# Patient Record
Sex: MALE | Race: WHITE | NOT HISPANIC OR LATINO | Employment: OTHER | URBAN - METROPOLITAN AREA
[De-identification: names, ages, dates, MRNs, and addresses within clinical notes are randomized per-mention and may not be internally consistent; named-entity substitution may affect disease eponyms.]

---

## 2017-05-13 RX ORDER — SIMVASTATIN 80 MG
TABLET ORAL
Qty: 90 TABLET | Refills: 4 | Status: CANCELLED | OUTPATIENT
Start: 2017-05-13

## 2017-05-20 RX ORDER — SIMVASTATIN 80 MG
TABLET ORAL
Qty: 90 TABLET | Refills: 4 | Status: CANCELLED | OUTPATIENT
Start: 2017-05-13

## 2017-05-23 ENCOUNTER — OFFICE VISIT (OUTPATIENT)
Dept: CARDIOLOGY | Facility: CLINIC | Age: 72
End: 2017-05-23

## 2017-05-23 VITALS
SYSTOLIC BLOOD PRESSURE: 117 MMHG | HEART RATE: 55 BPM | BODY MASS INDEX: 28.85 KG/M2 | DIASTOLIC BLOOD PRESSURE: 67 MMHG | WEIGHT: 232 LBS | HEIGHT: 75 IN

## 2017-05-23 DIAGNOSIS — I25.10 CORONARY ARTERY DISEASE INVOLVING NATIVE CORONARY ARTERY OF NATIVE HEART WITHOUT ANGINA PECTORIS: Primary | ICD-10-CM

## 2017-05-23 DIAGNOSIS — I10 ESSENTIAL HYPERTENSION: ICD-10-CM

## 2017-05-23 DIAGNOSIS — I63.9 CEREBROVASCULAR ACCIDENT (CVA), UNSPECIFIED MECHANISM (HCC): ICD-10-CM

## 2017-05-23 DIAGNOSIS — E78.00 HYPERCHOLESTEROLEMIA: ICD-10-CM

## 2017-05-23 PROCEDURE — 99212 OFFICE O/P EST SF 10 MIN: CPT | Performed by: INTERNAL MEDICINE

## 2017-05-23 RX ORDER — OXYBUTYNIN CHLORIDE 5 MG/1
TABLET ORAL
Refills: 0 | COMMUNITY
Start: 2017-05-10 | End: 2020-06-11

## 2017-05-23 RX ORDER — LEVOTHYROXINE SODIUM 0.03 MG/1
25 TABLET ORAL DAILY
Refills: 0 | COMMUNITY
Start: 2017-03-02

## 2017-05-23 RX ORDER — AMLODIPINE BESYLATE 5 MG/1
5 TABLET ORAL DAILY
Refills: 0 | COMMUNITY
Start: 2017-04-04 | End: 2019-06-04 | Stop reason: SDUPTHER

## 2017-08-30 ENCOUNTER — TRANSCRIBE ORDERS (OUTPATIENT)
Dept: ADMINISTRATIVE | Facility: HOSPITAL | Age: 72
End: 2017-08-30

## 2017-08-30 DIAGNOSIS — I10 ESSENTIAL HYPERTENSION, MALIGNANT: Primary | ICD-10-CM

## 2017-08-30 DIAGNOSIS — E03.9 UNSPECIFIED HYPOTHYROIDISM: ICD-10-CM

## 2017-11-09 ENCOUNTER — TRANSCRIBE ORDERS (OUTPATIENT)
Dept: ADMINISTRATIVE | Facility: HOSPITAL | Age: 72
End: 2017-11-09

## 2017-11-09 ENCOUNTER — HOSPITAL ENCOUNTER (OUTPATIENT)
Dept: GENERAL RADIOLOGY | Facility: HOSPITAL | Age: 72
Discharge: HOME | End: 2017-11-09
Attending: INTERNAL MEDICINE | Admitting: INTERNAL MEDICINE

## 2017-11-09 DIAGNOSIS — M25.552 ISCHIAL PAIN, LEFT: Primary | ICD-10-CM

## 2017-11-09 PROCEDURE — 72170 X-RAY EXAM OF PELVIS: CPT

## 2018-05-29 ENCOUNTER — OFFICE VISIT (OUTPATIENT)
Dept: CARDIOLOGY | Facility: CLINIC | Age: 73
End: 2018-05-29

## 2018-05-29 VITALS
HEART RATE: 55 BPM | HEIGHT: 75 IN | SYSTOLIC BLOOD PRESSURE: 128 MMHG | BODY MASS INDEX: 28.97 KG/M2 | DIASTOLIC BLOOD PRESSURE: 72 MMHG | WEIGHT: 233 LBS

## 2018-05-29 DIAGNOSIS — I10 ESSENTIAL HYPERTENSION: ICD-10-CM

## 2018-05-29 DIAGNOSIS — I63.9 CEREBROVASCULAR ACCIDENT (CVA), UNSPECIFIED MECHANISM (HCC): ICD-10-CM

## 2018-05-29 DIAGNOSIS — I25.10 CORONARY ARTERY DISEASE INVOLVING NATIVE CORONARY ARTERY OF NATIVE HEART WITHOUT ANGINA PECTORIS: Primary | ICD-10-CM

## 2018-05-29 DIAGNOSIS — E78.00 HYPERCHOLESTEROLEMIA: ICD-10-CM

## 2018-05-29 PROCEDURE — 99214 OFFICE O/P EST MOD 30 MIN: CPT | Performed by: INTERNAL MEDICINE

## 2018-05-29 NOTE — PROGRESS NOTES
"  OFFICE FOLLOW UP     Date of Encounter:2018     Name: Travon Plummer  : 1945  Address: [address] [place], [place] [address]  Phone: [phone]    PCP: Chilango Erickson MD  [address] [place], [place] [id number]    Travon Plummer is a 73 y.o. male.      Chief Complaint: Follow up of CAD    Problem List:   1. Coronary artery disease.  a. Dyspnea/abnormal MPS, 2008:  Inferior wall ischemia, EF 70%:  1. Left heart catheterization by Dr. Gonzalez, 12/15/2008:  Normal LV.  2. Subtotal occlusion of well collateralized right JEREMIAS.  3. A 3.5 x 13 mm. Cypher ESTIVEN to RCA expanded with 4 mm balloon.   2. Cerebrovascular accident with expressive aphasia, 10/31/2008:  a. PTA/left carotid artery stent, 10/31/2008.   b. No obstructive disease by duplex, 2010.  c. Carotid duplex, 11/10/2014, Petros Crsos MD, revealed patent left carotid stent and no evidence of significant stenosis in the right ICA.  3. History of hypertension; hypotensive at the time of office visit on 11/10/2015.  4. Hypercholesterolemia.   5. History of paroxysmal atrial fibrillation, data deficit.  6. Tobacco/chewing abuse.   7. Gout.  8. Osteoarthritis.  9. Gastroesophageal reflux disease.  10. Obesity.  11. Acute Pancreatitis, 2016.  12. Prostate cancer, diagnosed 2015, status post radiation therapy x39 treatments, 2015 at [facility].  13. Renal cell carcinoma, diagnosed 2015, status post left nephrectomy.  Elevated creatinine of 2.1 on labs performed 2015. Creatinine 1.3 2015. \"Spots on lung\" and chest treated with radiation, 2016.  14. Surgical history:  a. Inguinal hernia.   b. Colon polypectomy    Allergies   Allergen Reactions   • Lipitor [Atorvastatin]        Current Medications:  •  allopurinol 300 mg by mouth 2 (Two) Times a Day.  •  amLODIPine (NORVASC) 5 MG by mouth daily  •  aspirin 81 MG EC by mouth Daily  •  carvedilol 3.125 MG by mouth 2 " "(Two) Times a Day With Meals  •  levothyroxine 25 MCG by mouth once daily  •  oxybutynin 5 MG by mouth twice a day  •  pantoprazole 40 MG EC BY MOUTH ONCE DAILY  •  simvastatin 80 MG by mouth once every day  •  tamsulosin 0.4 MG by mouth Daily.    History of Present Illness:  The patient returns for followup today.  Over the last year he has apparently been treated for some \"metastatic\" renal cell cancer lesions that include a pulmonary lesion and a possible skin lesion on his chest.  There is as well now, per his wife's history, a possible bone lesion.  He has not had angina.  He has not had symptoms of TIA or CVA.  Home blood pressures have been \"normal\".           The following portions of the patient's history were reviewed and updated as appropriate: allergies, current medications and problem list.    ROS: Pertinent positives as listed in the HPI.  All other systems reviewed and negative.    Objective:    Vitals:    05/29/18 1203 05/29/18 1207   BP: 140/82 128/72   BP Location: Right arm Right arm   Patient Position: Sitting Standing   Pulse: (!) 49 55   Weight: 106 kg (233 lb)    Height: 190.5 cm (75\")        Physical Exam:  GENERAL: Alert, cooperative, in no acute distress.   HEENT: Fundoscopic deferred, otherwise unremarkable.  NECK: No Jugular venous distention, adenopathy, or thyromegaly noted.   HEART: Regular rhythm, normal rate, and no murmurs, gallops, or rubs.   LUNGS: Clear to auscultation bilaterally. No wheezing, rales or ronchi.  ABDOMEN: Flat without evidence of organomegaly, masses, or tenderness.  NEUROLOGIC: No focal abnormalities involving strength or sensation are noted.   EXTREMITIES: No clubbing, cyanosis, or edema noted.     Diagnostic Data:  No new labs available to review.    Procedures      Assessment and Plan:   1.  CAD: Asymptomatic.  Best medical treatment will be continued.  2.  HTN: Historically, BPs are normal. Will \"watch\" only for now.  3.  HLD:  No changes at this time; " recheck cholesterol per primary care. Target LDL is <70.    I, Raul Gonzalez MD, [facility], [facility], personally performed the services described in this documentation as scribed by the above named individual in my presence, and it is both accurate and complete. At 1:58 PM on 05/29/2018    I will see Travon Plummer back in one year or sooner on an as needed basis.        Scribed for Raul Gonzalez MD by Sandra Flores RN. 05/29/2018 12:14 PM.        EMR Dragon/Transcription Disclaimer:  Much of this encounter note is an electronic transcription/translation of spoken language to printed text.  The electronic translation of spoken language may permit erroneous, or at times, nonsensical words or phrases to be inadvertently transcribed.  Although I have reviewed the note for such errors, some may still exist.

## 2018-12-26 ENCOUNTER — TRANSCRIBE ORDERS (OUTPATIENT)
Dept: ADMINISTRATIVE | Facility: HOSPITAL | Age: 73
End: 2018-12-26

## 2018-12-26 ENCOUNTER — HOSPITAL ENCOUNTER (OUTPATIENT)
Dept: GENERAL RADIOLOGY | Facility: HOSPITAL | Age: 73
Discharge: HOME | End: 2018-12-26
Attending: INTERNAL MEDICINE | Admitting: INTERNAL MEDICINE

## 2018-12-26 DIAGNOSIS — J18.9 PNEUMONIA OF LOWER LOBE DUE TO INFECTIOUS ORGANISM, UNSPECIFIED LATERALITY: Primary | ICD-10-CM

## 2018-12-26 DIAGNOSIS — J18.9 PNEUMONIA OF LOWER LOBE DUE TO INFECTIOUS ORGANISM, UNSPECIFIED LATERALITY: ICD-10-CM

## 2018-12-26 PROCEDURE — 71046 X-RAY EXAM CHEST 2 VIEWS: CPT

## 2019-04-16 ENCOUNTER — HOSPITAL ENCOUNTER (OUTPATIENT)
Dept: GENERAL RADIOLOGY | Facility: HOSPITAL | Age: 74
Discharge: HOME | End: 2019-04-16

## 2019-04-16 ENCOUNTER — HOSPITAL ENCOUNTER (OUTPATIENT)
Dept: GENERAL RADIOLOGY | Facility: HOSPITAL | Age: 74
Discharge: HOME | End: 2019-04-16
Admitting: INTERNAL MEDICINE

## 2019-04-16 ENCOUNTER — TRANSCRIBE ORDERS (OUTPATIENT)
Dept: GENERAL RADIOLOGY | Facility: HOSPITAL | Age: 74
End: 2019-04-16

## 2019-04-16 DIAGNOSIS — M79.631 PAIN OF RIGHT FOREARM: ICD-10-CM

## 2019-04-16 DIAGNOSIS — M25.531 RIGHT WRIST PAIN: ICD-10-CM

## 2019-04-16 DIAGNOSIS — M25.531 RIGHT WRIST PAIN: Primary | ICD-10-CM

## 2019-04-16 PROCEDURE — 73090 X-RAY EXAM OF FOREARM: CPT

## 2019-04-16 PROCEDURE — 73110 X-RAY EXAM OF WRIST: CPT

## 2019-06-04 ENCOUNTER — OFFICE VISIT (OUTPATIENT)
Dept: CARDIOLOGY | Facility: CLINIC | Age: 74
End: 2019-06-04

## 2019-06-04 VITALS
HEIGHT: 74 IN | HEART RATE: 64 BPM | WEIGHT: 231 LBS | SYSTOLIC BLOOD PRESSURE: 143 MMHG | BODY MASS INDEX: 29.65 KG/M2 | DIASTOLIC BLOOD PRESSURE: 105 MMHG

## 2019-06-04 DIAGNOSIS — I25.10 CORONARY ARTERY DISEASE INVOLVING NATIVE CORONARY ARTERY OF NATIVE HEART WITHOUT ANGINA PECTORIS: ICD-10-CM

## 2019-06-04 DIAGNOSIS — E78.00 HYPERCHOLESTEROLEMIA: ICD-10-CM

## 2019-06-04 DIAGNOSIS — I63.9 CEREBROVASCULAR ACCIDENT (CVA), UNSPECIFIED MECHANISM (HCC): ICD-10-CM

## 2019-06-04 DIAGNOSIS — I10 ESSENTIAL HYPERTENSION: Primary | ICD-10-CM

## 2019-06-04 PROCEDURE — 99214 OFFICE O/P EST MOD 30 MIN: CPT | Performed by: INTERNAL MEDICINE

## 2019-06-04 RX ORDER — AMLODIPINE BESYLATE 10 MG/1
10 TABLET ORAL DAILY
Qty: 90 TABLET | Refills: 3 | Status: SHIPPED | OUTPATIENT
Start: 2019-06-04 | End: 2019-12-26

## 2019-06-04 NOTE — PROGRESS NOTES
"  OFFICE FOLLOW UP     Date of Encounter:2019     Name: Travon Plummer  : 1945  Address: [address] [place], [place] [address]    PCP: Chilango Erickson MD  [address] [id number]    Travon Plummer is a 74 y.o. male.      Chief Complaint: Follow up of CAD, CVA, HTN, HLD    Problem List:   1. Coronary artery disease.  a. Dyspnea/abnormal MPS, 2008:  Inferior wall ischemia, EF 70%:  1. Left heart catheterization by Dr. Gonzalez, 12/15/2008:  Normal LV.  2. Subtotal occlusion of well collateralized right JEREMIAS.  3. A 3.5 x 13 mm. Cypher ESTIVEN to RCA expanded with 4 mm balloon.   2. Cerebrovascular accident with expressive aphasia, 10/31/2008:  a. PTA/left carotid artery stent, 10/31/2008.   b. No obstructive disease by duplex, 2010.  c. Carotid duplex, 11/10/2014, Petros Cross MD, revealed patent left carotid stent and no evidence of significant stenosis in the right ICA.  3. History of hypertension; hypotensive at the time of office visit on 11/10/2015.  4. Hypercholesterolemia.   5. History of paroxysmal atrial fibrillation, data deficit.  6. Tobacco/chewing abuse.   7. Gout.  8. Osteoarthritis.  9. Gastroesophageal reflux disease.  10. Obesity.  11. Acute Pancreatitis, 2016.  12. Prostate cancer, diagnosed 2015, status post radiation therapy x39 treatments, 2015 at [facility].  13. Renal cell carcinoma, diagnosed 2015, status post left nephrectomy.  Elevated creatinine of 2.1 on labs performed 2015. Creatinine 1.3 2015. \"Spots on lung\" and chest treated with radiation, 2016.  14. Surgical history:  a. Inguinal hernia.   b. Colon polypectomy  Allergies:  Allergies   Allergen Reactions   • Lipitor [Atorvastatin]        Current Medications:  •  allopurinol (ZYLOPRIM) 300 MG tablet, Take 300 mg by mouth 2 (Two) Times a Day  •  amLODIPine (NORVASC) 5 MG tablet, Take 5 mg by mouth Daily.  •  aspirin 81 MG EC " "tablet, Take 81 mg by mouth Daily.   •  carvedilol (COREG) 3.125 MG tablet, Take 3.125 mg by mouth 2 (Two) Times a Day With Meals.  •  levothyroxine (SYNTHROID, LEVOTHROID) 25 MCG tablet, take 1 tablet by mouth once daily  •  oxybutynin (DITROPAN) 5 MG tablet, take 1 tablet by mouth daily  •  pantoprazole (PROTONIX) 40 MG EC tablet, TAKE 1 TABLET BY MOUTH ONCE DAILY  •  simvastatin (ZOCOR) 80 MG tablet, Take 1 tablet by mouth once every day    History of Present Illness: Mr. Plummer returns for follow-up today.  He has had no angina or symptoms of congestive heart failure.  He has had no symptoms or findings suggesting TIA  or stroke. He is fairly sedentary but remains asymptomatic.         The following portions of the patient's history were reviewed and updated as appropriate: allergies, current medications and problem list.    ROS: Pertinent positives as listed in the HPI.  All other systems reviewed and negative.    Objective:    Vitals:    06/04/19 1305 06/04/19 1307   BP: 147/85 (!) 143/105   BP Location: Left arm Left arm   Patient Position: Sitting Standing   Pulse: 61 64   Weight: 105 kg (231 lb)    Height: 188 cm (74\")        Physical Exam:  GENERAL: Alert, cooperative, in no acute distress.   HEENT: Normocephalic, no adenopathy, no jugular venous distention  HEART: No discrete PMI is noted. Regular rhythm, normal rate, and no murmurs, gallops, or rubs.   LUNGS: Clear to auscultation bilaterally. No wheezing, rales or ronchi.  ABDOMEN: Soft, bowel sounds present, non-tender   NEUROLOGIC: No focal abnormalities involving strength or sensation are noted.   EXTREMITIES: No clubbing, cyanosis, or edema noted.     Diagnostic Data:  No new labs available to review.      Procedures      Assessment and Plan:   1.  CAD: NYHA I on BMT.  2.  HTN: Not at goal at home or in office. I'll increase amlodipine from 5 to 10 mg p.o. daily  3.  HLD:  The target LDL is <70.    I, Raul [facility] MD, [facility], " personally performed the services described in this documentation as scribed by the above named individual in my presence, and it is both accurate and complete. At 6:15 PM on 06/04/2019    I will see Travon Plummer back in one year or sooner on an as needed basis.        Scribed for Raul Gonzalez MD by Sandra Flores RN. 06/04/2019 12:16 PM.        EMR Dragon/Transcription Disclaimer:  Much of this encounter note is an electronic transcription/translation of spoken language to printed text.  The electronic translation of spoken language may permit erroneous, or at times, nonsensical words or phrases to be inadvertently transcribed.  Although I have reviewed the note for such errors, some may still exist.

## 2019-06-17 ENCOUNTER — TRANSCRIBE ORDERS (OUTPATIENT)
Dept: ADMINISTRATIVE | Facility: HOSPITAL | Age: 74
End: 2019-06-17

## 2019-06-17 ENCOUNTER — HOSPITAL ENCOUNTER (OUTPATIENT)
Dept: GENERAL RADIOLOGY | Facility: HOSPITAL | Age: 74
Discharge: HOME | End: 2019-06-17
Admitting: INTERNAL MEDICINE

## 2019-06-17 DIAGNOSIS — M25.552 LEFT HIP PAIN: Primary | ICD-10-CM

## 2019-06-17 PROCEDURE — 73502 X-RAY EXAM HIP UNI 2-3 VIEWS: CPT

## 2019-06-25 ENCOUNTER — TRANSCRIBE ORDERS (OUTPATIENT)
Dept: ADMINISTRATIVE | Facility: HOSPITAL | Age: 74
End: 2019-06-25

## 2019-06-25 ENCOUNTER — HOSPITAL ENCOUNTER (OUTPATIENT)
Dept: CT IMAGING | Facility: HOSPITAL | Age: 74
Discharge: HOME | End: 2019-06-25
Admitting: ORTHOPAEDIC SURGERY

## 2019-06-25 DIAGNOSIS — M25.552 LEFT HIP PAIN: Primary | ICD-10-CM

## 2019-06-25 DIAGNOSIS — M25.552 LEFT HIP PAIN: ICD-10-CM

## 2019-06-25 PROCEDURE — 73700 CT LOWER EXTREMITY W/O DYE: CPT

## 2019-12-26 RX ORDER — AMLODIPINE BESYLATE 10 MG/1
TABLET ORAL
Qty: 30 TABLET | Refills: 6 | Status: SHIPPED | OUTPATIENT
Start: 2019-12-26 | End: 2020-01-06

## 2020-01-06 RX ORDER — AMLODIPINE BESYLATE 10 MG/1
TABLET ORAL
Qty: 30 TABLET | Refills: 6 | Status: SHIPPED | OUTPATIENT
Start: 2020-01-06 | End: 2020-09-08

## 2020-04-23 ENCOUNTER — HOSPITAL ENCOUNTER (EMERGENCY)
Facility: HOSPITAL | Age: 75
Discharge: HOME | End: 2020-04-23
Attending: EMERGENCY MEDICINE | Admitting: EMERGENCY MEDICINE

## 2020-04-23 VITALS
WEIGHT: 238.2 LBS | HEART RATE: 74 BPM | SYSTOLIC BLOOD PRESSURE: 150 MMHG | HEIGHT: 74 IN | DIASTOLIC BLOOD PRESSURE: 83 MMHG | BODY MASS INDEX: 30.57 KG/M2 | RESPIRATION RATE: 19 BRPM | TEMPERATURE: 97.6 F | OXYGEN SATURATION: 97 %

## 2020-04-23 DIAGNOSIS — S05.02XA CORNEAL ABRASION, LEFT, INITIAL ENCOUNTER: Primary | ICD-10-CM

## 2020-04-23 PROCEDURE — 99284 EMERGENCY DEPT VISIT MOD MDM: CPT

## 2020-04-23 RX ORDER — ERYTHROMYCIN 5 MG/G
OINTMENT OPHTHALMIC EVERY 6 HOURS
Qty: 3.5 G | Refills: 0 | Status: SHIPPED | OUTPATIENT
Start: 2020-04-23 | End: 2020-04-28

## 2020-04-23 RX ORDER — TETRACAINE HYDROCHLORIDE 5 MG/ML
2 SOLUTION OPHTHALMIC ONCE
Status: COMPLETED | OUTPATIENT
Start: 2020-04-23 | End: 2020-04-23

## 2020-04-23 RX ORDER — ERYTHROMYCIN 5 MG/G
1 OINTMENT OPHTHALMIC ONCE
Status: COMPLETED | OUTPATIENT
Start: 2020-04-23 | End: 2020-04-23

## 2020-04-23 RX ADMIN — ERYTHROMYCIN 1 APPLICATION: 5 OINTMENT OPHTHALMIC at 22:09

## 2020-04-23 RX ADMIN — TETRACAINE HYDROCHLORIDE 2 DROP: 5 SOLUTION OPHTHALMIC at 22:10

## 2020-04-23 RX ADMIN — FLUORESCEIN SODIUM 1 STRIP: 1 STRIP OPHTHALMIC at 22:10

## 2020-04-24 NOTE — ED PROVIDER NOTES
"Subjective   Patient presents with complaint of some left eye discomfort seen at an urgent treatment center yesterday given antibiotic eyedrops states still having some discomfort in the left eye apparently was mowing 3 days ago and felt like he \"got something in the left eye.  No loss of vision describes some kind of blurred vision at times he does have some history of myopia no chest pain shortness of air no fever chills no systemic complaints      History provided by:  Patient      Review of Systems   Constitutional: Negative.    HENT: Negative.    Eyes: Positive for photophobia and pain.   Respiratory: Negative.    Cardiovascular: Negative.    Gastrointestinal: Negative.    Musculoskeletal: Positive for arthralgias.   Skin: Negative.    Neurological: Negative.    Psychiatric/Behavioral: Negative.    All other systems reviewed and are negative.      Past Medical History:   Diagnosis Date   • Broken arm    • Cerebrovascular accident (CMS/HCC) 10/31/2008   • Coronary artery disease    • Dyspnea    • GERD (gastroesophageal reflux disease)    • Gout    • Hypercholesterolemia    • Hypertension 11/10/2015    History of hypertension; hypotensive at the time of office visit on 11/10/2015.   • Obesity    • Osteoarthritis    • Paroxysmal atrial fibrillation (CMS/HCC)     History of paroxysmal atrial fibrillation, data deficit.   • Prostate cancer (CMS/HCC)     Prostate cancer, diagnosed March of 2015, status post radiation therapy x39 treatments, 07/01/2015 at [facility].   • Renal cell carcinoma (CMS/HCC)     Renal cell carcinoma, diagnosed March of 2015, status post left nephrectomy.  Elevated creatinine of 2.1 on labs performed 11/04/2015.       Allergies   Allergen Reactions   • Lipitor [Atorvastatin]        Past Surgical History:   Procedure Laterality Date   • CAROTID STENT Left 10/31/2008    PTA/left carotid artery stent, 10/31/2008.    • COLONOSCOPY W/ POLYPECTOMY     • CORONARY ANGIOPLASTY WITH STENT " PLACEMENT      A 3.5 x 13 mm. Cypher ESTIVEN to RCA expanded with 4 mm balloon.    • INGUINAL HERNIA REPAIR     • NEPHRECTOMY Left     Renal cell carcinoma, diagnosed 2015, status post left nephrectomy.  Elevated creatinine of 2.1 on labs performed 2015.   • PROSTATE SURGERY         Family History   Problem Relation Age of Onset   • No Known Problems Mother    • No Known Problems Father        Social History     Socioeconomic History   • Marital status:      Spouse name: Not on file   • Number of children: Not on file   • Years of education: Not on file   • Highest education level: Not on file   Tobacco Use   • Smoking status: Former Smoker     Types: Cigarettes     Last attempt to quit:      Years since quittin.3   • Smokeless tobacco: Former User     Types: Chew     Quit date: 2017   Substance and Sexual Activity   • Alcohol use: No   • Drug use: No   • Sexual activity: Defer           Objective   Physical Exam   Constitutional: He appears well-developed and well-nourished.   Well-appearing nontoxic acute distress   HENT:   Head: Normocephalic and atraumatic.   Eyes: Pupils are equal, round, and reactive to light. Conjunctivae and EOM are normal. Right eye exhibits no discharge. Left eye exhibits no discharge.   No orbital edema or erythema   Neck: Normal range of motion.   Cardiovascular: Normal rate and regular rhythm.   Pulmonary/Chest: Effort normal.   Musculoskeletal: Normal range of motion.   Neurological: He is alert. No cranial nerve deficit or sensory deficit. He exhibits normal muscle tone. Coordination normal.   Skin: Skin is warm and dry. Capillary refill takes less than 2 seconds.   Psychiatric: He has a normal mood and affect. His behavior is normal. Judgment and thought content normal.   Nursing note and vitals reviewed.      Foreign Body Removal - Ocular  Date/Time: 2020 9:43 PM  Performed by: Escobar Bob PA-C  Authorized by: Cuate Faith DO      Consent:     Consent obtained:  Verbal    Consent given by:  Patient  Pre-procedure details:     OS visual acuity:  20/50    OD visual acuity:  20/70    Correction: uncorrected      Fluorescein exam: yes      Fluorescein uptake: yes      Corneal abrasion description:  Central punctate area    Corneal abrasion location:  Central  Anesthesia (see MAR for exact dosages):     Local anesthetic:  Tetracaine drops  Procedure details:     Localization method:  Eyelid eversion and slit lamp    Removal mechanism:  Irrigation    Foreign bodies recovered:  None  Post-procedure details:     Dressing:  Antibiotic ointment  Comments:      Exam with slit lamp left eye, appears to be a small defect central cornea, seen evaluated by myself and Dr. Faith... Small punctate lesion, also some irritation to the limbus left sclera  Aubrey-Pen use eye pressure7/8/9 left               ED Course  ED Course as of Apr 23 2148   Thu Apr 23, 2020 2142 Pt seen and examined by myself and Dr faith  Plan on discharging patient home with erythromycin ophthalmic ointment recommend follow-up with ophthalmology [facility] here in town tomorrow to return here if there is any problem follow-up and worsen concerns patient is doing great with plan of care    [SC]      ED Course User Index  [SC] Escobar Bob PA-C                                           MDM  Number of Diagnoses or Management Options     Amount and/or Complexity of Data Reviewed  Review and summarize past medical records: yes  Discuss the patient with other providers: yes    Risk of Complications, Morbidity, and/or Mortality  Presenting problems: moderate  Diagnostic procedures: low  Management options: low        Final diagnoses:   Corneal abrasion, left, initial encounter            Escobar Bob PA-C  04/23/20 2148

## 2020-04-24 NOTE — DISCHARGE INSTRUCTIONS
Use eye ointment as directed, follow-up with ophthalmology tomorrow, return to ER for any problem with follow-up, worsening symptoms

## 2020-06-11 ENCOUNTER — OFFICE VISIT (OUTPATIENT)
Dept: CARDIOLOGY | Facility: CLINIC | Age: 75
End: 2020-06-11

## 2020-06-11 VITALS
SYSTOLIC BLOOD PRESSURE: 111 MMHG | DIASTOLIC BLOOD PRESSURE: 67 MMHG | HEIGHT: 75 IN | HEART RATE: 63 BPM | TEMPERATURE: 97 F | WEIGHT: 226.6 LBS | BODY MASS INDEX: 28.17 KG/M2

## 2020-06-11 DIAGNOSIS — I65.22 CAROTID STENOSIS, LEFT: ICD-10-CM

## 2020-06-11 DIAGNOSIS — I63.9 CEREBROVASCULAR ACCIDENT (CVA), UNSPECIFIED MECHANISM (HCC): ICD-10-CM

## 2020-06-11 DIAGNOSIS — I25.10 CORONARY ARTERY DISEASE INVOLVING NATIVE CORONARY ARTERY OF NATIVE HEART WITHOUT ANGINA PECTORIS: ICD-10-CM

## 2020-06-11 DIAGNOSIS — I10 ESSENTIAL HYPERTENSION: Primary | ICD-10-CM

## 2020-06-11 DIAGNOSIS — E78.00 HYPERCHOLESTEROLEMIA: ICD-10-CM

## 2020-06-11 PROCEDURE — 99214 OFFICE O/P EST MOD 30 MIN: CPT | Performed by: INTERNAL MEDICINE

## 2020-06-11 NOTE — PROGRESS NOTES
"  OFFICE FOLLOW UP     Date of Encounter:2020     Name: Travon Plummer  : 1945  Address: [address] [place], [place] [id number]    PCP: Chilango Erickson MD  [address] [id number]    Travon Plummer is a 75 y.o. male.      Chief Complaint: Follow up of CAD, CVD, HTN, HLD    Problem List:   1. Coronary artery disease.  a. Dyspnea/abnormal MPS, 2008:  Inferior wall ischemia, EF 70%:  1. Left heart catheterization by Dr. Gonzalez, 12/15/2008:  Normal LV.  2. Subtotal occlusion of well collateralized right JEREMIAS.  3. A 3.5 x 13 mm. Cypher ESTIVEN to RCA expanded with 4 mm balloon.   2. Cerebrovascular accident with expressive aphasia, 10/31/2008:  a. PTA/left carotid artery stent, 10/31/2008.   b. No obstructive disease by duplex, 2010.  c. Carotid duplex, 11/10/2014, Petros Cross MD, revealed patent left carotid stent and no evidence of significant stenosis in the right ICA.  3. History of hypertension; hypotensive at the time of office visit on 11/10/2015.  4. Hypercholesterolemia.   5. History of paroxysmal atrial fibrillation, data deficit.  6. Tobacco/chewing abuse.   7. Gout.  8. Osteoarthritis.  9. Gastroesophageal reflux disease.  10. Obesity.  11. Acute Pancreatitis, 2016.  12. Prostate cancer, diagnosed 2015, status post radiation therapy x39 treatments, 2015 at [facility].  13. Renal cell carcinoma, diagnosed 2015, status post left nephrectomy.  Elevated creatinine of 2.1 on labs performed 2015. Creatinine 1.3 2015. \"Spots on lung\" and chest treated with radiation, 2016.  14. Surgical history:  a. Inguinal hernia.   b. Colon polypectomy    Allergies:  Allergies   Allergen Reactions   • Lipitor [Atorvastatin] Myalgia       Current Medications:   •  allopurinol (ZYLOPRIM) 300 MG tablet, Take 300 mg by mouth Daily.  •  amLODIPine (NORVASC) 10 MG tablet, TAKE ONE TABLET BY MOUTH EVERY DAY  •  aspirin 81 " "MG EC tablet, Take 81 mg by mouth Daily.  •  carvedilol (COREG) 3.125 MG tablet, Take 3.125 mg by mouth 2 (Two) Times a Day With Meals.  •  levothyroxine (SYNTHROID, LEVOTHROID) 25 MCG tablet, take 1 tablet by mouth once daily  •  Mirabegron ER (Myrbetriq) 50 MG tablet sustained-release 24 hour 24 hr tablet, Take 50 mg by mouth Daily  •  pantoprazole (PROTONIX) 40 MG EC tablet, TAKE 1 TABLET BY MOUTH ONCE DAILY  •  simvastatin (ZOCOR) 80 MG tablet, Take 1 tablet by mouth once every day    History of Present Illness:   Travon Plummer returns for follow up today. He has not had angina, heart failure or recurrent neurovascular symptoms. He follows closely with Dr. Erickson for primary care. 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    The following portions of the patient's history were reviewed and updated as appropriate: allergies, current medications and problem list.    ROS: Pertinent positives as listed in the HPI.  All other systems reviewed and negative.    Objective:    Vitals:    06/11/20 1106 06/11/20 1108   BP: 118/81 111/67   BP Location: Left arm Left arm   Patient Position: Sitting Standing   Pulse: 61 63   Temp: 97 °F (36.1 °C)    Weight: 103 kg (226 lb 9.6 oz)    Height: 190.5 cm (75\")        Physical Exam:  GENERAL: Alert, cooperative, in no acute distress.   HEENT: Normocephalic, no adenopathy, no jugular venous distention  HEART: No discrete PMI is noted. Regular rhythm, normal rate, and no murmurs, gallops, or rubs.   LUNGS: Clear to auscultation bilaterally. No wheezing, rales or ronchi.  ABDOMEN: Soft, bowel sounds present, non-tender   NEUROLOGIC: No focal abnormalities involving strength or sensation are noted.   EXTREMITIES: No clubbing, cyanosis, or edema noted.      Diagnostic Data:  No new labs available to review.     Procedures      Assessment and Plan:   1.  CAD: No angina, continue aspirin and statin.  2.  CVD: Repeat carotid duplex to check stent patency at his " convenience. He has not had recurrent neurovascular symptoms.   3.  HLD:  Followed by Dr. Erickson, continue statin.   4.  HTN: Well controlled, continue current medications.     I will see Travon Plummer back in one year or sooner on an as needed basis.  Scribed for Raul Gonzalez MD by Sandra Flores RN. 06/11/2020 11:53 AM.      EMR Dragon/Transcription Disclaimer:  Much of this encounter note is an electronic transcription/translation of spoken language to printed text.  The electronic translation of spoken language may permit erroneous, or at times, nonsensical words or phrases to be inadvertently transcribed.  Although I have reviewed the note for such errors, some may still exist.

## 2020-06-15 ENCOUNTER — HOSPITAL ENCOUNTER (OUTPATIENT)
Dept: CARDIOLOGY | Facility: HOSPITAL | Age: 75
Discharge: HOME | End: 2020-06-15
Admitting: INTERNAL MEDICINE

## 2020-06-15 VITALS — WEIGHT: 226 LBS | BODY MASS INDEX: 28.1 KG/M2 | HEIGHT: 75 IN

## 2020-06-15 DIAGNOSIS — I65.22 CAROTID STENOSIS, LEFT: ICD-10-CM

## 2020-06-15 PROCEDURE — 93880 EXTRACRANIAL BILAT STUDY: CPT | Performed by: INTERNAL MEDICINE

## 2020-06-15 PROCEDURE — 93880 EXTRACRANIAL BILAT STUDY: CPT

## 2020-06-16 LAB
BH CV ECHO MEAS - BSA(HAYCOCK): 2.3 M^2
BH CV ECHO MEAS - BSA: 2.3 M^2
BH CV ECHO MEAS - BZI_BMI: 28.2 KILOGRAMS/M^2
BH CV ECHO MEAS - BZI_METRIC_HEIGHT: 190.5 CM
BH CV ECHO MEAS - BZI_METRIC_WEIGHT: 102.5 KG
BH CV MID LEFT ICA HIDDEN LRR: 1 CM
BH CV VAS CAROTID LEFT DISTAL STENT EDV: 26.3 CM/S
BH CV VAS CAROTID LEFT DISTAL STENT: 75.5 CM/S
BH CV VAS CAROTID LEFT DISTAL TO STENT EDV: 24.6 CM/S
BH CV VAS CAROTID LEFT DISTAL TO STENT: 82.1 CM/S
BH CV VAS CAROTID LEFT MID STENT EDV: 22.8 CM/S
BH CV VAS CAROTID LEFT MID STENT: 78.1 CM/S
BH CV VAS CAROTID LEFT PROXIMAL STENT EDV: 7.79 CM/S
BH CV VAS CAROTID LEFT PROXIMAL STENT: 36.7 CM/S
BH CV VAS CAROTID LEFT PROXIMAL TO STENT: 50.8 CM/S
BH CV VAS CAROTID LEFT STENT NATIVE VESSEL PROXIMAL ED: 10.9 CM/S
BH CV XLRA MEAS LEFT DIST CCA EDV: 13.6 CM/SEC
BH CV XLRA MEAS LEFT DIST CCA PSV: 62.8 CM/SEC
BH CV XLRA MEAS LEFT DIST ICA EDV: 20.6 CM/SEC
BH CV XLRA MEAS LEFT DIST ICA PSV: 64.4 CM/SEC
BH CV XLRA MEAS LEFT ICA/CCA RATIO: 1.31
BH CV XLRA MEAS LEFT MID CCA EDV: 16.7 CM/SEC
BH CV XLRA MEAS LEFT MID CCA PSV: 62.8 CM/SEC
BH CV XLRA MEAS LEFT MID ICA EDV: 24.6 CM/SEC
BH CV XLRA MEAS LEFT MID ICA PSV: 82.1 CM/SEC
BH CV XLRA MEAS LEFT PROX CCA EDV: 15.5 CM/SEC
BH CV XLRA MEAS LEFT PROX CCA PSV: 68.3 CM/SEC
BH CV XLRA MEAS LEFT PROX ECA EDV: 9.3 CM/SEC
BH CV XLRA MEAS LEFT PROX ECA PSV: 73.2 CM/SEC
BH CV XLRA MEAS LEFT PROX ICA EDV: 12.9 CM/SEC
BH CV XLRA MEAS LEFT PROX ICA PSV: 42.3 CM/SEC
BH CV XLRA MEAS LEFT PROX SCLA EDV: 0 CM/SEC
BH CV XLRA MEAS LEFT PROX SCLA PSV: 181 CM/SEC
BH CV XLRA MEAS LEFT VERTEBRAL A EDV: 13 CM/SEC
BH CV XLRA MEAS LEFT VERTEBRAL A PSV: 40.3 CM/SEC
BH CV XLRA MEAS RIGHT DIST CCA EDV: 9.8 CM/SEC
BH CV XLRA MEAS RIGHT DIST CCA PSV: 36.7 CM/SEC
BH CV XLRA MEAS RIGHT DIST ICA EDV: 21.1 CM/SEC
BH CV XLRA MEAS RIGHT DIST ICA PSV: 57.1 CM/SEC
BH CV XLRA MEAS RIGHT ICA/CCA RATIO: 5.9
BH CV XLRA MEAS RIGHT MID CCA EDV: 10.2 CM/SEC
BH CV XLRA MEAS RIGHT MID CCA PSV: 52.9 CM/SEC
BH CV XLRA MEAS RIGHT MID ICA EDV: 13.2 CM/SEC
BH CV XLRA MEAS RIGHT MID ICA PSV: 64.1 CM/SEC
BH CV XLRA MEAS RIGHT PROX CCA EDV: 12.4 CM/SEC
BH CV XLRA MEAS RIGHT PROX CCA PSV: 72.1 CM/SEC
BH CV XLRA MEAS RIGHT PROX ECA EDV: 16.5 CM/SEC
BH CV XLRA MEAS RIGHT PROX ECA PSV: 118.4 CM/SEC
BH CV XLRA MEAS RIGHT PROX ICA EDV: 92.5 CM/SEC
BH CV XLRA MEAS RIGHT PROX ICA PSV: 312 CM/SEC
BH CV XLRA MEAS RIGHT PROX SCLA EDV: 16.5 CM/SEC
BH CV XLRA MEAS RIGHT PROX SCLA PSV: 159.8 CM/SEC
BH CV XLRA MEAS RIGHT VERTEBRAL A EDV: 15.1 CM/SEC
BH CV XLRA MEAS RIGHT VERTEBRAL A PSV: 53.6 CM/SEC
BH CVPROX LEFT ICA HIDDEN LRR: 1 CM
LEFT ARM BP: NORMAL MMHG
RIGHT ARM BP: NORMAL MMHG

## 2020-06-23 DIAGNOSIS — R73.09 OTHER ABNORMAL GLUCOSE: ICD-10-CM

## 2020-06-23 DIAGNOSIS — E78.00 HYPERCHOLESTEROLEMIA: ICD-10-CM

## 2020-06-23 DIAGNOSIS — I10 ESSENTIAL HYPERTENSION: ICD-10-CM

## 2020-06-23 DIAGNOSIS — I65.21 CAROTID STENOSIS, ASYMPTOMATIC, RIGHT: Primary | ICD-10-CM

## 2020-06-24 ENCOUNTER — LAB (OUTPATIENT)
Dept: LAB | Facility: HOSPITAL | Age: 75
End: 2020-06-24

## 2020-06-24 ENCOUNTER — RESEARCH ENCOUNTER (OUTPATIENT)
Dept: OTHER | Facility: OTHER | Age: 75
End: 2020-06-24

## 2020-06-24 DIAGNOSIS — R73.09 OTHER ABNORMAL GLUCOSE: ICD-10-CM

## 2020-06-24 DIAGNOSIS — I10 ESSENTIAL HYPERTENSION: ICD-10-CM

## 2020-06-24 DIAGNOSIS — E78.00 HYPERCHOLESTEROLEMIA: ICD-10-CM

## 2020-06-24 DIAGNOSIS — I65.21 CAROTID STENOSIS, ASYMPTOMATIC, RIGHT: ICD-10-CM

## 2020-06-24 LAB
ALBUMIN SERPL-MCNC: 4.4 G/DL (ref 3.5–5.2)
ALBUMIN/GLOB SERPL: 1.6 G/DL
ALP SERPL-CCNC: 85 U/L (ref 39–117)
ALT SERPL W P-5'-P-CCNC: 11 U/L (ref 1–41)
ANION GAP SERPL CALCULATED.3IONS-SCNC: 10.5 MMOL/L (ref 5–15)
AST SERPL-CCNC: 15 U/L (ref 1–40)
BILIRUB SERPL-MCNC: 0.3 MG/DL (ref 0.2–1.2)
BUN BLD-MCNC: 31 MG/DL (ref 8–23)
BUN/CREAT SERPL: 11.8 (ref 7–25)
CALCIUM SPEC-SCNC: 9.8 MG/DL (ref 8.6–10.5)
CHLORIDE SERPL-SCNC: 104 MMOL/L (ref 98–107)
CHOLEST SERPL-MCNC: 163 MG/DL (ref 0–200)
CK SERPL-CCNC: 103 U/L (ref 20–200)
CO2 SERPL-SCNC: 22.5 MMOL/L (ref 22–29)
CREAT BLD-MCNC: 2.63 MG/DL (ref 0.76–1.27)
DEPRECATED RDW RBC AUTO: 46.7 FL (ref 37–54)
ERYTHROCYTE [DISTWIDTH] IN BLOOD BY AUTOMATED COUNT: 14.3 % (ref 12.3–15.4)
GFR SERPL CREATININE-BSD FRML MDRD: 24 ML/MIN/1.73
GLOBULIN UR ELPH-MCNC: 2.8 GM/DL
GLUCOSE BLD-MCNC: 103 MG/DL (ref 65–99)
HBA1C MFR BLD: 5.93 % (ref 4.8–5.6)
HCT VFR BLD AUTO: 37.8 % (ref 37.5–51)
HDLC SERPL-MCNC: 38 MG/DL (ref 40–60)
HGB BLD-MCNC: 12.5 G/DL (ref 13–17.7)
LDLC SERPL CALC-MCNC: 83 MG/DL (ref 0–100)
LDLC/HDLC SERPL: 2.19 {RATIO}
MCH RBC QN AUTO: 30.1 PG (ref 26.6–33)
MCHC RBC AUTO-ENTMCNC: 33.1 G/DL (ref 31.5–35.7)
MCV RBC AUTO: 91.1 FL (ref 79–97)
PLATELET # BLD AUTO: 175 10*3/MM3 (ref 140–450)
PMV BLD AUTO: 12 FL (ref 6–12)
POTASSIUM BLD-SCNC: 5.5 MMOL/L (ref 3.5–5.2)
PROT SERPL-MCNC: 7.2 G/DL (ref 6–8.5)
RBC # BLD AUTO: 4.15 10*6/MM3 (ref 4.14–5.8)
SODIUM BLD-SCNC: 137 MMOL/L (ref 136–145)
TRIGL SERPL-MCNC: 209 MG/DL (ref 0–150)
VLDLC SERPL-MCNC: 41.8 MG/DL (ref 5–40)
WBC NRBC COR # BLD: 7.91 10*3/MM3 (ref 3.4–10.8)

## 2020-06-24 PROCEDURE — 36415 COLL VENOUS BLD VENIPUNCTURE: CPT

## 2020-06-24 PROCEDURE — 85027 COMPLETE CBC AUTOMATED: CPT

## 2020-06-24 PROCEDURE — 82550 ASSAY OF CK (CPK): CPT

## 2020-06-24 PROCEDURE — 80061 LIPID PANEL: CPT

## 2020-06-24 PROCEDURE — 80053 COMPREHEN METABOLIC PANEL: CPT

## 2020-06-24 PROCEDURE — 83036 HEMOGLOBIN GLYCOSYLATED A1C: CPT

## 2020-06-24 NOTE — RESEARCH
[facility] Crest 2 Baseline Randomization  Pt agreed and consented to Crest 2 . Randomized to IMM alone.  Pt denies baseline MRA but is agreeable to end of study MRI  Pt and wife informed .  Baseline vital signs obtained  1130 131/72 53  1133 133/69 52  1136 130/70 52  Average 131/70 52  Standing 133/72 59  No B/P  Medication changes at this time will wait till 30 day f/u  MRS 1 (pt had contralateral CVA 2008 when tired speech will slurrat times)  NIHSS 0  MRJ informed . Awaiting lab results . Aspirin changed to 325mg daily per protocol.

## 2020-06-25 ENCOUNTER — DOCUMENTATION (OUTPATIENT)
Dept: CARDIOLOGY | Facility: CLINIC | Age: 75
End: 2020-06-25

## 2020-06-26 RX ORDER — ROSUVASTATIN CALCIUM 40 MG/1
40 TABLET, COATED ORAL NIGHTLY
Qty: 30 TABLET | Refills: 11 | Status: SHIPPED | OUTPATIENT
Start: 2020-06-26 | End: 2021-07-01

## 2020-06-26 NOTE — PROGRESS NOTES
Per MRJ review of labs  · Needs ASAP referral to nephrology   · Change simvastatin to rosuvastatin 40 mg daily at bedtime.

## 2020-07-09 ENCOUNTER — TELEPHONE (OUTPATIENT)
Dept: CARDIOLOGY | Facility: CLINIC | Age: 75
End: 2020-07-09

## 2020-07-09 NOTE — TELEPHONE ENCOUNTER
Confirmed nephrology appt with patient. Pt verbalizes understanding and all questions were answered at this time.

## 2020-07-09 NOTE — TELEPHONE ENCOUNTER
----- Message from Nba Valverde RN sent at 7/9/2020 11:24 AM EDT -----      ----- Message -----  From: Laura Lao RN  Sent: 7/8/2020   2:40 PM EDT  To: Sandra Flores, MIROSLAVA, Marcela Cruz RN    Patient called stating he still hasn't heard anything from the nephrologist about an appt. His wife is asking a call back at [phone] regarding this appt. Making sure they haven't missed anything.

## 2020-08-03 ENCOUNTER — LAB (OUTPATIENT)
Dept: LAB | Facility: HOSPITAL | Age: 75
End: 2020-08-03

## 2020-08-03 ENCOUNTER — RESEARCH ENCOUNTER (OUTPATIENT)
Dept: OTHER | Facility: OTHER | Age: 75
End: 2020-08-03

## 2020-08-03 DIAGNOSIS — E78.00 HYPERCHOLESTEROLEMIA: Primary | ICD-10-CM

## 2020-08-03 DIAGNOSIS — E78.00 HYPERCHOLESTEROLEMIA: ICD-10-CM

## 2020-08-03 LAB
CHOLEST SERPL-MCNC: 133 MG/DL (ref 0–200)
HDLC SERPL-MCNC: 34 MG/DL (ref 40–60)
LDLC SERPL CALC-MCNC: 58 MG/DL (ref 0–100)
LDLC/HDLC SERPL: 1.69 {RATIO}
TRIGL SERPL-MCNC: 207 MG/DL (ref 0–150)
VLDLC SERPL-MCNC: 41.4 MG/DL (ref 5–40)

## 2020-08-03 PROCEDURE — 36415 COLL VENOUS BLD VENIPUNCTURE: CPT

## 2020-08-03 PROCEDURE — 80061 LIPID PANEL: CPT

## 2020-08-03 NOTE — RESEARCH
08/03/2020 Crest 2 44 day f/u  Pt seen in our office. Denies any S&S of CVA/TIA.  1003 132/75 61  1006 134/68 57  1009 134/67 57  Average 133/70 57  Pt last creatinine was 2.6 . Is seeing Nephrologist next Thurasday. ELSA wants to wait to see what nephrology has to say before changing any medication. Awaiting Lipid profile results from today .  NIHSS 0  MRS 1 ( had contralateral CVA prior to enrollment and when gets very tired speech sometimes becomes slurred)  Pt agreeable to b/p recheck 08/31/2020 10 am our office.

## 2020-08-31 ENCOUNTER — RESEARCH ENCOUNTER (OUTPATIENT)
Dept: OTHER | Facility: OTHER | Age: 75
End: 2020-08-31

## 2020-08-31 NOTE — RESEARCH
08/31/2020 [facility] C[facility] 2 B/P recheck  Here for B/P recheck   0955 116/63 HR 57  0958 119/59 HR 56  1001 120/60 HR 56  Average 118/60 HR 56  NIHSS 0 MRS 1   No c/o's . Saw Nephrologist is having ultrasound this Thursday 09/03/2020.  Agrees to 4 month follow up 10/27/2020 @ 10am

## 2020-09-08 RX ORDER — AMLODIPINE BESYLATE 10 MG/1
TABLET ORAL
Qty: 90 TABLET | Refills: 3 | Status: SHIPPED | OUTPATIENT
Start: 2020-09-08 | End: 2021-09-21

## 2020-09-23 ENCOUNTER — TRANSCRIBE ORDERS (OUTPATIENT)
Dept: ADMINISTRATIVE | Facility: HOSPITAL | Age: 75
End: 2020-09-23

## 2020-09-23 DIAGNOSIS — N18.5 CHRONIC KIDNEY DISEASE, STAGE V (HCC): Primary | ICD-10-CM

## 2020-09-29 ENCOUNTER — TRANSCRIBE ORDERS (OUTPATIENT)
Dept: ADMINISTRATIVE | Facility: HOSPITAL | Age: 75
End: 2020-09-29

## 2020-10-01 ENCOUNTER — HOSPITAL ENCOUNTER (OUTPATIENT)
Dept: ULTRASOUND IMAGING | Facility: HOSPITAL | Age: 75
Discharge: HOME | End: 2020-10-01
Admitting: INTERNAL MEDICINE

## 2020-10-01 DIAGNOSIS — N18.5 CHRONIC KIDNEY DISEASE, STAGE V (HCC): ICD-10-CM

## 2020-10-01 PROCEDURE — 76775 US EXAM ABDO BACK WALL LIM: CPT

## 2020-10-27 ENCOUNTER — RESEARCH ENCOUNTER (OUTPATIENT)
Dept: OTHER | Facility: OTHER | Age: 75
End: 2020-10-27

## 2020-10-27 ENCOUNTER — TELEPHONE (OUTPATIENT)
Dept: CARDIOLOGY | Facility: CLINIC | Age: 75
End: 2020-10-27

## 2020-10-27 RX ORDER — FERROUS SULFATE 325(65) MG
325 TABLET ORAL
COMMUNITY

## 2020-10-27 RX ORDER — UBIDECARENONE 75 MG
100 CAPSULE ORAL DAILY
COMMUNITY

## 2020-10-27 NOTE — TELEPHONE ENCOUNTER
----- Message from Marcela Cruz RN sent at 10/27/2020 12:27 PM EDT -----  Regarding: Kavitha meds  He stopped his allupurinol and they added iron tab and vit b12 did not know doses.

## 2020-10-27 NOTE — RESEARCH
10/27/2020 Research 4 mn Crest 2 f/u  wgt 224.6lbs  0954 125/68 66  0957 132/67 51  1000 132/71 51  Average 129/68 51  Nonsmoker   NIHSS 0  MRS1 (same as baseline)Exercising twice a week at gym.   Has been to Dr Lees he stopped his allupurinol  added iron and Vit B 12 . Is also watching his sodium intake.  No S&S of TIA/CVA  No hospitalizations.   MRJ informed of visit. No changes at this time. LDL was at targer at 44 day f/u.   Agrees to 8mn f/u March 1 2021

## 2021-02-25 ENCOUNTER — TELEPHONE (OUTPATIENT)
Dept: ICU | Facility: HOSPITAL | Age: 76
End: 2021-02-25

## 2021-03-01 ENCOUNTER — RESEARCH ENCOUNTER (OUTPATIENT)
Dept: OTHER | Facility: OTHER | Age: 76
End: 2021-03-01

## 2021-03-01 VITALS
SYSTOLIC BLOOD PRESSURE: 120 MMHG | DIASTOLIC BLOOD PRESSURE: 76 MMHG | BODY MASS INDEX: 27.56 KG/M2 | HEART RATE: 53 BPM | WEIGHT: 220.5 LBS

## 2021-03-01 NOTE — RESEARCH
03/01/2021      Travon TRISH Kavitha  1945      CREST-2  8mn FOLLOWUP    R ICA STENOSIS    ARM OF STUDY:  PERICO IMM alone      Current Medications    Current Outpatient Medications:   •  amLODIPine (NORVASC) 10 MG tablet, TAKE ONE TABLET BY MOUTH EVERY DAY, Disp: 90 tablet, Rfl: 3  •  aspirin 81 MG EC tablet, Take 81 mg by mouth Daily., Disp: , Rfl:   •  carvedilol (COREG) 3.125 MG tablet, Take 3.125 mg by mouth 2 (Two) Times a Day With Meals., Disp: , Rfl:   •  ferrous sulfate 325 (65 FE) MG tablet, Take 325 mg by mouth Daily With Breakfast., Disp: , Rfl:   •  levothyroxine (SYNTHROID, LEVOTHROID) 25 MCG tablet, take 1 tablet by mouth once daily, Disp: , Rfl: 0  •  Mirabegron ER (Myrbetriq) 50 MG tablet sustained-release 24 hour 24 hr tablet, Take 50 mg by mouth Daily., Disp: , Rfl:   •  pantoprazole (PROTONIX) 40 MG EC tablet, TAKE 1 TABLET BY MOUTH ONCE DAILY, Disp: 30 tablet, Rfl: 11  •  rosuvastatin (CRESTOR) 40 MG tablet, Take 1 tablet by mouth Every Night., Disp: 30 tablet, Rfl: 11  •  vitamin B-12 (CYANOCOBALAMIN) 100 MCG tablet, Take 100 mcg by mouth Daily., Disp: , Rfl:     Vital signs:  AVERAGE BP(right arm sitting - study device): 124/72  Standing(if indicated):  Heart Rate:52  Weight: 220.5  lbs  Vitals:    03/01/21 1040 03/01/21 1043 03/01/21 1046   BP: 130/76 123/70 120/76   BP Location: Right arm     Patient Position: Sitting     Pulse: 53 52 53   Weight: 100 kg (220 lb 8 oz)         NIHSS/mrS: 0/1    RECENT LABS:  Lab Results   Component Value Date    CHOL 133 08/03/2020    TRIG 207 (H) 08/03/2020    HDL 34 (L) 08/03/2020    LDL 58 08/03/2020     Lab Results   Component Value Date    CREATININE 2.63 (H) 06/24/2020     Lab Results   Component Value Date    K 5.5 (H) 06/24/2020       CAROTID DUPLEX  03/01/2021        ORDERS AND MEDICATION CHANGES:B/P within target MRJ informed of visit. No changes made. Will see Dr Sellers 04/19/21 for his kidneys.   Not exercising as much as usual due to covid  precautions  No tobacco products ETOH monthly or less  NO AE's   No CVA/TIA S&S reported  Agrees to one year f/u to be scheduled in June 2021 with labs and doppler          Primary Care Provider: Chilango Erickson MD  PRIMARY CARDIOLOGIST:      Marcela Cruz RN

## 2021-03-09 DIAGNOSIS — I10 ESSENTIAL HYPERTENSION: Primary | ICD-10-CM

## 2021-03-09 DIAGNOSIS — E78.00 HYPERCHOLESTEROLEMIA: ICD-10-CM

## 2021-03-09 DIAGNOSIS — I65.22 CAROTID STENOSIS, LEFT: ICD-10-CM

## 2021-06-15 ENCOUNTER — HOSPITAL ENCOUNTER (OUTPATIENT)
Dept: CARDIOLOGY | Facility: HOSPITAL | Age: 76
Discharge: HOME | End: 2021-06-15

## 2021-06-15 ENCOUNTER — CLINICAL SUPPORT (OUTPATIENT)
Dept: CARDIOLOGY | Facility: HOSPITAL | Age: 76
End: 2021-06-15

## 2021-06-15 ENCOUNTER — LAB (OUTPATIENT)
Dept: LAB | Facility: HOSPITAL | Age: 76
End: 2021-06-15

## 2021-06-15 ENCOUNTER — OFFICE VISIT (OUTPATIENT)
Dept: CARDIOLOGY | Facility: CLINIC | Age: 76
End: 2021-06-15

## 2021-06-15 VITALS — HEIGHT: 75 IN | WEIGHT: 220 LBS | BODY MASS INDEX: 27.35 KG/M2

## 2021-06-15 VITALS
HEART RATE: 59 BPM | BODY MASS INDEX: 26.61 KG/M2 | SYSTOLIC BLOOD PRESSURE: 133 MMHG | DIASTOLIC BLOOD PRESSURE: 71 MMHG | WEIGHT: 214 LBS | HEIGHT: 75 IN

## 2021-06-15 DIAGNOSIS — I10 ESSENTIAL HYPERTENSION: ICD-10-CM

## 2021-06-15 DIAGNOSIS — E78.00 HYPERCHOLESTEROLEMIA: ICD-10-CM

## 2021-06-15 DIAGNOSIS — R03.0 ELEVATED BLOOD PRESSURE, SITUATIONAL: ICD-10-CM

## 2021-06-15 DIAGNOSIS — N18.9 CHRONIC KIDNEY DISEASE, UNSPECIFIED CKD STAGE: ICD-10-CM

## 2021-06-15 DIAGNOSIS — I65.21 CAROTID STENOSIS, ASYMPTOMATIC, RIGHT: ICD-10-CM

## 2021-06-15 DIAGNOSIS — I65.22 CAROTID STENOSIS, LEFT: ICD-10-CM

## 2021-06-15 DIAGNOSIS — I25.10 CORONARY ARTERY DISEASE INVOLVING NATIVE CORONARY ARTERY OF NATIVE HEART WITHOUT ANGINA PECTORIS: Primary | ICD-10-CM

## 2021-06-15 LAB
BH CV ECHO MEAS - BSA(HAYCOCK): 2.3 M^2
BH CV ECHO MEAS - BSA: 2.3 M^2
BH CV ECHO MEAS - BZI_BMI: 27.5 KILOGRAMS/M^2
BH CV ECHO MEAS - BZI_METRIC_HEIGHT: 190.5 CM
BH CV ECHO MEAS - BZI_METRIC_WEIGHT: 99.8 KG
BH CV LEFT CCA HIDDEN LRR: 1 CM/S
BH CV VAS CAROTID LEFT DISTAL STENT EDV: 22.1 CM/S
BH CV VAS CAROTID LEFT DISTAL STENT: 92.1 CM/S
BH CV VAS CAROTID LEFT DISTAL TO STENT EDV: 29.8 CM/S
BH CV VAS CAROTID LEFT DISTAL TO STENT: 93.7 CM/S
BH CV VAS CAROTID LEFT MID STENT EDV: 20.4 CM/S
BH CV VAS CAROTID LEFT MID STENT: 66.2 CM/S
BH CV VAS CAROTID LEFT PROXIMAL STENT EDV: 14.3 CM/S
BH CV VAS CAROTID LEFT PROXIMAL STENT: 43.6 CM/S
BH CV VAS CAROTID LEFT PROXIMAL TO STENT: 47.4 CM/S
BH CV VAS CAROTID LEFT STENT NATIVE VESSEL PROXIMAL ED: 16.5 CM/S
BH CV XLRA MEAS LEFT BULB EDV: 13.3 CM/SEC
BH CV XLRA MEAS LEFT BULB PSV: 46.8 CM/SEC
BH CV XLRA MEAS LEFT CAROTID BULB EDV: 13.3 CM/SEC
BH CV XLRA MEAS LEFT CAROTID BULB PSV: 46.8 CM/SEC
BH CV XLRA MEAS LEFT CCA RATIO VEL: 69.1 CM/SEC
BH CV XLRA MEAS LEFT DIST CCA EDV: 12.6 CM/SEC
BH CV XLRA MEAS LEFT DIST CCA PSV: 48.2 CM/SEC
BH CV XLRA MEAS LEFT DIST ICA EDV: 36.3 CM/SEC
BH CV XLRA MEAS LEFT DIST ICA PSV: 107 CM/SEC
BH CV XLRA MEAS LEFT ICA RATIO VEL: 93.6 CM/SEC
BH CV XLRA MEAS LEFT ICA/CCA RATIO: 1.4
BH CV XLRA MEAS LEFT MID CCA EDV: 16.8 CM/SEC
BH CV XLRA MEAS LEFT MID CCA PSV: 69.1 CM/SEC
BH CV XLRA MEAS LEFT MID ICA EDV: 35.6 CM/SEC
BH CV XLRA MEAS LEFT MID ICA PSV: 107 CM/SEC
BH CV XLRA MEAS LEFT PROX CCA EDV: 14 CM/SEC
BH CV XLRA MEAS LEFT PROX CCA PSV: 56.6 CM/SEC
BH CV XLRA MEAS LEFT PROX ECA PSV: 99.9 CM/SEC
BH CV XLRA MEAS LEFT PROX ICA EDV: 15.4 CM/SEC
BH CV XLRA MEAS LEFT PROX ICA PSV: 48 CM/SEC
BH CV XLRA MEAS LEFT PROX SCLA PSV: 162 CM/SEC
BH CV XLRA MEAS LEFT VERTEBRAL A PSV: 53.8 CM/SEC
BH CV XLRA MEAS RIGHT CAROTID BULB EDV: 12.3 CM/SEC
BH CV XLRA MEAS RIGHT CAROTID BULB PSV: 37.8 CM/SEC
BH CV XLRA MEAS RIGHT CCA RATIO VEL: 68.5 CM/SEC
BH CV XLRA MEAS RIGHT DIST CCA EDV: 10.8 CM/SEC
BH CV XLRA MEAS RIGHT DIST CCA PSV: 45.2 CM/SEC
BH CV XLRA MEAS RIGHT DIST ICA EDV: 26.5 CM/SEC
BH CV XLRA MEAS RIGHT DIST ICA PSV: 86.4 CM/SEC
BH CV XLRA MEAS RIGHT ICA RATIO VEL: 542 CM/SEC
BH CV XLRA MEAS RIGHT ICA/CCA RATIO: 7.9
BH CV XLRA MEAS RIGHT MID CCA EDV: 17 CM/SEC
BH CV XLRA MEAS RIGHT MID CCA PSV: 68.5 CM/SEC
BH CV XLRA MEAS RIGHT MID ICA EDV: 134 CM/SEC
BH CV XLRA MEAS RIGHT MID ICA PSV: 340 CM/SEC
BH CV XLRA MEAS RIGHT PROX CCA EDV: 13.8 CM/SEC
BH CV XLRA MEAS RIGHT PROX CCA PSV: 68.5 CM/SEC
BH CV XLRA MEAS RIGHT PROX ECA PSV: 83.3 CM/SEC
BH CV XLRA MEAS RIGHT PROX ICA EDV: 135 CM/SEC
BH CV XLRA MEAS RIGHT PROX ICA PSV: 542 CM/SEC
BH CV XLRA MEAS RIGHT PROX SCLA PSV: 143 CM/SEC
BH CV XLRA MEAS RIGHT VERTEBRAL A PSV: 59.4 CM/SEC
BH CVPROX LEFT ICA HIDDEN LRR: 1 CM
CHOLEST SERPL-MCNC: 114 MG/DL (ref 0–200)
CREAT SERPL-MCNC: 3.26 MG/DL (ref 0.76–1.27)
GFR SERPL CREATININE-BSD FRML MDRD: 19 ML/MIN/1.73
HDLC SERPL-MCNC: 34 MG/DL (ref 40–60)
LDLC SERPL CALC-MCNC: 58 MG/DL (ref 0–100)
LDLC/HDLC SERPL: 1.63 {RATIO}
LEFT ARM BP: NORMAL MMHG
MAXIMAL PREDICTED HEART RATE: 144 BPM
POTASSIUM SERPL-SCNC: 5.1 MMOL/L (ref 3.5–5.2)
RIGHT ARM BP: NORMAL MMHG
SODIUM SERPL-SCNC: 141 MMOL/L (ref 136–145)
STRESS TARGET HR: 122 BPM
TRIGL SERPL-MCNC: 123 MG/DL (ref 0–150)
VLDLC SERPL-MCNC: 22 MG/DL (ref 5–40)

## 2021-06-15 PROCEDURE — 82565 ASSAY OF CREATININE: CPT

## 2021-06-15 PROCEDURE — 93880 EXTRACRANIAL BILAT STUDY: CPT

## 2021-06-15 PROCEDURE — 93786 AMBL BP MNTR W/SW REC ONLY: CPT

## 2021-06-15 PROCEDURE — 93880 EXTRACRANIAL BILAT STUDY: CPT | Performed by: INTERNAL MEDICINE

## 2021-06-15 PROCEDURE — 36415 COLL VENOUS BLD VENIPUNCTURE: CPT

## 2021-06-15 PROCEDURE — 80061 LIPID PANEL: CPT

## 2021-06-15 PROCEDURE — 84132 ASSAY OF SERUM POTASSIUM: CPT

## 2021-06-15 PROCEDURE — 84295 ASSAY OF SERUM SODIUM: CPT

## 2021-06-15 RX ORDER — ASPIRIN 325 MG
325 TABLET, DELAYED RELEASE (ENTERIC COATED) ORAL DAILY
COMMUNITY
End: 2021-12-23 | Stop reason: HOSPADM

## 2021-06-15 RX ORDER — MULTIVIT-MIN/IRON/FOLIC ACID/K 18-600-40
2000 CAPSULE ORAL DAILY
COMMUNITY

## 2021-06-15 NOTE — PROGRESS NOTES
Travon Plummer  : 1945  MRN: [id number]  Today's Date: 06/15/21    Bedtime __________    I woke up at _______      [facility]  [address] [place] [address], [address] [address]  [phone]    During the day, the monitor will pump air and the cuff will inflate approximately every twenty minutes.  In the evening, the pump-up interval changes to every thirty minutes.  In the late evening (9:00 p.m.--10:00 p.m.), the cuff will inflate every hour until 8:00 a.m. the following morning when the twenty-minute interval begins again.    When you feel the cuff inflating, stop what you are doing, straighten your arm, and be still.  If while the cuff is inflated, your arm is bent or there is too much movement, the cuff will re-inflate and attempt another reading.  When the cuff deflates, resume your normal activity.    The monitor is self-contained and records and displays all readings.  Every time the cuff deflates, your blood pressure and heart rate will be displayed twice.    If you bathe or change clothing, remove the monitor.  It is difficult to re-wrap or re-apply the cuff; before removing it, make sure someone is available to  help you.  Whether the cuff is on your left or right arm, the gray tube must be directly above the bend of your elbow.    If the cuff inflates when it is not wrapped around your arm, let it deflate and disconnect it from the black tube, push out any remaining air, reconnect the tubing, and wrap it around your arm again.  The monitor will resume readings at the next proper time.    After wearing the cuff for twenty-four hours, turn the monitor off by holding the button for several seconds, or by removing the batteries.            BPMONITORINSTRUCTIONS 8                  Ambulatory Blood Pressure Monitor Patient Agreement    I, Travon Plummer, 1945, [id number] assume full responsibility for the safekeeping and care of the  monitor while it is in my possession.      I have been advised that it is not waterproof and that any water that comes in contact with the monitor may cause damage that could require the unit to be replaced.    Until I return the monitor and its attachments to [facility], I will assume responsibility for any damage to the monitor due to neglect or misuse of same.    I understand that I am responsible for returning the monitor or I will be responsible for all costs for replacing the equipment.  Failure to return the monitor will result in a replacement charge of $1800.00 which will be billed to me five business days following the date of hookup.    Unless instructed otherwise, I will wear the monitor for 24 hours.    I was given the opportunity to ask questions and left the office with the device instructions.    I will return the monitor no later than 06/15/2021 to:    [facility], [address] [place] [address], [address] [address]    Date of Hookup: 06/15/21    Ordered by: Dr. Gonzalez    Hooked up by: Jayne Kirk, [facility] Cuff placed on left arm.    Serial Number: [id number]    Termination Date: 06/15/2021  Time: 1040    Patient or Guardian Signature: ______________________, 06/15/21    Date Monitor Returned: __________________            BPMONITORINSTRUCTIONS 8.12.2019

## 2021-06-15 NOTE — PROGRESS NOTES
"06/15/2021      Travon Escobar Kavitha  1945      CREST-2 12 month FOLLOWUP    Right ICA STENOSIS (previous LICA stent)    ARM OF STUDY:  PERICO arm with IMM alone    Allergies   Allergen Reactions   • Lipitor [Atorvastatin] Myalgia     He has received COVID vaccinations.    Current Medications  Current Outpatient Medications   Medication Instructions   • amLODIPine (NORVASC) 10 MG tablet TAKE ONE TABLET BY MOUTH EVERY DAY   • aspirin  mg, Oral, Daily   • aspirin 81 mg, Oral, Daily   • carvedilol (COREG) 3.125 mg, Oral, 2 Times Daily With Meals   • Cholecalciferol (Vitamin D) 50 MCG (2000 UT) capsule Oral, Daily   • ferrous sulfate 325 mg, Oral, Daily With Breakfast   • levothyroxine (SYNTHROID, LEVOTHROID) 25 MCG tablet take 1 tablet by mouth once daily   • pantoprazole (PROTONIX) 40 MG EC tablet TAKE 1 TABLET BY MOUTH ONCE DAILY   • rosuvastatin (CRESTOR) 40 mg, Oral, Nightly   • vitamin B-12 (CYANOCOBALAMIN) 100 mcg, Oral, Daily     Vital signs:  AVERAGE BP(right arm sitting - study device): 136/79  Standing(if indicated): 133/71  Heart Rate: 51  Weight: 214 lbs    Vitals:    06/15/21 0955 06/15/21 0958 06/15/21 1001 06/15/21 1002   BP: 139/72 132/90 138/77 133/71   BP Location: Right arm   Right arm   Patient Position: Sitting   Standing   Pulse: 51 52 51 59   Weight: 97.1 kg (214 lb)      Height: 190.5 cm (75\")      Body mass index is 26.75 kg/m².     NIHSS/mrS: 0/1    RECENT LABS: pending  Lab Results   Component Value Date    CHOL 114 06/15/2021    TRIG 123 06/15/2021    HDL 34 (L) 06/15/2021    LDL 58 06/15/2021     Lab Results   Component Value Date    CREATININE 3.26 (H) 06/15/2021     Lab Results   Component Value Date    K 5.1 06/15/2021        Na                                                                  141                                   06/15/2021     CAROTID DUPLEX  06/15/2021  Interpretation Summary  · There is >70% right internal carotid artery stenosis.  · Since June 2020VICKI" velocities have increased.  · There is a left internal carotid artery stent. There is no evidence of in-stent restenosis.  · Bilateral vertebral artery flow is antegrade.       ORDERS AND MEDICATION CHANGES:  Repeat duplex in 6 months. Apply 24 hour ambulatory BP cuff today to determine home blood pressure. He declines medication changes today. He is being followed by Dr. Sellers in [place] for his CKD and we will send him a letter regarding CREST2 enrollment/follow up.      *THERE IS NO CHARGE FOR THIS VISIT*    Primary Care Provider: Chilango Erickson MD Kristen S Henderson, RN

## 2021-07-01 RX ORDER — ROSUVASTATIN CALCIUM 40 MG/1
TABLET, COATED ORAL
Qty: 90 TABLET | Refills: 3 | Status: SHIPPED | OUTPATIENT
Start: 2021-07-01 | End: 2022-08-03

## 2021-07-01 NOTE — TELEPHONE ENCOUNTER
Lab Results   Component Value Date    CHOL 114 06/15/2021    TRIG 123 06/15/2021    HDL 34 (L) 06/15/2021    LDL 58 06/15/2021    CREST2 pt

## 2021-07-14 ENCOUNTER — TELEPHONE (OUTPATIENT)
Dept: OTHER | Facility: OTHER | Age: 76
End: 2021-07-14

## 2021-07-14 NOTE — RESEARCH
Attempted to contact Pt and Pt wife to remind them about BP Recheck apt in our office tomorrow at 10a.     Will wait for returned call back.

## 2021-07-15 ENCOUNTER — RESEARCH ENCOUNTER (OUTPATIENT)
Dept: OTHER | Facility: OTHER | Age: 76
End: 2021-07-15

## 2021-07-15 VITALS
BODY MASS INDEX: 26.87 KG/M2 | SYSTOLIC BLOOD PRESSURE: 140 MMHG | WEIGHT: 215 LBS | HEART RATE: 51 BPM | DIASTOLIC BLOOD PRESSURE: 80 MMHG

## 2021-07-15 NOTE — RESEARCH
07/15/2021    Travon Plummer  1945    CREST-2 BP FOLLOWUP    R ICA STENOSIS    ARM OF STUDY:  R PERICO- IMM alone    Problem List:       Allergies   Allergen Reactions   • Lipitor [Atorvastatin] Myalgia       Current Medications  Current Outpatient Medications   Medication Instructions   • amLODIPine (NORVASC) 10 MG tablet TAKE ONE TABLET BY MOUTH EVERY DAY   • aspirin  mg, Oral, Daily   • carvedilol (COREG) 3.125 mg, Oral, 2 Times Daily With Meals   • Cholecalciferol (Vitamin D) 50 MCG (2000 UT) capsule Oral, Daily   • ferrous sulfate 325 mg, Oral, Daily With Breakfast   • levothyroxine (SYNTHROID, LEVOTHROID) 25 MCG tablet take 1 tablet by mouth once daily   • pantoprazole (PROTONIX) 40 MG EC tablet TAKE 1 TABLET BY MOUTH ONCE DAILY   • rosuvastatin (CRESTOR) 40 MG tablet TAKE ONE TABLET BY MOUTH AT BEDTIME   • vitamin B-12 (CYANOCOBALAMIN) 100 mcg, Oral, Daily         Vital signs:  AVERAGE BP(right arm sitting - study device): 139/78  Standing(if indicated): 134/67 HR 58--asymptomatic  Heart Rate: 51  Weight: 215 lbs    Vitals:    07/15/21 1003 07/15/21 1006 07/15/21 1009   BP: 136/79 142/75 140/80   BP Location: Right arm Right arm Right arm   Patient Position: Sitting Sitting Sitting   Pulse: 52 52 51   Weight: 97.5 kg (215 lb)       Body mass index is 26.87 kg/m².    mrS: 1    ORDERS AND MEDICATION CHANGES: Patient was seen today for BP recheck, although it is elevated, we are currently awaiting to hear back from the Crest 2 team on making him RTF due to his past 24 hr BP monitor results and his kidney issues. He does have a follow up nephrology appointment on 7/26/21 with b/w prior which he's having drawn 7/19/21. MRJ aware, no med changes at the time. Him and his spouse have been encouraged to start checking BP regularly at home. He's aware of s/s of stroke to call 911. Patient agreeable to receive phone call on 11/1/21 to schedule his 18mn f/u within window of (12/7/21-2/5/22).        *THERE  IS NO CHARGE FOR THIS VISIT*    Primary Care Provider: Chilango Erickson MD  PRIMARY CARDIOLOGIST:      Laura Lao RN

## 2021-08-17 ENCOUNTER — PREP FOR SURGERY (OUTPATIENT)
Dept: OTHER | Facility: HOSPITAL | Age: 76
End: 2021-08-17

## 2021-08-17 DIAGNOSIS — H25.11 NUCLEAR SCLEROTIC CATARACT OF RIGHT EYE: Primary | ICD-10-CM

## 2021-08-17 RX ORDER — SODIUM CHLORIDE 0.9 % (FLUSH) 0.9 %
10 SYRINGE (ML) INJECTION EVERY 12 HOURS SCHEDULED
Status: CANCELLED | OUTPATIENT
Start: 2021-08-17

## 2021-08-17 RX ORDER — PREDNISOLONE ACETATE 10 MG/ML
1 SUSPENSION/ DROPS OPHTHALMIC SEE ADMIN INSTRUCTIONS
Status: CANCELLED | OUTPATIENT
Start: 2021-08-17

## 2021-08-17 RX ORDER — CYCLOPENT/TROPIC/PHEN/KETR/WAT 1%-1%-2.5%
1 DROPS (EA) OPHTHALMIC (EYE)
Status: CANCELLED | OUTPATIENT
Start: 2021-08-17 | End: 2021-08-17

## 2021-08-17 RX ORDER — TETRACAINE HYDROCHLORIDE 5 MG/ML
1 SOLUTION OPHTHALMIC SEE ADMIN INSTRUCTIONS
Status: CANCELLED | OUTPATIENT
Start: 2021-08-17

## 2021-08-17 RX ORDER — SODIUM CHLORIDE 0.9 % (FLUSH) 0.9 %
1-10 SYRINGE (ML) INJECTION AS NEEDED
Status: CANCELLED | OUTPATIENT
Start: 2021-08-17

## 2021-08-18 PROBLEM — H25.11 NUCLEAR SCLEROTIC CATARACT OF RIGHT EYE: Status: ACTIVE | Noted: 2021-08-18

## 2021-09-08 ENCOUNTER — HOSPITAL ENCOUNTER (OUTPATIENT)
Dept: CT IMAGING | Facility: HOSPITAL | Age: 76
Discharge: HOME | End: 2021-09-08
Admitting: NURSE PRACTITIONER

## 2021-09-08 ENCOUNTER — TRANSCRIBE ORDERS (OUTPATIENT)
Dept: ADMINISTRATIVE | Facility: HOSPITAL | Age: 76
End: 2021-09-08

## 2021-09-08 DIAGNOSIS — R10.32 LLQ PAIN: Primary | ICD-10-CM

## 2021-09-08 DIAGNOSIS — R10.32 LLQ PAIN: ICD-10-CM

## 2021-09-08 PROCEDURE — 74176 CT ABD & PELVIS W/O CONTRAST: CPT

## 2021-09-21 RX ORDER — AMLODIPINE BESYLATE 10 MG/1
TABLET ORAL
Qty: 90 TABLET | Refills: 3 | Status: SHIPPED | OUTPATIENT
Start: 2021-09-21 | End: 2022-10-03

## 2021-10-20 ENCOUNTER — OFFICE VISIT (OUTPATIENT)
Dept: SURGERY | Facility: CLINIC | Age: 76
End: 2021-10-20

## 2021-10-20 VITALS
WEIGHT: 203.8 LBS | HEART RATE: 66 BPM | HEIGHT: 75 IN | DIASTOLIC BLOOD PRESSURE: 62 MMHG | OXYGEN SATURATION: 99 % | SYSTOLIC BLOOD PRESSURE: 110 MMHG | TEMPERATURE: 97.3 F | RESPIRATION RATE: 18 BRPM | BODY MASS INDEX: 25.34 KG/M2

## 2021-10-20 DIAGNOSIS — K80.20 CALCULUS OF GALLBLADDER WITHOUT CHOLECYSTITIS WITHOUT OBSTRUCTION: Primary | ICD-10-CM

## 2021-10-20 PROCEDURE — 99203 OFFICE O/P NEW LOW 30 MIN: CPT | Performed by: SURGERY

## 2021-10-29 ENCOUNTER — TELEPHONE (OUTPATIENT)
Dept: SURGERY | Facility: CLINIC | Age: 76
End: 2021-10-29

## 2021-10-29 NOTE — TELEPHONE ENCOUNTER
Patient called and stated one of his stool tests came back positive for c.diff.  He has been prescribed antibiotics and has already have improvement in symptoms. Patient wants to know if he should still have colonoscopy that is scheduled for 11/08/21. Please advise.

## 2021-11-01 ENCOUNTER — TELEPHONE (OUTPATIENT)
Dept: SURGERY | Facility: CLINIC | Age: 76
End: 2021-11-01

## 2021-11-02 ENCOUNTER — TELEPHONE (OUTPATIENT)
Dept: SURGERY | Facility: CLINIC | Age: 76
End: 2021-11-02

## 2021-12-03 ENCOUNTER — TELEPHONE (OUTPATIENT)
Dept: SURGERY | Facility: CLINIC | Age: 76
End: 2021-12-03

## 2021-12-03 NOTE — TELEPHONE ENCOUNTER
If they would like to hold off on the colonoscopy for now, that is fine.  I suspect that his diarrhea was due to undiagnosed C. Diff colitis, which has now been treated.  If he has recurrent symptoms we can always do the colonoscopy later.

## 2021-12-03 NOTE — TELEPHONE ENCOUNTER
Spouse called about colonoscopy.  Patient is doing 100% better, no diarrhea.  Does he still need to have the colonoscopy?    Thanks,

## 2021-12-03 NOTE — TELEPHONE ENCOUNTER
I spoke to Olya Kavitha and let her know that Dr. Do said it was ok to cancel the colonoscopy for now.  If he has recurrent problems to call us back and the colonoscopy can be rescheduled.      Jayne please cancel the colonoscopy in the surgery book.  I called the OR, but it wasn't on their schedule yet.

## 2021-12-21 ENCOUNTER — APPOINTMENT (OUTPATIENT)
Dept: GENERAL RADIOLOGY | Facility: HOSPITAL | Age: 76
End: 2021-12-21

## 2021-12-21 ENCOUNTER — HOSPITAL ENCOUNTER (INPATIENT)
Facility: HOSPITAL | Age: 76
LOS: 2 days | Discharge: HOME | End: 2021-12-23
Attending: EMERGENCY MEDICINE | Admitting: INTERNAL MEDICINE

## 2021-12-21 DIAGNOSIS — K92.1 MELENA: ICD-10-CM

## 2021-12-21 DIAGNOSIS — K92.2 UPPER GI BLEED: ICD-10-CM

## 2021-12-21 DIAGNOSIS — D64.9 ANEMIA, UNSPECIFIED TYPE: Primary | ICD-10-CM

## 2021-12-21 DIAGNOSIS — D50.0 IRON DEFICIENCY ANEMIA DUE TO CHRONIC BLOOD LOSS: ICD-10-CM

## 2021-12-21 DIAGNOSIS — D64.9 SYMPTOMATIC ANEMIA: ICD-10-CM

## 2021-12-21 PROBLEM — N18.4 CHRONIC KIDNEY DISEASE, STAGE IV (SEVERE): Status: ACTIVE | Noted: 2021-12-21

## 2021-12-21 LAB
ABO GROUP BLD: NORMAL
ABO GROUP BLD: NORMAL
ALBUMIN SERPL-MCNC: 3.6 G/DL (ref 3.5–5.2)
ALBUMIN/GLOB SERPL: 1.4 G/DL
ALP SERPL-CCNC: 83 U/L (ref 39–117)
ALT SERPL W P-5'-P-CCNC: <5 U/L (ref 1–41)
ANION GAP SERPL CALCULATED.3IONS-SCNC: 12.2 MMOL/L (ref 5–15)
ANISOCYTOSIS BLD QL: NORMAL
APTT PPP: 26.6 SECONDS (ref 24.5–37.2)
AST SERPL-CCNC: 8 U/L (ref 1–40)
BASOPHILS # BLD AUTO: 0.08 10*3/MM3 (ref 0–0.2)
BASOPHILS NFR BLD AUTO: 1 % (ref 0–1.5)
BILIRUB SERPL-MCNC: 0.2 MG/DL (ref 0–1.2)
BLD GP AB SCN SERPL QL: NEGATIVE
BUN SERPL-MCNC: 42 MG/DL (ref 8–23)
BUN/CREAT SERPL: 11.6 (ref 7–25)
CALCIUM SPEC-SCNC: 9.2 MG/DL (ref 8.6–10.5)
CHLORIDE SERPL-SCNC: 111 MMOL/L (ref 98–107)
CO2 SERPL-SCNC: 17.8 MMOL/L (ref 22–29)
CREAT SERPL-MCNC: 3.63 MG/DL (ref 0.76–1.27)
D-LACTATE SERPL-SCNC: 1.5 MMOL/L (ref 0.5–2)
DEPRECATED RDW RBC AUTO: 53.9 FL (ref 37–54)
EOSINOPHIL # BLD AUTO: 0.94 10*3/MM3 (ref 0–0.4)
EOSINOPHIL NFR BLD AUTO: 12.1 % (ref 0.3–6.2)
ERYTHROCYTE [DISTWIDTH] IN BLOOD BY AUTOMATED COUNT: 15.8 % (ref 12.3–15.4)
FERRITIN SERPL-MCNC: 16.76 NG/ML (ref 30–400)
FOLATE SERPL-MCNC: 5.63 NG/ML (ref 4.78–24.2)
GFR SERPL CREATININE-BSD FRML MDRD: 16 ML/MIN/1.73
GLOBULIN UR ELPH-MCNC: 2.6 GM/DL
GLUCOSE SERPL-MCNC: 110 MG/DL (ref 65–99)
HCT VFR BLD AUTO: 18 % (ref 37.5–51)
HGB BLD-MCNC: 5 G/DL (ref 13–17.7)
HYPOCHROMIA BLD QL: NORMAL
IMM GRANULOCYTES # BLD AUTO: 0.03 10*3/MM3 (ref 0–0.05)
IMM GRANULOCYTES NFR BLD AUTO: 0.4 % (ref 0–0.5)
INR PPP: 1.04 (ref 0.9–1.1)
IRON 24H UR-MRATE: 23 MCG/DL (ref 59–158)
IRON SATN MFR SERPL: 6 % (ref 20–50)
LYMPHOCYTES # BLD AUTO: 0.54 10*3/MM3 (ref 0.7–3.1)
LYMPHOCYTES NFR BLD AUTO: 7 % (ref 19.6–45.3)
MCH RBC QN AUTO: 26.5 PG (ref 26.6–33)
MCHC RBC AUTO-ENTMCNC: 27.8 G/DL (ref 31.5–35.7)
MCV RBC AUTO: 95.2 FL (ref 79–97)
MICROCYTES BLD QL: NORMAL
MONOCYTES # BLD AUTO: 0.69 10*3/MM3 (ref 0.1–0.9)
MONOCYTES NFR BLD AUTO: 8.9 % (ref 5–12)
NEUTROPHILS NFR BLD AUTO: 5.46 10*3/MM3 (ref 1.7–7)
NEUTROPHILS NFR BLD AUTO: 70.6 % (ref 42.7–76)
NRBC BLD AUTO-RTO: 0 /100 WBC (ref 0–0.2)
NT-PROBNP SERPL-MCNC: 2164 PG/ML (ref 0–1800)
PLATELET # BLD AUTO: 188 10*3/MM3 (ref 140–450)
PMV BLD AUTO: 12.1 FL (ref 6–12)
POTASSIUM SERPL-SCNC: 4.9 MMOL/L (ref 3.5–5.2)
PROT SERPL-MCNC: 6.2 G/DL (ref 6–8.5)
PROTHROMBIN TIME: 14.2 SECONDS (ref 12–15.1)
RBC # BLD AUTO: 1.89 10*6/MM3 (ref 4.14–5.8)
RETICS # AUTO: 0.08 10*6/MM3 (ref 0.02–0.13)
RETICS/RBC NFR AUTO: 4.37 % (ref 0.7–1.9)
RH BLD: POSITIVE
RH BLD: POSITIVE
SMALL PLATELETS BLD QL SMEAR: ADEQUATE
SODIUM SERPL-SCNC: 141 MMOL/L (ref 136–145)
T&S EXPIRATION DATE: NORMAL
TIBC SERPL-MCNC: 411 MCG/DL (ref 298–536)
TRANSFERRIN SERPL-MCNC: 276 MG/DL (ref 200–360)
TROPONIN T SERPL-MCNC: <0.01 NG/ML (ref 0–0.03)
VIT B12 BLD-MCNC: >2000 PG/ML (ref 211–946)
WBC MORPH BLD: NORMAL
WBC NRBC COR # BLD: 7.74 10*3/MM3 (ref 3.4–10.8)

## 2021-12-21 PROCEDURE — 85025 COMPLETE CBC W/AUTO DIFF WBC: CPT | Performed by: EMERGENCY MEDICINE

## 2021-12-21 PROCEDURE — 85730 THROMBOPLASTIN TIME PARTIAL: CPT | Performed by: EMERGENCY MEDICINE

## 2021-12-21 PROCEDURE — 86920 COMPATIBILITY TEST SPIN: CPT

## 2021-12-21 PROCEDURE — 82607 VITAMIN B-12: CPT | Performed by: INTERNAL MEDICINE

## 2021-12-21 PROCEDURE — 82728 ASSAY OF FERRITIN: CPT | Performed by: INTERNAL MEDICINE

## 2021-12-21 PROCEDURE — 86900 BLOOD TYPING SEROLOGIC ABO: CPT

## 2021-12-21 PROCEDURE — 86850 RBC ANTIBODY SCREEN: CPT | Performed by: EMERGENCY MEDICINE

## 2021-12-21 PROCEDURE — 36430 TRANSFUSION BLD/BLD COMPNT: CPT

## 2021-12-21 PROCEDURE — 99223 1ST HOSP IP/OBS HIGH 75: CPT | Performed by: INTERNAL MEDICINE

## 2021-12-21 PROCEDURE — 86901 BLOOD TYPING SEROLOGIC RH(D): CPT

## 2021-12-21 PROCEDURE — 83540 ASSAY OF IRON: CPT | Performed by: INTERNAL MEDICINE

## 2021-12-21 PROCEDURE — 86900 BLOOD TYPING SEROLOGIC ABO: CPT | Performed by: EMERGENCY MEDICINE

## 2021-12-21 PROCEDURE — P9016 RBC LEUKOCYTES REDUCED: HCPCS

## 2021-12-21 PROCEDURE — 82746 ASSAY OF FOLIC ACID SERUM: CPT | Performed by: INTERNAL MEDICINE

## 2021-12-21 PROCEDURE — 99284 EMERGENCY DEPT VISIT MOD MDM: CPT

## 2021-12-21 PROCEDURE — 85045 AUTOMATED RETICULOCYTE COUNT: CPT | Performed by: INTERNAL MEDICINE

## 2021-12-21 PROCEDURE — 84466 ASSAY OF TRANSFERRIN: CPT | Performed by: INTERNAL MEDICINE

## 2021-12-21 PROCEDURE — 83605 ASSAY OF LACTIC ACID: CPT | Performed by: EMERGENCY MEDICINE

## 2021-12-21 PROCEDURE — 71045 X-RAY EXAM CHEST 1 VIEW: CPT

## 2021-12-21 PROCEDURE — 83880 ASSAY OF NATRIURETIC PEPTIDE: CPT | Performed by: EMERGENCY MEDICINE

## 2021-12-21 PROCEDURE — 84484 ASSAY OF TROPONIN QUANT: CPT | Performed by: EMERGENCY MEDICINE

## 2021-12-21 PROCEDURE — 86901 BLOOD TYPING SEROLOGIC RH(D): CPT | Performed by: EMERGENCY MEDICINE

## 2021-12-21 PROCEDURE — 80053 COMPREHEN METABOLIC PANEL: CPT | Performed by: EMERGENCY MEDICINE

## 2021-12-21 PROCEDURE — 99221 1ST HOSP IP/OBS SF/LOW 40: CPT | Performed by: SURGERY

## 2021-12-21 PROCEDURE — 93005 ELECTROCARDIOGRAM TRACING: CPT | Performed by: EMERGENCY MEDICINE

## 2021-12-21 PROCEDURE — 85610 PROTHROMBIN TIME: CPT | Performed by: EMERGENCY MEDICINE

## 2021-12-21 PROCEDURE — 85007 BL SMEAR W/DIFF WBC COUNT: CPT | Performed by: EMERGENCY MEDICINE

## 2021-12-21 RX ORDER — SODIUM CHLORIDE 9 MG/ML
100 INJECTION, SOLUTION INTRAVENOUS CONTINUOUS
Status: DISCONTINUED | OUTPATIENT
Start: 2021-12-21 | End: 2021-12-22

## 2021-12-21 RX ORDER — LEVOTHYROXINE SODIUM 0.03 MG/1
25 TABLET ORAL DAILY
Status: DISCONTINUED | OUTPATIENT
Start: 2021-12-22 | End: 2021-12-23 | Stop reason: HOSPADM

## 2021-12-21 RX ORDER — SODIUM CHLORIDE 0.9 % (FLUSH) 0.9 %
3 SYRINGE (ML) INJECTION EVERY 12 HOURS SCHEDULED
Status: DISCONTINUED | OUTPATIENT
Start: 2021-12-21 | End: 2021-12-23 | Stop reason: HOSPADM

## 2021-12-21 RX ORDER — ACETAMINOPHEN 325 MG/1
650 TABLET ORAL EVERY 4 HOURS PRN
Status: DISCONTINUED | OUTPATIENT
Start: 2021-12-21 | End: 2021-12-23 | Stop reason: HOSPADM

## 2021-12-21 RX ORDER — SODIUM CHLORIDE 0.9 % (FLUSH) 0.9 %
3-10 SYRINGE (ML) INJECTION AS NEEDED
Status: DISCONTINUED | OUTPATIENT
Start: 2021-12-21 | End: 2021-12-23 | Stop reason: HOSPADM

## 2021-12-21 RX ORDER — AMLODIPINE BESYLATE 5 MG/1
10 TABLET ORAL DAILY
Status: DISCONTINUED | OUTPATIENT
Start: 2021-12-22 | End: 2021-12-23 | Stop reason: HOSPADM

## 2021-12-21 RX ORDER — ACETAMINOPHEN 650 MG/1
650 SUPPOSITORY RECTAL EVERY 4 HOURS PRN
Status: DISCONTINUED | OUTPATIENT
Start: 2021-12-21 | End: 2021-12-23 | Stop reason: HOSPADM

## 2021-12-21 RX ORDER — ACETAMINOPHEN 160 MG/5ML
650 SOLUTION ORAL EVERY 4 HOURS PRN
Status: DISCONTINUED | OUTPATIENT
Start: 2021-12-21 | End: 2021-12-23 | Stop reason: HOSPADM

## 2021-12-21 RX ORDER — CARVEDILOL 3.12 MG/1
3.12 TABLET ORAL 2 TIMES DAILY WITH MEALS
Status: DISCONTINUED | OUTPATIENT
Start: 2021-12-21 | End: 2021-12-23 | Stop reason: HOSPADM

## 2021-12-21 RX ORDER — ONDANSETRON 2 MG/ML
4 INJECTION INTRAMUSCULAR; INTRAVENOUS EVERY 6 HOURS PRN
Status: DISCONTINUED | OUTPATIENT
Start: 2021-12-21 | End: 2021-12-23 | Stop reason: HOSPADM

## 2021-12-21 RX ORDER — PANTOPRAZOLE SODIUM 40 MG/10ML
80 INJECTION, POWDER, LYOPHILIZED, FOR SOLUTION INTRAVENOUS ONCE
Status: COMPLETED | OUTPATIENT
Start: 2021-12-21 | End: 2021-12-21

## 2021-12-21 RX ADMIN — Medication 8 MG/HR: at 13:12

## 2021-12-21 RX ADMIN — SODIUM CHLORIDE, PRESERVATIVE FREE 3 ML: 5 INJECTION INTRAVENOUS at 20:45

## 2021-12-21 RX ADMIN — Medication 8 MG/HR: at 17:52

## 2021-12-21 RX ADMIN — CARVEDILOL 3.12 MG: 3.12 TABLET, FILM COATED ORAL at 18:42

## 2021-12-21 RX ADMIN — PANTOPRAZOLE SODIUM 80 MG: 40 INJECTION, POWDER, FOR SOLUTION INTRAVENOUS at 13:12

## 2021-12-21 RX ADMIN — SODIUM CHLORIDE 1000 ML: 9 INJECTION, SOLUTION INTRAVENOUS at 13:11

## 2021-12-21 RX ADMIN — SODIUM CHLORIDE 100 ML/HR: 9 INJECTION, SOLUTION INTRAVENOUS at 22:58

## 2021-12-21 RX ADMIN — Medication 8 MG/HR: at 22:07

## 2021-12-22 ENCOUNTER — ANESTHESIA (OUTPATIENT)
Dept: GASTROENTEROLOGY | Facility: HOSPITAL | Age: 76
End: 2021-12-22

## 2021-12-22 ENCOUNTER — ANESTHESIA EVENT (OUTPATIENT)
Dept: GASTROENTEROLOGY | Facility: HOSPITAL | Age: 76
End: 2021-12-22

## 2021-12-22 ENCOUNTER — HOSPITAL ENCOUNTER (OUTPATIENT)
Facility: HOSPITAL | Age: 76
Setting detail: HOSPITAL OUTPATIENT SURGERY
End: 2021-12-22
Attending: SURGERY | Admitting: SURGERY

## 2021-12-22 PROBLEM — K92.2 UPPER GI BLEED: Status: ACTIVE | Noted: 2021-12-22

## 2021-12-22 LAB
ANION GAP SERPL CALCULATED.3IONS-SCNC: 5.6 MMOL/L (ref 5–15)
BASOPHILS # BLD AUTO: 0.08 10*3/MM3 (ref 0–0.2)
BASOPHILS NFR BLD AUTO: 1 % (ref 0–1.5)
BUN SERPL-MCNC: 37 MG/DL (ref 8–23)
BUN/CREAT SERPL: 12.1 (ref 7–25)
CALCIUM SPEC-SCNC: 8.6 MG/DL (ref 8.6–10.5)
CHLORIDE SERPL-SCNC: 116 MMOL/L (ref 98–107)
CO2 SERPL-SCNC: 15.4 MMOL/L (ref 22–29)
CREAT SERPL-MCNC: 3.06 MG/DL (ref 0.76–1.27)
DEPRECATED RDW RBC AUTO: 53.5 FL (ref 37–54)
EOSINOPHIL # BLD AUTO: 0.9 10*3/MM3 (ref 0–0.4)
EOSINOPHIL NFR BLD AUTO: 11.5 % (ref 0.3–6.2)
ERYTHROCYTE [DISTWIDTH] IN BLOOD BY AUTOMATED COUNT: 16 % (ref 12.3–15.4)
GFR SERPL CREATININE-BSD FRML MDRD: 20 ML/MIN/1.73
GLUCOSE SERPL-MCNC: 88 MG/DL (ref 65–99)
HCT VFR BLD AUTO: 23.1 % (ref 37.5–51)
HGB BLD-MCNC: 6.8 G/DL (ref 13–17.7)
HYPOCHROMIA BLD QL: NORMAL
IMM GRANULOCYTES # BLD AUTO: 0.04 10*3/MM3 (ref 0–0.05)
IMM GRANULOCYTES NFR BLD AUTO: 0.5 % (ref 0–0.5)
INR PPP: 1.01 (ref 0.9–1.1)
LYMPHOCYTES # BLD AUTO: 0.57 10*3/MM3 (ref 0.7–3.1)
LYMPHOCYTES NFR BLD AUTO: 7.3 % (ref 19.6–45.3)
MCH RBC QN AUTO: 27 PG (ref 26.6–33)
MCHC RBC AUTO-ENTMCNC: 29.4 G/DL (ref 31.5–35.7)
MCV RBC AUTO: 91.7 FL (ref 79–97)
MONOCYTES # BLD AUTO: 0.71 10*3/MM3 (ref 0.1–0.9)
MONOCYTES NFR BLD AUTO: 9.1 % (ref 5–12)
NEUTROPHILS NFR BLD AUTO: 5.54 10*3/MM3 (ref 1.7–7)
NEUTROPHILS NFR BLD AUTO: 70.6 % (ref 42.7–76)
NRBC BLD AUTO-RTO: 0 /100 WBC (ref 0–0.2)
PLATELET # BLD AUTO: 168 10*3/MM3 (ref 140–450)
PMV BLD AUTO: 11.9 FL (ref 6–12)
POTASSIUM SERPL-SCNC: 4.6 MMOL/L (ref 3.5–5.2)
PROTHROMBIN TIME: 13.8 SECONDS (ref 12–15.1)
RBC # BLD AUTO: 2.52 10*6/MM3 (ref 4.14–5.8)
SARS-COV-2 RNA PNL SPEC NAA+PROBE: NOT DETECTED
SMALL PLATELETS BLD QL SMEAR: ADEQUATE
SODIUM SERPL-SCNC: 137 MMOL/L (ref 136–145)
WBC MORPH BLD: NORMAL
WBC NRBC COR # BLD: 7.84 10*3/MM3 (ref 3.4–10.8)

## 2021-12-22 PROCEDURE — 99232 SBSQ HOSP IP/OBS MODERATE 35: CPT | Performed by: INTERNAL MEDICINE

## 2021-12-22 PROCEDURE — 87635 SARS-COV-2 COVID-19 AMP PRB: CPT | Performed by: SURGERY

## 2021-12-22 PROCEDURE — P9016 RBC LEUKOCYTES REDUCED: HCPCS

## 2021-12-22 PROCEDURE — 85007 BL SMEAR W/DIFF WBC COUNT: CPT | Performed by: INTERNAL MEDICINE

## 2021-12-22 PROCEDURE — 25010000002 PROPOFOL 10 MG/ML EMULSION: Performed by: NURSE ANESTHETIST, CERTIFIED REGISTERED

## 2021-12-22 PROCEDURE — 80048 BASIC METABOLIC PNL TOTAL CA: CPT | Performed by: INTERNAL MEDICINE

## 2021-12-22 PROCEDURE — 36430 TRANSFUSION BLD/BLD COMPNT: CPT

## 2021-12-22 PROCEDURE — C1889 IMPLANT/INSERT DEVICE, NOC: HCPCS | Performed by: SURGERY

## 2021-12-22 PROCEDURE — 0DB98ZX EXCISION OF DUODENUM, VIA NATURAL OR ARTIFICIAL OPENING ENDOSCOPIC, DIAGNOSTIC: ICD-10-PCS | Performed by: SURGERY

## 2021-12-22 PROCEDURE — 85025 COMPLETE CBC W/AUTO DIFF WBC: CPT | Performed by: INTERNAL MEDICINE

## 2021-12-22 PROCEDURE — 86900 BLOOD TYPING SEROLOGIC ABO: CPT

## 2021-12-22 PROCEDURE — 85610 PROTHROMBIN TIME: CPT | Performed by: INTERNAL MEDICINE

## 2021-12-22 PROCEDURE — 88305 TISSUE EXAM BY PATHOLOGIST: CPT | Performed by: SURGERY

## 2021-12-22 DEVICE — DEV CLIP ENDO RESOLUTION360 CONTRL ROT 235CM: Type: IMPLANTABLE DEVICE | Site: ESOPHAGUS | Status: FUNCTIONAL

## 2021-12-22 RX ORDER — KETAMINE HCL IN NACL, ISO-OSM 100MG/10ML
SYRINGE (ML) INJECTION AS NEEDED
Status: DISCONTINUED | OUTPATIENT
Start: 2021-12-22 | End: 2021-12-22 | Stop reason: SURG

## 2021-12-22 RX ORDER — SODIUM CHLORIDE, SODIUM LACTATE, POTASSIUM CHLORIDE, CALCIUM CHLORIDE 600; 310; 30; 20 MG/100ML; MG/100ML; MG/100ML; MG/100ML
INJECTION, SOLUTION INTRAVENOUS CONTINUOUS PRN
Status: DISCONTINUED | OUTPATIENT
Start: 2021-12-22 | End: 2021-12-22 | Stop reason: SURG

## 2021-12-22 RX ORDER — SODIUM CHLORIDE, SODIUM LACTATE, POTASSIUM CHLORIDE, CALCIUM CHLORIDE 600; 310; 30; 20 MG/100ML; MG/100ML; MG/100ML; MG/100ML
1000 INJECTION, SOLUTION INTRAVENOUS CONTINUOUS
Status: DISCONTINUED | OUTPATIENT
Start: 2021-12-22 | End: 2021-12-22

## 2021-12-22 RX ORDER — PANTOPRAZOLE SODIUM 40 MG/1
40 TABLET, DELAYED RELEASE ORAL
Status: DISCONTINUED | OUTPATIENT
Start: 2021-12-23 | End: 2021-12-23 | Stop reason: HOSPADM

## 2021-12-22 RX ORDER — BISACODYL 5 MG/1
20 TABLET, DELAYED RELEASE ORAL ONCE
Status: COMPLETED | OUTPATIENT
Start: 2021-12-22 | End: 2021-12-22

## 2021-12-22 RX ORDER — LIDOCAINE HYDROCHLORIDE 20 MG/ML
INJECTION, SOLUTION INTRAVENOUS AS NEEDED
Status: DISCONTINUED | OUTPATIENT
Start: 2021-12-22 | End: 2021-12-22 | Stop reason: SURG

## 2021-12-22 RX ORDER — POLYETHYLENE GLYCOL 3350 17 G/17G
1 POWDER, FOR SOLUTION ORAL ONCE
Status: COMPLETED | OUTPATIENT
Start: 2021-12-22 | End: 2021-12-22

## 2021-12-22 RX ORDER — ONDANSETRON 2 MG/ML
4 INJECTION INTRAMUSCULAR; INTRAVENOUS ONCE
Status: DISCONTINUED | OUTPATIENT
Start: 2021-12-22 | End: 2021-12-22 | Stop reason: HOSPADM

## 2021-12-22 RX ORDER — PROPOFOL 10 MG/ML
VIAL (ML) INTRAVENOUS AS NEEDED
Status: DISCONTINUED | OUTPATIENT
Start: 2021-12-22 | End: 2021-12-22 | Stop reason: SURG

## 2021-12-22 RX ADMIN — CARVEDILOL 3.12 MG: 3.12 TABLET, FILM COATED ORAL at 08:52

## 2021-12-22 RX ADMIN — PROPOFOL 20 MG: 10 INJECTION, EMULSION INTRAVENOUS at 13:18

## 2021-12-22 RX ADMIN — CARVEDILOL 3.12 MG: 3.12 TABLET, FILM COATED ORAL at 18:58

## 2021-12-22 RX ADMIN — SODIUM CHLORIDE, POTASSIUM CHLORIDE, SODIUM LACTATE AND CALCIUM CHLORIDE: 600; 310; 30; 20 INJECTION, SOLUTION INTRAVENOUS at 12:59

## 2021-12-22 RX ADMIN — SODIUM CHLORIDE 100 ML/HR: 9 INJECTION, SOLUTION INTRAVENOUS at 08:50

## 2021-12-22 RX ADMIN — LEVOTHYROXINE SODIUM 25 MCG: 25 TABLET ORAL at 08:52

## 2021-12-22 RX ADMIN — Medication 8 MG/HR: at 08:50

## 2021-12-22 RX ADMIN — LIDOCAINE HYDROCHLORIDE 40 MG: 20 INJECTION, SOLUTION INTRAVENOUS at 13:09

## 2021-12-22 RX ADMIN — SODIUM CHLORIDE, PRESERVATIVE FREE 3 ML: 5 INJECTION INTRAVENOUS at 08:52

## 2021-12-22 RX ADMIN — SODIUM CHLORIDE, PRESERVATIVE FREE 3 ML: 5 INJECTION INTRAVENOUS at 20:04

## 2021-12-22 RX ADMIN — BISACODYL 20 MG: 5 TABLET, COATED ORAL at 18:58

## 2021-12-22 RX ADMIN — Medication 10 MG: at 13:07

## 2021-12-22 RX ADMIN — PROPOFOL 30 MG: 10 INJECTION, EMULSION INTRAVENOUS at 13:11

## 2021-12-22 RX ADMIN — POLYETHYLENE GLYCOL 3350 1 BOTTLE: 17 POWDER, FOR SOLUTION ORAL at 18:58

## 2021-12-22 RX ADMIN — PROPOFOL 20 MG: 10 INJECTION, EMULSION INTRAVENOUS at 13:14

## 2021-12-22 RX ADMIN — AMLODIPINE BESYLATE 10 MG: 5 TABLET ORAL at 08:52

## 2021-12-22 RX ADMIN — Medication 10 MG: at 13:11

## 2021-12-22 NOTE — ANESTHESIA POSTPROCEDURE EVALUATION
Patient: Travon Plummer    Procedure Summary     Date: 12/22/21 Room / Location: [facility] ENDOSCOPY 3 / [facility] ENDOSCOPY    Anesthesia Start: 1303 Anesthesia Stop:     Procedure: ESOPHAGOGASTRODUODENOSCOPY with biopsy (N/A Esophagus) Diagnosis:       Upper GI bleed      Iron deficiency anemia due to chronic blood loss      Melena      (Upper GI bleed [K92.2])      (Iron deficiency anemia due to chronic blood loss [D50.0])      (Melena [K92.1])    Surgeons: Deandra Do MD Provider: Alvaro Segal CRNA    Anesthesia Type: MAC ASA Status: 3          Anesthesia Type: MAC    Vitals  No vitals data found for the desired time range.          Post Anesthesia Care and Evaluation    Patient location during evaluation: PACU  Patient participation: complete - patient participated  Level of consciousness: awake  Pain score: 0  Pain management: adequate  Airway patency: patent  Anesthetic complications: No anesthetic complications  PONV Status: none  Cardiovascular status: acceptable  Respiratory status: acceptable and nasal cannula  Hydration status: acceptable    Comments: vsss resp spont, reflexes intact, responsive, report given to pacu nurse

## 2021-12-22 NOTE — ANESTHESIA PREPROCEDURE EVALUATION
Anesthesia Evaluation     Patient summary reviewed and Nursing notes reviewed   no history of anesthetic complications:  NPO Solid Status: > 8 hours  NPO Liquid Status: > 8 hours           Airway   Mallampati: II  TM distance: >3 FB  Neck ROM: full  Possible difficult intubation  Dental      Pulmonary    (+) a smoker Former, COPD, shortness of breath, sleep apnea, decreased breath sounds,   Cardiovascular     PT is on anticoagulation therapy  Patient on routine beta blocker    (+) hypertension, CAD, cardiac stents dysrhythmias Atrial Fib, PVD, hyperlipidemia,  carotid artery disease      Neuro/Psych  (+) CVA,     GI/Hepatic/Renal/Endo    (+) obesity, morbid obesity, GERD, GI bleeding , renal disease CRI, thyroid problem hypothyroidism    Musculoskeletal     Abdominal   (+) obese,    Substance History      OB/GYN          Other   arthritis,    history of cancer                  Anesthesia Plan    ASA 3     MAC   (Risks and benefits discussed including risk of aspiration, recall and dental damage. All patient questions answered.    Will continue with plan of care.)  intravenous induction     Anesthetic plan, all risks, benefits, and alternatives have been provided, discussed and informed consent has been obtained with: patient.

## 2021-12-23 ENCOUNTER — ANESTHESIA (OUTPATIENT)
Dept: GASTROENTEROLOGY | Facility: HOSPITAL | Age: 76
End: 2021-12-23

## 2021-12-23 ENCOUNTER — READMISSION MANAGEMENT (OUTPATIENT)
Dept: CALL CENTER | Facility: HOSPITAL | Age: 76
End: 2021-12-23

## 2021-12-23 ENCOUNTER — ANESTHESIA EVENT (OUTPATIENT)
Dept: GASTROENTEROLOGY | Facility: HOSPITAL | Age: 76
End: 2021-12-23

## 2021-12-23 VITALS
RESPIRATION RATE: 20 BRPM | HEIGHT: 75 IN | TEMPERATURE: 98 F | SYSTOLIC BLOOD PRESSURE: 132 MMHG | DIASTOLIC BLOOD PRESSURE: 54 MMHG | WEIGHT: 203.26 LBS | OXYGEN SATURATION: 95 % | HEART RATE: 84 BPM | BODY MASS INDEX: 25.27 KG/M2

## 2021-12-23 LAB
ANION GAP SERPL CALCULATED.3IONS-SCNC: 8.7 MMOL/L (ref 5–15)
BH BB BLOOD EXPIRATION DATE: NORMAL
BH BB BLOOD TYPE BARCODE: 5100
BH BB DISPENSE STATUS: NORMAL
BH BB PRODUCT CODE: NORMAL
BH BB UNIT NUMBER: NORMAL
BUN SERPL-MCNC: 33 MG/DL (ref 8–23)
BUN/CREAT SERPL: 12 (ref 7–25)
CALCIUM SPEC-SCNC: 8.9 MG/DL (ref 8.6–10.5)
CHLORIDE SERPL-SCNC: 113 MMOL/L (ref 98–107)
CO2 SERPL-SCNC: 16.3 MMOL/L (ref 22–29)
CREAT SERPL-MCNC: 2.76 MG/DL (ref 0.76–1.27)
CROSSMATCH INTERPRETATION: NORMAL
DEPRECATED RDW RBC AUTO: 54.3 FL (ref 37–54)
ERYTHROCYTE [DISTWIDTH] IN BLOOD BY AUTOMATED COUNT: 15.9 % (ref 12.3–15.4)
GFR SERPL CREATININE-BSD FRML MDRD: 23 ML/MIN/1.73
GLUCOSE SERPL-MCNC: 101 MG/DL (ref 65–99)
HCT VFR BLD AUTO: 24.6 % (ref 37.5–51)
HGB BLD-MCNC: 7.2 G/DL (ref 13–17.7)
MCH RBC QN AUTO: 27.4 PG (ref 26.6–33)
MCHC RBC AUTO-ENTMCNC: 29.3 G/DL (ref 31.5–35.7)
MCV RBC AUTO: 93.5 FL (ref 79–97)
PLATELET # BLD AUTO: 160 10*3/MM3 (ref 140–450)
PMV BLD AUTO: 12.1 FL (ref 6–12)
POTASSIUM SERPL-SCNC: 4.5 MMOL/L (ref 3.5–5.2)
RBC # BLD AUTO: 2.63 10*6/MM3 (ref 4.14–5.8)
SODIUM SERPL-SCNC: 138 MMOL/L (ref 136–145)
UNIT  ABO: NORMAL
UNIT  RH: NORMAL
WBC NRBC COR # BLD: 8.84 10*3/MM3 (ref 3.4–10.8)

## 2021-12-23 PROCEDURE — 25010000002 FENTANYL CITRATE (PF) 100 MCG/2ML SOLUTION: Performed by: NURSE ANESTHETIST, CERTIFIED REGISTERED

## 2021-12-23 PROCEDURE — C1889 IMPLANT/INSERT DEVICE, NOC: HCPCS | Performed by: SURGERY

## 2021-12-23 PROCEDURE — 88305 TISSUE EXAM BY PATHOLOGIST: CPT | Performed by: SURGERY

## 2021-12-23 PROCEDURE — 85027 COMPLETE CBC AUTOMATED: CPT | Performed by: SURGERY

## 2021-12-23 PROCEDURE — 99238 HOSP IP/OBS DSCHRG MGMT 30/<: CPT | Performed by: INTERNAL MEDICINE

## 2021-12-23 PROCEDURE — 25010000002 PROPOFOL 1000 MG/100ML EMULSION: Performed by: NURSE ANESTHETIST, CERTIFIED REGISTERED

## 2021-12-23 PROCEDURE — 25010000002 MAGNESIUM SULFATE IN D5W 1G/100ML (PREMIX) 1-5 GM/100ML-% SOLUTION: Performed by: HOSPITALIST

## 2021-12-23 PROCEDURE — 80048 BASIC METABOLIC PNL TOTAL CA: CPT | Performed by: SURGERY

## 2021-12-23 PROCEDURE — 0DBL8ZZ EXCISION OF TRANSVERSE COLON, VIA NATURAL OR ARTIFICIAL OPENING ENDOSCOPIC: ICD-10-PCS | Performed by: SURGERY

## 2021-12-23 DEVICE — DEV CLIP ENDO RESOLUTION360 CONTRL ROT 235CM: Type: IMPLANTABLE DEVICE | Site: COLON | Status: FUNCTIONAL

## 2021-12-23 RX ORDER — LIDOCAINE HYDROCHLORIDE 20 MG/ML
INJECTION, SOLUTION INTRAVENOUS AS NEEDED
Status: DISCONTINUED | OUTPATIENT
Start: 2021-12-23 | End: 2021-12-23 | Stop reason: SURG

## 2021-12-23 RX ORDER — SODIUM CHLORIDE, SODIUM LACTATE, POTASSIUM CHLORIDE, CALCIUM CHLORIDE 600; 310; 30; 20 MG/100ML; MG/100ML; MG/100ML; MG/100ML
1000 INJECTION, SOLUTION INTRAVENOUS CONTINUOUS
Status: DISCONTINUED | OUTPATIENT
Start: 2021-12-23 | End: 2021-12-23

## 2021-12-23 RX ORDER — PROPOFOL 10 MG/ML
INJECTION, EMULSION INTRAVENOUS AS NEEDED
Status: DISCONTINUED | OUTPATIENT
Start: 2021-12-23 | End: 2021-12-23 | Stop reason: SURG

## 2021-12-23 RX ORDER — MAGNESIUM SULFATE 1 G/100ML
1 INJECTION INTRAVENOUS ONCE
Status: COMPLETED | OUTPATIENT
Start: 2021-12-23 | End: 2021-12-23

## 2021-12-23 RX ORDER — ASPIRIN 81 MG/1
81 TABLET ORAL DAILY
Qty: 30 TABLET | Refills: 0 | Status: ON HOLD | OUTPATIENT
Start: 2021-12-27 | End: 2023-02-18 | Stop reason: SDUPTHER

## 2021-12-23 RX ORDER — FENTANYL CITRATE 50 UG/ML
INJECTION, SOLUTION INTRAMUSCULAR; INTRAVENOUS AS NEEDED
Status: DISCONTINUED | OUTPATIENT
Start: 2021-12-23 | End: 2021-12-23 | Stop reason: SURG

## 2021-12-23 RX ADMIN — Medication: at 05:36

## 2021-12-23 RX ADMIN — PANTOPRAZOLE SODIUM 40 MG: 40 TABLET, DELAYED RELEASE ORAL at 05:36

## 2021-12-23 RX ADMIN — SODIUM CHLORIDE, POTASSIUM CHLORIDE, SODIUM LACTATE AND CALCIUM CHLORIDE 1000 ML: 600; 310; 30; 20 INJECTION, SOLUTION INTRAVENOUS at 11:18

## 2021-12-23 RX ADMIN — PROPOFOL 50 MG: 10 INJECTION, EMULSION INTRAVENOUS at 13:07

## 2021-12-23 RX ADMIN — MAGNESIUM SULFATE HEPTAHYDRATE 1 G: 1 INJECTION, SOLUTION INTRAVENOUS at 05:36

## 2021-12-23 RX ADMIN — SODIUM CHLORIDE, PRESERVATIVE FREE 3 ML: 5 INJECTION INTRAVENOUS at 08:55

## 2021-12-23 RX ADMIN — LIDOCAINE HYDROCHLORIDE 60 MG: 20 INJECTION, SOLUTION INTRAVENOUS at 12:46

## 2021-12-23 RX ADMIN — PROPOFOL 100 MG: 10 INJECTION, EMULSION INTRAVENOUS at 12:45

## 2021-12-23 RX ADMIN — FENTANYL CITRATE 50 MCG: 50 INJECTION, SOLUTION INTRAMUSCULAR; INTRAVENOUS at 12:46

## 2021-12-23 NOTE — ANESTHESIA POSTPROCEDURE EVALUATION
Patient: Travon Plummer    Procedure Summary       Date: 12/23/21 Room / Location: [facility] ENDOSCOPY 2 / [facility] ENDOSCOPY    Anesthesia Start: 1243 Anesthesia Stop: 1339    Procedure: COLONOSCOPY, polypectomy, clip placement x 1 (N/A ) Diagnosis:       Iron deficiency anemia due to chronic blood loss      Melena      Symptomatic anemia      (Iron deficiency anemia due to chronic blood loss [D50.0])      (Melena [K92.1])      (Symptomatic anemia [D64.9])    Surgeons: Deandra Do MD Provider: Tony Johnson CRNA    Anesthesia Type: MAC ASA Status: 3            Anesthesia Type: MAC    Vitals  Vitals Value Taken Time   /54 12/23/21 1424   Temp 98 °F (36.7 °C) 12/23/21 1424   Pulse 84 12/23/21 1424   Resp 20 12/23/21 1424   SpO2 95 % 12/23/21 1424           Post Anesthesia Care and Evaluation    Patient location during evaluation: bedside  Patient participation: complete - patient participated  Level of consciousness: awake  Pain score: 0  Pain management: adequate  Airway patency: patent  Anesthetic complications: No anesthetic complications  PONV Status: controlled  Cardiovascular status: acceptable and stable  Respiratory status: acceptable and room air  Hydration status: acceptable

## 2021-12-24 NOTE — OUTREACH NOTE
Prep Survey      Responses   [facility] patient discharged from? [place]   Is LACE score < 7 ? No   Emergency Room discharge w/ pulse ox? No   Eligibility TCM   [facility]   Date of Admission 12/21/21   Date of Discharge 12/23/21   Discharge Disposition Home or Self Care   Discharge diagnosis symptomatic anemia, upper GI bleed, CKD   Does the patient have one of the following disease processes/diagnoses(primary or secondary)? Other   Does the patient have Home health ordered? No   Is there a DME ordered? No   Prep survey completed? Yes          Vane Lang RN

## 2021-12-27 ENCOUNTER — TRANSITIONAL CARE MANAGEMENT TELEPHONE ENCOUNTER (OUTPATIENT)
Dept: CALL CENTER | Facility: HOSPITAL | Age: 76
End: 2021-12-27

## 2021-12-27 ENCOUNTER — TELEPHONE (OUTPATIENT)
Dept: SURGERY | Facility: CLINIC | Age: 76
End: 2021-12-27

## 2021-12-27 NOTE — OUTREACH NOTE
Call Center TCM Note      Responses   [facility] patient discharged from? [facility]   Does the patient have one of the following disease processes/diagnoses(primary or secondary)? Other   TCM attempt successful? No   Unsuccessful attempts Attempt 1          John Briones RN    12/27/2021, 10:01 EST

## 2021-12-27 NOTE — TELEPHONE ENCOUNTER
Called 12/23/21patient spoke to patient wife scheduled patient follow up EGD & Colonoscopy @ [place] office 01/05/22

## 2021-12-27 NOTE — OUTREACH NOTE
Call Center TCM Note      Responses   [facility] patient discharged from? [facility]   Does the patient have one of the following disease processes/diagnoses(primary or secondary)? Other   TCM attempt successful? Yes   Call start time 1017   Call end time 1021   Discharge diagnosis symptomatic anemia, upper GI bleed, CKD   Meds reviewed with patient/caregiver? Yes   Is the patient having any side effects they believe may be caused by any medication additions or changes? No   Does the patient have all medications ordered at discharge? Yes   Is the patient taking all medications as directed (includes completed medication regime)? Yes   Does the patient have a primary care provider?  Yes   Does the patient have an appointment with their PCP within 7 days of discharge? Greater than 7 days   Comments regarding PCP Has a New PCP appt scheduled on 1/7/2022 with Maci Call.   What is preventing the patient from scheduling follow up appointments within 7 days of discharge? Earlier appointment not available   Nursing Interventions Verified appointment date/time/provider   Has the patient kept scheduled appointments due by today? N/A   Has home health visited the patient within 72 hours of discharge? N/A   Psychosocial issues? No   Did the patient receive a copy of their discharge instructions? Yes   Nursing interventions Reviewed instructions with patient   What is the patient's perception of their health status since discharge? Same   Is the patient/caregiver able to teach back signs and symptoms related to disease process for when to call PCP? Yes   Is the patient/caregiver able to teach back signs and symptoms related to disease process for when to call 911? Yes   Is the patient/caregiver able to teach back the hierarchy of who to call/visit for symptoms/problems? PCP, Specialist, Home health nurse, Urgent Care, ED, 911 Yes   If the patient is a current smoker, are they able to teach back resources for  cessation? Smoking cessation medications   TCM call completed? Yes          John Briones RN    12/27/2021, 10:21 EST

## 2021-12-29 LAB
LAB AP CASE REPORT: NORMAL
LAB AP CASE REPORT: NORMAL
PATH REPORT.FINAL DX SPEC: NORMAL
PATH REPORT.FINAL DX SPEC: NORMAL

## 2022-01-03 ENCOUNTER — READMISSION MANAGEMENT (OUTPATIENT)
Dept: CALL CENTER | Facility: HOSPITAL | Age: 77
End: 2022-01-03

## 2022-01-03 NOTE — OUTREACH NOTE
Medical Week 2 Survey      Responses   [facility] patient discharged from? [facility]   Does the patient have one of the following disease processes/diagnoses(primary or secondary)? Other   Week 2 attempt successful? Yes   Call start time 1549   Call end time 1552   Meds reviewed with patient/caregiver? Yes   Comments regarding appointments Pt's spouse has scheduled appointments.   Week 2 Call Completed? Yes   Wrap up additional comments Brief call as to pt in the car driving. Pt feeling better.          Cristina Celaya RN

## 2022-01-10 ENCOUNTER — READMISSION MANAGEMENT (OUTPATIENT)
Dept: CALL CENTER | Facility: HOSPITAL | Age: 77
End: 2022-01-10

## 2022-01-10 NOTE — OUTREACH NOTE
Medical Week 3 Survey      Responses   [facility] patient discharged from? [facility]   Does the patient have one of the following disease processes/diagnoses(primary or secondary)? Other   Week 3 attempt successful? Yes   Call start time 1612   Call end time 1613   Discharge diagnosis symptomatic anemia, upper GI bleed, CKD   Meds reviewed with patient/caregiver? Yes   Is the patient taking all medications as directed (includes completed medication regime)? Yes   Does the patient have a primary care provider?  Yes   Has the patient kept scheduled appointments due by today? Yes   Has home health visited the patient within 72 hours of discharge? N/A   Psychosocial issues? No   What is the patient's perception of their health status since discharge? Improving   Is the patient/caregiver able to teach back signs and symptoms related to disease process for when to call PCP? Yes   Is the patient/caregiver able to teach back signs and symptoms related to disease process for when to call 911? Yes   Is the patient/caregiver able to teach back the hierarchy of who to call/visit for symptoms/problems? PCP, Specialist, Home health nurse, Urgent Care, ED, 911 Yes   Week 3 Call Completed? Yes          AMITA VARGAS RN

## 2022-01-13 ENCOUNTER — OFFICE VISIT (OUTPATIENT)
Dept: SURGERY | Facility: CLINIC | Age: 77
End: 2022-01-13

## 2022-01-13 VITALS
BODY MASS INDEX: 25.71 KG/M2 | WEIGHT: 206.8 LBS | HEIGHT: 75 IN | HEART RATE: 75 BPM | SYSTOLIC BLOOD PRESSURE: 138 MMHG | OXYGEN SATURATION: 98 % | TEMPERATURE: 98.6 F | DIASTOLIC BLOOD PRESSURE: 64 MMHG | RESPIRATION RATE: 18 BRPM

## 2022-01-13 DIAGNOSIS — D50.0 IRON DEFICIENCY ANEMIA DUE TO CHRONIC BLOOD LOSS: ICD-10-CM

## 2022-01-13 DIAGNOSIS — D64.9 SYMPTOMATIC ANEMIA: Primary | ICD-10-CM

## 2022-01-13 PROCEDURE — 99213 OFFICE O/P EST LOW 20 MIN: CPT | Performed by: SURGERY

## 2022-01-13 NOTE — PROGRESS NOTES
Subjective   Travon Plummer is a 77 y.o. male.   Chief Complaint   Patient presents with   • Follow-up     EGD/colon     History of Present Illness Patient is here for follow up EGD/colonoscopy. Patient states he is doing well and not having any problems.    The following portions of the patient's history were reviewed and updated as appropriate: allergies, current medications, past family history, past medical history, past social history, past surgical history and problem list.    Review of Systems   Constitutional: Negative for chills, fever and unexpected weight change.   HENT: Negative for trouble swallowing and voice change.    Eyes: Negative for visual disturbance.   Respiratory: Negative for apnea, cough, chest tightness, shortness of breath and wheezing.    Cardiovascular: Negative for chest pain, palpitations and leg swelling.   Gastrointestinal: Negative for abdominal distention, abdominal pain, anal bleeding, blood in stool, constipation, diarrhea, nausea, rectal pain and vomiting.   Endocrine: Negative for cold intolerance and heat intolerance.   Genitourinary: Negative for difficulty urinating, dysuria, flank pain, scrotal swelling and testicular pain.   Musculoskeletal: Negative for back pain, gait problem and joint swelling.   Skin: Negative for color change, rash and wound.   Neurological: Negative for dizziness, syncope, speech difficulty, weakness, numbness and headaches.   Hematological: Negative for adenopathy. Does not bruise/bleed easily.   Psychiatric/Behavioral: Negative for confusion. The patient is not nervous/anxious.        Objective   Physical Exam    Assessment/Plan   Diagnoses and all orders for this visit:    1. Symptomatic anemia (Primary)    2. Iron deficiency anemia due to chronic blood loss

## 2022-01-18 ENCOUNTER — READMISSION MANAGEMENT (OUTPATIENT)
Dept: CALL CENTER | Facility: HOSPITAL | Age: 77
End: 2022-01-18

## 2022-01-18 NOTE — OUTREACH NOTE
Medical Week 4 Survey      Responses   [facility] patient discharged from? [facility]   Does the patient have one of the following disease processes/diagnoses(primary or secondary)? Other   Week 4 attempt successful? Yes   Call start time 1111   Call end time 1112   Meds reviewed with patient/caregiver? Yes   Is the patient taking all medications as directed (includes completed medication regime)? Yes   Has the patient kept scheduled appointments due by today? Yes   Is the patient still receiving Home Health Services? N/A   What is the patient's perception of their health status since discharge? Improving   Week 4 Call Completed? Yes   Would the patient like one additional call? No   Graduated Yes   Is the patient interested in additional calls from an ambulatory ?  NOTE:  applies to high risk patients requiring additional follow-up. Yes   Did the patient feel the follow up calls were helpful during their recovery period? Yes   Wrap up additional comments Pt. states is doing well, no new issues and no questions.           Flora Fowler RN

## 2022-01-24 NOTE — PROGRESS NOTES
"[facility] Cardiology  Follow Up Visit  Travon Plummer  1945    VISIT DATE:  01/26/22    PCP:   Maci Burgos, APRN  [address] [address]          CC: Follow-up carotid stenosis    Problem List:  1. Coronary artery disease.  a. Dyspnea/abnormal MPS, 12/09/2008:  Inferior wall ischemia, EF 70%:  1. Left heart catheterization by Dr. Gonzalez, 12/15/2008:  Normal LV.  2. Subtotal occlusion of well collateralized right JEREMIAS.  3. A 3.5 x 13 mm. Cypher ESTIVEN to RCA expanded with 4 mm balloon.   2. Cerebrovascular accident with expressive aphasia, 10/31/2008:  a. PTA/left carotid artery stent, 10/31/2008.   b. No obstructive disease by duplex, October 2010.  c. Carotid duplex, 11/10/2014, Petros Cross MD, revealed patent left carotid stent and no evidence of significant stenosis in the right ICA.  d. Greater than 70% right internal carotid artery stenosis, medical management [facility]  3. History of hypertension; hypotensive at the time of office visit on 11/10/2015.  4. Hypercholesterolemia.   5. History of paroxysmal atrial fibrillation, data deficit.  6. Tobacco/chewing abuse.   7. Gout.  8. Osteoarthritis.  9. Gastroesophageal reflux disease.  10. Obesity.  11. Acute Pancreatitis, September 2016.  12. Prostate cancer, diagnosed March of 2015, status post radiation therapy x39 treatments, 07/01/2015 at [facility].  13. Renal cell carcinoma, diagnosed March of 2015, status post left nephrectomy.  Elevated creatinine of 2.1 on labs performed 11/04/2015. Creatinine 1.3 09/17/2015. \"Spots on lung\" and chest treated with radiation, fall 2016.  14. Surgical history:  a. Inguinal hernia.   b. Colon polypectomy        History of Present Illness:  Travon Plummer  Is a 77 y.o. male with pertinent cardiac history detailed above.   Patient is returning for 6 months follow-up for his right carotid stenosis.  He had a duplex completed earlier today that shows " high-grade proximal right carotid artery stenosis with diminished velocities in the distal segment.  He denies any new stroke or TIA symptoms.  He denies any anginal chest pain.  His blood pressure is elevated above goal.  He complains of fatigue.  He has chronic anemia and had recent evaluation for GI bleed in December.  He had EGD and colonoscopy and it was thought he might be having small bowel bleeding.  His aspirin dose was reduced to 81 mg.      Patient Active Problem List    Diagnosis Date Noted   • Upper GI bleed 12/22/2021     Note Last Updated: 12/22/2021     Added automatically from request for surgery [id number]     • Symptomatic anemia 12/21/2021   • Iron deficiency anemia due to chronic blood loss 12/21/2021   • Melena 12/21/2021   • Chronic kidney disease, stage IV (severe) ([facility]) 12/21/2021   • Nuclear sclerotic cataract of right eye 08/18/2021     Note Last Updated: 8/18/2021     Added automatically from request for surgery [id number]     • Coronary artery disease      Note Last Updated: 11/7/2016     1. Coronary artery disease.  2. Dyspnea/abnormal MPS, 12/09/2008:  Inferior wall ischemia, EF 70%:  a. Left heart catheterization by Dr. Gonzalez, 12/15/2008:  Normal LV.   b. Subtotal occlusion of well collateralized right JEREMIAS.  c. A 3.5 x 13 mm. Cypher ESTIVEN to RCA expanded with 4 mm balloon.        • Dyspnea      Note Last Updated: 11/7/2016     3. Dyspnea/abnormal MPS, 12/09/2008:  Inferior wall ischemia, EF 70%:  a. Left heart catheterization by Dr. Gonzalez, 12/15/2008:  Normal LV.   b. Subtotal occlusion of well collateralized right JEREMIAS.  c. A 3.5 x 13 mm. Cypher ESTIVEN to RCA expanded with 4 mm balloon.        • Hypercholesterolemia    • Tobacco abuse      Note Last Updated: 11/7/2016     Tobacco/chewing abuse.      • Gout    • Osteoarthritis    • GERD (gastroesophageal reflux disease)    • Obesity    • Prostate cancer (HCC)      Note Last Updated: 11/7/2016     Prostate cancer, diagnosed March of 2015, status  post radiation therapy x39 treatments, 2015 at [facility].     • Renal cell carcinoma (HCC)      Note Last Updated: 2016     Renal cell carcinoma, diagnosed 2015, status post left nephrectomy.  Elevated creatinine of 2.1 on labs performed 2015.     • Essential hypertension 11/10/2015     Note Last Updated: 2016     History of hypertension; hypotensive at the time of office visit on 11/10/2015.     • Cerebrovascular accident (HCC) 10/31/2008     Note Last Updated: 2016     4. Cerebrovascular accident with expressive aphasia, 10/31/2008:  a. PTA/left carotid artery stent, 10/31/2008.   b. No obstructive disease by duplex, 2010.  c. Carotid duplex, 11/10/2014, Petros Cross MD, revealed patent left carotid stent and no evidence of significant stenosis in the right ICA.           Allergies   Allergen Reactions   • Allopurinol Urinary Retention   • Lipitor [Atorvastatin] Myalgia       Social History     Socioeconomic History   • Marital status:    Tobacco Use   • Smoking status: Former Smoker     Types: Cigarettes     Quit date:      Years since quittin.0   • Smokeless tobacco: Current User     Types: Chew   Vaping Use   • Vaping Use: Never used   Substance and Sexual Activity   • Alcohol use: No   • Drug use: No   • Sexual activity: Defer       Family History   Problem Relation Age of Onset   • No Known Problems Mother    • No Known Problems Father        Current Medications:    Current Outpatient Medications:   •  amLODIPine (NORVASC) 10 MG tablet, TAKE ONE TABLET BY MOUTH EVERY DAY, Disp: 90 tablet, Rfl: 3  •  aspirin (aspirin) 81 MG EC tablet, Take 1 tablet by mouth Daily., Disp: 30 tablet, Rfl: 0  •  carvedilol (COREG) 3.125 MG tablet, Take 3.125 mg by mouth 2 (Two) Times a Day With Meals., Disp: , Rfl:   •  Cholecalciferol (Vitamin D) 50 MCG (2000 UT) capsule, Take 2,000 Units by mouth Daily., Disp: , Rfl:   •  ferrous sulfate 325 (65 FE) MG  tablet, Take 325 mg by mouth Daily With Breakfast., Disp: , Rfl:   •  hydrALAZINE (APRESOLINE) 25 MG tablet, Take 1 tablet by mouth 2 (Two) Times a Day., Disp: 60 tablet, Rfl: 3  •  levothyroxine (SYNTHROID, LEVOTHROID) 25 MCG tablet, Take 25 mcg by mouth Daily., Disp: , Rfl: 0  •  pantoprazole (PROTONIX) 40 MG EC tablet, TAKE 1 TABLET BY MOUTH ONCE DAILY, Disp: 30 tablet, Rfl: 11  •  rosuvastatin (CRESTOR) 40 MG tablet, TAKE ONE TABLET BY MOUTH AT BEDTIME (Patient taking differently: Take 40 mg by mouth Every Night.), Disp: 90 tablet, Rfl: 3  •  vitamin B-12 (CYANOCOBALAMIN) 100 MCG tablet, Take 100 mcg by mouth Daily., Disp: , Rfl:      Review of Systems   Constitutional: Positive for malaise/fatigue.   Cardiovascular: Positive for palpitations. Negative for chest pain, dyspnea on exertion, irregular heartbeat, orthopnea and syncope.   Gastrointestinal: Positive for melena.   Neurological: Negative for focal weakness, numbness and paresthesias.       There were no vitals filed for this visit.    Physical Exam  Constitutional:       Appearance: Normal appearance.   Neck:      Vascular: Carotid bruit (Right side) present.   Cardiovascular:      Rate and Rhythm: Normal rate and regular rhythm.   Pulmonary:      Effort: Pulmonary effort is normal.      Breath sounds: Normal breath sounds.   Neurological:      General: No focal deficit present.      Mental Status: He is alert.   Psychiatric:         Mood and Affect: Mood normal.         Diagnostic Data:  Procedures  Lab Results   Component Value Date    TRIG 123 06/15/2021    HDL 34 (L) 06/15/2021     Lab Results   Component Value Date    GLUCOSE 101 (H) 12/23/2021    BUN 33 (H) 12/23/2021    CREATININE 2.76 (H) 12/23/2021     12/23/2021    K 4.5 12/23/2021     (H) 12/23/2021    CO2 16.3 (L) 12/23/2021     Lab Results   Component Value Date    HGBA1C 5.93 (H) 06/24/2020     Lab Results   Component Value Date    WBC 8.84 12/23/2021    HGB 7.2 (L) 12/23/2021     HCT 24.6 (L) 12/23/2021     12/23/2021       Assessment:   Diagnosis Plan   1. Coronary artery disease involving native coronary artery of native heart without angina pectoris  Adult Transthoracic Echo Complete W/ Cont if Necessary Per Protocol    ECG 12 Lead       Plan:      1.  CAD  -On aspirin and statin, remote RCA PCI  -Ejection fraction historically has been normal will obtain updated echocardiogram  -No complaints of angina at this time    2.  Bilateral carotid artery disease  -History of left carotid stent  -Right internal carotid artery stenosis greater than 70%, Dr. Gonzalez is going to review his duplex with Dr. Elmore, he may require crossover to stenting arm    3.  Hyperlipidemia  -LDL 58 triglycerides 123  -Continue rosuvastatin 10 mg    4.  Hypertension  -Coreg, amlodipine  -BP today 147/81 mmHg.  Add hydralazine 25 mg two times daily    5.  CKD  -Creatinine 2.76 with GFR 23 on most recent lab    6.  Anemia, last hemoglobin 7.2  -Status post colonoscopy EGD, possible small bowel bleeding  -ASA 81mg daily  -With his CKD asked him to discuss erythropoietin supplementation with his nephrologist    He will follow up in 1 month for blood pressure recheck with the addition of hydralazine        Leonard Ashford MD [facility]

## 2022-01-25 ENCOUNTER — OFFICE VISIT (OUTPATIENT)
Dept: CARDIOLOGY | Facility: CLINIC | Age: 77
End: 2022-01-25

## 2022-01-25 ENCOUNTER — HOSPITAL ENCOUNTER (OUTPATIENT)
Dept: CARDIOLOGY | Facility: HOSPITAL | Age: 77
Discharge: HOME | End: 2022-01-25
Admitting: INTERNAL MEDICINE

## 2022-01-25 VITALS — HEIGHT: 75 IN | BODY MASS INDEX: 25.61 KG/M2 | WEIGHT: 206 LBS

## 2022-01-25 DIAGNOSIS — E78.00 HYPERCHOLESTEROLEMIA: ICD-10-CM

## 2022-01-25 DIAGNOSIS — I65.21 CAROTID STENOSIS, ASYMPTOMATIC, RIGHT: ICD-10-CM

## 2022-01-25 DIAGNOSIS — I10 ESSENTIAL HYPERTENSION: ICD-10-CM

## 2022-01-25 DIAGNOSIS — I25.10 CORONARY ARTERY DISEASE INVOLVING NATIVE CORONARY ARTERY OF NATIVE HEART WITHOUT ANGINA PECTORIS: Primary | ICD-10-CM

## 2022-01-25 DIAGNOSIS — N18.9 CHRONIC KIDNEY DISEASE, UNSPECIFIED CKD STAGE: ICD-10-CM

## 2022-01-25 PROCEDURE — 99212 OFFICE O/P EST SF 10 MIN: CPT | Performed by: INTERNAL MEDICINE

## 2022-01-25 PROCEDURE — 93880 EXTRACRANIAL BILAT STUDY: CPT

## 2022-01-25 RX ORDER — HYDRALAZINE HYDROCHLORIDE 25 MG/1
25 TABLET, FILM COATED ORAL 2 TIMES DAILY
Qty: 60 TABLET | Refills: 3 | Status: SHIPPED | OUTPATIENT
Start: 2022-01-25 | End: 2022-02-07 | Stop reason: SDUPTHER

## 2022-01-26 ENCOUNTER — PATIENT OUTREACH (OUTPATIENT)
Dept: CASE MANAGEMENT | Facility: OTHER | Age: 77
End: 2022-01-26

## 2022-01-26 ENCOUNTER — DOCUMENTATION (OUTPATIENT)
Dept: CARDIOLOGY | Facility: CLINIC | Age: 77
End: 2022-01-26

## 2022-01-26 ENCOUNTER — RESEARCH ENCOUNTER (OUTPATIENT)
Dept: OTHER | Facility: OTHER | Age: 77
End: 2022-01-26

## 2022-01-26 VITALS
BODY MASS INDEX: 26.01 KG/M2 | DIASTOLIC BLOOD PRESSURE: 79 MMHG | WEIGHT: 207 LBS | HEART RATE: 58 BPM | SYSTOLIC BLOOD PRESSURE: 150 MMHG

## 2022-01-26 DIAGNOSIS — I65.21 CAROTID STENOSIS, ASYMPTOMATIC, RIGHT: Primary | ICD-10-CM

## 2022-01-26 LAB
BH CV DISTAL LEFT ICA HIDDEN LRR: 1 CM
BH CV ECHO MEAS - BSA(HAYCOCK): 2.2 M^2
BH CV ECHO MEAS - BSA: 2.2 M^2
BH CV ECHO MEAS - BZI_BMI: 25.7 KILOGRAMS/M^2
BH CV ECHO MEAS - BZI_METRIC_HEIGHT: 190.5 CM
BH CV ECHO MEAS - BZI_METRIC_WEIGHT: 93.4 KG
BH CV MID LEFT ICA HIDDEN LRR: 1 CM
BH CV VAS CAROTID LEFT DISTAL STENT EDV: 25.4 CM/S
BH CV VAS CAROTID LEFT DISTAL STENT: 83.8 CM/S
BH CV VAS CAROTID LEFT DISTAL TO STENT EDV: 23.2 CM/S
BH CV VAS CAROTID LEFT DISTAL TO STENT: 86 CM/S
BH CV VAS CAROTID LEFT MID STENT EDV: 23.6 CM/S
BH CV VAS CAROTID LEFT MID STENT: 85.4 CM/S
BH CV VAS CAROTID LEFT PROXIMAL STENT EDV: 11.9 CM/S
BH CV VAS CAROTID LEFT PROXIMAL STENT: 42.2 CM/S
BH CV VAS CAROTID LEFT PROXIMAL TO STENT: 39.5 CM/S
BH CV VAS CAROTID LEFT STENT NATIVE VESSEL PROXIMAL ED: 10.5 CM/S
BH CV XLRA MEAS LEFT CAROTID BULB EDV: 10.5 CM/SEC
BH CV XLRA MEAS LEFT CAROTID BULB PSV: 39.5 CM/SEC
BH CV XLRA MEAS LEFT DIST CCA EDV: 12.2 CM/SEC
BH CV XLRA MEAS LEFT DIST CCA PSV: 49.6 CM/SEC
BH CV XLRA MEAS LEFT DIST ICA EDV: 25.4 CM/SEC
BH CV XLRA MEAS LEFT DIST ICA PSV: 83.8 CM/SEC
BH CV XLRA MEAS LEFT ICA/CCA RATIO: 1.5
BH CV XLRA MEAS LEFT MID CCA EDV: 15.7 CM/SEC
BH CV XLRA MEAS LEFT MID CCA PSV: 56 CM/SEC
BH CV XLRA MEAS LEFT MID ICA EDV: 23.6 CM/SEC
BH CV XLRA MEAS LEFT MID ICA PSV: 85.4 CM/SEC
BH CV XLRA MEAS LEFT PROX CCA EDV: 18.9 CM/SEC
BH CV XLRA MEAS LEFT PROX CCA PSV: 65.2 CM/SEC
BH CV XLRA MEAS LEFT PROX ECA EDV: 11 CM/SEC
BH CV XLRA MEAS LEFT PROX ECA PSV: 74.4 CM/SEC
BH CV XLRA MEAS LEFT PROX ICA EDV: 11.9 CM/SEC
BH CV XLRA MEAS LEFT PROX ICA PSV: 42.2 CM/SEC
BH CV XLRA MEAS LEFT PROX SCLA PSV: 145.8 CM/SEC
BH CV XLRA MEAS LEFT VERTEBRAL A EDV: 10.7 CM/SEC
BH CV XLRA MEAS LEFT VERTEBRAL A PSV: 39.6 CM/SEC
BH CV XLRA MEAS RIGHT CAROTID BULB EDV: 7.7 CM/SEC
BH CV XLRA MEAS RIGHT CAROTID BULB PSV: 24.6 CM/SEC
BH CV XLRA MEAS RIGHT DIST CCA EDV: 11.2 CM/SEC
BH CV XLRA MEAS RIGHT DIST CCA PSV: 46.8 CM/SEC
BH CV XLRA MEAS RIGHT DIST ICA EDV: 16.6 CM/SEC
BH CV XLRA MEAS RIGHT DIST ICA PSV: 51.1 CM/SEC
BH CV XLRA MEAS RIGHT ICA/CCA RATIO: 6.3
BH CV XLRA MEAS RIGHT MID CCA EDV: 13.8 CM/SEC
BH CV XLRA MEAS RIGHT MID CCA PSV: 63.4 CM/SEC
BH CV XLRA MEAS RIGHT MID ICA EDV: 80.8 CM/SEC
BH CV XLRA MEAS RIGHT MID ICA PSV: 354 CM/SEC
BH CV XLRA MEAS RIGHT PROX CCA EDV: 16.6 CM/SEC
BH CV XLRA MEAS RIGHT PROX CCA PSV: 83.8 CM/SEC
BH CV XLRA MEAS RIGHT PROX ECA EDV: 14.3 CM/SEC
BH CV XLRA MEAS RIGHT PROX ECA PSV: 92.6 CM/SEC
BH CV XLRA MEAS RIGHT PROX ICA EDV: 100 CM/SEC
BH CV XLRA MEAS RIGHT PROX ICA PSV: 399 CM/SEC
BH CV XLRA MEAS RIGHT PROX SCLA PSV: 197.8 CM/SEC
BH CV XLRA MEAS RIGHT VERTEBRAL A EDV: 16.6 CM/SEC
BH CV XLRA MEAS RIGHT VERTEBRAL A PSV: 48.5 CM/SEC
BH CVPROX LEFT ICA HIDDEN LRR: 1 CM
LEFT ARM BP: NORMAL MMHG
RIGHT ARM BP: NORMAL MMHG

## 2022-01-26 NOTE — OUTREACH NOTE
Ambulatory Case Management Note    RN-ACM outreach with patient.  Patient recently completed Readmission Management with [facility].  Case transitioned to [facility] for continued support.     General & Health Literacy Assessment    Questions/Answers      Most Recent Value   Assessment Completed With Patient   Living Arrangement Spouse   Type of Residence Private Residence   Home Care Services No   Equiptment Used at Home --  [Independent with ADLs, no DME needed, patient still drives. ]   Bed or Wheelchair Confined No   Difficulty Keeping Appointments No   Amish or Spiritual Beliefs that Impact Treatment No   Health Literacy Good        Care Evaluation    Questions/Answers      Most Recent Value   Suggested Appointments --  [Continue to follow with PCP and specialties as scheduled.  Patient had a visit with cardiology yesterday, 11/25/21.  Patient anticipates testing/imaging.]   Annual Wellness Visit:  Patient Refuses at This Time   Care Gaps Addressed Colon Cancer Screening,  Pneumonia Vaccine   Colon Cancer Screening Type Colonoscopy   Colonoscopy Status Up to Date (< 10 yrs)   Pneumonia Vaccine Status Up to Date   Care Gap Comments Covid19 Vaccines Up to Date   Advanced Directives: Not Interested At This Time   Medication Adherence --  [Spouse assists as needed. ]   Healthy Lifestyle (Self-Efficacy) self-reports important symptoms to medical professional,  recognizes when to contact medical assistance,  recognizes when to stop activity            Angy Bo RN  Ambulatory Case Management    1/26/2022, 12:03 EST

## 2022-01-26 NOTE — RESEARCH
01/56/2022      Travon Escobar Kavitha  1945      [facility]-2 18mn FOLLOWUP    R ICA STENOSIS    ARM OF STUDY:  R PERICO- IMM alone       Allergies   Allergen Reactions   • Allopurinol Urinary Retention   • Lipitor [Atorvastatin] Myalgia       Current Medications  Current Outpatient Medications   Medication Instructions   • amLODIPine (NORVASC) 10 MG tablet TAKE ONE TABLET BY MOUTH EVERY DAY   • aspirin 81 mg, Oral, Daily   • carvedilol (COREG) 3.125 mg, Oral, 2 Times Daily With Meals   • ferrous sulfate 325 mg, Oral, Daily With Breakfast   • hydrALAZINE (APRESOLINE) 25 mg, Oral, 2 Times Daily   • levothyroxine (SYNTHROID, LEVOTHROID) 25 mcg, Oral, Daily   • pantoprazole (PROTONIX) 40 MG EC tablet TAKE 1 TABLET BY MOUTH ONCE DAILY   • rosuvastatin (CRESTOR) 40 MG tablet TAKE ONE TABLET BY MOUTH AT BEDTIME   • vitamin B-12 (CYANOCOBALAMIN) 100 mcg, Oral, Daily   • Vitamin D 2,000 Units, Oral, Daily         Vital signs:  AVERAGE BP(right arm sitting - study device): 147/81  Standing(if indicated): N/A  Heart Rate:58  Weight: 207 lbs    Vitals:    01/25/22 1628 01/25/22 1631 01/25/22 1634   BP: 149/82 144/84 150/79   BP Location: Right arm Right arm Right arm   Patient Position: Sitting Sitting Sitting   Pulse: 59 58 58   Weight: 93.9 kg (207 lb)       Body mass index is 26.01 kg/m².    NIHSS/mrS: 0/1    Tobacco: No  Activity: No  ETOH:  No  INTERVENT: Yes     CAROTID DUPLEX  01/25/2022- Pending review from Dr. Elmore and Dr. Marei      ORDERS AND MEDICATION CHANGES: Patient was seen today for his 18mn f/u with doppler due to increased velocities at his 12mn visit. Medications confirmed. Per MRJ and NSK, add Hydralazine 25mg bid to daily regimen. Patient is agreeable to that and a 30 day BP recheck on 2/23/22 @ 10am. Patient aware of s/s of stroke to call 911.      *THERE IS NO CHARGE FOR THIS VISIT*    Primary Care Provider: Maci Burgos APRN  PRIMARY CARDIOLOGIST:      Laura Lao RN

## 2022-01-26 NOTE — PROGRESS NOTES
CREST 2 OVERSIGHT NOTE    Duplex from January 25 noted and discussed with Dr. Elmore.    I spoke with the patient's wife by telephone this morning. He is running errands and asymptomatic since recent blood transfusions for suspected small bowel bleed.    I will schedule repeat DUS in 3 months; in the interim, he will call (he has my cell phone number) for any neuro symptoms or questions.    Agree with changes to medical Rx made on 1/25.

## 2022-02-02 DIAGNOSIS — E78.00 HYPERCHOLESTEROLEMIA: Primary | ICD-10-CM

## 2022-02-04 ENCOUNTER — APPOINTMENT (OUTPATIENT)
Dept: CARDIOLOGY | Facility: HOSPITAL | Age: 77
End: 2022-02-04

## 2022-02-07 RX ORDER — HYDRALAZINE HYDROCHLORIDE 25 MG/1
12.5 TABLET, FILM COATED ORAL 2 TIMES DAILY
Qty: 60 TABLET | Refills: 3 | Status: SHIPPED | OUTPATIENT
Start: 2022-02-07 | End: 2022-02-23 | Stop reason: ALTCHOICE

## 2022-02-09 ENCOUNTER — APPOINTMENT (OUTPATIENT)
Dept: CARDIOLOGY | Facility: HOSPITAL | Age: 77
End: 2022-02-09

## 2022-02-23 ENCOUNTER — RESEARCH ENCOUNTER (OUTPATIENT)
Dept: OTHER | Facility: OTHER | Age: 77
End: 2022-02-23

## 2022-02-23 VITALS
DIASTOLIC BLOOD PRESSURE: 71 MMHG | HEART RATE: 62 BPM | SYSTOLIC BLOOD PRESSURE: 139 MMHG | BODY MASS INDEX: 26.76 KG/M2 | WEIGHT: 213 LBS

## 2022-02-23 NOTE — RESEARCH
02/23/2022    Travon Plummer  1945    [facility]-2 BP FOLLOWUP    R ICA STENOSIS    ARM OF STUDY:  R PERICO arm- IMM alone    Allergies   Allergen Reactions   • Allopurinol Urinary Retention   • Lipitor [Atorvastatin] Myalgia       Current Medications  Current Outpatient Medications   Medication Instructions   • amLODIPine (NORVASC) 10 MG tablet TAKE ONE TABLET BY MOUTH EVERY DAY   • aspirin 81 mg, Oral, Daily   • carvedilol (COREG) 3.125 mg, Oral, 2 Times Daily With Meals   • ferrous sulfate 325 mg, Oral, Daily With Breakfast   • hydrALAZINE (APRESOLINE) 12.5 mg, Oral, 2 Times Daily   • levothyroxine (SYNTHROID, LEVOTHROID) 25 mcg, Oral, Daily   • pantoprazole (PROTONIX) 40 MG EC tablet TAKE 1 TABLET BY MOUTH ONCE DAILY   • rosuvastatin (CRESTOR) 40 MG tablet TAKE ONE TABLET BY MOUTH AT BEDTIME   • vitamin B-12 (CYANOCOBALAMIN) 100 mcg, Oral, Daily   • Vitamin D 2,000 Units, Oral, Daily     Vital signs:  AVERAGE BP(right arm sitting - study device): 136/70  Standing(if indicated): N/A  Heart Rate:61  Weight: 213  lbs    Vitals:    02/23/22 1300 02/23/22 1303 02/23/22 1306   BP: 131/69 138/70 139/71   BP Location: Right arm Right arm Right arm   Patient Position: Sitting Sitting Sitting   Pulse: 61 61 62   Weight: 96.6 kg (213 lb)       Body mass index is 26.76 kg/m².    mrS: 1    ORDERS AND MEDICATION CHANGES: Patient seen today for BP recheck. He stopped his Hydralazine on 1/28/22 per nephrologist. There have been several back and forth emails from [facility] team regarding this patient and medication management. In the end, per MRJ, we are going to leave the BP changes up to the nephrologist. This is also per patient request. Medications confirmed. He does have an upcoming echo on 2/28/22 ordered by NSK. He did not want to come back for another 30 day BP apt but is aware he is due for his 24mn appt from June-Aug. Patient aware of s/s of stroke to call 911.       *THERE IS NO CHARGE FOR THIS VISIT*    Primary Care  Provider: Maci Burgos APRN  PRIMARY CARDIOLOGIST:      Laura Lao RN

## 2022-02-28 ENCOUNTER — HOSPITAL ENCOUNTER (OUTPATIENT)
Dept: CARDIOLOGY | Facility: HOSPITAL | Age: 77
Discharge: HOME | End: 2022-02-28
Admitting: INTERNAL MEDICINE

## 2022-02-28 DIAGNOSIS — I25.10 CORONARY ARTERY DISEASE INVOLVING NATIVE CORONARY ARTERY OF NATIVE HEART WITHOUT ANGINA PECTORIS: ICD-10-CM

## 2022-02-28 LAB
BH CV ECHO MEAS - AO MAX PG (FULL): 6.8 MMHG
BH CV ECHO MEAS - AO MAX PG: 13 MMHG
BH CV ECHO MEAS - AO MEAN PG (FULL): 5 MMHG
BH CV ECHO MEAS - AO MEAN PG: 7 MMHG
BH CV ECHO MEAS - AO ROOT AREA (BSA CORRECTED): 1.6
BH CV ECHO MEAS - AO ROOT AREA: 10.2 CM^2
BH CV ECHO MEAS - AO ROOT DIAM: 3.6 CM
BH CV ECHO MEAS - AO V2 MAX: 176.5 CM/SEC
BH CV ECHO MEAS - AO V2 MEAN: 124 CM/SEC
BH CV ECHO MEAS - AO V2 VTI: 41.6 CM
BH CV ECHO MEAS - ASC AORTA: 3.3 CM
BH CV ECHO MEAS - AVA(I,A): 2.1 CM^2
BH CV ECHO MEAS - AVA(I,D): 2.1 CM^2
BH CV ECHO MEAS - AVA(V,A): 2.5 CM^2
BH CV ECHO MEAS - AVA(V,D): 2.5 CM^2
BH CV ECHO MEAS - BSA(HAYCOCK): 2.3 M^2
BH CV ECHO MEAS - BSA: 2.2 M^2
BH CV ECHO MEAS - BZI_BMI: 27.3 KILOGRAMS/M^2
BH CV ECHO MEAS - BZI_METRIC_HEIGHT: 188 CM
BH CV ECHO MEAS - BZI_METRIC_WEIGHT: 96.6 KG
BH CV ECHO MEAS - EDV(CUBED): 146.4 ML
BH CV ECHO MEAS - EDV(MOD-SP2): 71 ML
BH CV ECHO MEAS - EDV(MOD-SP4): 96 ML
BH CV ECHO MEAS - EDV(TEICH): 133.6 ML
BH CV ECHO MEAS - EF(CUBED): 75.4 %
BH CV ECHO MEAS - EF(MOD-BP): 62 %
BH CV ECHO MEAS - EF(MOD-SP2): 62 %
BH CV ECHO MEAS - EF(MOD-SP4): 61.5 %
BH CV ECHO MEAS - EF(TEICH): 67 %
BH CV ECHO MEAS - ESV(CUBED): 35.9 ML
BH CV ECHO MEAS - ESV(MOD-SP2): 27 ML
BH CV ECHO MEAS - ESV(MOD-SP4): 37 ML
BH CV ECHO MEAS - ESV(TEICH): 44.1 ML
BH CV ECHO MEAS - FS: 37.4 %
BH CV ECHO MEAS - IVS/LVPW: 0.84
BH CV ECHO MEAS - IVSD: 1.4 CM
BH CV ECHO MEAS - LA DIMENSION: 4.3 CM
BH CV ECHO MEAS - LA/AO: 1.2
BH CV ECHO MEAS - LAD MAJOR: 6.1 CM
BH CV ECHO MEAS - LAT PEAK E' VEL: 6.8 CM/SEC
BH CV ECHO MEAS - LATERAL E/E' RATIO: 9.3
BH CV ECHO MEAS - LV DIASTOLIC VOL/BSA (35-75): 43 ML/M^2
BH CV ECHO MEAS - LV MASS(C)D: 152.7 GRAMS
BH CV ECHO MEAS - LV MASS(C)DI: 68.4 GRAMS/M^2
BH CV ECHO MEAS - LV MAX PG: 6.3 MMHG
BH CV ECHO MEAS - LV MEAN PG: 2 MMHG
BH CV ECHO MEAS - LV SYSTOLIC VOL/BSA (12-30): 16.6 ML/M^2
BH CV ECHO MEAS - LV V1 MAX: 125 CM/SEC
BH CV ECHO MEAS - LV V1 MEAN: 69.1 CM/SEC
BH CV ECHO MEAS - LV V1 VTI: 25.6 CM
BH CV ECHO MEAS - LVIDD: 5.3 CM
BH CV ECHO MEAS - LVIDS: 3.3 CM
BH CV ECHO MEAS - LVLD AP2: 7 CM
BH CV ECHO MEAS - LVLD AP4: 7.7 CM
BH CV ECHO MEAS - LVLS AP2: 5.4 CM
BH CV ECHO MEAS - LVLS AP4: 6.1 CM
BH CV ECHO MEAS - LVOT AREA (M): 3.5 CM^2
BH CV ECHO MEAS - LVOT AREA: 3.5 CM^2
BH CV ECHO MEAS - LVOT DIAM: 2.1 CM
BH CV ECHO MEAS - LVPWD: 0.89 CM
BH CV ECHO MEAS - MED PEAK E' VEL: 5.1 CM/SEC
BH CV ECHO MEAS - MEDIAL E/E' RATIO: 12.2
BH CV ECHO MEAS - MV A MAX VEL: 92.8 CM/SEC
BH CV ECHO MEAS - MV DEC TIME: 0.3 SEC
BH CV ECHO MEAS - MV E MAX VEL: 62.7 CM/SEC
BH CV ECHO MEAS - MV E/A: 0.68
BH CV ECHO MEAS - PA ACC SLOPE: 805 CM/SEC^2
BH CV ECHO MEAS - PA ACC TIME: 0.1 SEC
BH CV ECHO MEAS - PA PR(ACCEL): 32.7 MMHG
BH CV ECHO MEAS - RAP SYSTOLE: 3 MMHG
BH CV ECHO MEAS - RVSP: 32.2 MMHG
BH CV ECHO MEAS - SI(AO): 189.7 ML/M^2
BH CV ECHO MEAS - SI(CUBED): 49.5 ML/M^2
BH CV ECHO MEAS - SI(LVOT): 39.7 ML/M^2
BH CV ECHO MEAS - SI(MOD-SP2): 19.7 ML/M^2
BH CV ECHO MEAS - SI(MOD-SP4): 26.4 ML/M^2
BH CV ECHO MEAS - SI(TEICH): 40.1 ML/M^2
BH CV ECHO MEAS - SV(AO): 423.4 ML
BH CV ECHO MEAS - SV(CUBED): 110.4 ML
BH CV ECHO MEAS - SV(LVOT): 88.7 ML
BH CV ECHO MEAS - SV(MOD-SP2): 44 ML
BH CV ECHO MEAS - SV(MOD-SP4): 59 ML
BH CV ECHO MEAS - SV(TEICH): 89.4 ML
BH CV ECHO MEAS - TAPSE (>1.6): 2.1 CM
BH CV ECHO MEAS - TR MAX PG: 29.2 MMHG
BH CV ECHO MEAS - TR MAX VEL: 270 CM/SEC
BH CV ECHO MEASUREMENTS AVERAGE E/E' RATIO: 10.54
BH CV XLRA - RV BASE: 3.6 CM
BH CV XLRA - RV LENGTH: 5.5 CM
BH CV XLRA - RV MID: 3.1 CM
LEFT ATRIUM VOLUME INDEX: 34 ML/M^2
LEFT ATRIUM VOLUME: 76 ML

## 2022-02-28 PROCEDURE — 93306 TTE W/DOPPLER COMPLETE: CPT

## 2022-02-28 PROCEDURE — 93306 TTE W/DOPPLER COMPLETE: CPT | Performed by: INTERNAL MEDICINE

## 2022-03-03 ENCOUNTER — TELEPHONE (OUTPATIENT)
Dept: CARDIOLOGY | Facility: CLINIC | Age: 77
End: 2022-03-03

## 2022-03-03 NOTE — TELEPHONE ENCOUNTER
----- Message from Leonard Ashford MD sent at 3/1/2022  6:40 PM EST -----  Patient's echo showed normal pumping function of his heart.  His aortic valve has some calcification I think that is what is producing his murmur.  No changes need to be made at this time.    LM for pt to return call  Letter sent to pt and PCP

## 2022-04-27 ENCOUNTER — APPOINTMENT (OUTPATIENT)
Dept: CARDIOLOGY | Facility: HOSPITAL | Age: 77
End: 2022-04-27

## 2022-05-16 ENCOUNTER — TELEPHONE (OUTPATIENT)
Dept: SURGERY | Facility: CLINIC | Age: 77
End: 2022-05-16

## 2022-05-26 NOTE — PROGRESS NOTES
Addendum  created 05/26/22 1815 by Steve Ramos CRNA    Child order released for a procedure order, Clinical Note Signed, Flowsheet accepted, Intraprocedure Blocks edited, Intraprocedure Flowsheets edited, SmartForm saved       Per MRJ review of labs showing elevated creatinine (2.63) and LDL of 83  Orders noted for:  · Nephrology consult ASAP, ?seen previously by [facility] - otherwise refer to [facility]  · Change simvastatin to rosuvastatin 40 mg daily.

## 2022-06-22 ENCOUNTER — TELEPHONE (OUTPATIENT)
Dept: SURGERY | Facility: CLINIC | Age: 77
End: 2022-06-22

## 2022-06-24 ENCOUNTER — TELEPHONE (OUTPATIENT)
Dept: SURGERY | Facility: CLINIC | Age: 77
End: 2022-06-24

## 2022-06-24 NOTE — TELEPHONE ENCOUNTER
Caller: Travon Plummer    Relationship: Self    Best call back number: [phone]    What is the best time to reach you: ANYTIME; OKAY TO LEAVE MESSAGE IF NO ANSWER    Who are you requesting to speak with (clinical staff, provider,  specific staff member):     Do you know the name of the person who called:     What was the call regarding:  REQUESTED A RETURN CALL    Do you require a callback: YES

## 2022-07-20 ENCOUNTER — TELEPHONE (OUTPATIENT)
Dept: SURGERY | Facility: CLINIC | Age: 77
End: 2022-07-20

## 2022-07-20 NOTE — TELEPHONE ENCOUNTER
Called patient, advised him that Dr. Do recommended a follow-up exam within the next 3 to 6 months with an extended bowel prep to ensure that we fully evaluate his colon for additional polyps. He understood.

## 2022-07-26 ENCOUNTER — HOSPITAL ENCOUNTER (OUTPATIENT)
Dept: CARDIOLOGY | Facility: HOSPITAL | Age: 77
Discharge: HOME | End: 2022-07-26
Admitting: INTERNAL MEDICINE

## 2022-07-26 ENCOUNTER — OFFICE VISIT (OUTPATIENT)
Dept: CARDIOLOGY | Facility: CLINIC | Age: 77
End: 2022-07-26

## 2022-07-26 ENCOUNTER — RESEARCH ENCOUNTER (OUTPATIENT)
Dept: OTHER | Facility: OTHER | Age: 77
End: 2022-07-26

## 2022-07-26 VITALS
SYSTOLIC BLOOD PRESSURE: 146 MMHG | DIASTOLIC BLOOD PRESSURE: 75 MMHG | WEIGHT: 204.6 LBS | HEART RATE: 57 BPM | BODY MASS INDEX: 24.9 KG/M2

## 2022-07-26 VITALS
SYSTOLIC BLOOD PRESSURE: 120 MMHG | HEART RATE: 78 BPM | HEIGHT: 76 IN | WEIGHT: 204.6 LBS | OXYGEN SATURATION: 99 % | BODY MASS INDEX: 24.91 KG/M2 | DIASTOLIC BLOOD PRESSURE: 60 MMHG

## 2022-07-26 DIAGNOSIS — I10 ESSENTIAL HYPERTENSION: Primary | ICD-10-CM

## 2022-07-26 DIAGNOSIS — I65.21 CAROTID STENOSIS, ASYMPTOMATIC, RIGHT: ICD-10-CM

## 2022-07-26 PROCEDURE — 99214 OFFICE O/P EST MOD 30 MIN: CPT | Performed by: INTERNAL MEDICINE

## 2022-07-26 PROCEDURE — 93880 EXTRACRANIAL BILAT STUDY: CPT

## 2022-07-26 RX ORDER — CARVEDILOL 6.25 MG/1
6.25 TABLET ORAL 2 TIMES DAILY WITH MEALS
Qty: 60 TABLET | Refills: 6 | Status: SHIPPED | OUTPATIENT
Start: 2022-07-26

## 2022-07-26 NOTE — RESEARCH
07/26/2022      Travon Plummer  1945      [facility]-2 24 mn FOLLOWUP    R ICA STENOSIS    ARM OF STUDY:  R PERICO arm IMM alone      Current Medications  Current Outpatient Medications   Medication Instructions   • amLODIPine (NORVASC) 10 MG tablet TAKE ONE TABLET BY MOUTH EVERY DAY   • aspirin 81 mg, Oral, Daily   • carvedilol (COREG) 6.25 mg, Oral, 2 Times Daily With Meals   • ferrous sulfate 325 mg, Oral, Daily With Breakfast   • levothyroxine (SYNTHROID, LEVOTHROID) 25 mcg, Oral, Daily   • pantoprazole (PROTONIX) 40 MG EC tablet TAKE 1 TABLET BY MOUTH ONCE DAILY   • rosuvastatin (CRESTOR) 40 MG tablet TAKE ONE TABLET BY MOUTH AT BEDTIME   • vitamin B-12 (CYANOCOBALAMIN) 100 mcg, Oral, Daily   • Vitamin D 2,000 Units, Oral, Daily       Vital signs:  AVERAGE BP(right arm sitting - study device): 145/74  Standing(if indicated):  Heart Rate: 56  Weight: 204.6 lbs    Vitals:    07/26/22 1317 07/26/22 1320 07/26/22 1323   BP: 144/73 145/73 146/75   BP Location: Right arm Right arm Right arm   Patient Position: Sitting Sitting Sitting   Pulse: 56 57 57   Weight: 92.8 kg (204 lb 9.6 oz)       Body mass index is 24.9 kg/m².    NIHSS/mrS: 0/0    Tobacco: No  Activity: No  ETOH: No   INTERVENT: Yes      CAROTID DUPLEX  07/26/2022      ORDERS AND MEDICATION CHANGES:  Patient seen today for his 24 mn [facility]-2 visit with NSK.  Medications confirmed, labs and DUS pending.  NSK and MRJ to discuss medications, previously discussed that nephrology will be the ones to change his BP meds if needed.  Patient is aware of s/s of stroke to call 911. Patient is agreeable to his 30 mn appointment with research (12/8/22 - 2/6/23).       *THERE IS NO CHARGE FOR THIS VISIT*    Primary Care Provider: Chilango Erickson MD  PRIMARY CARDIOLOGIST:      Rafaela Faith

## 2022-07-26 NOTE — PROGRESS NOTES
"[facility] Cardiology  Follow Up Visit  Travon Plummer  1945    VISIT DATE:  07/26/22    PCP:   Chilango Erickson MD  [address] [address]          CC:  Coronary Artery Disease      Problem List:  1. Coronary artery disease.  a. Dyspnea/abnormal MPS, 12/09/2008:  Inferior wall ischemia, EF 70%:  1. Left heart catheterization by Dr. Gonzalez, 12/15/2008:  Normal LV.  2. Subtotal occlusion of well collateralized right JEREMIAS.  3. A 3.5 x 13 mm. Cypher ESTIVEN to RCA expanded with 4 mm balloon.   4. Echo February 2022: EF 62%, grade 1 diastolic dysfunction, aortic sclerosis.  2. Cerebrovascular accident with expressive aphasia, 10/31/2008:  a. PTA/left carotid artery stent, 10/31/2008.   b. No obstructive disease by duplex, October 2010.  c. Carotid duplex, 11/10/2014, Petros Cross MD, revealed patent left carotid stent and no evidence of significant stenosis in the right ICA.  d. Greater than 70% right internal carotid artery stenosis, medical management Crest 2  3. History of hypertension; hypotensive at the time of office visit on 11/10/2015.  4. Hypercholesterolemia.   5. History of paroxysmal atrial fibrillation, data deficit.  6. Tobacco/chewing abuse.   7. Gout.  8. Osteoarthritis.  9. Gastroesophageal reflux disease.  10. Obesity.  11. Acute Pancreatitis, September 2016.  12. Prostate cancer, diagnosed March of 2015, status post radiation therapy x39 treatments, 07/01/2015 at [facility].  13. Renal cell carcinoma, diagnosed March of 2015, status post left nephrectomy.  Elevated creatinine of 2.1 on labs performed 11/04/2015. Creatinine 1.3 09/17/2015. \"Spots on lung\" and chest treated with radiation, fall 2016.  14. Surgical history:  a. Inguinal hernia.   b. Colon polypectomy        History of Present Illness:  Travon Plummer  Is a 77 y.o. male with pertinent cardiac history detailed above.  Patient has some complaint of fatigue.  On Crest " protocol blood pressure checks his blood pressure is elevated with a systolic of 145.  He continues to follow closely with nephrology.  Current BP medications are amlodipine and carvedilol.  He is due to get his yearly lipids.  He had his Doppler today, pending Dr. Gonzalez will read.  He denies chest pain.  He had an echocardiogram done since last visit showing preserved LVEF and mild to moderate MR.      Patient Active Problem List    Diagnosis Date Noted   • Upper GI bleed 12/22/2021     Note Last Updated: 12/22/2021     Added automatically from request for surgery [id number]     • Symptomatic anemia 12/21/2021   • Iron deficiency anemia due to chronic blood loss 12/21/2021   • Melena 12/21/2021   • Chronic kidney disease, stage IV (severe) ([facility]) 12/21/2021   • Nuclear sclerotic cataract of right eye 08/18/2021     Note Last Updated: 8/18/2021     Added automatically from request for surgery [id number]     • Coronary artery disease      Note Last Updated: 11/7/2016     1. Coronary artery disease.  2. Dyspnea/abnormal MPS, 12/09/2008:  Inferior wall ischemia, EF 70%:  a. Left heart catheterization by Dr. Gonzalez, 12/15/2008:  Normal LV.   b. Subtotal occlusion of well collateralized right JEREMIAS.  c. A 3.5 x 13 mm. Cypher ESTIVEN to RCA expanded with 4 mm balloon.        • Dyspnea      Note Last Updated: 11/7/2016     3. Dyspnea/abnormal MPS, 12/09/2008:  Inferior wall ischemia, EF 70%:  a. Left heart catheterization by Dr. Gonzalez, 12/15/2008:  Normal LV.   b. Subtotal occlusion of well collateralized right JEREMIAS.  c. A 3.5 x 13 mm. Cypher ESTIVEN to RCA expanded with 4 mm balloon.        • Hypercholesterolemia    • Tobacco abuse      Note Last Updated: 11/7/2016     Tobacco/chewing abuse.      • Gout    • Osteoarthritis    • GERD (gastroesophageal reflux disease)    • Obesity    • Prostate cancer (HCC)      Note Last Updated: 11/7/2016     Prostate cancer, diagnosed March of 2015, status post radiation therapy x39 treatments, 07/01/2015  at [facility].     • Renal cell carcinoma (HCC)      Note Last Updated: 2016     Renal cell carcinoma, diagnosed 2015, status post left nephrectomy.  Elevated creatinine of 2.1 on labs performed 2015.     • Essential hypertension 11/10/2015     Note Last Updated: 2016     History of hypertension; hypotensive at the time of office visit on 11/10/2015.     • Cerebrovascular accident (HCC) 10/31/2008     Note Last Updated: 2016     4. Cerebrovascular accident with expressive aphasia, 10/31/2008:  a. PTA/left carotid artery stent, 10/31/2008.   b. No obstructive disease by duplex, 2010.  c. Carotid duplex, 11/10/2014, Petros Cross MD, revealed patent left carotid stent and no evidence of significant stenosis in the right ICA.           Allergies   Allergen Reactions   • Allopurinol Urinary Retention   • Lipitor [Atorvastatin] Myalgia       Social History     Socioeconomic History   • Marital status:    Tobacco Use   • Smoking status: Former Smoker     Types: Cigarettes     Quit date:      Years since quittin.5   • Smokeless tobacco: Current User     Types: Chew   Vaping Use   • Vaping Use: Never used   Substance and Sexual Activity   • Alcohol use: No   • Drug use: No   • Sexual activity: Defer       Family History   Problem Relation Age of Onset   • No Known Problems Mother    • No Known Problems Father        Current Medications:    Current Outpatient Medications:   •  amLODIPine (NORVASC) 10 MG tablet, TAKE ONE TABLET BY MOUTH EVERY DAY, Disp: 90 tablet, Rfl: 3  •  aspirin (aspirin) 81 MG EC tablet, Take 1 tablet by mouth Daily., Disp: 30 tablet, Rfl: 0  •  carvedilol (COREG) 3.125 MG tablet, Take 3.125 mg by mouth 2 (Two) Times a Day With Meals., Disp: , Rfl:   •  Cholecalciferol (Vitamin D) 50 MCG (2000 UT) capsule, Take 2,000 Units by mouth Daily., Disp: , Rfl:   •  ferrous sulfate 325 (65 FE) MG tablet, Take 325 mg by mouth Daily With Breakfast.,  "Disp: , Rfl:   •  levothyroxine (SYNTHROID, LEVOTHROID) 25 MCG tablet, Take 25 mcg by mouth Daily., Disp: , Rfl: 0  •  pantoprazole (PROTONIX) 40 MG EC tablet, TAKE 1 TABLET BY MOUTH ONCE DAILY, Disp: 30 tablet, Rfl: 11  •  rosuvastatin (CRESTOR) 40 MG tablet, TAKE ONE TABLET BY MOUTH AT BEDTIME (Patient taking differently: Take 40 mg by mouth Every Night.), Disp: 90 tablet, Rfl: 3  •  vitamin B-12 (CYANOCOBALAMIN) 100 MCG tablet, Take 100 mcg by mouth Daily., Disp: , Rfl:      Review of Systems   Constitutional: Positive for malaise/fatigue.   HENT: Positive for hoarse voice.    Cardiovascular: Negative for chest pain, dyspnea on exertion, irregular heartbeat and leg swelling.       Vitals:    07/26/22 1256   BP: 120/60   BP Location: Right arm   Patient Position: Sitting   Pulse: 78   SpO2: 99%   Weight: 92.8 kg (204 lb 9.6 oz)   Height: 193 cm (76\")       Physical Exam  Constitutional:       Appearance: Normal appearance.   Neck:      Vascular: Carotid bruit (Right-sided carotid bruit.) present.   Cardiovascular:      Rate and Rhythm: Normal rate and regular rhythm.      Pulses: Normal pulses.      Comments: Soft systolic murmur.  Pulmonary:      Effort: Pulmonary effort is normal.   Musculoskeletal:      Right lower leg: No edema.      Left lower leg: No edema.   Neurological:      General: No focal deficit present.      Mental Status: He is alert.         Diagnostic Data:  Procedures  Lab Results   Component Value Date    TRIG 123 06/15/2021    HDL 34 (L) 06/15/2021     Lab Results   Component Value Date    GLUCOSE 101 (H) 12/23/2021    BUN 30 (H) 02/21/2022    CREATININE 3.16 (H) 02/21/2022     02/21/2022    K 4.8 02/21/2022     02/21/2022    CO2 22 02/21/2022     Lab Results   Component Value Date    HGBA1C 5.93 (H) 06/24/2020     Lab Results   Component Value Date    WBC 7.20 02/21/2022    HGB 12.1 (L) 02/21/2022    HCT 39.8 (L) 02/21/2022     02/21/2022       Assessment:  No diagnosis " found.    Plan:    1.  CAD  -On aspirin and statin, remote RCA PCI  -EF 62% with mild to moderate MR and aortic sclerosis     2.  Bilateral carotid artery disease  -History of left carotid stent  -Right internal carotid artery stenosis greater than 70%,  -Duplex today read pending (Dr. Gonzalez to read (     3.  Hyperlipidemia  -LDL 58 triglycerides 123  -Continue rosuvastatin 10 mg  -we need updated lipids will get them drawn in [place]     4.  Hypertension  -Coreg 3.125mg, amlodipine 10mg ,   -did not tolerate hydralazine  -increase coreg to 6.25mg BID today for better control  -/74 mmHg        5.  CKD  -Last creatinine 3.16  -follows with nephrology     6.  Anemia,   -Status post colonoscopy EGD, possible small bowel bleeding  -ASA 81mg daily  -Hemoglobin improved to 12.1 on most recent evaluation          Leonard Ashford MD [facility]

## 2022-07-27 ENCOUNTER — DOCUMENTATION (OUTPATIENT)
Dept: CARDIOLOGY | Facility: HOSPITAL | Age: 77
End: 2022-07-27

## 2022-07-27 ENCOUNTER — LAB (OUTPATIENT)
Dept: LAB | Facility: HOSPITAL | Age: 77
End: 2022-07-27

## 2022-07-27 DIAGNOSIS — E78.00 HYPERCHOLESTEROLEMIA: ICD-10-CM

## 2022-07-27 LAB
ALBUMIN SERPL-MCNC: 4.1 G/DL (ref 3.5–5.2)
ALBUMIN/GLOB SERPL: 1.7 G/DL
ALP SERPL-CCNC: 84 U/L (ref 39–117)
ALT SERPL W P-5'-P-CCNC: 7 U/L (ref 1–41)
ANION GAP SERPL CALCULATED.3IONS-SCNC: 14 MMOL/L (ref 5–15)
AST SERPL-CCNC: 12 U/L (ref 1–40)
BH CV LEFT CCA HIDDEN LRR: 1 CM/S
BH CV MID LEFT ICA HIDDEN LRR: 1 CM
BH CV VAS CAROTID LEFT DISTAL STENT EDV: 20 CM/S
BH CV VAS CAROTID LEFT DISTAL STENT: 95 CM/S
BH CV VAS CAROTID LEFT DISTAL TO STENT EDV: 29 CM/S
BH CV VAS CAROTID LEFT DISTAL TO STENT: 88 CM/S
BH CV VAS CAROTID LEFT MID STENT EDV: 18 CM/S
BH CV VAS CAROTID LEFT MID STENT: 69 CM/S
BH CV VAS CAROTID LEFT PROXIMAL STENT EDV: 7 CM/S
BH CV VAS CAROTID LEFT PROXIMAL STENT: 42 CM/S
BH CV VAS CAROTID LEFT PROXIMAL TO STENT: 44 CM/S
BH CV VAS CAROTID LEFT STENT NATIVE VESSEL PROXIMAL ED: 8 CM/S
BH CV XLRA MEAS LEFT DIST CCA EDV: 8.2 CM/SEC
BH CV XLRA MEAS LEFT DIST CCA PSV: 43.9 CM/SEC
BH CV XLRA MEAS LEFT DIST ICA EDV: -17.6 CM/SEC
BH CV XLRA MEAS LEFT DIST ICA PSV: -27.4 CM/SEC
BH CV XLRA MEAS LEFT ICA/CCA RATIO: 1.48
BH CV XLRA MEAS LEFT MID CCA EDV: 16.1 CM/SEC
BH CV XLRA MEAS LEFT MID CCA PSV: 63.5 CM/SEC
BH CV XLRA MEAS LEFT MID ICA EDV: -19.8 CM/SEC
BH CV XLRA MEAS LEFT MID ICA PSV: -94.9 CM/SEC
BH CV XLRA MEAS LEFT PROX CCA EDV: 11 CM/SEC
BH CV XLRA MEAS LEFT PROX CCA PSV: 78.5 CM/SEC
BH CV XLRA MEAS LEFT PROX ECA EDV: -16.5 CM/SEC
BH CV XLRA MEAS LEFT PROX ECA PSV: -148 CM/SEC
BH CV XLRA MEAS LEFT PROX ICA EDV: -18.4 CM/SEC
BH CV XLRA MEAS LEFT PROX ICA PSV: -69.4 CM/SEC
BH CV XLRA MEAS LEFT PROX SCLA PSV: 279 CM/SEC
BH CV XLRA MEAS LEFT VERTEBRAL A EDV: 14.3 CM/SEC
BH CV XLRA MEAS LEFT VERTEBRAL A PSV: 45.7 CM/SEC
BH CV XLRA MEAS RIGHT DIST CCA EDV: -9.1 CM/SEC
BH CV XLRA MEAS RIGHT DIST CCA PSV: -43.1 CM/SEC
BH CV XLRA MEAS RIGHT DIST ICA EDV: -15.7 CM/SEC
BH CV XLRA MEAS RIGHT DIST ICA PSV: -56.5 CM/SEC
BH CV XLRA MEAS RIGHT ICA/CCA RATIO: 3.91
BH CV XLRA MEAS RIGHT MID CCA EDV: 9.8 CM/SEC
BH CV XLRA MEAS RIGHT MID CCA PSV: 78.5 CM/SEC
BH CV XLRA MEAS RIGHT MID ICA EDV: -48.6 CM/SEC
BH CV XLRA MEAS RIGHT MID ICA PSV: -232 CM/SEC
BH CV XLRA MEAS RIGHT PROX CCA EDV: 16.5 CM/SEC
BH CV XLRA MEAS RIGHT PROX CCA PSV: 137 CM/SEC
BH CV XLRA MEAS RIGHT PROX ECA EDV: -11.3 CM/SEC
BH CV XLRA MEAS RIGHT PROX ECA PSV: -120 CM/SEC
BH CV XLRA MEAS RIGHT PROX ICA EDV: 78 CM/SEC
BH CV XLRA MEAS RIGHT PROX ICA PSV: -309 CM/SEC
BH CV XLRA MEAS RIGHT PROX SCLA PSV: 214 CM/SEC
BH CV XLRA MEAS RIGHT VERTEBRAL A EDV: 13.3 CM/SEC
BH CV XLRA MEAS RIGHT VERTEBRAL A PSV: 54.1 CM/SEC
BH CVPROX LEFT ICA HIDDEN LRR: 1 CM
BILIRUB SERPL-MCNC: 0.2 MG/DL (ref 0–1.2)
BUN SERPL-MCNC: 44 MG/DL (ref 8–23)
BUN/CREAT SERPL: 11.9 (ref 7–25)
CALCIUM SPEC-SCNC: 9.6 MG/DL (ref 8.6–10.5)
CHLORIDE SERPL-SCNC: 110 MMOL/L (ref 98–107)
CHOLEST SERPL-MCNC: 110 MG/DL (ref 0–200)
CO2 SERPL-SCNC: 17 MMOL/L (ref 22–29)
CREAT SERPL-MCNC: 3.7 MG/DL (ref 0.76–1.27)
EGFRCR SERPLBLD CKD-EPI 2021: 16.1 ML/MIN/1.73
GLOBULIN UR ELPH-MCNC: 2.4 GM/DL
GLUCOSE SERPL-MCNC: 120 MG/DL (ref 65–99)
HDLC SERPL-MCNC: 42 MG/DL (ref 40–60)
LDLC SERPL CALC-MCNC: 56 MG/DL (ref 0–100)
LDLC/HDLC SERPL: 1.38 {RATIO}
MAXIMAL PREDICTED HEART RATE: 143 BPM
POTASSIUM SERPL-SCNC: 5.5 MMOL/L (ref 3.5–5.2)
PROT SERPL-MCNC: 6.5 G/DL (ref 6–8.5)
SODIUM SERPL-SCNC: 141 MMOL/L (ref 136–145)
STRESS TARGET HR: 122 BPM
TRIGL SERPL-MCNC: 51 MG/DL (ref 0–150)
VLDLC SERPL-MCNC: 12 MG/DL (ref 5–40)

## 2022-07-27 PROCEDURE — 80061 LIPID PANEL: CPT

## 2022-07-27 PROCEDURE — 80053 COMPREHEN METABOLIC PANEL: CPT

## 2022-07-27 PROCEDURE — 36415 COLL VENOUS BLD VENIPUNCTURE: CPT

## 2022-07-27 NOTE — PROGRESS NOTES
"CREST 2 IMM and PI NOTE    Carotid US  (7/26/22)  Reviewed with Dr. Elmore.        The CREST 2 study side (VICKI) is \"about the same\" when considering velocities and changes in equipment.     The stented LICA exhibits no issues. Parvus et tardus waveforms are NOT present.         Awaiting labs at this time.  "

## 2022-07-28 ENCOUNTER — DOCUMENTATION (OUTPATIENT)
Dept: CARDIOLOGY | Facility: HOSPITAL | Age: 77
End: 2022-07-28

## 2022-07-28 NOTE — PROGRESS NOTES
CREST 2 PI and IMM Note    All data reviewed.    Creatinine = 3.7, up from 3.16 on 2/22. LDL is 56.    BP = 120/60 in Dr. Ashford's office this week.    He is seeing nephrologist Dr. Wiggins who is following his renal function and BP and I encouraged this.    Carotid duplex looks good as per my recent note (after review with Dr. Elmore).     With BP and LDL at CREST 2 target, I have nothing else to offer now and will continue CREST 2 followup per protocol. All discussed with the patient and his wife by telephone this morning. Questions answered.

## 2022-08-03 RX ORDER — ROSUVASTATIN CALCIUM 40 MG/1
40 TABLET, COATED ORAL NIGHTLY
Qty: 90 TABLET | Refills: 3 | Status: SHIPPED | OUTPATIENT
Start: 2022-08-03

## 2022-10-03 RX ORDER — AMLODIPINE BESYLATE 10 MG/1
TABLET ORAL
Qty: 90 TABLET | Refills: 3 | Status: SHIPPED | OUTPATIENT
Start: 2022-10-03

## 2022-12-12 ENCOUNTER — RESEARCH ENCOUNTER (OUTPATIENT)
Dept: OTHER | Facility: OTHER | Age: 77
End: 2022-12-12

## 2022-12-12 VITALS
WEIGHT: 204 LBS | BODY MASS INDEX: 24.83 KG/M2 | HEART RATE: 49 BPM | DIASTOLIC BLOOD PRESSURE: 74 MMHG | SYSTOLIC BLOOD PRESSURE: 131 MMHG

## 2022-12-12 NOTE — RESEARCH
12/12/2022      Travon Plummer  1945      CREST-2 30 mn  FOLLOWUP    Right  ICA STENOSIS    ARM OF STUDY:  R PERICO arm IMM alone    Problem List:       Allergies   Allergen Reactions   • Allopurinol Urinary Retention   • Lipitor [Atorvastatin] Myalgia       Current Medications  Current Outpatient Medications   Medication Instructions   • amLODIPine (NORVASC) 10 MG tablet TAKE ONE TABLET BY MOUTH EVERY DAY   • aspirin 81 mg, Oral, Daily   • carvedilol (COREG) 6.25 mg, Oral, 2 Times Daily With Meals   • ferrous sulfate 325 mg, Oral, Daily With Breakfast   • levothyroxine (SYNTHROID, LEVOTHROID) 25 mcg, Oral, Daily   • pantoprazole (PROTONIX) 40 MG EC tablet TAKE 1 TABLET BY MOUTH ONCE DAILY   • rosuvastatin (CRESTOR) 40 mg, Oral, Nightly   • vitamin B-12 (CYANOCOBALAMIN) 100 mcg, Oral, Daily   • Vitamin D 2,000 Units, Oral, Daily         Vital signs:  AVERAGE BP(right arm sitting - study device): 129/72  Standing(if indicated): N/A  Heart Rate:49  Weight: 204  lbs    Vitals:    12/12/22 0211 12/12/22 0214 12/12/22 1408   BP: 129/70 129/74 131/74   BP Location: Right arm Right arm Right arm   Patient Position: Sitting Sitting Sitting   Pulse: (!) 49 52 (!) 49   Weight:   92.5 kg (204 lb)     Body mass index is 24.83 kg/m².    NIHSS/mrS: 0/0    Tobacco: No  Activity: No  ETOH:  Yes- Once a month or less   INTERVENT: No            ORDERS AND MEDICATION CHANGES: Patient was seen today for 30 mn CREST 2 follow up visit. Mean BP for today's visit (129/72)  which is within CREST 2 protocol. Medications confirmed with patient as well with his wife via phone while patient in the office today. MRJ aware. Patient mentioned a hospitalization stay at [facility] in [facility] over the summer of 2022, however he nor his wife can recall the exact date. Patient stated he was seen for C. Diff symptoms and coloscopy during that stay. Have reached out to [facility] nurse to see if documentation has been reported.  Patient states that he is continuing his follow up care with nephrology as discussed at his last research office visit on 7/26/22. Patient is aware of s/s of stroke to call 911. Patient is agreeable to his 36 mn follow up appointment 06/08/2023-08/07/2023.      *THERE IS NO CHARGE FOR THIS VISIT*    Primary Care Provider: Chilango Erickson MD  PRIMARY CARDIOLOGIST:      Rahel Keith

## 2022-12-13 ENCOUNTER — DOCUMENTATION (OUTPATIENT)
Dept: CARDIOLOGY | Facility: HOSPITAL | Age: 77
End: 2022-12-13

## 2022-12-21 DIAGNOSIS — R79.9 ABNORMAL FINDING OF BLOOD CHEMISTRY, UNSPECIFIED: ICD-10-CM

## 2022-12-21 DIAGNOSIS — I65.29 STENOSIS OF CAROTID ARTERY, UNSPECIFIED LATERALITY: Primary | ICD-10-CM

## 2022-12-21 DIAGNOSIS — I79.8 OTHER DISORDERS OF ARTERIES, ARTERIOLES AND CAPILLARIES IN DISEASES CLASSIFIED ELSEWHERE: ICD-10-CM

## 2023-02-09 ENCOUNTER — TRANSCRIBE ORDERS (OUTPATIENT)
Dept: ADMINISTRATIVE | Facility: HOSPITAL | Age: 78
End: 2023-02-09
Payer: MEDICARE

## 2023-02-09 ENCOUNTER — HOSPITAL ENCOUNTER (OUTPATIENT)
Dept: CT IMAGING | Facility: HOSPITAL | Age: 78
Discharge: HOME | End: 2023-02-09
Admitting: NURSE PRACTITIONER
Payer: MEDICARE

## 2023-02-09 DIAGNOSIS — K92.1 BLACK TARRY STOOLS: ICD-10-CM

## 2023-02-09 DIAGNOSIS — R10.30 LOWER ABDOMINAL PAIN: Primary | ICD-10-CM

## 2023-02-09 DIAGNOSIS — R10.30 LOWER ABDOMINAL PAIN: ICD-10-CM

## 2023-02-09 PROCEDURE — 74176 CT ABD & PELVIS W/O CONTRAST: CPT

## 2023-02-16 ENCOUNTER — APPOINTMENT (OUTPATIENT)
Dept: GENERAL RADIOLOGY | Facility: HOSPITAL | Age: 78
End: 2023-02-16
Payer: MEDICARE

## 2023-02-16 ENCOUNTER — HOSPITAL ENCOUNTER (OUTPATIENT)
Facility: HOSPITAL | Age: 78
LOS: 1 days | Discharge: HOME | End: 2023-02-18
Attending: STUDENT IN AN ORGANIZED HEALTH CARE EDUCATION/TRAINING PROGRAM | Admitting: FAMILY MEDICINE
Payer: MEDICARE

## 2023-02-16 DIAGNOSIS — K92.2 UPPER GI BLEED: Primary | ICD-10-CM

## 2023-02-16 DIAGNOSIS — D64.9 SYMPTOMATIC ANEMIA: ICD-10-CM

## 2023-02-16 DIAGNOSIS — K92.1 MELENA: ICD-10-CM

## 2023-02-16 LAB
ABO GROUP BLD: NORMAL
ALBUMIN SERPL-MCNC: 3.4 G/DL (ref 3.5–5.2)
ALBUMIN/GLOB SERPL: 1.4 G/DL
ALP SERPL-CCNC: 81 U/L (ref 39–117)
ALT SERPL W P-5'-P-CCNC: 7 U/L (ref 1–41)
ANION GAP SERPL CALCULATED.3IONS-SCNC: 10.3 MMOL/L (ref 5–15)
AST SERPL-CCNC: 11 U/L (ref 1–40)
BASOPHILS # BLD AUTO: 0.07 10*3/MM3 (ref 0–0.2)
BASOPHILS NFR BLD AUTO: 0.7 % (ref 0–1.5)
BILIRUB SERPL-MCNC: 0.2 MG/DL (ref 0–1.2)
BLD GP AB SCN SERPL QL: NEGATIVE
BUN SERPL-MCNC: 38 MG/DL (ref 8–23)
BUN/CREAT SERPL: 10.4 (ref 7–25)
CALCIUM SPEC-SCNC: 8.6 MG/DL (ref 8.6–10.5)
CHLORIDE SERPL-SCNC: 109 MMOL/L (ref 98–107)
CO2 SERPL-SCNC: 21.7 MMOL/L (ref 22–29)
CREAT SERPL-MCNC: 3.64 MG/DL (ref 0.76–1.27)
DEPRECATED RDW RBC AUTO: 55.1 FL (ref 37–54)
EGFRCR SERPLBLD CKD-EPI 2021: 16.3 ML/MIN/1.73
EOSINOPHIL # BLD AUTO: 1.34 10*3/MM3 (ref 0–0.4)
EOSINOPHIL NFR BLD AUTO: 13.8 % (ref 0.3–6.2)
ERYTHROCYTE [DISTWIDTH] IN BLOOD BY AUTOMATED COUNT: 15.9 % (ref 12.3–15.4)
GLOBULIN UR ELPH-MCNC: 2.5 GM/DL
GLUCOSE BLDC GLUCOMTR-MCNC: 122 MG/DL (ref 70–130)
GLUCOSE SERPL-MCNC: 137 MG/DL (ref 65–99)
HCT VFR BLD AUTO: 18.6 % (ref 37.5–51)
HEMOCCULT STL QL: POSITIVE
HGB BLD-MCNC: 5.6 G/DL (ref 13–17.7)
HGB BLD-MCNC: 6.6 G/DL (ref 13–17.7)
HOLD SPECIMEN: NORMAL
HOLD SPECIMEN: NORMAL
IMM GRANULOCYTES # BLD AUTO: 0.05 10*3/MM3 (ref 0–0.05)
IMM GRANULOCYTES NFR BLD AUTO: 0.5 % (ref 0–0.5)
LIPASE SERPL-CCNC: 74 U/L (ref 13–60)
LYMPHOCYTES # BLD AUTO: 0.72 10*3/MM3 (ref 0.7–3.1)
LYMPHOCYTES NFR BLD AUTO: 7.4 % (ref 19.6–45.3)
MAGNESIUM SERPL-MCNC: 1.8 MG/DL (ref 1.6–2.4)
MCH RBC QN AUTO: 29.2 PG (ref 26.6–33)
MCHC RBC AUTO-ENTMCNC: 30.1 G/DL (ref 31.5–35.7)
MCV RBC AUTO: 96.9 FL (ref 79–97)
MONOCYTES # BLD AUTO: 0.71 10*3/MM3 (ref 0.1–0.9)
MONOCYTES NFR BLD AUTO: 7.3 % (ref 5–12)
NEUTROPHILS NFR BLD AUTO: 6.8 10*3/MM3 (ref 1.7–7)
NEUTROPHILS NFR BLD AUTO: 70.3 % (ref 42.7–76)
NRBC BLD AUTO-RTO: 0 /100 WBC (ref 0–0.2)
NT-PROBNP SERPL-MCNC: 3689 PG/ML (ref 0–1800)
PHOSPHATE SERPL-MCNC: 3.2 MG/DL (ref 2.5–4.5)
PLATELET # BLD AUTO: 160 10*3/MM3 (ref 140–450)
PMV BLD AUTO: 12 FL (ref 6–12)
POTASSIUM SERPL-SCNC: 4 MMOL/L (ref 3.5–5.2)
PROT SERPL-MCNC: 5.9 G/DL (ref 6–8.5)
RBC # BLD AUTO: 1.92 10*6/MM3 (ref 4.14–5.8)
RH BLD: POSITIVE
SODIUM SERPL-SCNC: 141 MMOL/L (ref 136–145)
T&S EXPIRATION DATE: NORMAL
TROPONIN T SERPL HS-MCNC: 51 NG/L
TROPONIN T SERPL HS-MCNC: 53 NG/L
WBC NRBC COR # BLD: 9.69 10*3/MM3 (ref 3.4–10.8)
WHOLE BLOOD HOLD COAG: NORMAL
WHOLE BLOOD HOLD SPECIMEN: NORMAL

## 2023-02-16 PROCEDURE — 83880 ASSAY OF NATRIURETIC PEPTIDE: CPT | Performed by: STUDENT IN AN ORGANIZED HEALTH CARE EDUCATION/TRAINING PROGRAM

## 2023-02-16 PROCEDURE — 86900 BLOOD TYPING SEROLOGIC ABO: CPT | Performed by: STUDENT IN AN ORGANIZED HEALTH CARE EDUCATION/TRAINING PROGRAM

## 2023-02-16 PROCEDURE — P9016 RBC LEUKOCYTES REDUCED: HCPCS

## 2023-02-16 PROCEDURE — 86920 COMPATIBILITY TEST SPIN: CPT

## 2023-02-16 PROCEDURE — 82272 OCCULT BLD FECES 1-3 TESTS: CPT | Performed by: STUDENT IN AN ORGANIZED HEALTH CARE EDUCATION/TRAINING PROGRAM

## 2023-02-16 PROCEDURE — 86901 BLOOD TYPING SEROLOGIC RH(D): CPT | Performed by: STUDENT IN AN ORGANIZED HEALTH CARE EDUCATION/TRAINING PROGRAM

## 2023-02-16 PROCEDURE — 86900 BLOOD TYPING SEROLOGIC ABO: CPT

## 2023-02-16 PROCEDURE — 85025 COMPLETE CBC W/AUTO DIFF WBC: CPT | Performed by: STUDENT IN AN ORGANIZED HEALTH CARE EDUCATION/TRAINING PROGRAM

## 2023-02-16 PROCEDURE — 36430 TRANSFUSION BLD/BLD COMPNT: CPT

## 2023-02-16 PROCEDURE — 83735 ASSAY OF MAGNESIUM: CPT | Performed by: STUDENT IN AN ORGANIZED HEALTH CARE EDUCATION/TRAINING PROGRAM

## 2023-02-16 PROCEDURE — 82962 GLUCOSE BLOOD TEST: CPT

## 2023-02-16 PROCEDURE — 86850 RBC ANTIBODY SCREEN: CPT | Performed by: STUDENT IN AN ORGANIZED HEALTH CARE EDUCATION/TRAINING PROGRAM

## 2023-02-16 PROCEDURE — 96374 THER/PROPH/DIAG INJ IV PUSH: CPT

## 2023-02-16 PROCEDURE — 83690 ASSAY OF LIPASE: CPT | Performed by: STUDENT IN AN ORGANIZED HEALTH CARE EDUCATION/TRAINING PROGRAM

## 2023-02-16 PROCEDURE — 71045 X-RAY EXAM CHEST 1 VIEW: CPT

## 2023-02-16 PROCEDURE — 99285 EMERGENCY DEPT VISIT HI MDM: CPT

## 2023-02-16 PROCEDURE — 84100 ASSAY OF PHOSPHORUS: CPT | Performed by: STUDENT IN AN ORGANIZED HEALTH CARE EDUCATION/TRAINING PROGRAM

## 2023-02-16 PROCEDURE — 80053 COMPREHEN METABOLIC PANEL: CPT | Performed by: STUDENT IN AN ORGANIZED HEALTH CARE EDUCATION/TRAINING PROGRAM

## 2023-02-16 PROCEDURE — 85018 HEMOGLOBIN: CPT | Performed by: FAMILY MEDICINE

## 2023-02-16 PROCEDURE — 84484 ASSAY OF TROPONIN QUANT: CPT | Performed by: STUDENT IN AN ORGANIZED HEALTH CARE EDUCATION/TRAINING PROGRAM

## 2023-02-16 PROCEDURE — 99222 1ST HOSP IP/OBS MODERATE 55: CPT | Performed by: INTERNAL MEDICINE

## 2023-02-16 PROCEDURE — 99222 1ST HOSP IP/OBS MODERATE 55: CPT | Performed by: FAMILY MEDICINE

## 2023-02-16 PROCEDURE — 93005 ELECTROCARDIOGRAM TRACING: CPT | Performed by: STUDENT IN AN ORGANIZED HEALTH CARE EDUCATION/TRAINING PROGRAM

## 2023-02-16 RX ORDER — PANTOPRAZOLE SODIUM 40 MG/10ML
40 INJECTION, POWDER, LYOPHILIZED, FOR SOLUTION INTRAVENOUS
Status: DISCONTINUED | OUTPATIENT
Start: 2023-02-17 | End: 2023-02-17

## 2023-02-16 RX ORDER — ACETAMINOPHEN 160 MG/5ML
650 SOLUTION ORAL EVERY 4 HOURS PRN
Status: DISCONTINUED | OUTPATIENT
Start: 2023-02-16 | End: 2023-02-18 | Stop reason: HOSPADM

## 2023-02-16 RX ORDER — POLYETHYLENE GLYCOL 3350 17 G/17G
17 POWDER, FOR SOLUTION ORAL DAILY
Status: DISCONTINUED | OUTPATIENT
Start: 2023-02-16 | End: 2023-02-18 | Stop reason: HOSPADM

## 2023-02-16 RX ORDER — LEVOTHYROXINE SODIUM 0.03 MG/1
25 TABLET ORAL DAILY
Status: DISCONTINUED | OUTPATIENT
Start: 2023-02-16 | End: 2023-02-18 | Stop reason: HOSPADM

## 2023-02-16 RX ORDER — ONDANSETRON 2 MG/ML
4 INJECTION INTRAMUSCULAR; INTRAVENOUS EVERY 6 HOURS PRN
Status: DISCONTINUED | OUTPATIENT
Start: 2023-02-16 | End: 2023-02-18 | Stop reason: HOSPADM

## 2023-02-16 RX ORDER — CEFUROXIME AXETIL 250 MG/1
250 TABLET ORAL EVERY 12 HOURS
COMMUNITY
Start: 2023-02-10 | End: 2023-02-18 | Stop reason: HOSPADM

## 2023-02-16 RX ORDER — SODIUM CHLORIDE 0.9 % (FLUSH) 0.9 %
10 SYRINGE (ML) INJECTION AS NEEDED
Status: DISCONTINUED | OUTPATIENT
Start: 2023-02-16 | End: 2023-02-18 | Stop reason: HOSPADM

## 2023-02-16 RX ORDER — SODIUM BICARBONATE 650 MG/1
650 TABLET ORAL 2 TIMES DAILY
COMMUNITY

## 2023-02-16 RX ORDER — ROSUVASTATIN CALCIUM 20 MG/1
40 TABLET, COATED ORAL NIGHTLY
Status: DISCONTINUED | OUTPATIENT
Start: 2023-02-16 | End: 2023-02-18 | Stop reason: HOSPADM

## 2023-02-16 RX ORDER — ACETAMINOPHEN 650 MG/1
650 SUPPOSITORY RECTAL EVERY 4 HOURS PRN
Status: DISCONTINUED | OUTPATIENT
Start: 2023-02-16 | End: 2023-02-18 | Stop reason: HOSPADM

## 2023-02-16 RX ORDER — PANTOPRAZOLE SODIUM 40 MG/10ML
80 INJECTION, POWDER, LYOPHILIZED, FOR SOLUTION INTRAVENOUS ONCE
Status: COMPLETED | OUTPATIENT
Start: 2023-02-16 | End: 2023-02-16

## 2023-02-16 RX ORDER — ACETAMINOPHEN 325 MG/1
650 TABLET ORAL EVERY 4 HOURS PRN
Status: DISCONTINUED | OUTPATIENT
Start: 2023-02-16 | End: 2023-02-18 | Stop reason: HOSPADM

## 2023-02-16 RX ORDER — AMLODIPINE BESYLATE 5 MG/1
10 TABLET ORAL DAILY
Status: DISCONTINUED | OUTPATIENT
Start: 2023-02-16 | End: 2023-02-18 | Stop reason: HOSPADM

## 2023-02-16 RX ORDER — CARVEDILOL 6.25 MG/1
6.25 TABLET ORAL 2 TIMES DAILY WITH MEALS
Status: DISCONTINUED | OUTPATIENT
Start: 2023-02-16 | End: 2023-02-18 | Stop reason: HOSPADM

## 2023-02-16 RX ADMIN — ROSUVASTATIN CALCIUM 40 MG: 20 TABLET, COATED ORAL at 21:58

## 2023-02-16 RX ADMIN — POLYETHYLENE GLYCOL 3350 17 G: 17 POWDER, FOR SOLUTION ORAL at 22:02

## 2023-02-16 RX ADMIN — PANTOPRAZOLE SODIUM 80 MG: 40 INJECTION, POWDER, FOR SOLUTION INTRAVENOUS at 16:48

## 2023-02-16 NOTE — H&P
[facility]      Name:  Travon Plummer   Age:  78 y.o.  Sex:  male  :  1945  MRN:  [id number]   Visit Number:  [id number]  Admission Date:  2023  Date Of Service:  23  Primary Care Physician:  Chilango Erickson MD    Chief Complaint:     Abnormal labs, dark stool    History Of Presenting Illness:      Patient is 78 years old male with past medical history of CVA, CAD, hypertension, hyperlipidemia, paroxysmal atrial fibrillation not on anticoagulation, prostate cancer, renal cell carcinoma status post left nephrectomy, CKD who presented to the ER after he was referred from nephrology due to abnormal labs and low hemoglobin.  Patient reports that he was following up with Dr. Sellers who he sees for his CKD and had blood work done and showed very low hemoglobin and was told to come to the ER.  Patient otherwise asymptomatic and denies any pain or discomfort.  Patient reports that his been having very dark stool over the past 2 to 3 weeks which she thought it is due to the iron pills he takes daily.  Patient with no chest pain, shortness of breath, abdominal pain, nausea, vomiting, diarrhea or constipation.  Patient is not on anticoagulation but on aspirin 81 mg.  Patient denies any recent excessive use of NSAIDs.    Upon ER evaluation, patient was hemodynamically stable and was afebrile.  Labs were significant for troponin of 53, proBNP 3689, glucose 137, creatinine 3.64 (baseline creatinine around 3), BUN 38, lipase 74, hemoglobin 5.6, hematocrit 18.6, platelets WNL.  Fecal occult positive.  Chest x-ray with stable chest and clear lungs.  Dr. Alanis with GI consulted and recommended PPI and admission with n.p.o. after midnight.  Hospitalist consulted for admission, further evaluation and treatment.      Review Of Systems:    All systems were reviewed and negative except as mentioned in history of presenting illness, assessment and  plan.    Past Medical History: Patient  has a past medical history of Broken arm, Cerebrovascular accident (HCC) (10/31/2008), Coronary artery disease, Dyspnea, Full dentures (08/23/2021), GERD (gastroesophageal reflux disease), Gout, Hypercholesterolemia, Hypertension (11/10/2015), Obesity, Osteoarthritis, Paroxysmal atrial fibrillation (HCC), Prostate cancer (HCC) (01/2015), and Renal cell carcinoma (HCC).    Past Surgical History: Patient  has a past surgical history that includes Inguinal hernia repair; Colonoscopy w/ polypectomy; Coronary angioplasty with stent; Carotid stent (Left, 10/31/2008); Nephrectomy (Left, 03/19/2015); Prostate Fiducial Marker Placement (2016); Esophagogastroduodenoscopy (N/A, 12/22/2021); and Colonoscopy (N/A, 12/23/2021).    Social History: Patient  reports that he quit smoking about 15 years ago. His smoking use included cigarettes. His smokeless tobacco use includes chew. He reports that he does not drink alcohol and does not use drugs.    Family History:  Patient's family history has been reviewed and found to be noncontributory.     Allergies:      Allopurinol and Lipitor [atorvastatin]    Home Medications:    Prior to Admission Medications     Prescriptions Last Dose Informant Patient Reported? Taking?    amLODIPine (NORVASC) 10 MG tablet   No No    TAKE ONE TABLET BY MOUTH EVERY DAY    aspirin (aspirin) 81 MG EC tablet   No No    Take 1 tablet by mouth Daily.    carvedilol (COREG) 6.25 MG tablet   No No    Take 1 tablet by mouth 2 (Two) Times a Day With Meals.    Cholecalciferol (Vitamin D) 50 MCG (2000 UT) capsule  Self Yes No    Take 2,000 Units by mouth Daily.    ferrous sulfate 325 (65 FE) MG tablet  Self Yes No    Take 325 mg by mouth Daily With Breakfast.    levothyroxine (SYNTHROID, LEVOTHROID) 25 MCG tablet  Self Yes No    Take 25 mcg by mouth Daily.    pantoprazole (PROTONIX) 40 MG EC tablet  Self No No    TAKE 1 TABLET BY MOUTH ONCE DAILY    rosuvastatin (CRESTOR) 40  "MG tablet   No No    Take 1 tablet by mouth Every Night.    vitamin B-12 (CYANOCOBALAMIN) 100 MCG tablet  Self Yes No    Take 100 mcg by mouth Daily.        ED Medications:    Medications   sodium chloride 0.9 % flush 10 mL (has no administration in time range)   pantoprazole (PROTONIX) injection 80 mg (has no administration in time range)     Vital Signs:  Temp:  [97.8 °F (36.6 °C)-98.2 °F (36.8 °C)] 97.8 °F (36.6 °C)  Heart Rate:  [64-69] 64  Resp:  [16-20] 20  BP: (127-147)/(52-63) 147/63        02/16/23  1344 02/16/23  1400   Weight: 104 kg (230 lb) 92.1 kg (203 lb)     Body mass index is 26.06 kg/m².    Physical Exam:     Most recent vital Signs: /63   Pulse 64   Temp 97.8 °F (36.6 °C) (Oral)   Resp 20   Ht 188 cm (74\")   Wt 92.1 kg (203 lb)   SpO2 98%   BMI 26.06 kg/m²     Physical Exam  Vitals and nursing note reviewed.   Constitutional:       General: He is not in acute distress.     Appearance: Normal appearance.   HENT:      Head: Normocephalic and atraumatic.      Right Ear: External ear normal.      Left Ear: External ear normal.      Nose: Nose normal.      Mouth/Throat:      Mouth: Mucous membranes are moist.   Eyes:      Extraocular Movements: Extraocular movements intact.      Conjunctiva/sclera: Conjunctivae normal.      Pupils: Pupils are equal, round, and reactive to light.   Cardiovascular:      Rate and Rhythm: Normal rate and regular rhythm.      Pulses: Normal pulses.      Heart sounds: Normal heart sounds.   Pulmonary:      Effort: Pulmonary effort is normal.      Breath sounds: Normal breath sounds. No wheezing or rhonchi.   Abdominal:      General: Bowel sounds are normal. There is no distension.      Palpations: Abdomen is soft.      Tenderness: There is no abdominal tenderness.   Musculoskeletal:         General: Normal range of motion.      Cervical back: Normal range of motion and neck supple.   Skin:     General: Skin is warm and dry.      Coloration: Skin is pale.      " Findings: No rash.   Neurological:      General: No focal deficit present.      Mental Status: He is alert and oriented to person, place, and time. Mental status is at baseline.         Laboratory data:    I have reviewed the labs done in the emergency room.    Results from last 7 days   Lab Units 02/16/23  1357   SODIUM mmol/L 141   POTASSIUM mmol/L 4.0   CHLORIDE mmol/L 109*   CO2 mmol/L 21.7*   BUN mg/dL 38*   CREATININE mg/dL 3.64*   CALCIUM mg/dL 8.6   BILIRUBIN mg/dL 0.2   ALK PHOS U/L 81   ALT (SGPT) U/L 7   AST (SGOT) U/L 11   GLUCOSE mg/dL 137*     Results from last 7 days   Lab Units 02/16/23  1357   WBC 10*3/mm3 9.69   HEMOGLOBIN g/dL 5.6*   HEMATOCRIT % 18.6*   PLATELETS 10*3/mm3 160         Results from last 7 days   Lab Units 02/16/23  1357   HSTROP T ng/L 53*     Results from last 7 days   Lab Units 02/16/23  1357   PROBNP pg/mL 3,689.0*         Results from last 7 days   Lab Units 02/16/23  1357   LIPASE U/L 74*               Invalid input(s): USDES,  BLOODU, NITRITITE, BACT, EP    Pain Management Panel    There is no flowsheet data to display.         EKG:      Sinus rhythm, heart rate 64, nonspecific ST/T wave changes.    Radiology:    XR Chest 1 View    Result Date: 2/16/2023  PROCEDURE: XR CHEST 1 VW-  HISTORY: chest pain  COMPARISON: 12/21/2021.  FINDINGS: The heart is normal in size. The lungs are clear. The mediastinum is unremarkable. There is no pneumothorax.  There are no acute osseous abnormalities. Tortuosity of the thoracic aorta identified. Apical lordotic positioning noted.      Stable chest..    This report was signed and finalized on 2/16/2023 3:11 PM by Alice Castillo MD.      Assessment:    Melena, POA  Anemia due to GI bleed, POA   Elevated troponin, likely demand ischemia, POA  Chronic kidney disease status post left nephrectomy  Chronic CVA, CAD, hypertension, hyperlipidemia, paroxysmal atrial fibrillation not on anticoagulation, prostate cancer     Plan:    Patient is admitted  for further evaluation and treatment.    Melena  Anemia due to GI bleed  -Dr. Alanis consulted, appreciate his recommendations.  -Clear liquid diet, n.p.o. after midnight  -PPI  -Hold aspirin  -Will transfuse 2 units already ordered in the ER, continue to monitor hemoglobin, transfuse as indicated to keep hemoglobin above 7.    Elevated troponin  -Likely demand ischemia in the settings of CKD and anemia  -Trending troponins  -Patient denies any shortness of breath or chest pain, low suspicion for ACS.    Chronic kidney disease  -Avoid nephrotoxic drugs  -Consulted nephrology, appreciate recommendations.    Further orders as indicated per clinical course.    Risk Assessment: Moderate to high  DVT Prophylaxis: SCDs, avoid chemoprophylaxis due to anemia and melena  Code Status: Full  Diet: Clear liquids and n.p.o. after midnight    Advance Care Planning   ACP discussion was held with the patient during this visit. Patient does not have an advance directive, information provided.       Arash Balbuena MD  02/16/23  16:22 EST    Dictated utilizing Dragon dictation.

## 2023-02-16 NOTE — H&P (VIEW-ONLY)
In Patient Consult      Date of Consultation: 2023  Patient Name: Travon Plummer  MRN: [id number]  : 1945     Referring provider: Mario Barr MD    Primary care provider:  Chilango Erickson MD    Reason for consultation: Melena, GI bleed    History of Present Illness: This is a 78-year-old male patient with a prior history of hypertension, hyperlipidemia, coronary artery disease, prior history of CVA, paroxysmal atrial fibrillation not on any anticoagulation, history of prostate cancer in 2015 status post radiation treatment, renal cell cancer status post radical left nephrectomy in , CKD was sent from nephrology office for further evaluation management of his anemia noted with lab work     He has a chronic anemia with a CKD.  He was followed by Dr. Sellers.  Blood work done showed a low hemoglobin and sent to emergency room.  Patient admits that he has been passing black stools many months as  was also taking iron pills.  He did not see any difference in the color of the stool.  Denies any red blood or clots.  He denies any abdominal pain, diarrhea, nausea vomiting.  He states that he normally gets a pellet-like dark stool and feels constipated.  Last good bowel movement for 4 days ago.    He is currently on baby aspirin otherwise not on any blood thinners.  Denies any NSAID use.  No prior history of any peptic ulcer disease or GI bleed.  He had a EGD colonoscopy done by Dr. Sandra Do in 2021 for evaluation of iron deficiency anemia and heme positive stool.  EGD showed a nonobstructing distal esophageal ring was unremarkable.  Colonoscopy with suboptimal bowel prep and dark liquid stool noted in the colon and 1 benign polyp removed.    In the emergency room he was noted to have a hemoglobin of 5.6 g/dL with normal platelet counts.  His BUN was 38 creatinine was 3.64.  His recent CT abdomen pelvis was on 2023 unremarkable except asymptomatic  gallstones and possible cystitis.  GI consulted for further evaluation.      Subjective     Past Medical History:   Diagnosis Date   • Broken arm    • Cerebrovascular accident (HCC) 10/31/2008   • Coronary artery disease    • Dyspnea    • Full dentures 08/23/2021   • GERD (gastroesophageal reflux disease)    • Gout    • Hypercholesterolemia    • Hypertension 11/10/2015    History of hypertension; hypotensive at the time of office visit on 11/10/2015.   • Obesity    • Osteoarthritis    • Paroxysmal atrial fibrillation (HCC)     History of paroxysmal atrial fibrillation, data deficit.   • Prostate cancer (HCC) 01/2015    Prostate cancer, diagnosed January of 2015, status post radiation therapy x39 treatments, 07/01/2015 at [facility].   • Renal cell carcinoma (HCC)     Renal cell carcinoma, diagnosed March of 2015, status post left nephrectomy.  Elevated creatinine of 2.1 on labs performed 11/04/2015.       Past Surgical History:   Procedure Laterality Date   • CAROTID STENT Left 10/31/2008    PTA/left carotid artery stent, 10/31/2008.    • COLONOSCOPY N/A 12/23/2021    Procedure: COLONOSCOPY, polypectomy, clip placement x 1;  Surgeon: Deandra Do MD;  Location: [facility] ENDOSCOPY;  Service: Gastroenterology;  Laterality: N/A;   • COLONOSCOPY W/ POLYPECTOMY     • CORONARY ANGIOPLASTY WITH STENT PLACEMENT      A 3.5 x 13 mm. Cypher ESTIVEN to RCA expanded with 4 mm balloon.    • ENDOSCOPY N/A 12/22/2021    Procedure: ESOPHAGOGASTRODUODENOSCOPY with biopsy;  Surgeon: Deandra Do MD;  Location: [facility] ENDOSCOPY;  Service: Gastroenterology;  Laterality: N/A;   • INGUINAL HERNIA REPAIR     • NEPHRECTOMY Left 03/19/2015    diagnosed January 2015, status post left radical nephrectomy.    • PROSTATE FIDUCIAL MARKER PLACEMENT  2016    received XRT for prostate CA in 2016       Family History   Problem Relation Age of Onset   • No Known Problems Mother    • No Known Problems Father        Social History      Socioeconomic History   • Marital status:    Tobacco Use   • Smoking status: Former     Types: Cigarettes     Quit date: 2008     Years since quitting: 15.1   • Smokeless tobacco: Current     Types: Chew   Vaping Use   • Vaping Use: Never used   Substance and Sexual Activity   • Alcohol use: No   • Drug use: No   • Sexual activity: Defer         Current Facility-Administered Medications:   •  sodium chloride 0.9 % flush 10 mL, 10 mL, Intravenous, PRN, Emergency, Triage Protocol, MD    Current Outpatient Medications:   •  amLODIPine (NORVASC) 10 MG tablet, TAKE ONE TABLET BY MOUTH EVERY DAY, Disp: 90 tablet, Rfl: 3  •  aspirin (aspirin) 81 MG EC tablet, Take 1 tablet by mouth Daily., Disp: 30 tablet, Rfl: 0  •  carvedilol (COREG) 6.25 MG tablet, Take 1 tablet by mouth 2 (Two) Times a Day With Meals., Disp: 60 tablet, Rfl: 6  •  Cholecalciferol (Vitamin D) 50 MCG (2000 UT) capsule, Take 2,000 Units by mouth Daily., Disp: , Rfl:   •  ferrous sulfate 325 (65 FE) MG tablet, Take 325 mg by mouth Daily With Breakfast., Disp: , Rfl:   •  levothyroxine (SYNTHROID, LEVOTHROID) 25 MCG tablet, Take 25 mcg by mouth Daily., Disp: , Rfl: 0  •  pantoprazole (PROTONIX) 40 MG EC tablet, TAKE 1 TABLET BY MOUTH ONCE DAILY, Disp: 30 tablet, Rfl: 11  •  rosuvastatin (CRESTOR) 40 MG tablet, Take 1 tablet by mouth Every Night., Disp: 90 tablet, Rfl: 3  •  vitamin B-12 (CYANOCOBALAMIN) 100 MCG tablet, Take 100 mcg by mouth Daily., Disp: , Rfl:     Allergies   Allergen Reactions   • Allopurinol Urinary Retention   • Lipitor [Atorvastatin] Myalgia       Review of Systems   Constitutional: Negative for fever.   HENT: Negative for sore throat and trouble swallowing.    Eyes: Negative for visual disturbance.   Respiratory: Negative for cough, chest tightness and shortness of breath.    Cardiovascular: Negative for chest pain, palpitations and leg swelling.   Gastrointestinal: Positive for blood in stool and constipation. Negative for  "abdominal pain, diarrhea, nausea, vomiting and indigestion.   Endocrine: Negative for polyphagia.   Genitourinary: Negative for dysuria and hematuria.   Musculoskeletal: Negative for back pain, joint swelling and neck pain.   Skin: Negative for rash, skin lesions and wound.   Neurological: Negative for dizziness, seizures, speech difficulty, weakness, numbness and confusion.   Hematological: Negative for adenopathy. Does not bruise/bleed easily.   Psychiatric/Behavioral: Negative for hallucinations and depressed mood.        The following portions of the patient's history were reviewed and updated as appropriate: allergies, current medications, past family history, past medical history, past social history, past surgical history and problem list.    Objective     Vitals:    02/16/23 1344 02/16/23 1400 02/16/23 1459   BP: 127/61 133/52 147/63   BP Location: Left arm Left arm    Patient Position: Sitting Sitting    Pulse: 69 66 64   Resp: 16 20    Temp: 98.2 °F (36.8 °C) 97.8 °F (36.6 °C)    TempSrc: Oral Oral    SpO2: 97% 100% 98%   Weight: 104 kg (230 lb) 92.1 kg (203 lb)    Height: 188 cm (74\") 188 cm (74\")        Physical Exam  Vitals and nursing note reviewed.   Constitutional:       Appearance: Normal appearance. He is well-developed.   HENT:      Head: Normocephalic and atraumatic.      Right Ear: External ear normal.      Left Ear: External ear normal.   Eyes:      Comments: Pallor present   Neck:      Thyroid: No thyromegaly.      Trachea: No tracheal deviation.   Cardiovascular:      Rate and Rhythm: Normal rate and regular rhythm.      Heart sounds: No murmur heard.  Pulmonary:      Effort: Pulmonary effort is normal. No respiratory distress.      Breath sounds: Normal breath sounds.   Abdominal:      General: Bowel sounds are normal. There is no distension.      Palpations: Abdomen is soft. There is no mass.      Tenderness: There is no abdominal tenderness.      Hernia: No hernia is present. "   Musculoskeletal:         General: Normal range of motion.      Cervical back: Normal range of motion.   Skin:     General: Skin is warm and dry.   Neurological:      Mental Status: He is alert and oriented to person, place, and time.      Cranial Nerves: No cranial nerve deficit.      Sensory: No sensory deficit.   Psychiatric:         Mood and Affect: Mood normal.         Behavior: Behavior normal.         Thought Content: Thought content normal.         Judgment: Judgment normal.         Results from last 7 days   Lab Units 02/16/23  1357   SODIUM mmol/L 141   POTASSIUM mmol/L 4.0   CHLORIDE mmol/L 109*   CO2 mmol/L 21.7*   BUN mg/dL 38*   CREATININE mg/dL 3.64*   CALCIUM mg/dL 8.6   ALBUMIN g/dL 3.4*   BILIRUBIN mg/dL 0.2   ALK PHOS U/L 81   ALT (SGPT) U/L 7   AST (SGOT) U/L 11   GLUCOSE mg/dL 137*   WBC 10*3/mm3 9.69   HEMOGLOBIN g/dL 5.6*   PLATELETS 10*3/mm3 160       Imaging Results (Last 24 Hours)     Procedure Component Value Units Date/Time    XR Chest 1 View [[id number]] Collected: 02/16/23 1510     Updated: 02/16/23 1513    Narrative:      PROCEDURE: XR CHEST 1 VW-     HISTORY: chest pain     COMPARISON: 12/21/2021.     FINDINGS: The heart is normal in size. The lungs are clear. The  mediastinum is unremarkable. There is no pneumothorax.  There are no  acute osseous abnormalities. Tortuosity of the thoracic aorta  identified. Apical lordotic positioning noted.       Impression:      Stable chest..           This report was signed and finalized on 2/16/2023 3:11 PM by Alice Castillo MD.          Assessment / Plan      Assessment/Recommendations:   1.  Acute blood loss anemia  2.  Suspected upper GI bleed with melena  3.  History of chronic iron deficiency anemia  4.  History of CKD 4  5.  Chronic idiopathic constipation  Patient has a chronic anemia over 6 years now.  His hemoglobin runs about 11 to 12 g/dL before however since 2021 his hemoglobin dropped a few occasions between 5 to 6 g/dL.  He had a  positive Hemoccult stool test in 2021 and had a EGD colonoscopy done for further evaluation by Dr. Sandra Do which did not reveal any obvious source of bleeding.  Recent CT abdomen without contrast done week ago was unremarkable except asymptomatic gallstones.    This time patient does seem to have intermittent GI bleeding causing drop in his H&H.   He is currently only on aspirin.  No signs of any overt bleeding.  Given his CKD there is a strong suspicion of small bowel AVMs with the bleeding..  Peptic ulcer disease, erosions need to be ruled out    He needs an EGD to begin with.  I have discussed the indication risk involved and patient and his spouse Pat this evening and they both agreeable to proceed with the procedure.  Given his significant medical issues patient is slightly high risk for any cardiopulmonary complications during the procedure and after procedure and patient and the family is well aware of the    Clear liquids now  Keep n.p.o. after midnight  Protonix IV  Hold aspirin  Transfuse 2 units PRBC today and repeat CBC  Keep Hgb more than 7 g/dL  Patient likely need a small bowel PillCam study if EGD is negative  MiraLAX 17 g p.o. daily     6.  History of CAD status post coronary artery stent  7.  History of paroxysmal A-fib  8.  History of radical left nephrectomy for RCC  9.  History of prostate cancer with radiation treatment      Thank you very much for letting me participate in the care of this patient.  Please do not hesitate to call me if you have any questions.      Georgina Pool MD  [facility]    2/16/2023  15:54 EST    Please note that portions of this note may have been completed with a voice recognition program.

## 2023-02-16 NOTE — CONSULTS
In Patient Consult      Date of Consultation: 2023  Patient Name: Travon Plummer  MRN: [id number]  : 1945     Referring provider: Mario Barr MD    Primary care provider:  Chilango Erickson MD    Reason for consultation: Melena, GI bleed    History of Present Illness: This is a 78-year-old male patient with a prior history of hypertension, hyperlipidemia, coronary artery disease, prior history of CVA, paroxysmal atrial fibrillation not on any anticoagulation, history of prostate cancer in 2015 status post radiation treatment, renal cell cancer status post radical left nephrectomy in , CKD was sent from nephrology office for further evaluation management of his anemia noted with lab work     He has a chronic anemia with a CKD.  He was followed by Dr. Sellers.  Blood work done showed a low hemoglobin and sent to emergency room.  Patient admits that he has been passing black stools many months as  was also taking iron pills.  He did not see any difference in the color of the stool.  Denies any red blood or clots.  He denies any abdominal pain, diarrhea, nausea vomiting.  He states that he normally gets a pellet-like dark stool and feels constipated.  Last good bowel movement for 4 days ago.    He is currently on baby aspirin otherwise not on any blood thinners.  Denies any NSAID use.  No prior history of any peptic ulcer disease or GI bleed.  He had a EGD colonoscopy done by Dr. Sandra Do in 2021 for evaluation of iron deficiency anemia and heme positive stool.  EGD showed a nonobstructing distal esophageal ring was unremarkable.  Colonoscopy with suboptimal bowel prep and dark liquid stool noted in the colon and 1 benign polyp removed.    In the emergency room he was noted to have a hemoglobin of 5.6 g/dL with normal platelet counts.  His BUN was 38 creatinine was 3.64.  His recent CT abdomen pelvis was on 2023 unremarkable except asymptomatic  gallstones and possible cystitis.  GI consulted for further evaluation.      Subjective     Past Medical History:   Diagnosis Date   • Broken arm    • Cerebrovascular accident (HCC) 10/31/2008   • Coronary artery disease    • Dyspnea    • Full dentures 08/23/2021   • GERD (gastroesophageal reflux disease)    • Gout    • Hypercholesterolemia    • Hypertension 11/10/2015    History of hypertension; hypotensive at the time of office visit on 11/10/2015.   • Obesity    • Osteoarthritis    • Paroxysmal atrial fibrillation (HCC)     History of paroxysmal atrial fibrillation, data deficit.   • Prostate cancer (HCC) 01/2015    Prostate cancer, diagnosed January of 2015, status post radiation therapy x39 treatments, 07/01/2015 at [facility].   • Renal cell carcinoma (HCC)     Renal cell carcinoma, diagnosed March of 2015, status post left nephrectomy.  Elevated creatinine of 2.1 on labs performed 11/04/2015.       Past Surgical History:   Procedure Laterality Date   • CAROTID STENT Left 10/31/2008    PTA/left carotid artery stent, 10/31/2008.    • COLONOSCOPY N/A 12/23/2021    Procedure: COLONOSCOPY, polypectomy, clip placement x 1;  Surgeon: Deandra Do MD;  Location: [facility] ENDOSCOPY;  Service: Gastroenterology;  Laterality: N/A;   • COLONOSCOPY W/ POLYPECTOMY     • CORONARY ANGIOPLASTY WITH STENT PLACEMENT      A 3.5 x 13 mm. Cypher ESTIVEN to RCA expanded with 4 mm balloon.    • ENDOSCOPY N/A 12/22/2021    Procedure: ESOPHAGOGASTRODUODENOSCOPY with biopsy;  Surgeon: Deandra Do MD;  Location: [facility] ENDOSCOPY;  Service: Gastroenterology;  Laterality: N/A;   • INGUINAL HERNIA REPAIR     • NEPHRECTOMY Left 03/19/2015    diagnosed January 2015, status post left radical nephrectomy.    • PROSTATE FIDUCIAL MARKER PLACEMENT  2016    received XRT for prostate CA in 2016       Family History   Problem Relation Age of Onset   • No Known Problems Mother    • No Known Problems Father        Social History      Socioeconomic History   • Marital status:    Tobacco Use   • Smoking status: Former     Types: Cigarettes     Quit date: 2008     Years since quitting: 15.1   • Smokeless tobacco: Current     Types: Chew   Vaping Use   • Vaping Use: Never used   Substance and Sexual Activity   • Alcohol use: No   • Drug use: No   • Sexual activity: Defer         Current Facility-Administered Medications:   •  sodium chloride 0.9 % flush 10 mL, 10 mL, Intravenous, PRN, Emergency, Triage Protocol, MD    Current Outpatient Medications:   •  amLODIPine (NORVASC) 10 MG tablet, TAKE ONE TABLET BY MOUTH EVERY DAY, Disp: 90 tablet, Rfl: 3  •  aspirin (aspirin) 81 MG EC tablet, Take 1 tablet by mouth Daily., Disp: 30 tablet, Rfl: 0  •  carvedilol (COREG) 6.25 MG tablet, Take 1 tablet by mouth 2 (Two) Times a Day With Meals., Disp: 60 tablet, Rfl: 6  •  Cholecalciferol (Vitamin D) 50 MCG (2000 UT) capsule, Take 2,000 Units by mouth Daily., Disp: , Rfl:   •  ferrous sulfate 325 (65 FE) MG tablet, Take 325 mg by mouth Daily With Breakfast., Disp: , Rfl:   •  levothyroxine (SYNTHROID, LEVOTHROID) 25 MCG tablet, Take 25 mcg by mouth Daily., Disp: , Rfl: 0  •  pantoprazole (PROTONIX) 40 MG EC tablet, TAKE 1 TABLET BY MOUTH ONCE DAILY, Disp: 30 tablet, Rfl: 11  •  rosuvastatin (CRESTOR) 40 MG tablet, Take 1 tablet by mouth Every Night., Disp: 90 tablet, Rfl: 3  •  vitamin B-12 (CYANOCOBALAMIN) 100 MCG tablet, Take 100 mcg by mouth Daily., Disp: , Rfl:     Allergies   Allergen Reactions   • Allopurinol Urinary Retention   • Lipitor [Atorvastatin] Myalgia       Review of Systems   Constitutional: Negative for fever.   HENT: Negative for sore throat and trouble swallowing.    Eyes: Negative for visual disturbance.   Respiratory: Negative for cough, chest tightness and shortness of breath.    Cardiovascular: Negative for chest pain, palpitations and leg swelling.   Gastrointestinal: Positive for blood in stool and constipation. Negative for  "abdominal pain, diarrhea, nausea, vomiting and indigestion.   Endocrine: Negative for polyphagia.   Genitourinary: Negative for dysuria and hematuria.   Musculoskeletal: Negative for back pain, joint swelling and neck pain.   Skin: Negative for rash, skin lesions and wound.   Neurological: Negative for dizziness, seizures, speech difficulty, weakness, numbness and confusion.   Hematological: Negative for adenopathy. Does not bruise/bleed easily.   Psychiatric/Behavioral: Negative for hallucinations and depressed mood.        The following portions of the patient's history were reviewed and updated as appropriate: allergies, current medications, past family history, past medical history, past social history, past surgical history and problem list.    Objective     Vitals:    02/16/23 1344 02/16/23 1400 02/16/23 1459   BP: 127/61 133/52 147/63   BP Location: Left arm Left arm    Patient Position: Sitting Sitting    Pulse: 69 66 64   Resp: 16 20    Temp: 98.2 °F (36.8 °C) 97.8 °F (36.6 °C)    TempSrc: Oral Oral    SpO2: 97% 100% 98%   Weight: 104 kg (230 lb) 92.1 kg (203 lb)    Height: 188 cm (74\") 188 cm (74\")        Physical Exam  Vitals and nursing note reviewed.   Constitutional:       Appearance: Normal appearance. He is well-developed.   HENT:      Head: Normocephalic and atraumatic.      Right Ear: External ear normal.      Left Ear: External ear normal.   Eyes:      Comments: Pallor present   Neck:      Thyroid: No thyromegaly.      Trachea: No tracheal deviation.   Cardiovascular:      Rate and Rhythm: Normal rate and regular rhythm.      Heart sounds: No murmur heard.  Pulmonary:      Effort: Pulmonary effort is normal. No respiratory distress.      Breath sounds: Normal breath sounds.   Abdominal:      General: Bowel sounds are normal. There is no distension.      Palpations: Abdomen is soft. There is no mass.      Tenderness: There is no abdominal tenderness.      Hernia: No hernia is present. "   Musculoskeletal:         General: Normal range of motion.      Cervical back: Normal range of motion.   Skin:     General: Skin is warm and dry.   Neurological:      Mental Status: He is alert and oriented to person, place, and time.      Cranial Nerves: No cranial nerve deficit.      Sensory: No sensory deficit.   Psychiatric:         Mood and Affect: Mood normal.         Behavior: Behavior normal.         Thought Content: Thought content normal.         Judgment: Judgment normal.         Results from last 7 days   Lab Units 02/16/23  1357   SODIUM mmol/L 141   POTASSIUM mmol/L 4.0   CHLORIDE mmol/L 109*   CO2 mmol/L 21.7*   BUN mg/dL 38*   CREATININE mg/dL 3.64*   CALCIUM mg/dL 8.6   ALBUMIN g/dL 3.4*   BILIRUBIN mg/dL 0.2   ALK PHOS U/L 81   ALT (SGPT) U/L 7   AST (SGOT) U/L 11   GLUCOSE mg/dL 137*   WBC 10*3/mm3 9.69   HEMOGLOBIN g/dL 5.6*   PLATELETS 10*3/mm3 160       Imaging Results (Last 24 Hours)     Procedure Component Value Units Date/Time    XR Chest 1 View [[id number]] Collected: 02/16/23 1510     Updated: 02/16/23 1513    Narrative:      PROCEDURE: XR CHEST 1 VW-     HISTORY: chest pain     COMPARISON: 12/21/2021.     FINDINGS: The heart is normal in size. The lungs are clear. The  mediastinum is unremarkable. There is no pneumothorax.  There are no  acute osseous abnormalities. Tortuosity of the thoracic aorta  identified. Apical lordotic positioning noted.       Impression:      Stable chest..           This report was signed and finalized on 2/16/2023 3:11 PM by Alice Castillo MD.          Assessment / Plan      Assessment/Recommendations:   1.  Acute blood loss anemia  2.  Suspected upper GI bleed with melena  3.  History of chronic iron deficiency anemia  4.  History of CKD 4  5.  Chronic idiopathic constipation  Patient has a chronic anemia over 6 years now.  His hemoglobin runs about 11 to 12 g/dL before however since 2021 his hemoglobin dropped a few occasions between 5 to 6 g/dL.  He had a  positive Hemoccult stool test in 2021 and had a EGD colonoscopy done for further evaluation by Dr. Sandra Do which did not reveal any obvious source of bleeding.  Recent CT abdomen without contrast done week ago was unremarkable except asymptomatic gallstones.    This time patient does seem to have intermittent GI bleeding causing drop in his H&H.   He is currently only on aspirin.  No signs of any overt bleeding.  Given his CKD there is a strong suspicion of small bowel AVMs with the bleeding..  Peptic ulcer disease, erosions need to be ruled out    He needs an EGD to begin with.  I have discussed the indication risk involved and patient and his spouse Pat this evening and they both agreeable to proceed with the procedure.  Given his significant medical issues patient is slightly high risk for any cardiopulmonary complications during the procedure and after procedure and patient and the family is well aware of the    Clear liquids now  Keep n.p.o. after midnight  Protonix IV  Hold aspirin  Transfuse 2 units PRBC today and repeat CBC  Keep Hgb more than 7 g/dL  Patient likely need a small bowel PillCam study if EGD is negative  MiraLAX 17 g p.o. daily     6.  History of CAD status post coronary artery stent  7.  History of paroxysmal A-fib  8.  History of radical left nephrectomy for RCC  9.  History of prostate cancer with radiation treatment      Thank you very much for letting me participate in the care of this patient.  Please do not hesitate to call me if you have any questions.      Georgina Pool MD  [facility] [place]    2/16/2023  15:54 EST    Please note that portions of this note may have been completed with a voice recognition program.

## 2023-02-16 NOTE — ED PROVIDER NOTES
Subjective  History of Present Illness:    Patient's 78-year-old male with history of CVA, CAD, hypertension, hyperlipidemia, paroxysmal atrial fibrillation not on anticoagulation, prostate cancer, renal cell carcinoma status post left nephrectomy, CKD who presents today with concern for low hemoglobin.  Had routine labs done by his nephrologist office secondary to CKD.  Has had anemia in the past secondary to this.  Concurrently however, the patient reports that for the last 2 weeks he has been having very dark stools.  Denies that they are tar-like but does state that he has small stools which are black in nature.  On chart review, patient does take iron p.o.  He denies any abdominal pain, nausea, vomiting.  No chest pain.  He does report dyspnea with exertion.  No other symptoms noted.      Nurses Notes reviewed and agree, including vitals, allergies, social history and prior medical history.     REVIEW OF SYSTEMS: All systems reviewed and not pertinent unless noted.  Review of Systems   Constitutional: Positive for fatigue. Negative for activity change, appetite change, chills and fever.        Low H&H   HENT: Negative for congestion, sinus pressure, sneezing and trouble swallowing.    Eyes: Negative for discharge and itching.   Respiratory: Positive for shortness of breath. Negative for cough.    Cardiovascular: Negative for chest pain and palpitations.   Gastrointestinal: Negative for abdominal distention and abdominal pain.        Dark stools   Endocrine: Negative for cold intolerance and heat intolerance.   Genitourinary: Negative for decreased urine volume, dysuria and urgency.   Musculoskeletal: Negative for gait problem, neck pain and neck stiffness.   Skin: Negative for color change and rash.   Allergic/Immunologic: Negative for immunocompromised state.   Neurological: Negative for facial asymmetry and headaches.   Hematological: Negative for adenopathy.   Psychiatric/Behavioral: Negative for self-injury  and suicidal ideas.       Past Medical History:   Diagnosis Date   • Broken arm    • Cerebrovascular accident (HCC) 10/31/2008   • Coronary artery disease    • Dyspnea    • Full dentures 08/23/2021   • GERD (gastroesophageal reflux disease)    • Gout    • Hypercholesterolemia    • Hypertension 11/10/2015    History of hypertension; hypotensive at the time of office visit on 11/10/2015.   • Obesity    • Osteoarthritis    • Paroxysmal atrial fibrillation (HCC)     History of paroxysmal atrial fibrillation, data deficit.   • Prostate cancer (HCC) 01/2015    Prostate cancer, diagnosed January of 2015, status post radiation therapy x39 treatments, 07/01/2015 at [facility].   • Renal cell carcinoma (HCC)     Renal cell carcinoma, diagnosed March of 2015, status post left nephrectomy.  Elevated creatinine of 2.1 on labs performed 11/04/2015.       Allergies:    Allopurinol and Lipitor [atorvastatin]      Past Surgical History:   Procedure Laterality Date   • CAROTID STENT Left 10/31/2008    PTA/left carotid artery stent, 10/31/2008.    • COLONOSCOPY N/A 12/23/2021    Procedure: COLONOSCOPY, polypectomy, clip placement x 1;  Surgeon: Deandra Do MD;  Location: [facility] ENDOSCOPY;  Service: Gastroenterology;  Laterality: N/A;   • COLONOSCOPY W/ POLYPECTOMY     • CORONARY ANGIOPLASTY WITH STENT PLACEMENT      A 3.5 x 13 mm. Cypher ESTIVEN to RCA expanded with 4 mm balloon.    • ENDOSCOPY N/A 12/22/2021    Procedure: ESOPHAGOGASTRODUODENOSCOPY with biopsy;  Surgeon: Deandra Do MD;  Location: [facility] ENDOSCOPY;  Service: Gastroenterology;  Laterality: N/A;   • INGUINAL HERNIA REPAIR     • NEPHRECTOMY Left 03/19/2015    diagnosed January 2015, status post left radical nephrectomy.    • PROSTATE FIDUCIAL MARKER PLACEMENT  2016    received XRT for prostate CA in 2016         Social History     Socioeconomic History   • Marital status:    Tobacco Use   • Smoking status: Former     Types: Cigarettes     Quit  "date: 2008     Years since quitting: 15.1   • Smokeless tobacco: Current     Types: Chew   Vaping Use   • Vaping Use: Never used   Substance and Sexual Activity   • Alcohol use: No   • Drug use: No   • Sexual activity: Defer         Family History   Problem Relation Age of Onset   • No Known Problems Mother    • No Known Problems Father        Objective  Physical Exam:  /55   Pulse 61   Temp 97.9 °F (36.6 °C) (Oral)   Resp 16   Ht 188 cm (74\")   Wt 92.1 kg (203 lb)   SpO2 98%   BMI 26.06 kg/m²      Physical Exam  Constitutional:       General: He is not in acute distress.     Appearance: Normal appearance. He is normal weight. He is not ill-appearing.   HENT:      Head: Normocephalic and atraumatic.      Nose: Nose normal. No congestion or rhinorrhea.      Mouth/Throat:      Mouth: Mucous membranes are moist.      Pharynx: Oropharynx is clear.   Eyes:      Extraocular Movements: Extraocular movements intact.      Conjunctiva/sclera: Conjunctivae normal.      Pupils: Pupils are equal, round, and reactive to light.   Cardiovascular:      Rate and Rhythm: Normal rate and regular rhythm.      Pulses: Normal pulses.   Pulmonary:      Effort: Pulmonary effort is normal. No respiratory distress.      Breath sounds: Normal breath sounds.   Abdominal:      General: Abdomen is flat. Bowel sounds are normal. There is no distension.      Palpations: Abdomen is soft.      Tenderness: There is no abdominal tenderness.   Musculoskeletal:         General: No swelling or tenderness. Normal range of motion.      Cervical back: Normal range of motion and neck supple. No rigidity or tenderness.   Skin:     General: Skin is warm and dry.      Capillary Refill: Capillary refill takes less than 2 seconds.   Neurological:      General: No focal deficit present.      Mental Status: He is alert and oriented to person, place, and time. Mental status is at baseline.      Cranial Nerves: No cranial nerve deficit.      Sensory: No " sensory deficit.      Motor: No weakness.   Psychiatric:         Mood and Affect: Mood normal.         Behavior: Behavior normal.         Thought Content: Thought content normal.         Judgment: Judgment normal.         Procedures    ED Course:  EKG interpreted by me, normal sinus rhythm with no concerning ST changes noted, rate of 64       Lab Results (last 24 hours)     Procedure Component Value Units Date/Time    CBC & Differential [[id number]]  (Abnormal) Collected: 02/16/23 1357    Specimen: Blood Updated: 02/16/23 1444    Narrative:      The following orders were created for panel order CBC & Differential.  Procedure                               Abnormality         Status                     ---------                               -----------         ------                     CBC Auto Differential[[id number]]        Abnormal            Final result                 Please view results for these tests on the individual orders.    Comprehensive Metabolic Panel [[id number]]  (Abnormal) Collected: 02/16/23 1357    Specimen: Blood Updated: 02/16/23 1449     Glucose 137 mg/dL      BUN 38 mg/dL      Creatinine 3.64 mg/dL      Sodium 141 mmol/L      Potassium 4.0 mmol/L      Chloride 109 mmol/L      CO2 21.7 mmol/L      Calcium 8.6 mg/dL      Total Protein 5.9 g/dL      Albumin 3.4 g/dL      ALT (SGPT) 7 U/L      AST (SGOT) 11 U/L      Alkaline Phosphatase 81 U/L      Total Bilirubin 0.2 mg/dL      Globulin 2.5 gm/dL      A/G Ratio 1.4 g/dL      BUN/Creatinine Ratio 10.4     Anion Gap 10.3 mmol/L      eGFR 16.3 mL/min/1.73     Narrative:      GFR Normal >60  Chronic Kidney Disease <60  Kidney Failure <15    The GFR formula is only valid for adults with stable renal function between ages 18 and 70.    Lipase [[id number]]  (Abnormal) Collected: 02/16/23 1357    Specimen: Blood Updated: 02/16/23 1449     Lipase 74 U/L     BNP [[id number]]  (Abnormal) Collected: 02/16/23 1357    Specimen: Blood Updated: 02/16/23 1500      proBNP 3,689.0 pg/mL     Narrative:      Among patients with dyspnea, NT-proBNP is highly sensitive for the detection of acute congestive heart failure. In addition NT-proBNP of <300 pg/ml effectively rules out acute congestive heart failure with 99% negative predictive value.    Results may be falsely decreased if patient taking Biotin.      Magnesium [[id number]]  (Normal) Collected: 02/16/23 1357    Specimen: Blood Updated: 02/16/23 1449     Magnesium 1.8 mg/dL     Phosphorus [[id number]]  (Normal) Collected: 02/16/23 1357    Specimen: Blood Updated: 02/16/23 1449     Phosphorus 3.2 mg/dL     Single High Sensitivity Troponin T [[id number]]  (Abnormal) Collected: 02/16/23 1357    Specimen: Blood Updated: 02/16/23 1508     HS Troponin T 53 ng/L     Narrative:      High Sensitive Troponin T Reference Range:  <10.0 ng/L- Negative Female for AMI  <15.0 ng/L- Negative Male for AMI  >=10 - Abnormal Female indicating possible myocardial injury.  >=15 - Abnormal Male indicating possible myocardial injury.   Clinicians would have to utilize clinical acumen, EKG, Troponin, and serial changes to determine if it is an Acute Myocardial Infarction or myocardial injury due to an underlying chronic condition.         CBC Auto Differential [[id number]]  (Abnormal) Collected: 02/16/23 1357    Specimen: Blood Updated: 02/16/23 1444     WBC 9.69 10*3/mm3      RBC 1.92 10*6/mm3      Hemoglobin 5.6 g/dL      Hematocrit 18.6 %      MCV 96.9 fL      MCH 29.2 pg      MCHC 30.1 g/dL      RDW 15.9 %      RDW-SD 55.1 fl      MPV 12.0 fL      Platelets 160 10*3/mm3      Neutrophil % 70.3 %      Lymphocyte % 7.4 %      Monocyte % 7.3 %      Eosinophil % 13.8 %      Basophil % 0.7 %      Immature Grans % 0.5 %      Neutrophils, Absolute 6.80 10*3/mm3      Lymphocytes, Absolute 0.72 10*3/mm3      Monocytes, Absolute 0.71 10*3/mm3      Eosinophils, Absolute 1.34 10*3/mm3      Basophils, Absolute 0.07 10*3/mm3      Immature Grans, Absolute 0.05  10*3/mm3      nRBC 0.0 /100 WBC     Occult Blood X 1, Stool - Stool, Per Rectum [[id number]]  (Abnormal) Collected: 02/16/23 1438    Specimen: Stool from Per Rectum Updated: 02/16/23 1455     Fecal Occult Blood Positive    Single High Sensitivity Troponin T [[id number]] Collected: 02/16/23 1726    Specimen: Blood Updated: 02/16/23 1730           XR Chest 1 View    Result Date: 2/16/2023  PROCEDURE: XR CHEST 1 VW-  HISTORY: chest pain  COMPARISON: 12/21/2021.  FINDINGS: The heart is normal in size. The lungs are clear. The mediastinum is unremarkable. There is no pneumothorax.  There are no acute osseous abnormalities. Tortuosity of the thoracic aorta identified. Apical lordotic positioning noted.      Impression: Stable chest..    This report was signed and finalized on 2/16/2023 3:11 PM by Alice Castillo MD.         MDM    Initial impression of presenting illness: Anemia    DDX: includes but is not limited to: Lab error, anemia of chronic disease, chronic kidney disease, lower GI bleed, upper GI bleed    Patient arrives stable with vitals interpreted by myself.     Pertinent features from physical exam: Dark stool on rectal exam, unreactive to abdominal exam.    Initial diagnostic plan: CBC, CMP, lipase, Hemoccult, troponin, chest x-ray    Results from initial plan were reviewed and interpreted by me revealing symptomatic anemia, concern for upper GI bleed    Diagnostic information from other sources: Reviewed past medical records including CT scan performed 4 days prior    Interventions / Re-evaluation: Started on IV PPI, will give 2 units of blood, blood pressure stable during reassessment    Results/clinical rationale were discussed with discussed with GI who will scope the patient for EGD in the morning, discussed with hospitalist admit the patient to their service for further management.  Discussed with patient at bedside who voices understanding and agreement the plan    Consultations/Discussion of results with  other physicians: As above    Disposition plan: Admit  -----    Final diagnoses:   Upper GI bleed        Mario Barr MD  02/16/23 0193

## 2023-02-17 ENCOUNTER — ANESTHESIA (OUTPATIENT)
Dept: GASTROENTEROLOGY | Facility: HOSPITAL | Age: 78
End: 2023-02-17
Payer: MEDICARE

## 2023-02-17 ENCOUNTER — PROCEDURE VISIT (OUTPATIENT)
Dept: GASTROENTEROLOGY | Facility: CLINIC | Age: 78
End: 2023-02-17
Payer: MEDICARE

## 2023-02-17 ENCOUNTER — ANESTHESIA EVENT (OUTPATIENT)
Dept: GASTROENTEROLOGY | Facility: HOSPITAL | Age: 78
End: 2023-02-17
Payer: MEDICARE

## 2023-02-17 DIAGNOSIS — D50.0 IRON DEFICIENCY ANEMIA DUE TO CHRONIC BLOOD LOSS: Primary | ICD-10-CM

## 2023-02-17 LAB
ANION GAP SERPL CALCULATED.3IONS-SCNC: 10.4 MMOL/L (ref 5–15)
APTT PPP: 29.7 SECONDS (ref 23.5–35.5)
BASOPHILS # BLD AUTO: 0.1 10*3/MM3 (ref 0–0.2)
BASOPHILS NFR BLD AUTO: 1.1 % (ref 0–1.5)
BUN SERPL-MCNC: 36 MG/DL (ref 8–23)
BUN/CREAT SERPL: 10.3 (ref 7–25)
CALCIUM SPEC-SCNC: 8.9 MG/DL (ref 8.6–10.5)
CHLORIDE SERPL-SCNC: 112 MMOL/L (ref 98–107)
CO2 SERPL-SCNC: 21.6 MMOL/L (ref 22–29)
CREAT SERPL-MCNC: 3.49 MG/DL (ref 0.76–1.27)
DEPRECATED RDW RBC AUTO: 54.2 FL (ref 37–54)
EGFRCR SERPLBLD CKD-EPI 2021: 17.2 ML/MIN/1.73
EOSINOPHIL # BLD AUTO: 1.29 10*3/MM3 (ref 0–0.4)
EOSINOPHIL NFR BLD AUTO: 13.6 % (ref 0.3–6.2)
ERYTHROCYTE [DISTWIDTH] IN BLOOD BY AUTOMATED COUNT: 16 % (ref 12.3–15.4)
GLUCOSE BLDC GLUCOMTR-MCNC: 100 MG/DL (ref 70–130)
GLUCOSE BLDC GLUCOMTR-MCNC: 103 MG/DL (ref 70–130)
GLUCOSE BLDC GLUCOMTR-MCNC: 126 MG/DL (ref 70–130)
GLUCOSE BLDC GLUCOMTR-MCNC: 98 MG/DL (ref 70–130)
GLUCOSE SERPL-MCNC: 95 MG/DL (ref 65–99)
HCT VFR BLD AUTO: 23.8 % (ref 37.5–51)
HCT VFR BLD AUTO: 25 % (ref 37.5–51)
HGB BLD-MCNC: 7.4 G/DL (ref 13–17.7)
HGB BLD-MCNC: 7.9 G/DL (ref 13–17.7)
IMM GRANULOCYTES # BLD AUTO: 0.04 10*3/MM3 (ref 0–0.05)
IMM GRANULOCYTES NFR BLD AUTO: 0.4 % (ref 0–0.5)
INR PPP: 1.07 (ref 0.9–1.1)
LYMPHOCYTES # BLD AUTO: 0.73 10*3/MM3 (ref 0.7–3.1)
LYMPHOCYTES NFR BLD AUTO: 7.7 % (ref 19.6–45.3)
MCH RBC QN AUTO: 29 PG (ref 26.6–33)
MCHC RBC AUTO-ENTMCNC: 31.1 G/DL (ref 31.5–35.7)
MCV RBC AUTO: 93.3 FL (ref 79–97)
MONOCYTES # BLD AUTO: 0.86 10*3/MM3 (ref 0.1–0.9)
MONOCYTES NFR BLD AUTO: 9 % (ref 5–12)
NEUTROPHILS NFR BLD AUTO: 6.5 10*3/MM3 (ref 1.7–7)
NEUTROPHILS NFR BLD AUTO: 68.2 % (ref 42.7–76)
NRBC BLD AUTO-RTO: 0 /100 WBC (ref 0–0.2)
PLATELET # BLD AUTO: 154 10*3/MM3 (ref 140–450)
PMV BLD AUTO: 12 FL (ref 6–12)
POTASSIUM SERPL-SCNC: 4.1 MMOL/L (ref 3.5–5.2)
PROTHROMBIN TIME: 14.3 SECONDS (ref 12.5–14.5)
RBC # BLD AUTO: 2.55 10*6/MM3 (ref 4.14–5.8)
SODIUM SERPL-SCNC: 144 MMOL/L (ref 136–145)
WBC NRBC COR # BLD: 9.52 10*3/MM3 (ref 3.4–10.8)

## 2023-02-17 PROCEDURE — A9270 NON-COVERED ITEM OR SERVICE: HCPCS | Performed by: INTERNAL MEDICINE

## 2023-02-17 PROCEDURE — 85610 PROTHROMBIN TIME: CPT | Performed by: FAMILY MEDICINE

## 2023-02-17 PROCEDURE — 80048 BASIC METABOLIC PNL TOTAL CA: CPT | Performed by: FAMILY MEDICINE

## 2023-02-17 PROCEDURE — 99232 SBSQ HOSP IP/OBS MODERATE 35: CPT | Performed by: FAMILY MEDICINE

## 2023-02-17 PROCEDURE — 43239 EGD BIOPSY SINGLE/MULTIPLE: CPT | Performed by: INTERNAL MEDICINE

## 2023-02-17 PROCEDURE — 63710000001 CARVEDILOL 6.25 MG TABLET: Performed by: INTERNAL MEDICINE

## 2023-02-17 PROCEDURE — G0378 HOSPITAL OBSERVATION PER HR: HCPCS

## 2023-02-17 PROCEDURE — 91110 GI TRC IMG INTRAL ESOPH-ILE: CPT | Performed by: INTERNAL MEDICINE

## 2023-02-17 PROCEDURE — 85730 THROMBOPLASTIN TIME PARTIAL: CPT | Performed by: FAMILY MEDICINE

## 2023-02-17 PROCEDURE — 97161 PT EVAL LOW COMPLEX 20 MIN: CPT

## 2023-02-17 PROCEDURE — 85018 HEMOGLOBIN: CPT | Performed by: FAMILY MEDICINE

## 2023-02-17 PROCEDURE — 97165 OT EVAL LOW COMPLEX 30 MIN: CPT

## 2023-02-17 PROCEDURE — 88305 TISSUE EXAM BY PATHOLOGIST: CPT

## 2023-02-17 PROCEDURE — 85025 COMPLETE CBC W/AUTO DIFF WBC: CPT | Performed by: FAMILY MEDICINE

## 2023-02-17 PROCEDURE — 82962 GLUCOSE BLOOD TEST: CPT

## 2023-02-17 PROCEDURE — 25010000002 PROPOFOL 1000 MG/100ML EMULSION: Performed by: NURSE ANESTHETIST, CERTIFIED REGISTERED

## 2023-02-17 PROCEDURE — 85014 HEMATOCRIT: CPT | Performed by: FAMILY MEDICINE

## 2023-02-17 PROCEDURE — 25010000002 FUROSEMIDE PER 20 MG: Performed by: INTERNAL MEDICINE

## 2023-02-17 PROCEDURE — 63710000001 ROSUVASTATIN 20 MG TABLET: Performed by: INTERNAL MEDICINE

## 2023-02-17 RX ORDER — SODIUM CHLORIDE 9 MG/ML
70 INJECTION, SOLUTION INTRAVENOUS CONTINUOUS PRN
Status: DISCONTINUED | OUTPATIENT
Start: 2023-02-17 | End: 2023-02-18 | Stop reason: HOSPADM

## 2023-02-17 RX ORDER — KETAMINE HYDROCHLORIDE 50 MG/ML
INJECTION, SOLUTION, CONCENTRATE INTRAMUSCULAR; INTRAVENOUS AS NEEDED
Status: DISCONTINUED | OUTPATIENT
Start: 2023-02-17 | End: 2023-02-17 | Stop reason: SURG

## 2023-02-17 RX ORDER — PROPOFOL 10 MG/ML
INJECTION, EMULSION INTRAVENOUS AS NEEDED
Status: DISCONTINUED | OUTPATIENT
Start: 2023-02-17 | End: 2023-02-17 | Stop reason: SURG

## 2023-02-17 RX ORDER — LIDOCAINE HYDROCHLORIDE 20 MG/ML
INJECTION, SOLUTION INTRAVENOUS AS NEEDED
Status: DISCONTINUED | OUTPATIENT
Start: 2023-02-17 | End: 2023-02-17 | Stop reason: SURG

## 2023-02-17 RX ORDER — PANTOPRAZOLE SODIUM 40 MG/1
40 TABLET, DELAYED RELEASE ORAL
Status: DISCONTINUED | OUTPATIENT
Start: 2023-02-18 | End: 2023-02-18 | Stop reason: HOSPADM

## 2023-02-17 RX ADMIN — PROPOFOL 50 MG: 10 INJECTION, EMULSION INTRAVENOUS at 07:47

## 2023-02-17 RX ADMIN — CARVEDILOL 6.25 MG: 6.25 TABLET, FILM COATED ORAL at 06:01

## 2023-02-17 RX ADMIN — CARVEDILOL 6.25 MG: 6.25 TABLET, FILM COATED ORAL at 18:06

## 2023-02-17 RX ADMIN — SODIUM CHLORIDE 70 ML/HR: 9 INJECTION, SOLUTION INTRAVENOUS at 09:52

## 2023-02-17 RX ADMIN — SODIUM CHLORIDE 70 ML/HR: 9 INJECTION, SOLUTION INTRAVENOUS at 06:30

## 2023-02-17 RX ADMIN — FUROSEMIDE 80 MG: 100 INJECTION, SOLUTION INTRAMUSCULAR; INTRAVENOUS at 12:59

## 2023-02-17 RX ADMIN — PROPOFOL 50 MG: 10 INJECTION, EMULSION INTRAVENOUS at 07:33

## 2023-02-17 RX ADMIN — ROSUVASTATIN CALCIUM 40 MG: 20 TABLET, COATED ORAL at 21:09

## 2023-02-17 RX ADMIN — KETAMINE HYDROCHLORIDE 25 MG: 50 INJECTION, SOLUTION INTRAMUSCULAR; INTRAVENOUS at 07:33

## 2023-02-17 RX ADMIN — LIDOCAINE HYDROCHLORIDE 60 MG: 20 INJECTION, SOLUTION INTRAVENOUS at 07:33

## 2023-02-17 NOTE — PLAN OF CARE
Goal Outcome Evaluation:  Plan of Care Reviewed With: patient        Progress: no change  Outcome Evaluation: Pt seen for OT evaluation today.  Pt presents with weakness and decreased independence with self care tasks.  Pt able to sit eob with cga, stood with cga and walked 80' with cga.  Pt able to manage his toileting hygiene with set up.  Pt is expected to benefit from skilled OT to improve his strength, activity tolerance and independence with ADL tasks.

## 2023-02-17 NOTE — PLAN OF CARE
Goal Outcome Evaluation:      EGD today. Pill cam study complete, diet advanced. Pt ambulating to bathroom this shift. Discharge pending

## 2023-02-17 NOTE — CONSULTS
[facility] Consult Note      Patient Name: Travon Plummer  : 1945  MRN: [id number]  Primary Care Physician:  Chilango Erickson MD  Referring Physician: Arash Balbuena MD  Date of admission: 2023    Subjective     Reason for Consult:  CKD4    HPI:   Travon Plummer is a 78 y.o. male with h/o CKD stage 4 related to diabetic nephropathy, HTN, and dec nephron mass (RCC s/p left nephrectomy) who was directed to ER yesterday by us for worsening anemia.  Reports dark stool.  He has PAF but is not on anticoagulation (takes ASA 81).  Hgb only 5.6 on arrival.  FOBT+.  Cr stable 3.6 yesterday, 3.5 today, was 3.7 in 2022 and he saw Dr Sellers on 22 where dialysis planning further discussed (patient interested in PD).   Hgb up to 7.4 with transfusion.  No hypotension.  He is NPO on IV PPI BID.  EGD this morning showed no e/o UGIB, did have moderate ASA induced gastropathy.  Plan is for capsule endoscopy per Dr Pool (monitor in place).  Proteinuria quantified at 2.2 gm on prior spot ratio.  He c/o dyspnea today.  No swelling.  No n/v or dysuria.  He is incontinent of urine.    Review of Systems:   14 point review of systems is otherwise negative except for mentioned above on HPI    Personal History     Past Medical History:   Diagnosis Date   • Broken arm    • Cerebrovascular accident (HCC) 10/31/2008   • Coronary artery disease    • Dyspnea    • Full dentures 2021   • GERD (gastroesophageal reflux disease)    • Gout    • Hypercholesterolemia    • Hypertension 11/10/2015    History of hypertension; hypotensive at the time of office visit on 11/10/2015.   • Obesity    • Osteoarthritis    • Paroxysmal atrial fibrillation (HCC)     History of paroxysmal atrial fibrillation, data deficit.   • Prostate cancer (HCC) 2015    Prostate cancer, diagnosed 2015, status post radiation therapy x39 treatments, 2015 at [facility].   •  Renal cell carcinoma (HCC)     Renal cell carcinoma, diagnosed March of 2015, status post left nephrectomy.  Elevated creatinine of 2.1 on labs performed 11/04/2015.       Past Surgical History:   Procedure Laterality Date   • CAROTID STENT Left 10/31/2008    PTA/left carotid artery stent, 10/31/2008.    • COLONOSCOPY N/A 12/23/2021    Procedure: COLONOSCOPY, polypectomy, clip placement x 1;  Surgeon: Deandra Do MD;  Location: [facility] ENDOSCOPY;  Service: Gastroenterology;  Laterality: N/A;   • COLONOSCOPY W/ POLYPECTOMY     • CORONARY ANGIOPLASTY WITH STENT PLACEMENT      A 3.5 x 13 mm. Cypher ESTIVEN to RCA expanded with 4 mm balloon.    • ENDOSCOPY N/A 12/22/2021    Procedure: ESOPHAGOGASTRODUODENOSCOPY with biopsy;  Surgeon: Deandra Do MD;  Location: [facility] ENDOSCOPY;  Service: Gastroenterology;  Laterality: N/A;   • INGUINAL HERNIA REPAIR     • NEPHRECTOMY Left 03/19/2015    diagnosed January 2015, status post left radical nephrectomy.    • PROSTATE FIDUCIAL MARKER PLACEMENT  2016    received XRT for prostate CA in 2016       Family History: family history includes No Known Problems in his father and mother.    Social History:  reports that he quit smoking about 15 years ago. His smoking use included cigarettes. His smokeless tobacco use includes chew. He reports that he does not drink alcohol and does not use drugs.    Home Medications:  Prior to Admission medications    Medication Sig Start Date End Date Taking? Authorizing Provider   amLODIPine (NORVASC) 10 MG tablet TAKE ONE TABLET BY MOUTH EVERY DAY 10/3/22  Yes Laura Trinidad PA-C   aspirin (aspirin) 81 MG EC tablet Take 1 tablet by mouth Daily. 12/27/21  Yes Adonay Brock MD   carvedilol (COREG) 6.25 MG tablet Take 1 tablet by mouth 2 (Two) Times a Day With Meals. 7/26/22  Yes Leonard Ashford MD   Cholecalciferol (Vitamin D) 50 MCG (2000 UT) capsule Take 2,000 Units by mouth Daily.   Yes Provider, MD Karin   ferrous  sulfate 325 (65 FE) MG tablet Take 325 mg by mouth Daily With Breakfast.   Yes Karin Vale MD   levothyroxine (SYNTHROID, LEVOTHROID) 25 MCG tablet Take 25 mcg by mouth Daily. 3/2/17  Yes Karin Vale MD   pantoprazole (PROTONIX) 40 MG EC tablet TAKE 1 TABLET BY MOUTH ONCE DAILY 1/24/17  Yes Chilango Erickson MD   rosuvastatin (CRESTOR) 40 MG tablet Take 1 tablet by mouth Every Night. 8/3/22  Yes Leonard Ashford MD   sodium bicarbonate 650 MG tablet Take 650 mg by mouth 2 (Two) Times a Day.   Yes Karin Vale MD   vitamin B-12 (CYANOCOBALAMIN) 100 MCG tablet Take 100 mcg by mouth Daily.   Yes Karin Vale MD   cefuroxime (CEFTIN) 250 MG tablet Take 250 mg by mouth Every 12 (Twelve) Hours. 2/10/23   Karin Vale MD       Allergies:  Allergies   Allergen Reactions   • Allopurinol Urinary Retention   • Lipitor [Atorvastatin] Myalgia       Objective     Vitals:   Temp:  [97.5 °F (36.4 °C)-98.7 °F (37.1 °C)] 97.5 °F (36.4 °C)  Heart Rate:  [50-77] 64  Resp:  [15-20] 17  BP: (106-147)/(52-78) 143/78  Flow (L/min):  [2] 2    Intake/Output Summary (Last 24 hours) at 2/17/2023 1129  Last data filed at 2/17/2023 0900  Gross per 24 hour   Intake 940 ml   Output 400 ml   Net 540 ml       Physical Exam:    General Appearance: pleasant frail WM comfortable/alert  Skin: warm and dry  HEENT: oral mucosa normal, nonicteric sclera  Neck: supple, no JVD  Lungs: CTA bilat no rales  Heart: RRR, normal S1 and S2  Abdomen: soft, nontender, nondistended  : no palpable bladder  Extremities: no edema, cyanosis or clubbing  Neuro: normal speech and mental status     Scheduled Meds:     amLODIPine, 10 mg, Oral, Daily  carvedilol, 6.25 mg, Oral, BID With Meals  levothyroxine, 25 mcg, Oral, Daily  pantoprazole, 40 mg, Intravenous, BID AC  polyethylene glycol, 17 g, Oral, Daily  rosuvastatin, 40 mg, Oral, Nightly      IV Meds:   sodium chloride, 70 mL/hr, Last Rate: 70 mL/hr (02/17/23  0952)        Results Reviewed:   I have personally reviewed the results from the time of this admission to 2/17/2023 11:29 EST     Lab Results   Component Value Date    GLUCOSE 95 02/17/2023    CALCIUM 8.9 02/17/2023     02/17/2023    K 4.1 02/17/2023    CO2 21.6 (L) 02/17/2023     (H) 02/17/2023    BUN 36 (H) 02/17/2023    CREATININE 3.49 (H) 02/17/2023    EGFRIFAFRI 23 (L) 02/21/2022    EGFRIFNONA 19 (L) 02/21/2022    BCR 10.3 02/17/2023    ANIONGAP 10.4 02/17/2023      Lab Results   Component Value Date    MG 1.8 02/16/2023    PHOS 3.2 02/16/2023    ALBUMIN 3.4 (L) 02/16/2023           Assessment / Plan     ASSESSMENT:  1. 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  Cr stable thus far 3.5 mg/dL, c/w BL.  No uremic sx.  K normal 4.1, Na generous 144, bicarb is 21 with normal AG (and takes sodium bicarb at home).  May have mild vol excess related to transfusion to explain dyspnea today.  CXR yesterday (before PRBCs) w/o central jossy  2. GIB - EGD unrevealing; capsule endo in progress; on PPI drip   3. Anemia of ABL - hgb 5.7 -> 7.4 with transfusion  4. HTN - BP stable in assoc with GIB, on norvasc, coreg  5. Hx RCC s/p left nephrectomy  6. Dyslipidemia on statin  7. Hypothyroidism on synthroid     PLAN:  Lasix 80mg IV x1   Condom catheter due to incontinence; d/w RN  Hold sodium bicarb tabs for now     Thank you Dr Pool for involving us in the care of Travon Plummer.  Please feel free to call with any questions.    Raul Mercado MD  02/17/23  11:29 EST    [facility]  [phone]

## 2023-02-17 NOTE — PROGRESS NOTES
Patient presented to [facility] ER on 2/16/2023 for complaints of dark stools and low hemoglobin.  He was subsequently admitted to hospital and Dr. Pool was consulted. Dr. Pool performed upper endoscopy this morning and no current bleeding was found. He ordered a pill cam endoscopy to be completed while patient is in hospital. Myself and Myrna (MA student) went to the PACU and discussed procedure with patient. Due to patient still being under sedation we called and spoke with his wife Miley over the phone. Explained procedure in full detail and she gave permission for patient to have procedure performed. Patient was hooked to sensor belt monitor that was paired to capsule. He swallowed capsule without difficulty at 0836. Instructions given to PACU nurse for when patient returns to floor. He can start having clear liquids again around 1045 and light snack at 1245. Will go and check on patient through the day and will  equipment this evening.    Checked on patient at 1110. His nurse and tech were both at bedside. He was up drinking his first sips of clear liquids. Belt monitor was still intact and working properly. No complaints of abdominal pain, nausea or vomiting. Will  equipment this evening     Picked up belt monitor. Patient did not have complaints of nausea, vomiting or abdominal pain.

## 2023-02-17 NOTE — THERAPY EVALUATION
Patient Name: Travon Plummer  : 1945    MRN: [id number]                              Today's Date: 2023       Admit Date: 2023    Visit Dx:     ICD-10-CM ICD-9-CM   1. Upper GI bleed  K92.2 578.9   2. Melena  K92.1 578.1   3. Symptomatic anemia  D64.9 285.9     Patient Active Problem List   Diagnosis   • Coronary artery disease   • Dyspnea   • Cerebrovascular accident (HCC)   • Essential hypertension   • Hypercholesterolemia   • Tobacco abuse   • Gout   • Osteoarthritis   • GERD (gastroesophageal reflux disease)   • Obesity   • Prostate cancer (HCC)   • Renal cell carcinoma (HCC)   • Nuclear sclerotic cataract of right eye   • Symptomatic anemia   • Iron deficiency anemia due to chronic blood loss   • Melena   • Chronic kidney disease, stage IV (severe) (HCC)   • Upper GI bleed     Past Medical History:   Diagnosis Date   • Broken arm    • Cerebrovascular accident (HCC) 10/31/2008   • Coronary artery disease    • Dyspnea    • Full dentures 2021   • GERD (gastroesophageal reflux disease)    • Gout    • Hypercholesterolemia    • Hypertension 11/10/2015    History of hypertension; hypotensive at the time of office visit on 11/10/2015.   • Impaired mobility    • Obesity    • Osteoarthritis    • Paroxysmal atrial fibrillation (HCC)     History of paroxysmal atrial fibrillation, data deficit.   • Prostate cancer (HCC) 2015    Prostate cancer, diagnosed 2015, status post radiation therapy x39 treatments, 2015 at [facility].   • Renal cell carcinoma (HCC)     Renal cell carcinoma, diagnosed 2015, status post left nephrectomy.  Elevated creatinine of 2.1 on labs performed 2015.     Past Surgical History:   Procedure Laterality Date   • CAROTID STENT Left 10/31/2008    PTA/left carotid artery stent, 10/31/2008.    • COLONOSCOPY N/A 2021    Procedure: COLONOSCOPY, polypectomy, clip placement x 1;  Surgeon: Deandra Do MD;  Location: [facility]  ENDOSCOPY;  Service: Gastroenterology;  Laterality: N/A;   • COLONOSCOPY W/ POLYPECTOMY     • CORONARY ANGIOPLASTY WITH STENT PLACEMENT      A 3.5 x 13 mm. Cypher ESTIVEN to RCA expanded with 4 mm balloon.    • ENDOSCOPY N/A 12/22/2021    Procedure: ESOPHAGOGASTRODUODENOSCOPY with biopsy;  Surgeon: Deandra Do MD;  Location: [facility] ENDOSCOPY;  Service: Gastroenterology;  Laterality: N/A;   • INGUINAL HERNIA REPAIR     • NEPHRECTOMY Left 03/19/2015    diagnosed January 2015, status post left radical nephrectomy.    • PROSTATE FIDUCIAL MARKER PLACEMENT  2016    received XRT for prostate CA in 2016      General Information     Row Name 02/17/23 1532          OT Time and Intention    Document Type evaluation  -     Mode of Treatment occupational therapy  -     Row Name 02/17/23 1532          General Information    Patient Profile Reviewed yes  -     Prior Level of Function independent:;ADL's;driving;community mobility  -     Existing Precautions/Restrictions fall  -     Row Name 02/17/23 1532          Occupational Profile    Reason for Services/Referral (Occupational Profile) ADL decline  -     Row Name 02/17/23 1532          Living Environment    People in Home spouse  -     Row Name 02/17/23 1532          Home Main Entrance    Number of Stairs, Main Entrance one  -     Row Name 02/17/23 1532          Stairs Within Home, Primary    Number of Stairs, Within Home, Primary none  -     Row Name 02/17/23 1532          Cognition    Orientation Status (Cognition) oriented x 4  -     Row Name 02/17/23 1532          Safety Issues, Functional Mobility    Safety Issues Affecting Function (Mobility) safety precaution awareness;safety precautions follow-through/compliance  -     Comment, Safety Issues/Impairments (Mobility) pt c/o dizziness with position changes  -           User Key  (r) = Recorded By, (t) = Taken By, (c) = Cosigned By    Initials Name Provider Type     Sydnie Reveles  Therapist                 Mobility/ADL's     [facility] Name 02/17/23 1534          Bed Mobility    Rolling Right [place] (Bed Mobility) modified independence  -     Supine-Sit [place] (Bed Mobility) contact [facility]     Bed Mobility, Safety Issues decreased use of arms for pushing/pulling;decreased use of legs for bridging/pushing  -AH     Row Name 02/17/23 1534          Transfers    Transfers sit-stand transfer;toilet transfer  -AH     Row Name 02/17/23 1534          Sit-Stand Transfer    Sit-Stand [place] (Transfers) contact guard  -AH     Assistive Device (Sit-Stand Transfers) other (see comments)  gait belt  -[facility] Name 02/17/23 1534          Toilet Transfer    Type (Toilet Transfer) sit-stand;stand-sit  -     [place] Level (Toilet Transfer) contact guard  -[facility] Name 02/17/23 1534          Functional Mobility    Functional Mobility- Ind. Level contact guard assist  [facility]     Functional Mobility- Device other (see comments)  gait belt  -     Functional Mobility-Distance (Feet) 80  -[facility] Name 02/17/23 1534          Activities of Daily Living    BADL Assessment/Intervention bathing;upper body dressing;lower body dressing;grooming;feeding;toileting  -AH     Row Name 02/17/23 1534          Bathing Assessment/Intervention    [place] Level (Bathing) contact guard assist  -[facility] Name 02/17/23 1534          Upper Body Dressing Assessment/Training    [place] Level (Upper Body Dressing) independent  -[facility] Name 02/17/23 1534          Lower Body Dressing Assessment/Training    [place] Level (Lower Body Dressing) contact guard assist  -[facility] Name 02/17/23 1534          Grooming Assessment/Training    [place] Level (Grooming) set up  -[facility] Name 02/17/23 1534          Self-Feeding Assessment/Training    [place] Level (Feeding) independent  -AH     Row Name 02/17/23 1534          Toileting Assessment/Training    [place] Level (Toileting)  standby assist  -           User Key  (r) = Recorded By, (t) = Taken By, (c) = Cosigned By    Initials Name Provider Type    Sydnie Rodriguez Occupational Therapist               Obj/Interventions     Row Name 02/17/23 1536          Sensory Assessment (Somatosensory)    Sensory Assessment (Somatosensory) sensation intact  -AH     Row Name 02/17/23 1536          Vision Assessment/Intervention    Visual Impairment/Limitations WFL  -AH     Row Name 02/17/23 1536          Range of Motion Comprehensive    General Range of Motion bilateral upper extremity ROM L  -AH     Row Name 02/17/23 1536          Strength Comprehensive (MMT)    Comment, General Manual Muscle Testing (MMT) Assessment [facility]           User Key  (r) = Recorded By, (t) = Taken By, (c) = Cosigned By    Initials Name Provider Type    Sydnie Rodriguez Occupational Therapist               Goals/Plan     Row Name 02/17/23 1542          Bed Mobility Goal 1 (OT)    Activity/Assistive Device (Bed Mobility Goal 1, OT) bed mobility activities, all  -     [place] Level/Cues Needed (Bed Mobility Goal 1, OT) supervision required  -AH     Time Frame (Bed Mobility Goal 1, OT) by discharge  -     Progress/Outcomes (Bed Mobility Goal 1, OT) goal ongoing  -AH     Row Name 02/17/23 1542          Transfer Goal 1 (OT)    Activity/Assistive Device (Transfer Goal 1, OT) sit-to-stand/stand-to-sit  -     [place] Level/Cues Needed (Transfer Goal 1, OT) standby assist  -     Time Frame (Transfer Goal 1, OT) by discharge  -     Progress/Outcome (Transfer Goal 1, OT) goal ongoing  -AH     Row Name 02/17/23 1542          Dressing Goal 1 (OT)    Activity/Device (Dressing Goal 1, OT) lower body dressing  -     [place]/Cues Needed (Dressing Goal 1, OT) supervision required  -     Time Frame (Dressing Goal 1, OT) long term goal (LTG);1 week  -     Progress/Outcome (Dressing Goal 1, OT) goal ongoing  -AH     Row Name 02/17/23 1542           Strength Goal 1 (OT)    Strength Goal 1 (OT) Pt will perform UB strengthening ex using theraband for resistance.  -     Time Frame (Strength Goal 1, OT) by discharge  -     Progress/Outcome (Strength Goal 1, OT) goal ongoing  -[facility] Name 02/17/23 1542          Therapy Assessment/Plan (OT)    Planned Therapy Interventions (OT) activity tolerance training;BADL retraining;patient/caregiver education/training;transfer/mobility retraining;strengthening exercise  -           User Key  (r) = Recorded By, (t) = Taken By, (c) = Cosigned By    Initials Name Provider Type     Sydnie Reveles Occupational Therapist               Clinical Impression     [facility] Name 02/17/23 1536          Pain Assessment    Pretreatment Pain Rating 0/10 - no pain  -     Posttreatment Pain Rating 0/10 - no pain  -     Pain Intervention(s) Repositioned;Ambulation/increased activity  -[facility] Name 02/17/23 1536          Plan of Care Review    Plan of Care Reviewed With patient  -     Progress no change  -     Outcome Evaluation Pt seen for OT evaluation today.  Pt presents with weakness and decreased independence with self care tasks.  Pt able to sit eob with cga, stood with cga and walked 80' with cga.  Pt able to manage his toileting hygiene with set up.  Pt is expected to benefit from skilled OT to improve his strength, activity tolerance and independence with ADL tasks.  -[facility] Name 02/17/23 1536          Therapy Assessment/Plan (OT)    Patient/Family Therapy Goal Statement (OT) d/c home  -     Rehab Potential (OT) good, to achieve stated therapy goals  -     Criteria for Skilled Therapeutic Interventions Met (OT) yes;skilled treatment is necessary  -     Therapy Frequency (OT) 3 times/wk  -[facility] Name 02/17/23 1536          Therapy Plan Review/Discharge Plan (OT)    Anticipated Discharge Disposition (OT) home;home with home health  -[facility] Name 02/17/23 6006          Positioning and Restraints     Pre-Treatment Position in bed  -     Post Treatment Position bathroom  -     Bathroom sitting;call light within reach;encouraged to call for assist;notified [facility]  -           User Key  (r) = Recorded By, (t) = Taken By, (c) = Cosigned By    Initials Name Provider Type    Sydnie Rodriguez Occupational Therapist               Outcome Measures     Row Name 02/17/23 1543          How much help from another is currently needed...    Putting on and taking off regular lower body clothing? 3  -AH     Bathing (including washing, rinsing, and drying) 3  -AH     Toileting (which includes using toilet bed pan or urinal) 3  -AH     Putting on and taking off regular upper body clothing 4  -AH     Taking care of personal grooming (such as brushing teeth) 4  -AH     Eating meals 4  -     AM-PAC 6 Clicks Score (OT) 21  -AH     Row Name 02/17/23 1518 02/17/23 1000       How much help from another person do you currently need...    Turning from your back to your side while in flat bed without using bedrails? 3  -MS (r) LG (t) MS (c) 3  -CS    Moving from lying on back to sitting on the side of a flat bed without bedrails? 3  -MS (r) LG (t) MS (c) 3  -CS    Moving to and from a bed to a chair (including a wheelchair)? 4  -MS (r) LG (t) MS (c) 3  -CS    Standing up from a chair using your arms (e.g., wheelchair, bedside chair)? 4  -MS (r) LG (t) MS (c) 3  -CS    Climbing 3-5 steps with a railing? 3  -MS (r) LG (t) MS (c) 3  -CS    To walk in hospital room? 3  -MS (r) LG (t) MS (c) 3  -CS    AM-PAC 6 Clicks Score (PT) 20  -MS (r) LG (t) 18  -CS    Highest level of mobility 6 --> Walked 10 steps or more  -MS (r) LG (t) 6 --> Walked 10 steps or more  -CS    Row Name 02/17/23 1543          Functional Assessment    Outcome Measure Options AM-PAC 6 Clicks Daily Activity (OT)  -           User Key  (r) = Recorded By, (t) = Taken By, (c) = Cosigned By    Initials Name Provider Type    Sydnie Rodriguez Occupational Therapist    MS  Jenaro Seay, PT Physical Therapist    Carrol Navarro LPN Licensed Nurse    LG Sherry Maldonado, PT Student PT Student                Occupational Therapy Education     Title: PT OT SLP Therapies (In Progress)     Topic: Occupational Therapy (In Progress)     Point: ADL training (Done)     Description:   Instruct learner(s) on proper safety adaptation and remediation techniques during self care or transfers.   Instruct in proper use of assistive devices.              Learning Progress Summary           Patient Acceptance, E,TB, VU by  at 2/17/2023 1947    Comment: Role of OT/POC                   Point: Home exercise program (Not Started)     Description:   Instruct learner(s) on appropriate technique for monitoring, assisting and/or progressing therapeutic exercises/activities.              Learner Progress:  Not documented in this visit.          Point: Precautions (Not Started)     Description:   Instruct learner(s) on prescribed precautions during self-care and functional transfers.              Learner Progress:  Not documented in this visit.          Point: Body mechanics (Not Started)     Description:   Instruct learner(s) on proper positioning and spine alignment during self-care, functional mobility activities and/or exercises.              Learner Progress:  Not documented in this visit.                      User Key     Initials Effective Dates Name Provider Type Discipline     06/16/21 -  Sydnie Reveles Occupational Therapist OT              OT Recommendation and Plan  Planned Therapy Interventions (OT): activity tolerance training, BADL retraining, patient/caregiver education/training, transfer/mobility retraining, strengthening exercise  Therapy Frequency (OT): 3 times/wk  Plan of Care Review  Plan of Care Reviewed With: patient  Progress: no change  Outcome Evaluation: Pt seen for OT evaluation today.  Pt presents with weakness and decreased independence with self care tasks.  Pt able  to sit eob with cga, stood with cga and walked 80' with cga.  Pt able to manage his toileting hygiene with set up.  Pt is expected to benefit from skilled OT to improve his strength, activity tolerance and independence with ADL tasks.     Time Calculation:    Time Calculation- OT     Row Name 02/17/23 1546             Time Calculation- OT    OT Start Time 1413  -AH      OT Received On 02/17/23  -      OT Goal Re-Cert Due Date 02/27/23  -         Untimed Charges    OT Eval/Re-eval Minutes 40  -AH         Total Minutes    Untimed Charges Total Minutes 40  -AH       Total Minutes 40  -AH            User Key  (r) = Recorded By, (t) = Taken By, (c) = Cosigned By    Initials Name Provider Type    Sydnie Rodriguez Occupational Therapist              Therapy Charges for Today     Code Description Service Date Service Provider Modifiers Qty    [id number]  OT EVAL LOW COMPLEXITY 3 2/17/2023 Sydnie Reveles GO 1               Sydnie Reveles  2/17/2023

## 2023-02-17 NOTE — PLAN OF CARE
Goal Outcome Evaluation:    Pt prepped for EGD this morning. All needs met at this time.          Progress: (P) improving

## 2023-02-17 NOTE — PROGRESS NOTES
[facility]    Name:  Travon Plummer   Age:  78 y.o.  Sex:  male  :  1945  MRN:  [id number]   Visit Number:  [id number]  Admission Date:  2023  Date Of Service:  23  Primary Care Physician:  Chilango Erickson MD     LOS: 1 day :    Chief Complaint:      Abnormal labs, dark stool    Subjective:    Patient was seen and examined today at bedside.  Patient status post EGD by Dr. Alanis earlier this morning which did not show any signs of upper GI bleed.  Rectal exam with no melena or bright red blood per Dr. Alanis.  PillCam study ordered.  Patient laying comfortably in bed with no distress.  Patient reports that he is frustrated because he does not know yet where is he bleeding, hoping for the PillCam to show if any bleeding.  Patient otherwise denies any complaints today.  His vitals are stable and is afebrile.  Hemoglobin stable at 7.4 today was transfusions.    Hospital Course:    Patient is 78 years old male with past medical history of CVA, CAD, hypertension, hyperlipidemia, paroxysmal atrial fibrillation not on anticoagulation, prostate cancer, renal cell carcinoma status post left nephrectomy, CKD who presented to the ER after he was referred from nephrology due to abnormal labs and low hemoglobin.  Patient reports that he was following up with Dr. Sellers who he sees for his CKD and had blood work done and showed very low hemoglobin and was told to come to the ER.  Patient otherwise asymptomatic and denies any pain or discomfort.  Patient reports that his been having very dark stool over the past 2 to 3 weeks which she thought it is due to the iron pills he takes daily.  Patient with no chest pain, shortness of breath, abdominal pain, nausea, vomiting, diarrhea or constipation.  Patient is not on anticoagulation but on aspirin 81 mg.  Patient denies any recent excessive use of NSAIDs.     Upon ER evaluation, patient was  hemodynamically stable and was afebrile.  Labs were significant for troponin of 53, proBNP 3689, glucose 137, creatinine 3.64 (baseline creatinine around 3), BUN 38, lipase 74, hemoglobin 5.6, hematocrit 18.6, platelets WNL.  Fecal occult positive.  Chest x-ray with stable chest and clear lungs.  Dr. Alanis with GI consulted and recommended PPI and admission with n.p.o. after midnight.  Hospitalist consulted for admission, further evaluation and treatment.    Review of Systems:     All systems were reviewed and negative except as mentioned in subjective, assessment and plan.    Vital Signs:    Temp:  [97.5 °F (36.4 °C)-98.7 °F (37.1 °C)] 97.5 °F (36.4 °C)  Heart Rate:  [50-77] 64  Resp:  [15-20] 17  BP: (106-147)/(52-78) 143/78    Intake and output:    I/O last 3 completed shifts:  In: 600 [Blood:600]  Out: 400 [Urine:400]  I/O this shift:  In: 340 [P.O.:240; I.V.:100]  Out: -     Physical Examination:    General Appearance:  Alert and cooperative.  No acute distress.   Head:  Atraumatic and normocephalic.   Eyes: Conjunctivae and sclerae normal, no icterus.  Mild pallor.   Throat: No oral lesions, no thrush, oral mucosa moist.   Neck: Supple, trachea midline, no thyromegaly.   Lungs:   Breath sounds heard bilaterally equally.  No wheezing or crackles. No Pleural rub or bronchial breathing.   Heart:  Normal S1 and S2, no murmur, no gallop, no rub. No JVD.   Abdomen:   Normal bowel sounds, no masses, no organomegaly. Soft, nontender, nondistended, no rebound tenderness.   Extremities: Supple, no edema, no cyanosis, no clubbing.   Skin: No bleeding or rash.   Neurologic: Alert and oriented x 3. No facial asymmetry. Moves all four limbs. No tremors.      Laboratory results:    Results from last 7 days   Lab Units 02/17/23  0430 02/16/23  1357   SODIUM mmol/L 144 141   POTASSIUM mmol/L 4.1 4.0   CHLORIDE mmol/L 112* 109*   CO2 mmol/L 21.6* 21.7*   BUN mg/dL 36* 38*   CREATININE mg/dL 3.49* 3.64*   CALCIUM mg/dL 8.9  8.6   BILIRUBIN mg/dL  --  0.2   ALK PHOS U/L  --  81   ALT (SGPT) U/L  --  7   AST (SGOT) U/L  --  11   GLUCOSE mg/dL 95 137*     Results from last 7 days   Lab Units 02/17/23  0430 02/16/23  2154 02/16/23  1357   WBC 10*3/mm3 9.52  --  9.69   HEMOGLOBIN g/dL 7.4* 6.6* 5.6*   HEMATOCRIT % 23.8*  --  18.6*   PLATELETS 10*3/mm3 154  --  160     Results from last 7 days   Lab Units 02/17/23  0430   INR  1.07     Results from last 7 days   Lab Units 02/16/23  1726 02/16/23  1357   HSTROP T ng/L 51* 53*         No results for input(s): PHART, FHV9KGI, PO2ART, JZC4YDA, BASEEXCESS in the last 8760 hours.   I have reviewed the patient's laboratory results.    Radiology results:    XR Chest 1 View    Result Date: 2/16/2023  PROCEDURE: XR CHEST 1 VW-  HISTORY: chest pain  COMPARISON: 12/21/2021.  FINDINGS: The heart is normal in size. The lungs are clear. The mediastinum is unremarkable. There is no pneumothorax.  There are no acute osseous abnormalities. Tortuosity of the thoracic aorta identified. Apical lordotic positioning noted.      Impression: Stable chest..    This report was signed and finalized on 2/16/2023 3:11 PM by Alice Castillo MD.    I have reviewed the patient's radiology reports.    Medication Review:     I have reviewed the patient's active and prn medications.     Problem List:      Upper GI bleed    Symptomatic anemia    Melena      Assessment:    Melena, POA  Anemia due to GI bleed, POA   Elevated troponin, likely demand ischemia, POA  Chronic kidney disease status post left nephrectomy  Chronic CVA, CAD, hypertension, hyperlipidemia, paroxysmal atrial fibrillation not on anticoagulation, prostate cancer        Plan:    Melena  Anemia due to GI bleed  -Dr. Alanis consulted, appreciate his recommendations.  -S/p EGD by Dr. Alanis on 2/17 which showed possible ASA gastropathy did not show any signs of upper GI bleed.  Biopsies obtained.  -Rectal exam with no melena or bright red blood per   Annabelle.  -Recommended PillCam study.  -PPI p.o.  -Hold aspirin  -Posttransfusion of 2 units of PRBC  -continue to monitor hemoglobin, transfuse as indicated to keep hemoglobin above 7.     Elevated troponin  -Likely demand ischemia in the settings of CKD and anemia  -Troponin trended down appropriately.  -Patient denies any shortness of breath or chest pain, low suspicion for ACS.     Chronic kidney disease  -Avoid nephrotoxic drugs  -Consulted nephrology, appreciate recommendations.  -Given Lasix 80 mg IV x1.    Further orders as indicated per clinical course.     DVT Prophylaxis: SCDs, avoid chemoprophylaxis due to anemia and melena  Code Status: Full  Diet:  Cardiac/renal after PillCam study.  Discharge Plan: Likely home in 1 to 2 days.    Arash Balbuena MD  02/17/23  11:31 EST    Dictated utilizing Dragon dictation.

## 2023-02-17 NOTE — ANESTHESIA POSTPROCEDURE EVALUATION
Patient: Travon Plummer    Procedure Summary     Date: 02/17/23 Room / Location: [facility] ENDOSCOPY 2 / [facility] ENDOSCOPY    Anesthesia Start: 0735 Anesthesia Stop: 0759    Procedure: ESOPHAGOGASTRODUODENOSCOPY with biopsy (Esophagus) Diagnosis:       Melena      Symptomatic anemia      (Melena [K92.1])      (Symptomatic anemia [D64.9])    Surgeons: Georgina Pool MD Provider: Tony Johnson CRNA    Anesthesia Type: MAC ASA Status: 3          Anesthesia Type: MAC    Vitals  Vitals Value Taken Time   /62 02/17/23 0756   Temp     Pulse 54 02/17/23 0759   Resp     SpO2 96 % 02/17/23 0759   Vitals shown include unvalidated device data.        Post Anesthesia Care and Evaluation    Patient location during evaluation: bedside  Patient participation: complete - patient participated  Level of consciousness: awake  Pain score: 0  Pain management: adequate    Airway patency: patent  Anesthetic complications: No anesthetic complications  PONV Status: controlled  Cardiovascular status: acceptable and stable  Respiratory status: acceptable and room air  Hydration status: acceptable    Comments: Vital signs as noted in nursing documentation as per protocol

## 2023-02-17 NOTE — CASE MANAGEMENT/SOCIAL WORK
Discharge Planning Assessment  [facility]     Patient Name: Travon Plummer  MRN: [id number]  Today's Date: 2/17/2023    Admit Date: 2/16/2023    Plan: DCP   Discharge Needs Assessment     Row Name 02/17/23 1358       Living Environment    People in Home spouse    Current Living Arrangements home    Potentially Unsafe Housing Conditions none    Primary Care Provided by self    Provides Primary Care For spouse    Family Caregiver if Needed none    Quality of Family Relationships unable to assess    Able to Return to Prior Arrangements yes       Resource/Environmental Concerns    Resource/Environmental Concerns none    Transportation Concerns none       Transition Planning    Patient/Family Anticipates Transition to home    Patient/Family Anticipated Services at Transition none    Transportation Anticipated family or friend will provide       Discharge Needs Assessment    Readmission Within the Last 30 Days no previous admission in last 30 days    Equipment Currently Used at Home walker, rolling    Concerns to be Addressed discharge planning    Anticipated Changes Related to Illness none    Equipment Needed After Discharge other (see comments)  TBD    Discharge Facility/Level of Care Needs other (see comments)  TBD               Discharge Plan     Row Name 02/17/23 5071       Plan    Plan DCP    Patient/Family in Agreement with Plan yes    Plan Comments SW met with pt at bedside for discharge planning. Pt lives in [place] with spouse. Pt reports being independent with ADL's and still drives. Pt states he helps take care of his wife. Pt has a walker that he uses only as needed. Pt does not use home O2. Pt denied any STR or HH services. Pt states family can provide transportation. Goal is to return home. Pt denied the need for any DME. Pt is current with his PCP and confirmed medical insurance. SW/CM will continue to follow for d/c needs.    Final Discharge Disposition Code 30 - still a patient               Continued Care and Services - Admitted Since 2/16/2023    Coordination has not been started for this encounter.          Demographic Summary     Row Name 02/17/23 1359       General Information    Admission Type observation    Arrived From home    Required Notices Provided Observation Status Notice    Referral Source admission list    Reason for Consult discharge planning    Preferred Language English               Functional Status     Row Name 02/17/23 135       Functional Status, IADL    Medications independent    Meal Preparation independent    Housekeeping independent    Laundry independent    Shopping independent       Mental Status Summary    Recent Changes in Mental Status/Cognitive Functioning unable to assess       Employment/    Employment Status retired               Psychosocial     Row Name 02/17/23 6262       Developmental Stage (Eriksson's)    Developmental Stage Stage 8 (65 years-death/Late Adulthood) Integrity vs. Despair               Abuse/Neglect    No documentation.                Legal    No documentation.                Substance Abuse    No documentation.                Patient Forms    No documentation.                   JULIO Ashley

## 2023-02-17 NOTE — THERAPY EVALUATION
Patient Name: Travon Plummer  : 1945    MRN: [id number]                              Today's Date: 2023       Admit Date: 2023    Visit Dx:     ICD-10-CM ICD-9-CM   1. Upper GI bleed  K92.2 578.9   2. Melena  K92.1 578.1   3. Symptomatic anemia  D64.9 285.9     Patient Active Problem List   Diagnosis   • Coronary artery disease   • Dyspnea   • Cerebrovascular accident (HCC)   • Essential hypertension   • Hypercholesterolemia   • Tobacco abuse   • Gout   • Osteoarthritis   • GERD (gastroesophageal reflux disease)   • Obesity   • Prostate cancer (HCC)   • Renal cell carcinoma (HCC)   • Nuclear sclerotic cataract of right eye   • Symptomatic anemia   • Iron deficiency anemia due to chronic blood loss   • Melena   • Chronic kidney disease, stage IV (severe) (HCC)   • Upper GI bleed     Past Medical History:   Diagnosis Date   • Broken arm    • Cerebrovascular accident (HCC) 10/31/2008   • Coronary artery disease    • Dyspnea    • Full dentures 2021   • GERD (gastroesophageal reflux disease)    • Gout    • Hypercholesterolemia    • Hypertension 11/10/2015    History of hypertension; hypotensive at the time of office visit on 11/10/2015.   • Impaired mobility    • Obesity    • Osteoarthritis    • Paroxysmal atrial fibrillation (HCC)     History of paroxysmal atrial fibrillation, data deficit.   • Prostate cancer (HCC) 2015    Prostate cancer, diagnosed 2015, status post radiation therapy x39 treatments, 2015 at [facility].   • Renal cell carcinoma (HCC)     Renal cell carcinoma, diagnosed 2015, status post left nephrectomy.  Elevated creatinine of 2.1 on labs performed 2015.     Past Surgical History:   Procedure Laterality Date   • CAROTID STENT Left 10/31/2008    PTA/left carotid artery stent, 10/31/2008.    • COLONOSCOPY N/A 2021    Procedure: COLONOSCOPY, polypectomy, clip placement x 1;  Surgeon: Deandra Do MD;  Location: [facility]  ENDOSCOPY;  Service: Gastroenterology;  Laterality: N/A;   • COLONOSCOPY W/ POLYPECTOMY     • CORONARY ANGIOPLASTY WITH STENT PLACEMENT      A 3.5 x 13 mm. Cypher ESTIVEN to RCA expanded with 4 mm balloon.    • ENDOSCOPY N/A 12/22/2021    Procedure: ESOPHAGOGASTRODUODENOSCOPY with biopsy;  Surgeon: Deandra Do MD;  Location: [facility] ENDOSCOPY;  Service: Gastroenterology;  Laterality: N/A;   • INGUINAL HERNIA REPAIR     • NEPHRECTOMY Left 03/19/2015    diagnosed January 2015, status post left radical nephrectomy.    • PROSTATE FIDUCIAL MARKER PLACEMENT  2016    received XRT for prostate CA in 2016      General Information     Row Name 02/17/23 1459          Physical Therapy Time and Intention    Mode of Treatment physical therapy (P)   -     Row Name 02/17/23 1459          General Information    Patient Profile Reviewed yes (P)   -LG     Prior Level of Function independent:;all household mobility;community mobility;ADL's;driving (P)   -LG     Row Name 02/17/23 1459          Living Environment    People in Home spouse (P)   -LG     Row Name 02/17/23 1459          Home Main Entrance    Number of Stairs, Main Entrance one (P)   -LG     Stair Railings, Main Entrance none (P)   -LG     Row Name 02/17/23 1459          Stairs Within Home, Primary    Number of Stairs, Within Home, Primary none (P)   -LG     Row Name 02/17/23 1459          Cognition    Orientation Status (Cognition) oriented x 4 (P)   -     Row Name 02/17/23 1459          Safety Issues, Functional Mobility    Safety Issues Affecting Function (Mobility) safety precautions follow-through/compliance (P)   -LG     Impairments Affecting Function (Mobility) endurance/activity tolerance;postural/trunk control;balance (P)   -LG           User Key  (r) = Recorded By, (t) = Taken By, (c) = Cosigned By    Initials Name Provider Type    LG Sherry Maldonado, PT Student PT Student               Mobility     Row Name 02/17/23 1506          Bed Mobility    Bed Mobility  rolling right;supine-sit (P)   -LG     Rolling Right [place] (Bed Mobility) modified independence (P)   -LG     Supine-Sit [place] (Bed Mobility) contact guard (P)   -LG     Assistive Device (Bed Mobility) bed rails;head of bed elevated (P)   -LG     [facility] Name 02/17/23 1506          Bed-Chair Transfer    Bed-Chair [place] (Transfers) not tested (P)   -LG     Row Name 02/17/23 1506          Sit-Stand Transfer    Sit-Stand [place] (Transfers) contact guard (P)   -LG     Row Name 02/17/23 1506          Gait/Stairs (Locomotion)    [place] Level (Gait) contact guard (P)   -LG     Distance in Feet (Gait) 80' (P)   -LG     Deviations/Abnormal Patterns (Gait) stride length decreased;base of support, narrow (P)   -LG     [place] Level (Stairs) not tested (P)   -LG           User Key  (r) = Recorded By, (t) = Taken By, (c) = Cosigned By    Initials Name Provider Type    Sherry Flores, PT Student PT Student               Obj/Interventions     [facility] Name 02/17/23 1509          Range of Motion Comprehensive    General Range of Motion bilateral lower extremity ROM WFL (P)   -LG     Row Name 02/17/23 1509          Strength Comprehensive (MMT)    Comment, General Manual Muscle Testing (MMT) Assessment BLE 4/5 MMT (P)   -LG     Row Name 02/17/23 1509          Balance    Balance Assessment sitting static balance;sit to stand dynamic balance;standing static balance;standing dynamic balance (P)   -LG     Static Sitting Balance independent (P)   -LG     Position, Sitting Balance unsupported;sitting edge of bed (P)   -LG     Sit to Stand Dynamic Balance contact guard (P)   -LG     Static Standing Balance standby assist (P)   -LG     Dynamic Standing Balance contact guard (P)   -LG     Position/Device Used, Standing Balance unsupported (P)   -LG           User Key  (r) = Recorded By, (t) = Taken By, (c) = Cosigned By    Initials Name Provider Type    Sherry Flores, PT Student PT Student                Goals/Plan     Row Name 02/17/23 1517          Bed Mobility Goal 1 (PT)    Activity/Assistive Device (Bed Mobility Goal 1, PT) bed mobility activities, all (P)   -LG     [place] Level/Cues Needed (Bed Mobility Goal 1, PT) independent (P)   -LG     Time Frame (Bed Mobility Goal 1, PT) long term goal (LTG);2 weeks (P)   -LG     Row Name 02/17/23 1517          Transfer Goal 1 (PT)    Activity/Assistive Device (Transfer Goal 1, PT) sit-to-stand/stand-to-sit;bed-to-chair/chair-to-bed (P)   -LG     [place] Level/Cues Needed (Transfer Goal 1, PT) independent (P)   -LG     Time Frame (Transfer Goal 1, PT) long term goal (LTG);2 weeks (P)   -LG     [facility] Name 02/17/23 1517          Gait Training Goal 1 (PT)    Activity/Assistive Device (Gait Training Goal 1, PT) gait (walking locomotion) (P)   -LG     [place] Level (Gait Training Goal 1, PT) independent (P)   -LG     Distance (Gait Training Goal 1, PT) 400' (P)   -LG     Time Frame (Gait Training Goal 1, PT) long term goal (LTG);2 weeks (P)   -LG     [facility] Name 02/17/23 1517          Patient Education Goal (PT)    Activity (Patient Education Goal, PT) BLE ther ex AROM 15 reps (P)   -LG     [place]/Cues/Accuracy (Memory Goal 2, PT) demonstrates adequately;independent (P)   -LG     Time Frame (Patient Education Goal, PT) short term goal (STG);1 week (P)   -LG     [facility] Name 02/17/23 1517          Therapy Assessment/Plan (PT)    Planned Therapy Interventions (PT) balance training;bed mobility training;gait training;home exercise program;patient/family education;neuromuscular re-education;postural re-education;ROM (range of motion);stair training;strengthening;stretching;transfer training (P)   -LG           User Key  (r) = Recorded By, (t) = Taken By, (c) = Cosigned By    Initials Name Provider Type    Sherry Flores, PT Student PT Student               Clinical Impression     [facility] Name 02/17/23 1512          Pain    Pretreatment Pain Rating 0/10 - no pain (P)    -LG     Posttreatment Pain Rating 0/10 - no pain (P)   -LG     Row Name 02/17/23 1512          Plan of Care Review    Plan of Care Reviewed With patient (P)   -LG     Progress no change (P)   -LG     Outcome Evaluation Pt was agreeable and participated in IE this date. Pt reports he lives w/ his wife in a single level home w/ one step to enter the home. Pt reports he is independent w/ household and community mobility, ADL's, and driving at baseline. Pt was supine in bed w/ nursing upon entry into the room. Pt was able to roll to the L w/ Mod I using bedrails. Pt performed sit to stand w/ CGA and gait belt. Pt ambulated 80' within the room w/ CGA and gait belt. Pt was left in the bathroom w/ instructions to call for nursing once he was finished. Pt would benefit from skilled PT during inpatient stay in order to increase LE strength, endurance, and functional mobility. (P)   -LG     Row Name 02/17/23 1512          Therapy Assessment/Plan (PT)    Patient/Family Therapy Goals Statement (PT) To go home (P)   -LG     Rehab Potential (PT) good, to achieve stated therapy goals (P)   -LG     Criteria for Skilled Interventions Met (PT) yes;meets criteria (P)   -LG     Therapy Frequency (PT) 3 times/wk (P)   -LG     Row Name 02/17/23 1512          Vital Signs    O2 Delivery Pre Treatment room air (P)   -LG     O2 Delivery Intra Treatment room air (P)   -LG     O2 Delivery Post Treatment room air (P)   -LG     Pre Patient Position Supine (P)   -LG     Intra Patient Position Standing (P)   -LG     Post Patient Position Sitting (P)   -LG     Row Name 02/17/23 1512          Positioning and Restraints    Pre-Treatment Position in bed (P)   -LG     Post Treatment Position bathroom (P)   -LG     Bathroom sitting;encouraged to call for assist;notified nsg (P)   -LG           User Key  (r) = Recorded By, (t) = Taken By, (c) = Cosigned By    Initials Name Provider Type    Sherry Flores, PT Student PT Student                Outcome Measures     Row Name 02/17/23 1518 02/17/23 1000       How much help from another person do you currently need...    Turning from your back to your side while in flat bed without using bedrails? 3 (P)   -LG 3  -CS    Moving from lying on back to sitting on the side of a flat bed without bedrails? 3 (P)   -LG 3  -CS    Moving to and from a bed to a chair (including a wheelchair)? 4 (P)   -LG 3  -CS    Standing up from a chair using your arms (e.g., wheelchair, bedside chair)? 4 (P)   -LG 3  -CS    Climbing 3-5 steps with a railing? 3 (P)   -LG 3  -CS    To walk in hospital room? 3 (P)   -LG 3  -CS    AM-PAC 6 Clicks Score (PT) 20 (P)   -LG 18  -CS    Highest level of mobility 6 --> Walked 10 steps or more (P)   -LG 6 --> Walked 10 steps or more  -CS          User Key  (r) = Recorded By, (t) = Taken By, (c) = Cosigned By    Initials Name Provider Type    CS Carrol Abarca LPN Licensed Nurse    LG Sherry Maldonado, PT Student PT Student                             Physical Therapy Education     Title: PT OT SLP Therapies (In Progress)     Topic: Physical Therapy (In Progress)     Point: Mobility training (Done)     Learning Progress Summary           Patient Acceptance, E, VU by  at 2/17/2023 1520    Comment: Importance of mobility                   Point: Home exercise program (Done)     Learning Progress Summary           Patient Acceptance, E, VU by  at 2/17/2023 1520    Comment: Importance of mobility                   Point: Body mechanics (Not Started)     Learner Progress:  Not documented in this visit.          Point: Precautions (Not Started)     Learner Progress:  Not documented in this visit.                      User Key     Initials Effective Dates Name Provider Type Discipline     12/29/22 -  Sherry Maldonado, PT Student PT Student PT              PT Recommendation and Plan  Planned Therapy Interventions (PT): (P) balance training, bed mobility training, gait training, home exercise  program, patient/family education, neuromuscular re-education, postural re-education, ROM (range of motion), stair training, strengthening, stretching, transfer training  Plan of Care Reviewed With: (P) patient  Progress: (P) no change  Outcome Evaluation: (P) Pt was agreeable and participated in IE this date. Pt reports he lives w/ his wife in a single level home w/ one step to enter the home. Pt reports he is independent w/ household and community mobility, ADL's, and driving at baseline. Pt was supine in bed w/ nursing upon entry into the room. Pt was able to roll to the L w/ Mod I using bedrails. Pt performed sit to stand w/ CGA and gait belt. Pt ambulated 80' within the room w/ CGA and gait belt. Pt was left in the bathroom w/ instructions to call for nursing once he was finished. Pt would benefit from skilled PT during inpatient stay in order to increase LE strength, endurance, and functional mobility.     Time Calculation:    PT Charges     Row Name 02/17/23 1520             Time Calculation    Start Time 1406 (P)   -LG      PT Received On 02/17/23 (P)   -LG      PT Goal Re-Cert Due Date 02/27/23 (P)   -LG         Untimed Charges    PT Eval/Re-eval Minutes 40 (P)   -LG         Total Minutes    Untimed Charges Total Minutes 40 (P)   -LG       Total Minutes 40 (P)   -LG            User Key  (r) = Recorded By, (t) = Taken By, (c) = Cosigned By    Initials Name Provider Type    LG Sherry Maldonado, PT Student PT Student              Therapy Charges for Today     Code Description Service Date Service Provider Modifiers Qty    75179688706 HC PT EVAL LOW COMPLEXITY 3 2/17/2023 Sherry Maldonado, PT Student GP 1          PT G-Codes  AM-PAC 6 Clicks Score (PT): (P) 20  PT Discharge Summary  Anticipated Discharge Disposition (PT): (P) home    Sherry Maldonado PT Student  2/17/2023

## 2023-02-17 NOTE — PLAN OF CARE
Goal Outcome Evaluation:  Plan of Care Reviewed With: (P) patient        Progress: (P) no change  Outcome Evaluation: (P) Pt was agreeable and participated in IE this date. Pt reports he lives w/ his wife in a single level home w/ one step to enter the home. Pt reports he is independent w/ household and community mobility, ADL's, and driving at baseline. Pt was supine in bed w/ nursing upon entry into the room. Pt was able to roll to the L w/ Mod I using bedrails. Pt performed sit to stand w/ CGA and gait belt. Pt ambulated 80' within the room w/ CGA and gait belt. Pt was left in the bathroom w/ instructions to call for nursing once he was finished. Pt would benefit from skilled PT during inpatient stay in order to increase LE strength, endurance, and functional mobility.

## 2023-02-17 NOTE — PROGRESS NOTES
EGD done this morning does not show any signs of upper GI bleed.  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  No obvious AVMs noted in the first and second part of the duodenum.     Rectal examination done today does not reveal any melena or any bright red rectal bleed.   We will proceed with a small bowel PillCam study.

## 2023-02-17 NOTE — ANESTHESIA PREPROCEDURE EVALUATION
Anesthesia Evaluation     Patient summary reviewed and Nursing notes reviewed   no history of anesthetic complications:  NPO Solid Status: > 8 hours  NPO Liquid Status: > 8 hours           Airway   Mallampati: II  TM distance: >3 FB  Neck ROM: full  Possible difficult intubation  Dental    (+) upper dentures and lower dentures    Pulmonary    (+) a smoker Former, COPD, shortness of breath, sleep apnea, decreased breath sounds,   Cardiovascular     PT is on anticoagulation therapy  Patient on routine beta blocker  Rhythm: regular  Rate: normal    (+) hypertension, CAD, cardiac stents dysrhythmias Atrial Fib, PVD, hyperlipidemia,  carotid artery disease      Neuro/Psych  (+) CVA,    GI/Hepatic/Renal/Endo    (+) obesity, morbid obesity, GERD, GI bleeding , renal disease CRI, thyroid problem hypothyroidism    Musculoskeletal     Abdominal    Substance History      OB/GYN          Other   arthritis,    history of cancer                      Anesthesia Plan    ASA 3     MAC     (Risks and benefits discussed including risk of aspiration, recall and dental damage. All patient questions answered.    Will continue with plan of care.)  intravenous induction     Anesthetic plan, risks, benefits, and alternatives have been provided, discussed and informed consent has been obtained with: patient.  Pre-procedure education provided

## 2023-02-18 VITALS
HEART RATE: 61 BPM | OXYGEN SATURATION: 100 % | SYSTOLIC BLOOD PRESSURE: 135 MMHG | TEMPERATURE: 97.7 F | RESPIRATION RATE: 16 BRPM | BODY MASS INDEX: 27.78 KG/M2 | HEIGHT: 74 IN | WEIGHT: 216.49 LBS | DIASTOLIC BLOOD PRESSURE: 59 MMHG

## 2023-02-18 DIAGNOSIS — K55.20 ANGIODYSPLASIA OF SMALL INTESTINE: Primary | ICD-10-CM

## 2023-02-18 LAB
ALBUMIN SERPL-MCNC: 3.3 G/DL (ref 3.5–5.2)
ANION GAP SERPL CALCULATED.3IONS-SCNC: 7.6 MMOL/L (ref 5–15)
BASOPHILS # BLD AUTO: 0.07 10*3/MM3 (ref 0–0.2)
BASOPHILS NFR BLD AUTO: 0.8 % (ref 0–1.5)
BH BB BLOOD EXPIRATION DATE: NORMAL
BH BB BLOOD EXPIRATION DATE: NORMAL
BH BB BLOOD TYPE BARCODE: 5100
BH BB BLOOD TYPE BARCODE: 5100
BH BB DISPENSE STATUS: NORMAL
BH BB DISPENSE STATUS: NORMAL
BH BB PRODUCT CODE: NORMAL
BH BB PRODUCT CODE: NORMAL
BH BB UNIT NUMBER: NORMAL
BH BB UNIT NUMBER: NORMAL
BUN SERPL-MCNC: 35 MG/DL (ref 8–23)
BUN/CREAT SERPL: 10.2 (ref 7–25)
CALCIUM SPEC-SCNC: 9.1 MG/DL (ref 8.6–10.5)
CHLORIDE SERPL-SCNC: 110 MMOL/L (ref 98–107)
CO2 SERPL-SCNC: 23.4 MMOL/L (ref 22–29)
CREAT SERPL-MCNC: 3.43 MG/DL (ref 0.76–1.27)
CROSSMATCH INTERPRETATION: NORMAL
CROSSMATCH INTERPRETATION: NORMAL
DEPRECATED RDW RBC AUTO: 53.1 FL (ref 37–54)
EGFRCR SERPLBLD CKD-EPI 2021: 17.6 ML/MIN/1.73
EOSINOPHIL # BLD AUTO: 0.96 10*3/MM3 (ref 0–0.4)
EOSINOPHIL NFR BLD AUTO: 10.9 % (ref 0.3–6.2)
ERYTHROCYTE [DISTWIDTH] IN BLOOD BY AUTOMATED COUNT: 15.8 % (ref 12.3–15.4)
GLUCOSE BLDC GLUCOMTR-MCNC: 101 MG/DL (ref 70–130)
GLUCOSE BLDC GLUCOMTR-MCNC: 106 MG/DL (ref 70–130)
GLUCOSE SERPL-MCNC: 96 MG/DL (ref 65–99)
HCT VFR BLD AUTO: 24.3 % (ref 37.5–51)
HGB BLD-MCNC: 7.7 G/DL (ref 13–17.7)
IMM GRANULOCYTES # BLD AUTO: 0.06 10*3/MM3 (ref 0–0.05)
IMM GRANULOCYTES NFR BLD AUTO: 0.7 % (ref 0–0.5)
LYMPHOCYTES # BLD AUTO: 0.59 10*3/MM3 (ref 0.7–3.1)
LYMPHOCYTES NFR BLD AUTO: 6.7 % (ref 19.6–45.3)
MCH RBC QN AUTO: 29.5 PG (ref 26.6–33)
MCHC RBC AUTO-ENTMCNC: 31.7 G/DL (ref 31.5–35.7)
MCV RBC AUTO: 93.1 FL (ref 79–97)
MONOCYTES # BLD AUTO: 0.79 10*3/MM3 (ref 0.1–0.9)
MONOCYTES NFR BLD AUTO: 8.9 % (ref 5–12)
NEUTROPHILS NFR BLD AUTO: 6.37 10*3/MM3 (ref 1.7–7)
NEUTROPHILS NFR BLD AUTO: 72 % (ref 42.7–76)
NRBC BLD AUTO-RTO: 0 /100 WBC (ref 0–0.2)
PHOSPHATE SERPL-MCNC: 3.6 MG/DL (ref 2.5–4.5)
PLATELET # BLD AUTO: 158 10*3/MM3 (ref 140–450)
PMV BLD AUTO: 11.9 FL (ref 6–12)
POTASSIUM SERPL-SCNC: 4.3 MMOL/L (ref 3.5–5.2)
RBC # BLD AUTO: 2.61 10*6/MM3 (ref 4.14–5.8)
SODIUM SERPL-SCNC: 141 MMOL/L (ref 136–145)
UNIT  ABO: NORMAL
UNIT  ABO: NORMAL
UNIT  RH: NORMAL
UNIT  RH: NORMAL
WBC NRBC COR # BLD: 8.84 10*3/MM3 (ref 3.4–10.8)

## 2023-02-18 PROCEDURE — 63710000001 LEVOTHYROXINE 25 MCG TABLET: Performed by: INTERNAL MEDICINE

## 2023-02-18 PROCEDURE — 63710000001 AMLODIPINE 5 MG TABLET: Performed by: INTERNAL MEDICINE

## 2023-02-18 PROCEDURE — 85025 COMPLETE CBC W/AUTO DIFF WBC: CPT | Performed by: INTERNAL MEDICINE

## 2023-02-18 PROCEDURE — 25010000002 EPOETIN ALFA-EPBX 40000 UNIT/ML SOLUTION: Performed by: INTERNAL MEDICINE

## 2023-02-18 PROCEDURE — A9270 NON-COVERED ITEM OR SERVICE: HCPCS | Performed by: INTERNAL MEDICINE

## 2023-02-18 PROCEDURE — 63710000001 PANTOPRAZOLE 40 MG TABLET DELAYED-RELEASE: Performed by: FAMILY MEDICINE

## 2023-02-18 PROCEDURE — 99232 SBSQ HOSP IP/OBS MODERATE 35: CPT | Performed by: INTERNAL MEDICINE

## 2023-02-18 PROCEDURE — 82962 GLUCOSE BLOOD TEST: CPT

## 2023-02-18 PROCEDURE — 99239 HOSP IP/OBS DSCHRG MGMT >30: CPT | Performed by: FAMILY MEDICINE

## 2023-02-18 PROCEDURE — 80069 RENAL FUNCTION PANEL: CPT | Performed by: INTERNAL MEDICINE

## 2023-02-18 PROCEDURE — A9270 NON-COVERED ITEM OR SERVICE: HCPCS | Performed by: FAMILY MEDICINE

## 2023-02-18 PROCEDURE — G0378 HOSPITAL OBSERVATION PER HR: HCPCS

## 2023-02-18 RX ORDER — ASPIRIN 81 MG/1
81 TABLET ORAL DAILY
Qty: 30 TABLET | Refills: 0 | Status: SHIPPED | OUTPATIENT
Start: 2023-02-25

## 2023-02-18 RX ADMIN — PANTOPRAZOLE SODIUM 40 MG: 40 TABLET, DELAYED RELEASE ORAL at 06:03

## 2023-02-18 RX ADMIN — LEVOTHYROXINE SODIUM 25 MCG: 25 TABLET ORAL at 06:03

## 2023-02-18 RX ADMIN — EPOETIN ALFA-EPBX 30000 UNITS: 40000 INJECTION, SOLUTION INTRAVENOUS; SUBCUTANEOUS at 13:18

## 2023-02-18 RX ADMIN — AMLODIPINE BESYLATE 10 MG: 5 TABLET ORAL at 08:48

## 2023-02-18 NOTE — PROGRESS NOTES
In Patient Consult      Date of Consultation: 2023  Patient Name: Travon Plummer  MRN: [id number]  : 1945     Referring provider: Arash Balbuena MD    Primary care provider:  Chilango Erickson MD    Reason for consultation: Acute on chronic blood loss anemia, suspected upper GI bleed    Interval history:   2023  He had a couple of soft loose bowel movement at night which were black without any clots.  Denies any abdominal pain no nausea vomiting.  Tolerating p.o. diet.  He had a EGD yesterday morning followed by small bowel PillCam study    2023  This is a 78-year-old male patient with a prior history of hypertension, hyperlipidemia, coronary artery disease, prior history of CVA, paroxysmal atrial fibrillation not on any anticoagulation, history of prostate cancer in  status post radiation treatment, renal cell cancer status post radical left nephrectomy in , CKD was sent from nephrology office for further evaluation management of his anemia noted with lab work      He has a chronic anemia with a CKD.  He was followed by Dr. Sellers.  Blood work done showed a low hemoglobin and sent to emergency room.  Patient admits that he has been passing black stools many months as  was also taking iron pills.  He did not see any difference in the color of the stool.  Denies any red blood or clots.  He denies any abdominal pain, diarrhea, nausea vomiting.  He states that he normally gets a pellet-like dark stool and feels constipated.  Last good bowel movement for 4 days ago.     He is currently on baby aspirin otherwise not on any blood thinners.  Denies any NSAID use.  No prior history of any peptic ulcer disease or GI bleed.  He had a EGD colonoscopy done by Dr. Sandra Do in 2021 for evaluation of iron deficiency anemia and heme positive stool.  EGD showed a nonobstructing distal esophageal ring was unremarkable.  Colonoscopy with suboptimal bowel prep  and dark liquid stool noted in the colon and 1 benign polyp removed.     In the emergency room he was noted to have a hemoglobin of 5.6 g/dL with normal platelet counts.  His BUN was 38 creatinine was 3.64.  His recent CT abdomen pelvis was on 2/9/2023 unremarkable except asymptomatic gallstones and possible cystitis.  GI consulted for further evaluation.    Subjective     Past Medical History:   Diagnosis Date   • Broken arm    • Cerebrovascular accident (HCC) 10/31/2008   • Coronary artery disease    • Dyspnea    • Full dentures 08/23/2021   • GERD (gastroesophageal reflux disease)    • Gout    • Hypercholesterolemia    • Hypertension 11/10/2015    History of hypertension; hypotensive at the time of office visit on 11/10/2015.   • Impaired mobility    • Obesity    • Osteoarthritis    • Paroxysmal atrial fibrillation (HCC)     History of paroxysmal atrial fibrillation, data deficit.   • Prostate cancer (HCC) 01/2015    Prostate cancer, diagnosed January of 2015, status post radiation therapy x39 treatments, 07/01/2015 at [facility].   • Renal cell carcinoma (HCC)     Renal cell carcinoma, diagnosed March of 2015, status post left nephrectomy.  Elevated creatinine of 2.1 on labs performed 11/04/2015.       Past Surgical History:   Procedure Laterality Date   • CAROTID STENT Left 10/31/2008    PTA/left carotid artery stent, 10/31/2008.    • COLONOSCOPY N/A 12/23/2021    Procedure: COLONOSCOPY, polypectomy, clip placement x 1;  Surgeon: Deandra Do MD;  Location: [facility] ENDOSCOPY;  Service: Gastroenterology;  Laterality: N/A;   • COLONOSCOPY W/ POLYPECTOMY     • CORONARY ANGIOPLASTY WITH STENT PLACEMENT      A 3.5 x 13 mm. Cypher ESTIVEN to RCA expanded with 4 mm balloon.    • ENDOSCOPY N/A 12/22/2021    Procedure: ESOPHAGOGASTRODUODENOSCOPY with biopsy;  Surgeon: Deandra Do MD;  Location: [facility] ENDOSCOPY;  Service: Gastroenterology;  Laterality: N/A;   • INGUINAL HERNIA REPAIR     • NEPHRECTOMY  Left 03/19/2015    diagnosed January 2015, status post left radical nephrectomy.    • PROSTATE FIDUCIAL MARKER PLACEMENT  2016    received XRT for prostate CA in 2016       Family History   Problem Relation Age of Onset   • No Known Problems Mother    • No Known Problems Father        Social History     Socioeconomic History   • Marital status:    Tobacco Use   • Smoking status: Former     Types: Cigarettes     Quit date: 2008     Years since quitting: 15.1   • Smokeless tobacco: Current     Types: Chew   Vaping Use   • Vaping Use: Never used   Substance and Sexual Activity   • Alcohol use: No   • Drug use: No   • Sexual activity: Defer         Current Facility-Administered Medications:   •  acetaminophen (TYLENOL) tablet 650 mg, 650 mg, Oral, Q4H PRN **OR** acetaminophen (TYLENOL) 160 MG/5ML solution 650 mg, 650 mg, Oral, Q4H PRN **OR** acetaminophen (TYLENOL) suppository 650 mg, 650 mg, Rectal, Q4H PRN, Georgina Pool MD  •  amLODIPine (NORVASC) tablet 10 mg, 10 mg, Oral, Daily, Georgina Pool MD, 10 mg at 02/18/23 0848  •  carvedilol (COREG) tablet 6.25 mg, 6.25 mg, Oral, BID With Meals, Georgina Pool MD, 6.25 mg at 02/17/23 1806  •  levothyroxine (SYNTHROID, LEVOTHROID) tablet 25 mcg, 25 mcg, Oral, Daily, Georgina Pool MD, 25 mcg at 02/18/23 0603  •  ondansetron (ZOFRAN) injection 4 mg, 4 mg, Intravenous, Q6H PRN, Georgina Pool MD  •  pantoprazole (PROTONIX) EC tablet 40 mg, 40 mg, Oral, Q AM, Arash Balbuena MD, 40 mg at 02/18/23 0603  •  polyethylene glycol (MIRALAX) packet 17 g, 17 g, Oral, Daily, Georgina Pool MD, 17 g at 02/16/23 2202  •  rosuvastatin (CRESTOR) tablet 40 mg, 40 mg, Oral, Nightly, Georgina Pool MD, 40 mg at 02/17/23 2109  •  sodium chloride 0.9 % flush 10 mL, 10 mL, Intravenous, PRN, Georgina Pool MD  •  sodium chloride 0.9 % infusion, 70 mL/hr, Intravenous, Continuous PRN, Georgina Pool MD, Last Rate: 70  mL/hr at 02/17/23 0952, 70 mL/hr at 02/17/23 0952    Allergies   Allergen Reactions   • Allopurinol Urinary Retention   • Lipitor [Atorvastatin] Myalgia       Review of Systems   Constitutional: Negative for appetite change, fatigue, fever and unexpected weight loss.   HENT: Negative for trouble swallowing.    Gastrointestinal: Negative for abdominal distention, abdominal pain, anal bleeding, blood in stool, constipation, diarrhea, nausea, rectal pain, vomiting, GERD and indigestion.       The following portions of the patient's history were reviewed and updated as appropriate: allergies, current medications, past family history, past medical history, past social history, past surgical history and problem list.    Objective     Vitals:    02/18/23 0427 02/18/23 0538 02/18/23 0755 02/18/23 0848   BP: 135/58  134/65 134/65   BP Location: Right arm  Right arm    Patient Position: Lying  Lying    Pulse: 59  60 66   Resp: 18  18    Temp: 98.1 °F (36.7 °C)  98.3 °F (36.8 °C)    TempSrc: Oral  Oral    SpO2: 91%  94%    Weight:  98.2 kg (216 lb 7.9 oz)     Height:           Physical Exam  Constitutional:       Appearance: Normal appearance.   HENT:      Head: Normocephalic and atraumatic.   Eyes:      Comments: Pallor present   Abdominal:      General: Abdomen is flat. There is distension (Mild gaseous distention of the abdomen noted).      Palpations: There is no mass.      Tenderness: There is no abdominal tenderness. There is no guarding or rebound.      Hernia: No hernia is present.   Musculoskeletal:      Cervical back: Normal range of motion and neck supple.   Neurological:      Mental Status: He is alert.         Results from last 7 days   Lab Units 02/18/23  0641 02/17/23  1725 02/17/23  0430 02/16/23  2154 02/16/23  1357   SODIUM mmol/L 141  --  144  --  141   POTASSIUM mmol/L 4.3  --  4.1  --  4.0   CHLORIDE mmol/L 110*  --  112*  --  109*   CO2 mmol/L 23.4  --  21.6*  --  21.7*   BUN mg/dL 35*  --  36*  --  38*    CREATININE mg/dL 3.43*  --  3.49*  --  3.64*   CALCIUM mg/dL 9.1  --  8.9  --  8.6   ALBUMIN g/dL 3.3*  --   --   --  3.4*   BILIRUBIN mg/dL  --   --   --   --  0.2   ALK PHOS U/L  --   --   --   --  81   ALT (SGPT) U/L  --   --   --   --  7   AST (SGOT) U/L  --   --   --   --  11   GLUCOSE mg/dL 96  --  95  --  137*   WBC 10*3/mm3 8.84  --  9.52  --  9.69   HEMOGLOBIN g/dL 7.7* 7.9* 7.4*   < > 5.6*   PLATELETS 10*3/mm3 158  --  154  --  160   INR   --   --  1.07  --   --     < > = values in this interval not displayed.       Imaging Results (Last 24 Hours)     ** No results found for the last 24 hours. **         EGD on 2/17/2023  - Normal oropharynx.  - Z-line regular, 42 cm from the incisors.  - No gross lesions in the entire esophagus.  - Erythematous mucosa in the posterior wall of the stomach, antrum and prepyloric region of the stomach.  Biopsied. 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 Previously biopsied.  - Normal first portion of the duodenum, second portion of the duodenum and third portion of the duodenum    Small bowel PillCam study on 2/7/2023  PillCam still in the distal ileum, ??  TI at the end of the recording makes accurate location of the abnormality is difficult.  Couple of small and tiny nonbleeding AVMs in the distal duodenum and the proximal jejunum  Bleeding AVMs in the ileum at least 2 areas filled with clots and red blood.  Multiple nonbleeding AVMs in the ileum mostly in the mid and the distal ileum.  This AVMs can be approached only with balloon assisted enteroscopy for intervention.    Assessment / Plan    Assessment/Recommendations:  2/18/2023  He had an EGD done on 2/17/2023 which revealed moderate distal aspirin due to gastropathy otherwise unremarkable.  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  Otherwise unremarkable.      He had a small bowel PillCam study  followed by EGD.  He had a delayed transit of the PillCam in the distal ileum with poor views in the distal small bowel.  It appeared PillCam was still in the distal ileum at the end of 7-1/2 hours recorded.  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  This is more suggestive of bleeding AVMs.  Patient likely had intermittent minimal upper GI bleed with drop in his H&H for the last few years.    At this time patient is on aspirin for his peripheral vascular disease and paroxysmal A-fib.  No further signs of any active bleeding.  Hemoglobin has been stable.  I had a discussion with the patient and his wife regarding the findings.  Patient wants to go home today.   We will get an urgent consultation with advanced endoscopy at [facility] for possible consideration of balloon enteroscopy.  Given his single kidney and CKD he is not a candidate for any IR guided intervention. Patient is overall not a good candidate for any prolonged sedation and intervention.     We will hold his aspirin for 1 week.  Repeat CBC coming Friday.  Patient has been getting blood work every 4 weeks at renal office, if his Friday blood work is stable we will get labs as before every 4 weeks.  We will follow-up in the office in 4 weeks time.  Patient and his wife is aware of further likelihood of intermittent bleeding and to attend ED if there is any bleeding.    2/16/2023  1.  Acute blood loss anemia  2.  Suspected upper GI bleed with melena  3.  History of chronic iron deficiency anemia  4.  History of CKD 4  5.  Chronic idiopathic constipation  Patient has a chronic anemia over 6 years now.  His hemoglobin runs about 11 to 12 g/dL before however since 2021 his hemoglobin dropped a few occasions between 5 to 6 g/dL.  He had a positive Hemoccult stool test in 2021 and had a EGD colonoscopy done for further evaluation by Dr. Sandra Do which did not  reveal any obvious source of bleeding.  Recent CT abdomen without contrast done week ago was unremarkable except asymptomatic gallstones.     This time patient does seem to have intermittent GI bleeding causing drop in his H&H.   He is currently only on aspirin.  No signs of any overt bleeding.  Given his CKD there is a strong suspicion of small bowel AVMs with the bleeding..  Peptic ulcer disease, erosions need to be ruled out     He needs an EGD to begin with.  I have discussed the indication risk involved and patient and his spouse Pat this evening and they both agreeable to proceed with the procedure.  Given his significant medical issues patient is slightly high risk for any cardiopulmonary complications during the procedure and after procedure and patient and the family is well aware of the     Clear liquids now  Keep n.p.o. after midnight  Protonix IV  Hold aspirin  Transfuse 2 units PRBC today and repeat CBC  Keep Hgb more than 7 g/dL  Patient likely need a small bowel PillCam study if EGD is negative  MiraLAX 17 g p.o. daily     6.  History of CAD status post coronary artery stent  7.  History of paroxysmal A-fib  8.  History of radical left nephrectomy for RCC  9.  History of prostate cancer with radiation treatment      Thank you very much for letting me participate in the care of this patient.  Please do not hesitate to call me if you have any questions.    Georgina Pool MD  [facility] [place]  2/18/2023  10:31 EST    Please note that portions of this note may have been completed with a voice recognition program.

## 2023-02-18 NOTE — PROGRESS NOTES
[facility] Progress Note  [facility]. [place]        Patient Name: Travon Plummer  : 1945  MRN: [id number]   LOS: 1 day    Patient Care Team:  Chilango Erickson MD as PCP - General (Internal Medicine)  Andrea Sellers MD, JODY as Consulting Physician (Nephrology)  Deandra Do MD as Surgeon (General Surgery)  Leonard Ashford MD as Consulting Physician (Cardiology)    Chief Complaint:    Chief Complaint   Patient presents with   • Abnormal Lab     Low hemoglobin 6.1     Primary Care Physician:  Chilango Erickson MD  Date of admission: 2023    Subjective     Interval History:   Follow-up chronic kidney disease stage IV and anemia.  Events noted from last 24 hours.  Patient sitting up in the chair awake alert and interactive, he denies having any chest pain or shortness of breath he feels like that he is ready to get out of the hospital.    Review of Systems:   As noted above.    Objective     Vitals:   Temp:  [97.5 °F (36.4 °C)-99.3 °F (37.4 °C)] 98.3 °F (36.8 °C)  Heart Rate:  [59-69] 66  Resp:  [16-18] 18  BP: (132-143)/(58-78) 134/65    Intake/Output Summary (Last 24 hours) at 2023 0940  Last data filed at 2023 0900  Gross per 24 hour   Intake 480 ml   Output 1650 ml   Net -1170 ml       Physical Exam:    General Appearance: alert, oriented x 3, no acute distress   Skin: warm and dry  HEENT: oral mucosa normal, nonicteric sclera  Neck: supple, no JVD  Lungs: CTA  Heart: RRR, normal S1 and S2  Abdomen: Obese, soft, nontender, nondistended  : no palpable bladder  Extremities: Trace edema, no cyanosis or clubbing  Neuro: normal speech and mental status     Scheduled Meds:     Current Facility-Administered Medications   Medication Dose Route Frequency Provider Last Rate Last Admin   • acetaminophen (TYLENOL) tablet 650 mg  650 mg Oral Q4H PRN Georgina Pool MD        Or   • acetaminophen (TYLENOL) 160 MG/5ML solution  650 mg  650 mg Oral Q4H PRN Georgina Pool MD        Or   • acetaminophen (TYLENOL) suppository 650 mg  650 mg Rectal Q4H PRN Georgina Pool MD       • amLODIPine (NORVASC) tablet 10 mg  10 mg Oral Daily Georgina Pool MD   10 mg at 02/18/23 0848   • carvedilol (COREG) tablet 6.25 mg  6.25 mg Oral BID With Meals Georgina Pool MD   6.25 mg at 02/17/23 1806   • levothyroxine (SYNTHROID, LEVOTHROID) tablet 25 mcg  25 mcg Oral Daily Georgina Pool MD   25 mcg at 02/18/23 0603   • ondansetron (ZOFRAN) injection 4 mg  4 mg Intravenous Q6H PRN Georgina Pool MD       • pantoprazole (PROTONIX) EC tablet 40 mg  40 mg Oral Q AM Arash Balbuena MD   40 mg at 02/18/23 0603   • polyethylene glycol (MIRALAX) packet 17 g  17 g Oral Daily Georgina Pool MD   17 g at 02/16/23 2202   • rosuvastatin (CRESTOR) tablet 40 mg  40 mg Oral Nightly Georgina Pool MD   40 mg at 02/17/23 2109   • sodium chloride 0.9 % flush 10 mL  10 mL Intravenous PRN Georgina Pool MD       • sodium chloride 0.9 % infusion  70 mL/hr Intravenous Continuous PRN Georgina Pool MD 70 mL/hr at 02/17/23 0952 70 mL/hr at 02/17/23 0952       amLODIPine, 10 mg, Oral, Daily  carvedilol, 6.25 mg, Oral, BID With Meals  levothyroxine, 25 mcg, Oral, Daily  pantoprazole, 40 mg, Oral, Q AM  polyethylene glycol, 17 g, Oral, Daily  rosuvastatin, 40 mg, Oral, Nightly        IV Meds:   sodium chloride, 70 mL/hr, Last Rate: 70 mL/hr (02/17/23 0952)        Results Reviewed:   I have personally reviewed the results from the time of this admission to 2/18/2023 09:40 EST     Results from last 7 days   Lab Units 02/18/23  0641 02/17/23  0430 02/16/23  1357   SODIUM mmol/L 141 144 141   POTASSIUM mmol/L 4.3 4.1 4.0   CHLORIDE mmol/L 110* 112* 109*   CO2 mmol/L 23.4 21.6* 21.7*   BUN mg/dL 35* 36* 38*   CREATININE mg/dL 3.43* 3.49* 3.64*   CALCIUM mg/dL 9.1 8.9 8.6   BILIRUBIN mg/dL  --   --  0.2   ALK  PHOS U/L  --   --  81   ALT (SGPT) U/L  --   --  7   AST (SGOT) U/L  --   --  11   GLUCOSE mg/dL 96 95 137*       Estimated Creatinine Clearance: 24.7 mL/min (A) (by C-G formula based on SCr of 3.43 mg/dL (H)).    Results from last 7 days   Lab Units 02/18/23  0641 02/16/23  1357   MAGNESIUM mg/dL  --  1.8   PHOSPHORUS mg/dL 3.6 3.2             Results from last 7 days   Lab Units 02/18/23  0641 02/17/23  1725 02/17/23  0430 02/16/23  2154 02/16/23  1357   WBC 10*3/mm3 8.84  --  9.52  --  9.69   HEMOGLOBIN g/dL 7.7* 7.9* 7.4* 6.6* 5.6*   PLATELETS 10*3/mm3 158  --  154  --  160       Results from last 7 days   Lab Units 02/17/23  0430   INR  1.07       Brief Urine Lab Results     None          No results found for: UTPCR    Imaging Results (Last 24 Hours)     ** No results found for the last 24 hours. **              Assessment / Plan     ASSESSMENT:  1. 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  Cr stable thus far 3.5 mg/dL, c/w BL.  No uremic sx.  K normal 4.1, Na generous 144, bicarb is 21 with normal AG (and takes sodium bicarb at home).  May have mild vol excess related to transfusion to explain dyspnea today.  CXR yesterday (before PRBCs) w/o central jossy  2. GIB - EGD unrevealing; capsule endo in progress; on PPI drip   3. Anemia of ABL - hgb 5.7 -> 7.4 with transfusion  4. HTN - BP stable in assoc with GIB, on norvasc, coreg  5. Hx RCC s/p left nephrectomy  6. Dyslipidemia on statin  7. Hypothyroidism on synthroid       PLAN:  · It is okay from renal standpoint for patient to be discharged home he can resume all his home medications.  I will go ahead and give him a 20,000 units of Procrit today and will assess for continued use of it when he comes to the office.  · Details were discussed with the patient no family in the room.    · Details were also discussed with the hospitalist service.  Patient has appointment set up for February 23  at 9:45 AM, I encouraged him to keep the appointment.  We will recheck labs at that time.  · Continue with rest of the current treatment plan, and monitor with surveillance labs.  · Further recommendations will depend on clinical course of the patient during the current hospitalization.     Thank you for involving us in the care of Travon Plummer.  Please feel free to call with any questions.    Andrea Sellers MD, FASN  02/18/23  09:40 [facility]    [facility]  [phone]      Part of this note may be an electronic transcription/translation of spoken language to printed text using the Dragon Dictation System.

## 2023-02-18 NOTE — DISCHARGE INSTRUCTIONS
-Hold aspirin for 1 week until your next follow-up and blood work.  -Plan on getting repeat blood work at the nephrologist office on Friday as directed.  -Follow-up with your PCP in 2 to 3 days for recheck and continued care.  -Please come back to the ER if any weakness, shortness of breath, chest pain, bleeding of any kind including blood in the stool or dark stool.  -Follow-up with  gastroenterology as directed by Dr. Alanis.

## 2023-02-18 NOTE — DISCHARGE SUMMARY
[facility]   DISCHARGE SUMMARY      Name:  Travon Plummer   Age:  78 y.o.  Sex:  male  :  1945  MRN:  [id number]   Visit Number:  [id number]    Admission Date:  2023  Date of Discharge:  2023  Primary Care Physician:  Chilango Erickson MD    Important issues to note:    -Patient received 2 units of PRBC, EGD performed by Dr. Alanis on  which revealed moderate distal aspirin due to gastropathy otherwise unremarkable. He had a small bowel PillCam study with multiple small bowel AVMs identified in the ileum and mostly in the distal ileum with a couple of areas with red blood and clots. This is more suggestive of bleeding AVMs.   -Dr. Alanis recommended patient will be referred as an outpatient to  gastroenterology for advanced endoscopy and possible consideration of balloon enteroscopy.  -GI recommended holding aspirin for 1 week, repeat CBC next week with his follow-up with nephrology, if hemoglobin stable will monitor blood level every 4 weeks.  -Patient follow-up with GI in the office in 4 weeks.  -Patient to follow-up with PCP in 2 to 3 days for recheck and continued care.    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     Presenting Problem:    Chief Complaint   Patient presents with   • Abnormal Lab     Low hemoglobin 6.1      Consults:     Consulting Physician(s)     Provider Relationship Specialty    Raul Mercado MD  Consulting Physician Nephrology    Georgina Pool MD Consulting Physician Gastroenterology        Procedures Performed:    Procedure(s):  ESOPHAGOGASTRODUODENOSCOPY with biopsy    History of presenting illness/Hospital Course:    Patient is 78 years old male with past medical history of CVA, CAD, hypertension, hyperlipidemia, paroxysmal atrial fibrillation not on anticoagulation, prostate cancer, renal cell carcinoma status post left nephrectomy, CKD who presented to the ER after he was referred from nephrology due to abnormal labs and low hemoglobin.  Patient reports that he was following up with Dr. Sellers who he sees for his CKD and had blood work done and showed very low hemoglobin and was told to come to the ER.  Patient otherwise asymptomatic and denies any pain or discomfort.  Patient reports that his been having very dark stool over the past 2 to 3 weeks which she thought it is due to the iron pills he takes daily.  Patient with no chest pain, shortness of breath, abdominal pain, nausea, vomiting, diarrhea or constipation.  Patient is not on anticoagulation but on aspirin 81 mg.  Patient denies any recent excessive use of NSAIDs.     Upon ER evaluation, patient was hemodynamically stable and was afebrile.  Labs were significant for troponin of 53, proBNP 3689, glucose 137, creatinine 3.64 (baseline creatinine around 3), BUN 38, lipase 74, hemoglobin 5.6, hematocrit 18.6, platelets WNL.  Fecal occult positive.  Chest x-ray with stable chest and clear lungs.  Dr. Alanis with GI consulted and recommended PPI and admission with n.p.o. after midnight.  Hospitalist consulted for admission, further evaluation and treatment.    Chronic small bowel AVMs bleed  Anemia due to GI bleed  -Apparently patient was admitted to the hospital previously with similar symptoms and complaints.  -Dr. Alanis consulted, appreciate his recommendations.  -S/p EGD by Dr. Alanis on 2/17 which showed possible ASA gastropathy  did not show any signs of upper GI bleed.  Biopsies obtained.  -Rectal exam with no melena or bright red blood per Dr. Alanis.  -PillCam study multiple small bowel AVMs identified in the ileum and mostly in the distal ileum with a couple of areas with red blood and clots. This is more suggestive of bleeding AVMs.   -PPI p.o.  -Hold aspirin  -Posttransfusion of 2 units of PRBC  -continue to monitor hemoglobin, transfuse as indicated to keep hemoglobin above 7.     Elevated troponin  -Likely demand ischemia in the settings of CKD and anemia  -Troponin trended down appropriately.  -Patient denies any shortness of breath or chest pain, low suspicion for ACS.     Chronic kidney disease  -Avoid nephrotoxic drugs  -Consulted nephrology, appreciate recommendations.  -Given Lasix 80 mg IV x1.     Patient was seen and examined on the day of discharge.  Hemoglobin stabilized at 7.9-7.7.  Patient reports feeling better today and is eager to go home.  Patient denies any complaints or pain today. Dr. Alanis and Dr. Sellers have seen the patient today, I personally discussed the case with them.  Patient will be referred to advanced endoscopy by Dr. Alanis for consultation for small bowel AVMs bleed.  Patient to hold his aspirin for 1 week and follow-up with nephrology in the office on Friday for recheck of his blood levels.  Patient already on iron supplements at home. Clear return precautions discussed with the patient.  Patient verbalized understanding and agreeable to plan.  All questions were answered to satisfaction.      Vital Signs:    Temp:  [97.6 °F (36.4 °C)-99.3 °F (37.4 °C)] 97.7 °F (36.5 °C)  Heart Rate:  [59-69] 61  Resp:  [16-18] 16  BP: (132-142)/(58-65) 135/59    Physical Exam:    General Appearance:  Alert and cooperative.  No acute distress.   Head:  Atraumatic and normocephalic.   Eyes: Conjunctivae and sclerae normal, no icterus. Mild pallor.   Ears:  Ears with no abnormalities noted.   Throat: No oral  lesions, no thrush, oral mucosa moist.   Neck: Supple, trachea midline, no thyromegaly.   Lungs:   Breath sounds heard bilaterally equally.  No crackles or wheezing. No Pleural rub or bronchial breathing.   Heart:  Normal S1 and S2, no murmur, no gallop, no rub. No JVD.   Abdomen:   Normal bowel sounds, no masses, no organomegaly. Soft, nontender, nondistended, no rebound tenderness.   Extremities: Supple, no edema, no cyanosis, no clubbing.   Pulses: Pulses palpable bilaterally.   Skin: No bleeding or rash.   Neurologic: Alert and oriented x 3. No facial asymmetry. Moves all four limbs. No tremors.     Pertinent Lab Results:     Results from last 7 days   Lab Units 02/18/23  0641 02/17/23  0430 02/16/23  1357   SODIUM mmol/L 141 144 141   POTASSIUM mmol/L 4.3 4.1 4.0   CHLORIDE mmol/L 110* 112* 109*   CO2 mmol/L 23.4 21.6* 21.7*   BUN mg/dL 35* 36* 38*   CREATININE mg/dL 3.43* 3.49* 3.64*   CALCIUM mg/dL 9.1 8.9 8.6   BILIRUBIN mg/dL  --   --  0.2   ALK PHOS U/L  --   --  81   ALT (SGPT) U/L  --   --  7   AST (SGOT) U/L  --   --  11   GLUCOSE mg/dL 96 95 137*     Results from last 7 days   Lab Units 02/18/23  0641 02/17/23  1725 02/17/23  0430 02/16/23  2154 02/16/23  1357   WBC 10*3/mm3 8.84  --  9.52  --  9.69   HEMOGLOBIN g/dL 7.7* 7.9* 7.4*   < > 5.6*   HEMATOCRIT % 24.3* 25.0* 23.8*  --  18.6*   PLATELETS 10*3/mm3 158  --  154  --  160    < > = values in this interval not displayed.     Results from last 7 days   Lab Units 02/17/23  0430   INR  1.07     Results from last 7 days   Lab Units 02/16/23  1726 02/16/23  1357   HSTROP T ng/L 51* 53*     Results from last 7 days   Lab Units 02/16/23  1357   PROBNP pg/mL 3,689.0*         Results from last 7 days   Lab Units 02/16/23  1357   LIPASE U/L 74*               Pertinent Radiology Results:    Imaging Results (All)     Procedure Component Value Units Date/Time    XR Chest 1 View [[id number]] Collected: 02/16/23 1510     Updated: 02/16/23 1513    Narrative:       PROCEDURE: XR CHEST 1 VW-     HISTORY: chest pain     COMPARISON: 12/21/2021.     FINDINGS: The heart is normal in size. The lungs are clear. The  mediastinum is unremarkable. There is no pneumothorax.  There are no  acute osseous abnormalities. Tortuosity of the thoracic aorta  identified. Apical lordotic positioning noted.       Impression:      Stable chest..           This report was signed and finalized on 2/16/2023 3:11 PM by Alice Castillo MD.          Echo:    Results for orders placed during the hospital encounter of 02/28/22    Adult Transthoracic Echo Complete W/ Cont if Necessary Per Protocol    Interpretation Summary  · Calculated left ventricular EF = 62%  · Sigmoid-shaped ventricular septum is present.  · Left ventricular diastolic function is consistent with (grade I) impaired relaxation.  · Mild mitral annular calcification is present. Mild to moderate mitral valve regurgitation is present  · The aortic valve exhibits sclerosis  · Mild tricuspid valve regurgitation is present    Condition on Discharge:      Stable.    Code status during the hospital stay:    Code Status and Medical Interventions:   Ordered at: 02/16/23 1635     Code Status (Patient has no pulse and is not breathing):    CPR (Attempt to Resuscitate)     Medical Interventions (Patient has pulse or is breathing):    Full Support     Discharge Disposition:    Home or Self Care    Discharge Medications:       Discharge Medications      Changes to Medications      Instructions Start Date   aspirin 81 MG EC tablet  What changed: These instructions start on February 25, 2023. If you are unsure what to do until then, ask your doctor or other care provider.   81 mg, Oral, Daily   Start Date: February 25, 2023        Continue These Medications      Instructions Start Date   amLODIPine 10 MG tablet  Commonly known as: NORVASC   TAKE ONE TABLET BY MOUTH EVERY DAY      carvedilol 6.25 MG tablet  Commonly known as: COREG   6.25 mg, Oral, 2 Times  Daily With Meals      ferrous sulfate 325 (65 FE) MG tablet   325 mg, Oral, Daily With Breakfast      levothyroxine 25 MCG tablet  Commonly known as: SYNTHROID, LEVOTHROID   25 mcg, Oral, Daily      pantoprazole 40 MG EC tablet  Commonly known as: PROTONIX   TAKE 1 TABLET BY MOUTH ONCE DAILY      rosuvastatin 40 MG tablet  Commonly known as: CRESTOR   40 mg, Oral, Nightly      sodium bicarbonate 650 MG tablet   650 mg, Oral, 2 Times Daily      vitamin B-12 100 MCG tablet  Commonly known as: CYANOCOBALAMIN   100 mcg, Oral, Daily      Vitamin D 50 MCG (2000 UT) capsule   2,000 Units, Oral, Daily         Stop These Medications    cefuroxime 250 MG tablet  Commonly known as: CEFTIN          Discharge Diet:   Cardiac/GI soft    Activity at Discharge:   As tolerated    Follow-up Appointments:     Follow-up Information     Chilango Erickson MD Follow up in 3 day(s).    Specialties: Internal Medicine, Hospitalist  Contact information:  [address] [place] [address]  [address]  [facility] [address]  [phone]             Andrea Sellers MD, [facility] Follow up on 2/23/2023.    Specialty: Nephrology  Why: For blood work and follow-up  Contact information:  [address] [place] [address] [address]  [facility] [address]  [phone]             Georgina Pool MD Follow up in 4 week(s).    Specialty: Gastroenterology  Contact information:  [address] [place] [address] [address]  [address]  [facility] [address]  [phone]                       Future Appointments   Date Time Provider Department Center   8/1/2023 11:00 AM [facility] VASC CART RM 2 [facility]  [facility]   8/1/2023  1:15 PM Leonard Ashford MD E C [facility]     Test Results Pending at Discharge:    Pending Labs     Order Current Status    TISSUE EXAM, [facility] ([facility]) In process             Arash Balbuena MD  02/18/23  12:10 EST    Time: I spent 38 minutes on this discharge activity which included: face-to-face encounter with the patient, reviewing the data in the system, coordination of the care with  the nursing staff as well as consultants, documentation, and entering orders.     Dictated utilizing Dragon dictation.

## 2023-02-18 NOTE — CASE MANAGEMENT/SOCIAL WORK
Case Management Discharge Note                Selected Continued Care - Admitted Since 2/16/2023     Destination    No services have been selected for the patient.              Durable Medical Equipment    No services have been selected for the patient.              Dialysis/Infusion    No services have been selected for the patient.              Home Medical Care    No services have been selected for the patient.              Therapy    No services have been selected for the patient.              Community Resources    No services have been selected for the patient.              [facility]    No services have been selected for the patient.                  Transportation Services  Private: Car    Final Discharge Disposition Code: 01 - home or self-care

## 2023-02-18 NOTE — PLAN OF CARE
"Goal Outcome Evaluation:              Outcome Evaluation: VSS dring shift, rested well. Completed \"pill cam\" and EGD today. Tolerating advanced diet. Plan to DC tomorrow pending placement.  "

## 2023-02-19 ENCOUNTER — READMISSION MANAGEMENT (OUTPATIENT)
Dept: CALL CENTER | Facility: HOSPITAL | Age: 78
End: 2023-02-19
Payer: MEDICARE

## 2023-02-19 NOTE — OUTREACH NOTE
Prep Survey    Flowsheet Row Responses   [facility] facility patient discharged from? [facility]   Is LACE score < 7 ? No   Eligibility Readm Mgmt   Discharge diagnosis Upper GI bleed   Does the patient have one of the following disease processes/diagnoses(primary or secondary)? Other   Does the patient have Home health ordered? No   Is there a DME ordered? No   Prep survey completed? Yes          Tarah LEWIS - Registered Nurse

## 2023-02-20 LAB — REF LAB TEST METHOD: NORMAL

## 2023-02-21 ENCOUNTER — DOCUMENTATION (OUTPATIENT)
Dept: CARDIOLOGY | Facility: HOSPITAL | Age: 78
End: 2023-02-21
Payer: MEDICARE

## 2023-02-21 NOTE — PROGRESS NOTES
CREST 2 PI and IMM note    Mr. Plummer is being referred to [facility] for further studies and will hold ASA for 1 week.     I have already signed NARAYAN form (last week).    I will continue to follow in CREST 2 trial.

## 2023-02-22 ENCOUNTER — READMISSION MANAGEMENT (OUTPATIENT)
Dept: CALL CENTER | Facility: HOSPITAL | Age: 78
End: 2023-02-22
Payer: MEDICARE

## 2023-02-22 NOTE — OUTREACH NOTE
Medical Week 1 Survey    Flowsheet Row Responses   [facility] patient discharged from? [facility]   Does the patient have one of the following disease processes/diagnoses(primary or secondary)? Other   Week 1 attempt successful? No   Unsuccessful attempts Attempt 1          DIVINE CHAMBERS - Registered Nurse

## 2023-02-28 ENCOUNTER — READMISSION MANAGEMENT (OUTPATIENT)
Dept: CALL CENTER | Facility: HOSPITAL | Age: 78
End: 2023-02-28
Payer: MEDICARE

## 2023-02-28 NOTE — OUTREACH NOTE
"Medical Week 2 Survey    Flowsheet Row Responses   [facility] patient discharged from? [facility]   Does the patient have one of the following disease processes/diagnoses(primary or secondary)? Other   Week 2 attempt successful? Yes   Call start time 1040   Discharge diagnosis Upper GI bleed   Call end time 1051   Person spoke with today (if not patient) and relationship Pat-spouse    Meds reviewed with patient/caregiver? Yes   Is the patient having any side effects they believe may be caused by any medication additions or changes? No   Does the patient have all medications ordered at discharge? N/A   Prescription comments Pt was to start aspirin back on 2/25/23. RN went over instructions with Pat. She verbalized understanding and will have pt start back on 81 mg of aspirin today.   Is the patient taking all medications as directed (includes completed medication regime)? Yes   Comments regarding appointments Appt with nephrology is on 2/23/23   Does the patient have a primary care provider?  Yes   Comments regarding PCP Pt has followed up with PCP since discharge on 2/18/23   Has the patient kept scheduled appointments due by today? Yes   Psychosocial issues? No   Did the patient receive a copy of their discharge instructions? Yes   Nursing interventions Reviewed instructions with patient   What is the patient's perception of their health status since discharge? Improving  [Wife states, \"he has good days and bad days\". He's weak.]   Is the patient/caregiver able to teach back signs and symptoms related to disease process for when to call PCP? Yes   Is the patient/caregiver able to teach back signs and symptoms related to disease process for when to call 911? Yes   Is the patient/caregiver able to teach back the hierarchy of who to call/visit for symptoms/problems? PCP, Specialist, Home health nurse, Urgent Care, ED, 911 Yes   Week 2 Call Completed? Yes   Is the patient interested in additional calls from an " ambulatory ?  NOTE:  applies to high risk patients requiring additional follow-up. No          Lupe W - Registered Nurse   English

## 2023-03-08 ENCOUNTER — READMISSION MANAGEMENT (OUTPATIENT)
Dept: CALL CENTER | Facility: HOSPITAL | Age: 78
End: 2023-03-08
Payer: MEDICARE

## 2023-03-08 NOTE — OUTREACH NOTE
Medical Week 3 Survey    Flowsheet Row Responses   [facility] patient discharged from? [facility]   Does the patient have one of the following disease processes/diagnoses(primary or secondary)? Other   Week 3 attempt successful? Yes   Call start time 1605   Call end time 1607   Discharge diagnosis Upper GI bleed   Person spoke with today (if not patient) and relationship Pat-spouse    Meds reviewed with patient/caregiver? Yes   Is the patient having any side effects they believe may be caused by any medication additions or changes? No   Does the patient have all medications ordered at discharge? Yes   Is the patient taking all medications as directed (includes completed medication regime)? Yes   Comments regarding appointments Pt has followed up with PCP since discharge on 2/18/23   Does the patient have a primary care provider?  Yes   Has the patient kept scheduled appointments due by today? Yes   Has home health visited the patient within 72 hours of discharge? N/A   Psychosocial issues? No   Did the patient receive a copy of their discharge instructions? Yes   Nursing interventions Reviewed instructions with patient   What is the patient's perception of their health status since discharge? Improving   Is the patient/caregiver able to teach back signs and symptoms related to disease process for when to call PCP? Yes   Is the patient/caregiver able to teach back signs and symptoms related to disease process for when to call 911? Yes   Is the patient/caregiver able to teach back the hierarchy of who to call/visit for symptoms/problems? PCP, Specialist, Home health nurse, Urgent Care, ED, 911 Yes   Week 3 Call Completed? Yes   Graduated Yes  [risk score 4% LACE 7.]   Is the patient interested in additional calls from an ambulatory ?  NOTE:  applies to high risk patients requiring additional follow-up. No   Wrap up additional comments Brief call, pt. in car with spouse, reports still weak but has  improved. Pt. has gastro appt. 3/9/23. Pt. denies any questions. Advised any worsening symptoms go to ED, any questions contact PCP.           Angelica AKINS - Registered Nurse

## 2023-03-21 ENCOUNTER — OFFICE VISIT (OUTPATIENT)
Dept: GASTROENTEROLOGY | Facility: CLINIC | Age: 78
End: 2023-03-21
Payer: MEDICARE

## 2023-03-21 VITALS
HEIGHT: 74 IN | TEMPERATURE: 96.9 F | SYSTOLIC BLOOD PRESSURE: 134 MMHG | WEIGHT: 214 LBS | DIASTOLIC BLOOD PRESSURE: 62 MMHG | HEART RATE: 55 BPM | BODY MASS INDEX: 27.46 KG/M2

## 2023-03-21 DIAGNOSIS — K55.20 ANGIODYSPLASIA: ICD-10-CM

## 2023-03-21 DIAGNOSIS — D50.0 ANEMIA, BLOOD LOSS: Primary | ICD-10-CM

## 2023-03-21 DIAGNOSIS — K59.04 CHRONIC IDIOPATHIC CONSTIPATION: ICD-10-CM

## 2023-03-21 PROCEDURE — 1159F MED LIST DOCD IN RCRD: CPT | Performed by: INTERNAL MEDICINE

## 2023-03-21 PROCEDURE — 1160F RVW MEDS BY RX/DR IN RCRD: CPT | Performed by: INTERNAL MEDICINE

## 2023-03-21 PROCEDURE — 99213 OFFICE O/P EST LOW 20 MIN: CPT | Performed by: INTERNAL MEDICINE

## 2023-03-21 PROCEDURE — 3078F DIAST BP <80 MM HG: CPT | Performed by: INTERNAL MEDICINE

## 2023-03-21 PROCEDURE — 3075F SYST BP GE 130 - 139MM HG: CPT | Performed by: INTERNAL MEDICINE

## 2023-03-21 RX ORDER — ZINC OXIDE 13 %
1 CREAM (GRAM) TOPICAL DAILY
COMMUNITY

## 2023-03-21 NOTE — PROGRESS NOTES
Follow Up Note     Date: 2023   Patient Name: Travon Plummer  MRN: [id number]  : 1945     Referring Physician: Chilango Erickson MD    Chief Complaint:    Chief Complaint   Patient presents with   • Hospital Follow Up Visit   • Anemia     Iron deficiency     • GI Bleeding       Interval History:   3/21/2023  Travon Plummer is a 78 y.o. male who is here today for follow up after his recent GI bleed.  No further history suggestive any GI bleed.  He had a visit to GI advanced endoscopy at [facility] and has an appointment for patient uroscopy next month.     2023  This is a 78-year-old male patient with a prior history of hypertension, hyperlipidemia, coronary artery disease, prior history of CVA, paroxysmal atrial fibrillation not on any anticoagulation, history of prostate cancer in  status post radiation treatment, renal cell cancer status post radical left nephrectomy in , CKD was sent from nephrology office for further evaluation management of his anemia noted with lab work      He has a chronic anemia with a CKD.  He was followed by Dr. Sellers.  Blood work done showed a low hemoglobin and sent to emergency room.  Patient admits that he has been passing black stools many months as  was also taking iron pills.  He did not see any difference in the color of the stool.  Denies any red blood or clots.  He denies any abdominal pain, diarrhea, nausea vomiting.  He states that he normally gets a pellet-like dark stool and feels constipated.  Last good bowel movement for 4 days ago.     He is currently on baby aspirin otherwise not on any blood thinners.  Denies any NSAID use.  No prior history of any peptic ulcer disease or GI bleed.  He had a EGD colonoscopy done by Dr. Sandra Do in 2021 for evaluation of iron deficiency anemia and heme positive stool.  EGD showed a nonobstructing distal esophageal ring was unremarkable.  Colonoscopy with  suboptimal bowel prep and dark liquid stool noted in the colon and 1 benign polyp removed.     In the emergency room he was noted to have a hemoglobin of 5.6 g/dL with normal platelet counts.  His BUN was 38 creatinine was 3.64.  His recent CT abdomen pelvis was on 2/9/2023 unremarkable except asymptomatic gallstones and possible cystitis.  GI consulted for further evaluation.     Subjective      Past Medical History:   Past Medical History:   Diagnosis Date   • Broken arm    • Cerebrovascular accident (HCC) 10/31/2008   • Coronary artery disease    • Dyspnea    • Full dentures 08/23/2021   • GERD (gastroesophageal reflux disease)    • Gout    • History of blood transfusion    • Hypercholesterolemia    • Hypertension 11/10/2015    History of hypertension; hypotensive at the time of office visit on 11/10/2015.   • Impaired mobility    • Obesity    • Osteoarthritis    • Paroxysmal atrial fibrillation (HCC)     History of paroxysmal atrial fibrillation, data deficit.   • Prostate cancer (HCC) 01/2015    Prostate cancer, diagnosed January of 2015, status post radiation therapy x39 treatments, 07/01/2015 at [facility].   • Renal cell carcinoma (HCC)     Renal cell carcinoma, diagnosed March of 2015, status post left nephrectomy.  Elevated creatinine of 2.1 on labs performed 11/04/2015.     Past Surgical History:   Past Surgical History:   Procedure Laterality Date   • CAROTID STENT Left 10/31/2008    PTA/left carotid artery stent, 10/31/2008.    • COLONOSCOPY N/A 12/23/2021    Procedure: COLONOSCOPY, polypectomy, clip placement x 1;  Surgeon: Deandra Do MD;  Location: [facility] ENDOSCOPY;  Service: Gastroenterology;  Laterality: N/A;   • COLONOSCOPY W/ POLYPECTOMY     • CORONARY ANGIOPLASTY WITH STENT PLACEMENT      A 3.5 x 13 mm. Cypher ESTIVEN to RCA expanded with 4 mm balloon.    • ENDOSCOPY N/A 12/22/2021    Procedure: ESOPHAGOGASTRODUODENOSCOPY with biopsy;  Surgeon: Deandra Do MD;  Location: [facility]  ENDOSCOPY;  Service: Gastroenterology;  Laterality: N/A;   • ENDOSCOPY N/A 2/17/2023    Procedure: ESOPHAGOGASTRODUODENOSCOPY with biopsy;  Surgeon: Georgina Pool MD;  [facility];  Service: Gastroenterology;  Laterality: N/A;   • INGUINAL HERNIA REPAIR     • NEPHRECTOMY Left 03/19/2015    diagnosed January 2015, status post left radical nephrectomy.    • PROSTATE FIDUCIAL MARKER PLACEMENT  2016    received XRT for prostate CA in 2016       Family History:   Family History   Problem Relation Age of Onset   • No Known Problems Mother    • No Known Problems Father    • Colon cancer Neg Hx        Social History:   Social History     Socioeconomic History   • Marital status:    Tobacco Use   • Smoking status: Former     Types: Cigarettes     Quit date: 2008     Years since quitting: 15.2   • Smokeless tobacco: Former     Types: Chew   Vaping Use   • Vaping Use: Never used   Substance and Sexual Activity   • Alcohol use: Not Currently     Comment: rare   • Drug use: No   • Sexual activity: Defer       Medications:     Current Outpatient Medications:   •  amLODIPine (NORVASC) 10 MG tablet, TAKE ONE TABLET BY MOUTH EVERY DAY, Disp: 90 tablet, Rfl: 3  •  aspirin 81 MG EC tablet, Take 1 tablet by mouth Daily., Disp: 30 tablet, Rfl: 0  •  carvedilol (COREG) 6.25 MG tablet, Take 1 tablet by mouth 2 (Two) Times a Day With Meals., Disp: 60 tablet, Rfl: 6  •  Cholecalciferol (Vitamin D) 50 MCG (2000 UT) capsule, Take 1 capsule by mouth Daily., Disp: , Rfl:   •  ferrous sulfate 325 (65 FE) MG tablet, Take 1 tablet by mouth Daily With Breakfast., Disp: , Rfl:   •  levothyroxine (SYNTHROID, LEVOTHROID) 25 MCG tablet, Take 1 tablet by mouth Daily. LEVOXYL, Disp: , Rfl: 0  •  pantoprazole (PROTONIX) 40 MG EC tablet, TAKE 1 TABLET BY MOUTH ONCE DAILY, Disp: 30 tablet, Rfl: 11  •  Probiotic Product (Probiotic Daily) capsule, Take 1 capsule by mouth Daily., Disp: , Rfl:   •  rosuvastatin (CRESTOR) 40 MG  "tablet, Take 1 tablet by mouth Every Night., Disp: 90 tablet, Rfl: 3  •  sodium bicarbonate 650 MG tablet, Take 1 tablet by mouth 2 (Two) Times a Day., Disp: , Rfl:   •  vitamin B-12 (CYANOCOBALAMIN) 100 MCG tablet, Take 1 tablet by mouth Daily., Disp: , Rfl:   •  triamcinolone (KENALOG) 0.1 % ointment, APPLY TWICE DAILY to itchy spots on hands and arms AS DIRECTED, Disp: , Rfl:     Allergies:   Allergies   Allergen Reactions   • Allopurinol Urinary Retention   • Lipitor [Atorvastatin] Myalgia       Review of Systems:   Review of Systems   Constitutional: Positive for fatigue. Negative for appetite change, fever and unexpected weight loss.   HENT: Negative for trouble swallowing.    Gastrointestinal: Positive for abdominal pain. Negative for abdominal distention, anal bleeding, blood in stool, constipation, diarrhea, nausea, rectal pain, vomiting, GERD and indigestion.       The following portions of the patient's history were reviewed and updated as appropriate: allergies, current medications, past family history, past medical history, past social history, past surgical history and problem list.    Objective     Physical Exam:  Vital Signs:   Vitals:    03/21/23 1518   BP: 134/62   Pulse: 55   Temp: 96.9 °F (36.1 °C)   TempSrc: Infrared   Weight: 97.1 kg (214 lb)   Height: 188 cm (74\")       Physical Exam  HENT:      Head: Normocephalic and atraumatic.   Eyes:      Comments: Mild pallor present   Abdominal:      General: Abdomen is flat. There is no distension.      Palpations: There is no mass.      Tenderness: There is no abdominal tenderness. There is no guarding or rebound.      Hernia: No hernia is present.   Musculoskeletal:      Cervical back: Normal range of motion and neck supple.   Neurological:      Mental Status: He is alert.         Results Review:   I reviewed the patient's new clinical results.    Admission on 02/16/2023, Discharged on 02/18/2023   Component Date Value Ref Range Status   • Extra Tube " 02/16/2023 Hold for add-ons.   Final    Auto resulted.   • Extra Tube 02/16/2023 hold for add-on   Final    Auto resulted   • Extra Tube 02/16/2023 Hold for add-ons.   Final    Auto resulted.   • Extra Tube 02/16/2023 Hold for add-ons.   Final    Auto resulted   • Glucose 02/16/2023 137 (H)  65 - 99 mg/dL Final   • BUN 02/16/2023 38 (H)  8 - 23 mg/dL Final   • Creatinine 02/16/2023 3.64 (H)  0.76 - 1.27 mg/dL Final   • Sodium 02/16/2023 141  136 - 145 mmol/L Final   • Potassium 02/16/2023 4.0  3.5 - 5.2 mmol/L Final   • Chloride 02/16/2023 109 (H)  98 - 107 mmol/L Final   • CO2 02/16/2023 21.7 (L)  22.0 - 29.0 mmol/L Final   • Calcium 02/16/2023 8.6  8.6 - 10.5 mg/dL Final   • Total Protein 02/16/2023 5.9 (L)  6.0 - 8.5 g/dL Final   • Albumin 02/16/2023 3.4 (L)  3.5 - 5.2 g/dL Final   • ALT (SGPT) 02/16/2023 7  1 - 41 U/L Final   • AST (SGOT) 02/16/2023 11  1 - 40 U/L Final   • Alkaline Phosphatase 02/16/2023 81  39 - 117 U/L Final   • Total Bilirubin 02/16/2023 0.2  0.0 - 1.2 mg/dL Final   • Globulin 02/16/2023 2.5  gm/dL Final   • A/G Ratio 02/16/2023 1.4  g/dL Final   • BUN/Creatinine Ratio 02/16/2023 10.4  7.0 - 25.0 Final   • Anion Gap 02/16/2023 10.3  5.0 - 15.0 mmol/L Final   • eGFR 02/16/2023 16.3 (L)  >60.0 mL/min/1.73 Final   • Fecal Occult Blood 02/16/2023 Positive (A)  Negative Final   • Lipase 02/16/2023 74 (H)  13 - 60 U/L Final   • proBNP 02/16/2023 3,689.0 (H)  0.0 - 1,800.0 pg/mL Final   • Magnesium 02/16/2023 1.8  1.6 - 2.4 mg/dL Final   • Phosphorus 02/16/2023 3.2  2.5 - 4.5 mg/dL Final   • HS Troponin T 02/16/2023 53 (C)  <15 ng/L Final   • WBC 02/16/2023 9.69  3.40 - 10.80 10*3/mm3 Final   • RBC 02/16/2023 1.92 (L)  4.14 - 5.80 10*6/mm3 Final   • Hemoglobin 02/16/2023 5.6 (C)  13.0 - 17.7 g/dL Final   • Hematocrit 02/16/2023 18.6 (C)  37.5 - 51.0 % Final   • MCV 02/16/2023 96.9  79.0 - 97.0 fL Final   • MCH 02/16/2023 29.2  26.6 - 33.0 pg Final   • MCHC 02/16/2023 30.1 (L)  31.5 - 35.7 g/dL  Final   • RDW 02/16/2023 15.9 (H)  12.3 - 15.4 % Final   • RDW-SD 02/16/2023 55.1 (H)  37.0 - 54.0 fl Final   • MPV 02/16/2023 12.0  6.0 - 12.0 fL Final   • Platelets 02/16/2023 160  140 - 450 10*3/mm3 Final   • Neutrophil % 02/16/2023 70.3  42.7 - 76.0 % Final   • Lymphocyte % 02/16/2023 7.4 (L)  19.6 - 45.3 % Final   • Monocyte % 02/16/2023 7.3  5.0 - 12.0 % Final   • Eosinophil % 02/16/2023 13.8 (H)  0.3 - 6.2 % Final   • Basophil % 02/16/2023 0.7  0.0 - 1.5 % Final   • Immature Grans % 02/16/2023 0.5  0.0 - 0.5 % Final   • Neutrophils, Absolute 02/16/2023 6.80  1.70 - 7.00 10*3/mm3 Final   • Lymphocytes, Absolute 02/16/2023 0.72  0.70 - 3.10 10*3/mm3 Final   • Monocytes, Absolute 02/16/2023 0.71  0.10 - 0.90 10*3/mm3 Final   • Eosinophils, Absolute 02/16/2023 1.34 (H)  0.00 - 0.40 10*3/mm3 Final   • Basophils, Absolute 02/16/2023 0.07  0.00 - 0.20 10*3/mm3 Final   • Immature Grans, Absolute 02/16/2023 0.05  0.00 - 0.05 10*3/mm3 Final   • nRBC 02/16/2023 0.0  0.0 - 0.2 /100 WBC Final   • ABO Type 02/16/2023 O   Final   • RH type 02/16/2023 Positive   Final   • Antibody Screen 02/16/2023 Negative   Final   • T&S Expiration Date 02/16/2023 2/19/2023 11:59:59 PM   Final   • Product Code 02/18/2023 C1041Q27   Final   • Unit Number 02/18/2023 [id number]   Final   • UNIT  ABO 02/18/2023 O   Final   • UNIT  RH 02/18/2023 POS   Final   • Crossmatch Interpretation 02/18/2023 Compatible   Final   • Dispense Status 02/18/2023 PT   Final   • Blood Expiration Date 02/18/2023 202303202359   Final   • Blood Type Barcode 02/18/2023 5100   Final   • Product Code 02/18/2023 R5292I15   Final   • Unit Number 02/18/2023 [id number]   Final   • UNIT  ABO 02/18/2023 O   Final   • UNIT  RH 02/18/2023 POS   Final   • Crossmatch Interpretation 02/18/2023 Compatible   Final   • Dispense Status 02/18/2023 PT   Final   • Blood Expiration Date 02/18/2023 202303162359   Final   • Blood Type Barcode 02/18/2023 5100   Final   • HS  Troponin T 02/16/2023 51 (H)  <15 ng/L Final   • Hemoglobin 02/16/2023 6.6 (C)  13.0 - 17.7 g/dL Final   • Glucose 02/16/2023 122  70 - 130 mg/dL Final    Serial Number: [id number]:  [id number]   • Glucose 02/17/2023 95  65 - 99 mg/dL Final   • BUN 02/17/2023 36 (H)  8 - 23 mg/dL Final   • Creatinine 02/17/2023 3.49 (H)  0.76 - 1.27 mg/dL Final   • Sodium 02/17/2023 144  136 - 145 mmol/L Final   • Potassium 02/17/2023 4.1  3.5 - 5.2 mmol/L Final   • Chloride 02/17/2023 112 (H)  98 - 107 mmol/L Final   • CO2 02/17/2023 21.6 (L)  22.0 - 29.0 mmol/L Final   • Calcium 02/17/2023 8.9  8.6 - 10.5 mg/dL Final   • BUN/Creatinine Ratio 02/17/2023 10.3  7.0 - 25.0 Final   • Anion Gap 02/17/2023 10.4  5.0 - 15.0 mmol/L Final   • eGFR 02/17/2023 17.2 (L)  >60.0 mL/min/1.73 Final   • Protime 02/17/2023 14.3  12.5 - 14.5 Seconds Final   • INR 02/17/2023 1.07  0.90 - 1.10 Final   • PTT 02/17/2023 29.7  23.5 - 35.5 seconds Final   • WBC 02/17/2023 9.52  3.40 - 10.80 10*3/mm3 Final   • RBC 02/17/2023 2.55 (L)  4.14 - 5.80 10*6/mm3 Final   • Hemoglobin 02/17/2023 7.4 (L)  13.0 - 17.7 g/dL Final   • Hematocrit 02/17/2023 23.8 (L)  37.5 - 51.0 % Final   • MCV 02/17/2023 93.3  79.0 - 97.0 fL Final   • MCH 02/17/2023 29.0  26.6 - 33.0 pg Final   • MCHC 02/17/2023 31.1 (L)  31.5 - 35.7 g/dL Final   • RDW 02/17/2023 16.0 (H)  12.3 - 15.4 % Final   • RDW-SD 02/17/2023 54.2 (H)  37.0 - 54.0 fl Final   • MPV 02/17/2023 12.0  6.0 - 12.0 fL Final   • Platelets 02/17/2023 154  140 - 450 10*3/mm3 Final   • Neutrophil % 02/17/2023 68.2  42.7 - 76.0 % Final   • Lymphocyte % 02/17/2023 7.7 (L)  19.6 - 45.3 % Final   • Monocyte % 02/17/2023 9.0  5.0 - 12.0 % Final   • Eosinophil % 02/17/2023 13.6 (H)  0.3 - 6.2 % Final   • Basophil % 02/17/2023 1.1  0.0 - 1.5 % Final   • Immature Grans % 02/17/2023 0.4  0.0 - 0.5 % Final   • Neutrophils, Absolute 02/17/2023 6.50  1.70 - 7.00 10*3/mm3 Final   • Lymphocytes, Absolute 02/17/2023 0.73  0.70 - 3.10  10*3/mm3 Final   • Monocytes, Absolute 2023 0.86  0.10 - 0.90 10*3/mm3 Final   • Eosinophils, Absolute 2023 1.29 (H)  0.00 - 0.40 10*3/mm3 Final   • Basophils, Absolute 2023 0.10  0.00 - 0.20 10*3/mm3 Final   • Immature Grans, Absolute 2023 0.04  0.00 - 0.05 10*3/mm3 Final   • nRBC 2023 0.0  0.0 - 0.2 /100 WBC Final   • Glucose 2023 98  70 - 130 mg/dL Final    Serial Number: [id number]:  [id number]   • Reference Lab Report 2023    Final                    Value:[facility]  [address] [place] [address]  Phone: [phone] or [phone]  Fax: [phone]  Shane Reyes M.D., Medical Director    PATIENT NAME                                     LABORATORY NO.  [id number]   JOSE ABDALLA.                        [id number]  [id number]                                 AGE                    SEX   N              CLIENT REF #  [place] [id number]        1945           [id number]      [id number]    [address]                        REQUESTING M.D.           ATTENDING M.D.         COPY TO.   [address]                                [place] FARRBBOUR, R[place]W        [address]  DATE COLLECTED            DATE RECEIVED          DATE REPORTED  2023    DIAGNOSIS:  ANTRUM AND BODY, BIOPSY:  Gastric antral type mucosa with mild chronic                           inactive gastritis  Negative for intestinal metaplasia or dysplasia  No Helicobacter pylori-like organisms seen    MANOLO    CLINICAL HISTORY:  Melena, symptomatic anemia    SPECIMENS RECEIVED:  ANTRUM AND BODY, BIOPSY    MICROSCOPIC DESCRIPTION:  Tissue blocks are prepared and slides are examined microscopically on all  specimens. See diagnosis for details.    Professional interpretation rendered by Carter JIMENEZ  "TENNILLE Chavez, FVINNIEAOlyaP. at [facility] [employer], [address] [place] [address].    GROSS DESCRIPTION:  Labeled \"gastric antrum and body\".  Consists of 2 pieces of tan soft tissue  measuring 0.4 x 0.2 x 0.2 cm in aggregate and is submitted entirely in 1 block.  INO    REVIEWED, DIAGNOSED AND ELECTRONICALLY  SIGNED BY:    Carter Chavez M.D., CHRIS.  CPT CODES:  95659     • Glucose 02/17/2023 100  70 - 130 mg/dL Final    Serial Number: [id number]:  [id number]   • Glucose 02/17/2023 103  70 - 130 mg/dL Final    Serial Number: [id number]:  [id number]   • Hemoglobin 02/17/2023 7.9 (L)  13.0 - 17.7 g/dL Final   • Hematocrit 02/17/2023 25.0 (L)  37.5 - 51.0 % Final   • Glucose 02/17/2023 126  70 - 130 mg/dL Final    Serial Number: [id number]:  [id number]   • Glucose 02/18/2023 96  65 - 99 mg/dL Final   • BUN 02/18/2023 35 (H)  8 - 23 mg/dL Final   • Creatinine 02/18/2023 3.43 (H)  0.76 - 1.27 mg/dL Final   • Sodium 02/18/2023 141  136 - 145 mmol/L Final   • Potassium 02/18/2023 4.3  3.5 - 5.2 mmol/L Final   • Chloride 02/18/2023 110 (H)  98 - 107 mmol/L Final   • CO2 02/18/2023 23.4  22.0 - 29.0 mmol/L Final   • Calcium 02/18/2023 9.1  8.6 - 10.5 mg/dL Final   • Albumin 02/18/2023 3.3 (L)  3.5 - 5.2 g/dL Final   • Phosphorus 02/18/2023 3.6  2.5 - 4.5 mg/dL Final   • Anion Gap 02/18/2023 7.6  5.0 - 15.0 mmol/L Final   • BUN/Creatinine Ratio 02/18/2023 10.2  7.0 - 25.0 Final   • eGFR 02/18/2023 17.6 (L)  >60.0 mL/min/1.73 Final   • WBC 02/18/2023 8.84  3.40 - 10.80 10*3/mm3 Final   • RBC 02/18/2023 2.61 (L)  4.14 - 5.80 10*6/mm3 Final   • Hemoglobin 02/18/2023 7.7 (L)  13.0 - 17.7 g/dL Final   • Hematocrit 02/18/2023 24.3 (L)  37.5 - 51.0 % Final   • MCV 02/18/2023 93.1  79.0 - 97.0 fL Final   • MCH 02/18/2023 29.5  26.6 - 33.0 pg Final   • MCHC 02/18/2023 31.7  31.5 - 35.7 g/dL Final   • RDW 02/18/2023 15.8 (H)  12.3 - 15.4 % Final   • RDW-SD 02/18/2023 53.1  37.0 - 54.0 fl Final   • MPV 02/18/2023 11.9  6.0 - " 12.0 fL Final   • Platelets 02/18/2023 158  140 - 450 10*3/mm3 Final   • Neutrophil % 02/18/2023 72.0  42.7 - 76.0 % Final   • Lymphocyte % 02/18/2023 6.7 (L)  19.6 - 45.3 % Final   • Monocyte % 02/18/2023 8.9  5.0 - 12.0 % Final   • Eosinophil % 02/18/2023 10.9 (H)  0.3 - 6.2 % Final   • Basophil % 02/18/2023 0.8  0.0 - 1.5 % Final   • Immature Grans % 02/18/2023 0.7 (H)  0.0 - 0.5 % Final   • Neutrophils, Absolute 02/18/2023 6.37  1.70 - 7.00 10*3/mm3 Final   • Lymphocytes, Absolute 02/18/2023 0.59 (L)  0.70 - 3.10 10*3/mm3 Final   • Monocytes, Absolute 02/18/2023 0.79  0.10 - 0.90 10*3/mm3 Final   • Eosinophils, Absolute 02/18/2023 0.96 (H)  0.00 - 0.40 10*3/mm3 Final   • Basophils, Absolute 02/18/2023 0.07  0.00 - 0.20 10*3/mm3 Final   • Immature Grans, Absolute 02/18/2023 0.06 (H)  0.00 - 0.05 10*3/mm3 Final   • nRBC 02/18/2023 0.0  0.0 - 0.2 /100 WBC Final   • Glucose 02/18/2023 101  70 - 130 mg/dL Final    Serial Number: [id number]:  [id number]   • Glucose 02/18/2023 106  70 - 130 mg/dL Final    Serial Number: [id number]:  [id number]      CT Abdomen Pelvis Without Contrast    Result Date: 2/9/2023  Gallstones.  Possible cystitis. Authenticated and Electronically Signed by Varun Quinn M.D. on 02/09/2023 08:28:45 PM    XR Abdomen 1 View    Result Date: 3/9/2023  No retained capsule. Focal dilatation of a single small bowel loop in the right mid abdomen, of uncertain clinical significance. CRITICAL RESULT:   No. No dilated loops of small and large bowel. COMMUNICATION: Per this written report. Approved by Estevan Clark DO on 3/9/2023 10:54 AM By electronically signing this report, I, the attending physician, attest that I have personally reviewed the images/data for the above examination(s) and agree with the final edited report. Dictated by Estevan Clark DO on 3/9/2023 10:54 AM Signed by Reena Young MD on 3/9/2023 4:03 PM    XR Chest 1 View    Result Date: 2/16/2023  Stable chest..    This  report was signed and finalized on 2/16/2023 3:11 PM by Alice Castillo MD.    EGD on 2/17/2023  - Normal oropharynx.  - Z-line regular, 42 cm from the incisors.  - No gross lesions in the entire esophagus.  - Erythematous mucosa in the posterior wall of the stomach, antrum and prepyloric region of the stomach.  Biopsied. 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 Previously biopsied.  - Normal first portion of the duodenum, second portion of the duodenum and third portion of the duodenum     Small bowel PillCam study on 2/7/2023  PillCam still in the distal ileum, ??  TI at the end of the recording makes accurate location of the abnormality is difficult.  Couple of small and tiny nonbleeding AVMs in the distal duodenum and the proximal jejunum  Bleeding AVMs in the ileum at least 2 areas filled with clots and red blood.  Multiple nonbleeding AVMs in the ileum mostly in the mid and the distal ileum.  This AVMs can be approached only with balloon assisted enteroscopy for intervention.    Assessment / Plan      1.  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  2.  History of chronic iron deficiency anemia  3.  History of CKD 4  4.  Chronic idiopathic constipation  3/21/2023  No further signs of GI bleeding.  Has been having good bowel movement now.  Has been seen by advanced endoscopy at [facility] for possible consideration of balloon assisted small bowel enteroscopy and scheduled for the procedure next month.  He has been started back on aspirin.  Last hemoglobin on 3/9/2023 was 8.3 g/dL slightly improved from 7.  We will continue iron pills.  He has a iron infusion scheduled for April 11 with a follow-up at renal clinic.  We will continue until  Given high risk of gastric erosions erosive gastropathy with ASA will continue his Protonix  Follow-up after his balloon assisted enteroscopy    2/18/2023  He had an EGD done on  2/17/2023 which revealed moderate distal aspirin due to gastropathy otherwise unremarkable.  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  Otherwise unremarkable.       He had a small bowel PillCam study followed by EGD.  He had a delayed transit of the PillCam in the distal ileum with poor views in the distal small bowel.  It appeared PillCam was still in the distal ileum at the end of 7-1/2 hours recorded.  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  This is more suggestive of bleeding AVMs.  Patient likely had intermittent minimal upper GI bleed with drop in his H&H for the last few years. Given his single kidney and CKD he is not a candidate for any IR guided intervention.     Patient has a chronic anemia over 6 years now.  His hemoglobin runs about 11 to 12 g/dL before however since 2021 his hemoglobin dropped a few occasions between 5 to 6 g/dL.  He had a positive Hemoccult stool test in 2021 and had a EGD colonoscopy done for further evaluation by Dr. Sandra Do which did not reveal any obvious source of bleeding.  Recent CT abdomen without contrast done week ago was unremarkable except asymptomatic gallstones.       Prior history  5.  History of CAD status post coronary artery stent  6.  History of paroxysmal A-fib  7.  History of radical left nephrectomy for RCC  8.  History of prostate cancer with radiation treatment          Follow Up:   No follow-ups on file.    Georgina Pool MD  Gastroenterology Lead  3/21/2023  15:25 EDT     Please note that portions of this note may have been completed with a voice recognition program.

## 2023-03-28 RX ORDER — METHYLPREDNISOLONE SODIUM SUCCINATE 125 MG/2ML
125 INJECTION, POWDER, LYOPHILIZED, FOR SOLUTION INTRAMUSCULAR; INTRAVENOUS AS NEEDED
Status: CANCELLED | OUTPATIENT
Start: 2023-03-29

## 2023-03-28 RX ORDER — SODIUM CHLORIDE 9 MG/ML
250 INJECTION, SOLUTION INTRAVENOUS ONCE
Status: CANCELLED | OUTPATIENT
Start: 2023-03-29 | End: 2023-03-29

## 2023-03-28 RX ORDER — DIPHENHYDRAMINE HYDROCHLORIDE 50 MG/ML
50 INJECTION INTRAMUSCULAR; INTRAVENOUS AS NEEDED
Status: CANCELLED | OUTPATIENT
Start: 2023-03-29

## 2023-03-29 ENCOUNTER — HOSPITAL ENCOUNTER (OUTPATIENT)
Dept: INFUSION THERAPY | Facility: HOSPITAL | Age: 78
End: 2023-03-29
Payer: MEDICARE

## 2023-03-30 ENCOUNTER — HOSPITAL ENCOUNTER (OUTPATIENT)
Dept: INFUSION THERAPY | Facility: HOSPITAL | Age: 78
Discharge: HOME | End: 2023-03-30
Admitting: PHYSICIAN ASSISTANT
Payer: MEDICARE

## 2023-03-30 VITALS
TEMPERATURE: 97 F | HEART RATE: 62 BPM | DIASTOLIC BLOOD PRESSURE: 66 MMHG | RESPIRATION RATE: 22 BRPM | SYSTOLIC BLOOD PRESSURE: 150 MMHG | OXYGEN SATURATION: 96 %

## 2023-03-30 DIAGNOSIS — N18.4 CHRONIC KIDNEY DISEASE, STAGE IV (SEVERE): Primary | ICD-10-CM

## 2023-03-30 PROCEDURE — 96374 THER/PROPH/DIAG INJ IV PUSH: CPT

## 2023-03-30 PROCEDURE — 25010000002 FERRIC CARBOXYMALTOSE 750 MG/15ML SOLUTION 15 ML VIAL: Performed by: PHYSICIAN ASSISTANT

## 2023-03-30 RX ORDER — METHYLPREDNISOLONE SODIUM SUCCINATE 125 MG/2ML
125 INJECTION, POWDER, LYOPHILIZED, FOR SOLUTION INTRAMUSCULAR; INTRAVENOUS AS NEEDED
Status: CANCELLED | OUTPATIENT
Start: 2023-04-06

## 2023-03-30 RX ORDER — SODIUM CHLORIDE 9 MG/ML
250 INJECTION, SOLUTION INTRAVENOUS ONCE
Status: DISCONTINUED | OUTPATIENT
Start: 2023-03-30 | End: 2023-04-01 | Stop reason: HOSPADM

## 2023-03-30 RX ORDER — DIPHENHYDRAMINE HYDROCHLORIDE 50 MG/ML
50 INJECTION INTRAMUSCULAR; INTRAVENOUS AS NEEDED
Status: CANCELLED | OUTPATIENT
Start: 2023-04-06

## 2023-03-30 RX ORDER — SODIUM CHLORIDE 9 MG/ML
250 INJECTION, SOLUTION INTRAVENOUS ONCE
Status: CANCELLED | OUTPATIENT
Start: 2023-04-06 | End: 2023-04-06

## 2023-03-30 RX ADMIN — FERRIC CARBOXYMALTOSE INJECTION 750 MG: 50 INJECTION, SOLUTION INTRAVENOUS at 11:04

## 2023-04-07 ENCOUNTER — HOSPITAL ENCOUNTER (OUTPATIENT)
Dept: INFUSION THERAPY | Facility: HOSPITAL | Age: 78
Discharge: HOME | End: 2023-04-07
Admitting: PHYSICIAN ASSISTANT
Payer: MEDICARE

## 2023-04-07 VITALS
TEMPERATURE: 98 F | SYSTOLIC BLOOD PRESSURE: 162 MMHG | DIASTOLIC BLOOD PRESSURE: 65 MMHG | OXYGEN SATURATION: 99 % | RESPIRATION RATE: 20 BRPM | HEART RATE: 59 BPM

## 2023-04-07 DIAGNOSIS — N18.4 CHRONIC KIDNEY DISEASE, STAGE IV (SEVERE): Primary | ICD-10-CM

## 2023-04-07 PROCEDURE — 25010000002 FERRIC CARBOXYMALTOSE 750 MG/15ML SOLUTION 15 ML VIAL: Performed by: PHYSICIAN ASSISTANT

## 2023-04-07 PROCEDURE — 96374 THER/PROPH/DIAG INJ IV PUSH: CPT

## 2023-04-07 RX ORDER — METHYLPREDNISOLONE SODIUM SUCCINATE 125 MG/2ML
125 INJECTION, POWDER, LYOPHILIZED, FOR SOLUTION INTRAMUSCULAR; INTRAVENOUS AS NEEDED
OUTPATIENT
Start: 2023-04-13

## 2023-04-07 RX ORDER — SODIUM CHLORIDE 9 MG/ML
250 INJECTION, SOLUTION INTRAVENOUS ONCE
Status: DISCONTINUED | OUTPATIENT
Start: 2023-04-07 | End: 2023-04-09 | Stop reason: HOSPADM

## 2023-04-07 RX ORDER — DIPHENHYDRAMINE HYDROCHLORIDE 50 MG/ML
50 INJECTION INTRAMUSCULAR; INTRAVENOUS AS NEEDED
Status: DISCONTINUED | OUTPATIENT
Start: 2023-04-07 | End: 2023-04-09 | Stop reason: HOSPADM

## 2023-04-07 RX ORDER — METHYLPREDNISOLONE SODIUM SUCCINATE 125 MG/2ML
125 INJECTION, POWDER, LYOPHILIZED, FOR SOLUTION INTRAMUSCULAR; INTRAVENOUS AS NEEDED
Status: DISCONTINUED | OUTPATIENT
Start: 2023-04-07 | End: 2023-04-09 | Stop reason: HOSPADM

## 2023-04-07 RX ORDER — DIPHENHYDRAMINE HYDROCHLORIDE 50 MG/ML
50 INJECTION INTRAMUSCULAR; INTRAVENOUS AS NEEDED
OUTPATIENT
Start: 2023-04-13

## 2023-04-07 RX ORDER — SODIUM CHLORIDE 9 MG/ML
250 INJECTION, SOLUTION INTRAVENOUS ONCE
Status: CANCELLED | OUTPATIENT
Start: 2023-04-13 | End: 2023-04-13

## 2023-04-07 RX ADMIN — FERRIC CARBOXYMALTOSE INJECTION 750 MG: 50 INJECTION, SOLUTION INTRAVENOUS at 09:23

## 2023-05-11 ENCOUNTER — OFFICE VISIT (OUTPATIENT)
Dept: GASTROENTEROLOGY | Facility: CLINIC | Age: 78
End: 2023-05-11
Payer: MEDICARE

## 2023-05-11 VITALS
WEIGHT: 203 LBS | SYSTOLIC BLOOD PRESSURE: 128 MMHG | HEIGHT: 75 IN | HEART RATE: 55 BPM | BODY MASS INDEX: 25.24 KG/M2 | DIASTOLIC BLOOD PRESSURE: 76 MMHG | TEMPERATURE: 97.7 F

## 2023-05-11 DIAGNOSIS — D12.6 ADENOMATOUS POLYP OF COLON, UNSPECIFIED PART OF COLON: Primary | ICD-10-CM

## 2023-05-11 DIAGNOSIS — D50.0 ANEMIA, BLOOD LOSS: ICD-10-CM

## 2023-05-11 DIAGNOSIS — K59.04 CHRONIC IDIOPATHIC CONSTIPATION: ICD-10-CM

## 2023-05-11 DIAGNOSIS — K55.20 ANGIODYSPLASIA OF SMALL INTESTINE: ICD-10-CM

## 2023-05-11 PROCEDURE — 1159F MED LIST DOCD IN RCRD: CPT | Performed by: INTERNAL MEDICINE

## 2023-05-11 PROCEDURE — 3078F DIAST BP <80 MM HG: CPT | Performed by: INTERNAL MEDICINE

## 2023-05-11 PROCEDURE — 3074F SYST BP LT 130 MM HG: CPT | Performed by: INTERNAL MEDICINE

## 2023-05-11 PROCEDURE — 1160F RVW MEDS BY RX/DR IN RCRD: CPT | Performed by: INTERNAL MEDICINE

## 2023-05-11 PROCEDURE — 99213 OFFICE O/P EST LOW 20 MIN: CPT | Performed by: INTERNAL MEDICINE

## 2023-05-11 RX ORDER — CARVEDILOL 6.25 MG/1
6.25 TABLET ORAL 2 TIMES DAILY WITH MEALS
Qty: 60 TABLET | Refills: 6 | Status: SHIPPED | OUTPATIENT
Start: 2023-05-11

## 2023-05-11 RX ORDER — ONDANSETRON 4 MG/1
TABLET, ORALLY DISINTEGRATING ORAL AS NEEDED
COMMUNITY
Start: 2023-04-24

## 2023-05-11 NOTE — PROGRESS NOTES
Follow Up Note     Date: 2023   Patient Name: Travon Plummer  MRN: [id number]  : 1945     Referring Physician: Chilango Erickson MD    Chief Complaint:    Chief Complaint   Patient presents with   • Follow-up   • Anemia   • Constipation       Interval History:   2023  Travon Plummer is a 78 y.o. male who is here today for follow up for his GI bleed and anemia.  He had a colonoscopy with a balloon enteroscopy done on 2023 at [facility] he is here for follow-up after the procedure.  He denies any further episodes of black stool or blood in the stool.     2023  This is a 78-year-old male patient with a prior history of hypertension, hyperlipidemia, coronary artery disease, prior history of CVA, paroxysmal atrial fibrillation not on any anticoagulation, history of prostate cancer in  status post radiation treatment, renal cell cancer status post radical left nephrectomy in , CKD was sent from nephrology office for further evaluation management of his anemia noted with lab work      He has a chronic anemia with a CKD.  He was followed by Dr. Sellers.  Blood work done showed a low hemoglobin and sent to emergency room.  Patient admits that he has been passing black stools many months as  was also taking iron pills.  He did not see any difference in the color of the stool.  Denies any red blood or clots.  He denies any abdominal pain, diarrhea, nausea vomiting.  He states that he normally gets a pellet-like dark stool and feels constipated.  Last good bowel movement for 4 days ago.     He is currently on baby aspirin otherwise not on any blood thinners.  Denies any NSAID use.  No prior history of any peptic ulcer disease or GI bleed.  He had a EGD colonoscopy done by Dr. Sandra Do in 2021 for evaluation of iron deficiency anemia and heme positive stool.  EGD showed a nonobstructing distal esophageal ring was unremarkable.  Colonoscopy with  suboptimal bowel prep and dark liquid stool noted in the colon and 1 benign polyp removed.  In the emergency room he was noted to have a hemoglobin of 5.6 g/dL with normal platelet counts.  His BUN was 38 creatinine was 3.64.  His recent CT abdomen pelvis was on 2/9/2023 unremarkable except asymptomatic gallstones and possible cystitis.  GI consulted for further evaluation.  Subjective      Past Medical History:   Past Medical History:   Diagnosis Date   • Broken arm    • Cerebrovascular accident 10/31/2008   • Coronary artery disease    • Dyspnea    • Full dentures 08/23/2021   • GERD (gastroesophageal reflux disease)    • Gout    • History of blood transfusion    • Hypercholesterolemia    • Hypertension 11/10/2015    History of hypertension; hypotensive at the time of office visit on 11/10/2015.   • Impaired mobility    • Obesity    • Osteoarthritis    • Paroxysmal atrial fibrillation     History of paroxysmal atrial fibrillation, data deficit.   • Prostate cancer 01/2015    Prostate cancer, diagnosed January of 2015, status post radiation therapy x39 treatments, 07/01/2015 at [facility].   • Renal cell carcinoma     Renal cell carcinoma, diagnosed March of 2015, status post left nephrectomy.  Elevated creatinine of 2.1 on labs performed 11/04/2015.     Past Surgical History:   Past Surgical History:   Procedure Laterality Date   • CAROTID STENT Left 10/31/2008    PTA/left carotid artery stent, 10/31/2008.    • COLONOSCOPY N/A 12/23/2021    Procedure: COLONOSCOPY, polypectomy, clip placement x 1;  Surgeon: Deandra Do MD;  Location: [facility] ENDOSCOPY;  Service: Gastroenterology;  Laterality: N/A;   • COLONOSCOPY W/ POLYPECTOMY     • CORONARY ANGIOPLASTY WITH STENT PLACEMENT      A 3.5 x 13 mm. Cypher ESTIVEN to RCA expanded with 4 mm balloon.    • ENDOSCOPY N/A 12/22/2021    Procedure: ESOPHAGOGASTRODUODENOSCOPY with biopsy;  Surgeon: Deandra Do MD;  Location: [facility] ENDOSCOPY;  Service:  Gastroenterology;  Laterality: N/A;   • ENDOSCOPY N/A 2/17/2023    Procedure: ESOPHAGOGASTRODUODENOSCOPY with biopsy;  Surgeon: Georgina Pool MD;  Location: [facility] ENDOSCOPY;  Service: Gastroenterology;  Laterality: N/A;   • INGUINAL HERNIA REPAIR     • NEPHRECTOMY Left 03/19/2015    diagnosed January 2015, status post left radical nephrectomy.    • PROSTATE FIDUCIAL MARKER PLACEMENT  2016    received XRT for prostate CA in 2016       Family History:   Family History   Problem Relation Age of Onset   • No Known Problems Mother    • No Known Problems Father    • Colon cancer Neg Hx        Social History:   Social History     Socioeconomic History   • Marital status:    Tobacco Use   • Smoking status: Former     Types: Cigarettes     Quit date: 2008     Years since quitting: 15.3   • Smokeless tobacco: Former     Types: Chew   Vaping Use   • Vaping Use: Never used   Substance and Sexual Activity   • Alcohol use: Not Currently     Comment: rare   • Drug use: No   • Sexual activity: Defer       Medications:     Current Outpatient Medications:   •  amLODIPine (NORVASC) 10 MG tablet, TAKE ONE TABLET BY MOUTH EVERY DAY, Disp: 90 tablet, Rfl: 3  •  aspirin 81 MG EC tablet, Take 1 tablet by mouth Daily., Disp: 30 tablet, Rfl: 0  •  carvedilol (COREG) 6.25 MG tablet, Take 1 tablet by mouth 2 (Two) Times a Day With Meals., Disp: 60 tablet, Rfl: 6  •  Cholecalciferol (Vitamin D) 50 MCG (2000 UT) capsule, Take 1 capsule by mouth Daily., Disp: , Rfl:   •  ferrous sulfate 325 (65 FE) MG tablet, Take 1 tablet by mouth Daily With Breakfast., Disp: , Rfl:   •  levothyroxine (SYNTHROID, LEVOTHROID) 25 MCG tablet, Take 1 tablet by mouth Daily. LEVOXYL, Disp: , Rfl: 0  •  pantoprazole (PROTONIX) 40 MG EC tablet, TAKE 1 TABLET BY MOUTH ONCE DAILY, Disp: 30 tablet, Rfl: 11  •  Probiotic Product (Probiotic Daily) capsule, Take 1 capsule by mouth Daily., Disp: , Rfl:   •  rosuvastatin (CRESTOR) 40 MG tablet, Take 1 tablet by  "mouth Every Night., Disp: 90 tablet, Rfl: 3  •  sodium bicarbonate 650 MG tablet, Take 1 tablet by mouth 2 (Two) Times a Day., Disp: , Rfl:   •  triamcinolone (KENALOG) 0.1 % ointment, APPLY TWICE DAILY to itchy spots on hands and arms AS DIRECTED, Disp: , Rfl:   •  vitamin B-12 (CYANOCOBALAMIN) 100 MCG tablet, Take 1 tablet by mouth Daily., Disp: , Rfl:   •  ondansetron ODT (ZOFRAN-ODT) 4 MG disintegrating tablet, Place  under the tongue As Needed., Disp: , Rfl:     Allergies:   Allergies   Allergen Reactions   • Allopurinol Urinary Retention   • Lipitor [Atorvastatin] Myalgia       Review of Systems:   Review of Systems   Constitutional: Positive for fatigue. Negative for appetite change, fever and unexpected weight loss.   HENT: Negative for trouble swallowing.    Gastrointestinal: Negative for abdominal distention, abdominal pain, anal bleeding, blood in stool, constipation, diarrhea, nausea, rectal pain, vomiting, GERD and indigestion.       The following portions of the patient's history were reviewed and updated as appropriate: allergies, current medications, past family history, past medical history, past social history, past surgical history and problem list.    Objective     Physical Exam:  Vital Signs:   Vitals:    05/11/23 1651   BP: 128/76   Pulse: 55   Temp: 97.7 °F (36.5 °C)   TempSrc: Infrared   Weight: 92.1 kg (203 lb)   Height: 190.5 cm (75\")  Comment: patient states       Physical Exam  Constitutional:       Appearance: Normal appearance.   HENT:      Head: Normocephalic and atraumatic.   Eyes:      Comments: Pallor present   Abdominal:      General: Abdomen is flat. There is no distension.      Palpations: There is no mass.      Tenderness: There is no abdominal tenderness. There is no guarding or rebound.      Hernia: No hernia is present.   Musculoskeletal:      Cervical back: Normal range of motion and neck supple.   Neurological:      Mental Status: He is alert.         Results Review:   I " reviewed the patient's new clinical results.    Admission on 02/16/2023, Discharged on 02/18/2023   Component Date Value Ref Range Status   • Extra Tube 02/16/2023 Hold for add-ons.   Final    Auto resulted.   • Extra Tube 02/16/2023 hold for add-on   Final    Auto resulted   • Extra Tube 02/16/2023 Hold for add-ons.   Final    Auto resulted.   • Extra Tube 02/16/2023 Hold for add-ons.   Final    Auto resulted   • Glucose 02/16/2023 137 (H)  65 - 99 mg/dL Final   • BUN 02/16/2023 38 (H)  8 - 23 mg/dL Final   • Creatinine 02/16/2023 3.64 (H)  0.76 - 1.27 mg/dL Final   • Sodium 02/16/2023 141  136 - 145 mmol/L Final   • Potassium 02/16/2023 4.0  3.5 - 5.2 mmol/L Final   • Chloride 02/16/2023 109 (H)  98 - 107 mmol/L Final   • CO2 02/16/2023 21.7 (L)  22.0 - 29.0 mmol/L Final   • Calcium 02/16/2023 8.6  8.6 - 10.5 mg/dL Final   • Total Protein 02/16/2023 5.9 (L)  6.0 - 8.5 g/dL Final   • Albumin 02/16/2023 3.4 (L)  3.5 - 5.2 g/dL Final   • ALT (SGPT) 02/16/2023 7  1 - 41 U/L Final   • AST (SGOT) 02/16/2023 11  1 - 40 U/L Final   • Alkaline Phosphatase 02/16/2023 81  39 - 117 U/L Final   • Total Bilirubin 02/16/2023 0.2  0.0 - 1.2 mg/dL Final   • Globulin 02/16/2023 2.5  gm/dL Final   • A/G Ratio 02/16/2023 1.4  g/dL Final   • BUN/Creatinine Ratio 02/16/2023 10.4  7.0 - 25.0 Final   • Anion Gap 02/16/2023 10.3  5.0 - 15.0 mmol/L Final   • eGFR 02/16/2023 16.3 (L)  >60.0 mL/min/1.73 Final   • Fecal Occult Blood 02/16/2023 Positive (A)  Negative Final   • Lipase 02/16/2023 74 (H)  13 - 60 U/L Final   • proBNP 02/16/2023 3,689.0 (H)  0.0 - 1,800.0 pg/mL Final   • Magnesium 02/16/2023 1.8  1.6 - 2.4 mg/dL Final   • Phosphorus 02/16/2023 3.2  2.5 - 4.5 mg/dL Final   • HS Troponin T 02/16/2023 53 (C)  <15 ng/L Final   • WBC 02/16/2023 9.69  3.40 - 10.80 10*3/mm3 Final   • RBC 02/16/2023 1.92 (L)  4.14 - 5.80 10*6/mm3 Final   • Hemoglobin 02/16/2023 5.6 (C)  13.0 - 17.7 g/dL Final   • Hematocrit 02/16/2023 18.6 (C)  37.5 -  51.0 % Final   • MCV 02/16/2023 96.9  79.0 - 97.0 fL Final   • MCH 02/16/2023 29.2  26.6 - 33.0 pg Final   • MCHC 02/16/2023 30.1 (L)  31.5 - 35.7 g/dL Final   • RDW 02/16/2023 15.9 (H)  12.3 - 15.4 % Final   • RDW-SD 02/16/2023 55.1 (H)  37.0 - 54.0 fl Final   • MPV 02/16/2023 12.0  6.0 - 12.0 fL Final   • Platelets 02/16/2023 160  140 - 450 10*3/mm3 Final   • Neutrophil % 02/16/2023 70.3  42.7 - 76.0 % Final   • Lymphocyte % 02/16/2023 7.4 (L)  19.6 - 45.3 % Final   • Monocyte % 02/16/2023 7.3  5.0 - 12.0 % Final   • Eosinophil % 02/16/2023 13.8 (H)  0.3 - 6.2 % Final   • Basophil % 02/16/2023 0.7  0.0 - 1.5 % Final   • Immature Grans % 02/16/2023 0.5  0.0 - 0.5 % Final   • Neutrophils, Absolute 02/16/2023 6.80  1.70 - 7.00 10*3/mm3 Final   • Lymphocytes, Absolute 02/16/2023 0.72  0.70 - 3.10 10*3/mm3 Final   • Monocytes, Absolute 02/16/2023 0.71  0.10 - 0.90 10*3/mm3 Final   • Eosinophils, Absolute 02/16/2023 1.34 (H)  0.00 - 0.40 10*3/mm3 Final   • Basophils, Absolute 02/16/2023 0.07  0.00 - 0.20 10*3/mm3 Final   • Immature Grans, Absolute 02/16/2023 0.05  0.00 - 0.05 10*3/mm3 Final   • nRBC 02/16/2023 0.0  0.0 - 0.2 /100 WBC Final   • ABO Type 02/16/2023 O   Final   • RH type 02/16/2023 Positive   Final   • Antibody Screen 02/16/2023 Negative   Final   • T&S Expiration Date 02/16/2023 2/19/2023 11:59:59 PM   Final   • Product Code 02/18/2023 V7480B21   Final   • Unit Number 02/18/2023 [id number]   Final   • UNIT  ABO 02/18/2023 O   Final   • UNIT  RH 02/18/2023 POS   Final   • Crossmatch Interpretation 02/18/2023 Compatible   Final   • Dispense Status 02/18/2023 PT   Final   • Blood Expiration Date 02/18/2023 202303202359   Final   • Blood Type Barcode 02/18/2023 5100   Final   • Product Code 02/18/2023 N8051D68   Final   • Unit Number 02/18/2023 [id number]   Final   • UNIT  ABO 02/18/2023 O   Final   • UNIT  RH 02/18/2023 POS   Final   • Crossmatch Interpretation 02/18/2023 Compatible   Final   •  Dispense Status 02/18/2023 PT   Final   • Blood Expiration Date 02/18/2023 [id number]   Final   • Blood Type Barcode 02/18/2023 5100   Final   • HS Troponin T 02/16/2023 51 (H)  <15 ng/L Final   • Hemoglobin 02/16/2023 6.6 (C)  13.0 - 17.7 g/dL Final   • Glucose 02/16/2023 122  70 - 130 mg/dL Final    Serial Number: [id number]:  [id number]   • Glucose 02/17/2023 95  65 - 99 mg/dL Final   • BUN 02/17/2023 36 (H)  8 - 23 mg/dL Final   • Creatinine 02/17/2023 3.49 (H)  0.76 - 1.27 mg/dL Final   • Sodium 02/17/2023 144  136 - 145 mmol/L Final   • Potassium 02/17/2023 4.1  3.5 - 5.2 mmol/L Final   • Chloride 02/17/2023 112 (H)  98 - 107 mmol/L Final   • CO2 02/17/2023 21.6 (L)  22.0 - 29.0 mmol/L Final   • Calcium 02/17/2023 8.9  8.6 - 10.5 mg/dL Final   • BUN/Creatinine Ratio 02/17/2023 10.3  7.0 - 25.0 Final   • Anion Gap 02/17/2023 10.4  5.0 - 15.0 mmol/L Final   • eGFR 02/17/2023 17.2 (L)  >60.0 mL/min/1.73 Final   • Protime 02/17/2023 14.3  12.5 - 14.5 Seconds Final   • INR 02/17/2023 1.07  0.90 - 1.10 Final   • PTT 02/17/2023 29.7  23.5 - 35.5 seconds Final   • WBC 02/17/2023 9.52  3.40 - 10.80 10*3/mm3 Final   • RBC 02/17/2023 2.55 (L)  4.14 - 5.80 10*6/mm3 Final   • Hemoglobin 02/17/2023 7.4 (L)  13.0 - 17.7 g/dL Final   • Hematocrit 02/17/2023 23.8 (L)  37.5 - 51.0 % Final   • MCV 02/17/2023 93.3  79.0 - 97.0 fL Final   • MCH 02/17/2023 29.0  26.6 - 33.0 pg Final   • MCHC 02/17/2023 31.1 (L)  31.5 - 35.7 g/dL Final   • RDW 02/17/2023 16.0 (H)  12.3 - 15.4 % Final   • RDW-SD 02/17/2023 54.2 (H)  37.0 - 54.0 fl Final   • MPV 02/17/2023 12.0  6.0 - 12.0 fL Final   • Platelets 02/17/2023 154  140 - 450 10*3/mm3 Final   • Neutrophil % 02/17/2023 68.2  42.7 - 76.0 % Final   • Lymphocyte % 02/17/2023 7.7 (L)  19.6 - 45.3 % Final   • Monocyte % 02/17/2023 9.0  5.0 - 12.0 % Final   • Eosinophil % 02/17/2023 13.6 (H)  0.3 - 6.2 % Final   • Basophil % 02/17/2023 1.1  0.0 - 1.5 % Final   • Immature Grans % 02/17/2023  0.4  0.0 - 0.5 % Final   • Neutrophils, Absolute 2023 6.50  1.70 - 7.00 10*3/mm3 Final   • Lymphocytes, Absolute 2023 0.73  0.70 - 3.10 10*3/mm3 Final   • Monocytes, Absolute 2023 0.86  0.10 - 0.90 10*3/mm3 Final   • Eosinophils, Absolute 2023 1.29 (H)  0.00 - 0.40 10*3/mm3 Final   • Basophils, Absolute 2023 0.10  0.00 - 0.20 10*3/mm3 Final   • Immature Grans, Absolute 2023 0.04  0.00 - 0.05 10*3/mm3 Final   • nRBC 2023 0.0  0.0 - 0.2 /100 WBC Final   • Glucose 2023 98  70 - 130 mg/dL Final    Serial Number: [id number]:  [id number]   • Reference Lab Report 2023    Final                    Value:[facility]  [address] [place] [address]  Phone: [phone] or [phone]  Fax: [phone]  Shane Reyes M.D., Medical Director    PATIENT NAME                                     LABORATORY NO.  JOSE NEGRON.                        [id number]  [id number]                                 AGE                    SEX   N              CLIENT REF #  [place] [id number]        1945           [id number]      [id number]    [address]                        REQUESTING M.D.           ATTENDING M.D.         COPY TO.   [address]                                [place] [address]                         [facility]  DATE COLLECTED            DATE RECEIVED          DATE REPORTED  2023    DIAGNOSIS:  ANTRUM AND BODY, BIOPSY:  Gastric antral type mucosa with mild chronic                           inactive gastritis  Negative for intestinal metaplasia or dysplasia  No Helicobacter pylori-like organisms seen    MANOLO    CLINICAL HISTORY:  Melena, symptomatic anemia    SPECIMENS RECEIVED:  ANTRUM AND BODY, BIOPSY    MICROSCOPIC DESCRIPTION:  Tissue blocks are  "prepared and slides are examined microscopically on all  specimens. See diagnosis for details.    Professional interpretation rendered by Carter Chavez M.D., RENATA at [facility], [address] [place] [address].    GROSS DESCRIPTION:  Labeled \"gastric antrum and body\".  Consists of 2 pieces of tan soft tissue  measuring 0.4 x 0.2 x 0.2 cm in aggregate and is submitted entirely in 1 block.  INO    REVIEWED, DIAGNOSED AND ELECTRONICALLY  SIGNED BY:    Carter Chavez M.D., RENATA  CPT CODES:  88045     • Glucose 02/17/2023 100  70 - 130 mg/dL Final    Serial Number: [id number]:  [id number]   • Glucose 02/17/2023 103  70 - 130 mg/dL Final    Serial Number: [id number]:  [id number]   • Hemoglobin 02/17/2023 7.9 (L)  13.0 - 17.7 g/dL Final   • Hematocrit 02/17/2023 25.0 (L)  37.5 - 51.0 % Final   • Glucose 02/17/2023 126  70 - 130 mg/dL Final    Serial Number: [id number]:  [id number]   • Glucose 02/18/2023 96  65 - 99 mg/dL Final   • BUN 02/18/2023 35 (H)  8 - 23 mg/dL Final   • Creatinine 02/18/2023 3.43 (H)  0.76 - 1.27 mg/dL Final   • Sodium 02/18/2023 141  136 - 145 mmol/L Final   • Potassium 02/18/2023 4.3  3.5 - 5.2 mmol/L Final   • Chloride 02/18/2023 110 (H)  98 - 107 mmol/L Final   • CO2 02/18/2023 23.4  22.0 - 29.0 mmol/L Final   • Calcium 02/18/2023 9.1  8.6 - 10.5 mg/dL Final   • Albumin 02/18/2023 3.3 (L)  3.5 - 5.2 g/dL Final   • Phosphorus 02/18/2023 3.6  2.5 - 4.5 mg/dL Final   • Anion Gap 02/18/2023 7.6  5.0 - 15.0 mmol/L Final   • BUN/Creatinine Ratio 02/18/2023 10.2  7.0 - 25.0 Final   • eGFR 02/18/2023 17.6 (L)  >60.0 mL/min/1.73 Final   • WBC 02/18/2023 8.84  3.40 - 10.80 10*3/mm3 Final   • RBC 02/18/2023 2.61 (L)  4.14 - 5.80 10*6/mm3 Final   • Hemoglobin 02/18/2023 7.7 (L)  13.0 - 17.7 g/dL Final   • Hematocrit 02/18/2023 24.3 (L)  37.5 - 51.0 % Final   • MCV 02/18/2023 93.1  79.0 - 97.0 fL Final   • MCH 02/18/2023 29.5  26.6 - 33.0 pg Final   • MCHC 02/18/2023 31.7  31.5 " - 35.7 g/dL Final   • RDW 02/18/2023 15.8 (H)  12.3 - 15.4 % Final   • RDW-SD 02/18/2023 53.1  37.0 - 54.0 fl Final   • MPV 02/18/2023 11.9  6.0 - 12.0 fL Final   • Platelets 02/18/2023 158  140 - 450 10*3/mm3 Final   • Neutrophil % 02/18/2023 72.0  42.7 - 76.0 % Final   • Lymphocyte % 02/18/2023 6.7 (L)  19.6 - 45.3 % Final   • Monocyte % 02/18/2023 8.9  5.0 - 12.0 % Final   • Eosinophil % 02/18/2023 10.9 (H)  0.3 - 6.2 % Final   • Basophil % 02/18/2023 0.8  0.0 - 1.5 % Final   • Immature Grans % 02/18/2023 0.7 (H)  0.0 - 0.5 % Final   • Neutrophils, Absolute 02/18/2023 6.37  1.70 - 7.00 10*3/mm3 Final   • Lymphocytes, Absolute 02/18/2023 0.59 (L)  0.70 - 3.10 10*3/mm3 Final   • Monocytes, Absolute 02/18/2023 0.79  0.10 - 0.90 10*3/mm3 Final   • Eosinophils, Absolute 02/18/2023 0.96 (H)  0.00 - 0.40 10*3/mm3 Final   • Basophils, Absolute 02/18/2023 0.07  0.00 - 0.20 10*3/mm3 Final   • Immature Grans, Absolute 02/18/2023 0.06 (H)  0.00 - 0.05 10*3/mm3 Final   • nRBC 02/18/2023 0.0  0.0 - 0.2 /100 WBC Final   • Glucose 02/18/2023 101  70 - 130 mg/dL Final    Serial Number: [id number]:  [id number]   • Glucose 02/18/2023 106  70 - 130 mg/dL Final    Serial Number: [id number]:  [id number]      CT Abdomen Pelvis Without Contrast    Result Date: 2/9/2023  Gallstones.  Possible cystitis. Authenticated and Electronically Signed by Varun Quinn M.D. on 02/09/2023 08:28:45 PM    XR Abdomen 1 View    Result Date: 3/9/2023  No retained capsule. Focal dilatation of a single small bowel loop in the right mid abdomen, of uncertain clinical significance. CRITICAL RESULT:   No. No dilated loops of small and large bowel. COMMUNICATION: Per this written report. Approved by Estevan Clark DO on 3/9/2023 10:54 AM By electronically signing this report, I, the attending physician, attest that I have personally reviewed the images/data for the above examination(s) and agree with the final edited report. Dictated by Estevan  DO Amber on 3/9/2023 10:54 AM Signed by Reena Young MD on 3/9/2023 4:03 PM    XR Chest 1 View    Result Date: 2/16/2023  Stable chest..    This report was signed and finalized on 2/16/2023 3:11 PM by Alice Castillo MD.    EGD on 2/17/2023  - Normal oropharynx.  - Z-line regular, 42 cm from the incisors.  - No gross lesions in the entire esophagus.  - Erythematous mucosa in the posterior wall of the stomach, antrum and prepyloric region of the stomach.  Biopsied. 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 Previously biopsied.  - Normal first portion of the duodenum, second portion of the duodenum and third portion of the duodenum     Small bowel PillCam study on 2/7/2023  PillCam still in the distal ileum, ??  TI at the end of the recording makes accurate location of the abnormality is difficult.  Couple of small and tiny nonbleeding AVMs in the distal duodenum and the proximal jejunum  Bleeding AVMs in the ileum at least 2 areas filled with clots and red blood.  Multiple nonbleeding AVMs in the ileum mostly in the mid and the distal ileum.  This AVMs can be approached only with balloon assisted enteroscopy for intervention.    Assessment / Plan    1.  History of acute blood loss anemia from bleeding small bowel AVMs.  2.  History of chronic iron deficiency anemia  3.  History of CKD 4  4.  Chronic idiopathic constipation  5.  History of adenomatous colon polyps  5/11/2023  He had a colonoscopy with a balloon enteroscopy on 051 2023 at [facility].  Distal small bowel was examined about 80 cm from IC valve, unable to advance the scope beyond due to significant looping.  Within review available until 80cm there was no AVM identified.  He had a small cecal AVM which was ablated.  There was 1 ascending colon polyp measuring about 4 mm removed.  As per report there were 4-5 polyps ranging from 5 to 12 mm which were not removed and  recommended repeat colonoscopy.    No current bleeding.  He had a recent iron infusion.  He has lab work scheduled next week by renal.  We had a discussion regarding repeating colonoscopy however at this time patient does not want to proceed with any colonoscopy.  We agreed on follow in 1 year time and will discuss again and possibly scheduling for colonoscopy.  Currently on aspirin we will continue aspirin along with the PPI  His anemia has been followed by renal advised to follow with Dr. Sellers as planned     2/18/2023  He had an EGD done on 2/17/2023 which revealed moderate distal aspirin due to gastropathy otherwise unremarkable.  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  Otherwise unremarkable.  He had a small bowel PillCam study followed by EGD.  He had a delayed transit of the PillCam in the distal ileum with poor views in the distal small bowel.  It appeared PillCam was still in the distal ileum at the end of 7-1/2 hours recorded.  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  This is more suggestive of bleeding AVMs.  Patient likely had intermittent minimal upper GI bleed with drop in his H&H for the last few years. Given his single kidney and CKD he is not a candidate for any IR guided intervention.      Patient has a chronic anemia over 6 years now.  His hemoglobin runs about 11 to 12 g/dL before however since 2021 his hemoglobin dropped a few occasions between 5 to 6 g/dL.  He had a positive Hemoccult stool test in 2021 and had a EGD colonoscopy done for further evaluation by Dr. Sandra Do which did not reveal any obvious source of bleeding.  Recent CT abdomen without contrast done week ago was unremarkable except asymptomatic gallstones.     Prior history  6.  History of CAD status post coronary artery stent  7.  History of paroxysmal A-fib  8.  History of radical left  nephrectomy for RCC  9.  History of prostate cancer with radiation treatment       Follow Up:   No follow-ups on file.    Georgina Pool MD  [facility]  5/11/2023  16:53 EDT     Please note that portions of this note may have been completed with a voice recognition program.

## 2023-06-14 DIAGNOSIS — E78.00 HYPERCHOLESTEROLEMIA: ICD-10-CM

## 2023-06-14 DIAGNOSIS — I65.29 STENOSIS OF CAROTID ARTERY, UNSPECIFIED LATERALITY: Primary | ICD-10-CM

## 2023-07-31 DIAGNOSIS — E78.00 HYPERCHOLESTEROLEMIA: Primary | ICD-10-CM

## 2023-07-31 DIAGNOSIS — I65.21 CAROTID STENOSIS, ASYMPTOMATIC, RIGHT: ICD-10-CM

## 2023-07-31 RX ORDER — ROSUVASTATIN CALCIUM 40 MG/1
40 TABLET, COATED ORAL NIGHTLY
Qty: 90 TABLET | Refills: 0 | Status: SHIPPED | OUTPATIENT
Start: 2023-07-31

## 2023-08-01 ENCOUNTER — HOSPITAL ENCOUNTER (OUTPATIENT)
Dept: CARDIOLOGY | Facility: HOSPITAL | Age: 78
Discharge: HOME | End: 2023-08-01
Admitting: INTERNAL MEDICINE
Payer: MEDICARE

## 2023-08-01 VITALS — BODY MASS INDEX: 25.24 KG/M2 | WEIGHT: 203 LBS | HEIGHT: 75 IN

## 2023-08-01 DIAGNOSIS — I79.8 OTHER DISORDERS OF ARTERIES, ARTERIOLES AND CAPILLARIES IN DISEASES CLASSIFIED ELSEWHERE: ICD-10-CM

## 2023-08-01 DIAGNOSIS — I65.29 STENOSIS OF CAROTID ARTERY, UNSPECIFIED LATERALITY: ICD-10-CM

## 2023-08-01 PROCEDURE — 93880 EXTRACRANIAL BILAT STUDY: CPT

## 2023-08-02 ENCOUNTER — LAB (OUTPATIENT)
Dept: LAB | Facility: HOSPITAL | Age: 78
End: 2023-08-02
Payer: MEDICARE

## 2023-08-02 ENCOUNTER — APPOINTMENT (OUTPATIENT)
Dept: LAB | Facility: HOSPITAL | Age: 78
End: 2023-08-02
Payer: MEDICARE

## 2023-08-02 ENCOUNTER — RESEARCH ENCOUNTER (OUTPATIENT)
Dept: OTHER | Facility: OTHER | Age: 78
End: 2023-08-02
Payer: MEDICARE

## 2023-08-02 ENCOUNTER — OFFICE VISIT (OUTPATIENT)
Dept: CARDIOLOGY | Facility: CLINIC | Age: 78
End: 2023-08-02
Payer: MEDICARE

## 2023-08-02 VITALS
OXYGEN SATURATION: 95 % | SYSTOLIC BLOOD PRESSURE: 132 MMHG | HEART RATE: 53 BPM | BODY MASS INDEX: 24.62 KG/M2 | DIASTOLIC BLOOD PRESSURE: 62 MMHG | WEIGHT: 198 LBS | HEIGHT: 75 IN

## 2023-08-02 VITALS
SYSTOLIC BLOOD PRESSURE: 145 MMHG | BODY MASS INDEX: 24.75 KG/M2 | HEART RATE: 53 BPM | WEIGHT: 198 LBS | DIASTOLIC BLOOD PRESSURE: 71 MMHG

## 2023-08-02 DIAGNOSIS — I65.29 STENOSIS OF CAROTID ARTERY, UNSPECIFIED LATERALITY: Primary | ICD-10-CM

## 2023-08-02 DIAGNOSIS — I65.21 CAROTID STENOSIS, ASYMPTOMATIC, RIGHT: ICD-10-CM

## 2023-08-02 DIAGNOSIS — E78.00 HYPERCHOLESTEROLEMIA: ICD-10-CM

## 2023-08-02 PROCEDURE — 80061 LIPID PANEL: CPT

## 2023-08-02 PROCEDURE — 99214 OFFICE O/P EST MOD 30 MIN: CPT | Performed by: INTERNAL MEDICINE

## 2023-08-02 PROCEDURE — 3075F SYST BP GE 130 - 139MM HG: CPT | Performed by: INTERNAL MEDICINE

## 2023-08-02 PROCEDURE — 36415 COLL VENOUS BLD VENIPUNCTURE: CPT

## 2023-08-02 PROCEDURE — 3078F DIAST BP <80 MM HG: CPT | Performed by: INTERNAL MEDICINE

## 2023-08-03 ENCOUNTER — DOCUMENTATION (OUTPATIENT)
Dept: CARDIOLOGY | Facility: HOSPITAL | Age: 78
End: 2023-08-03
Payer: MEDICARE

## 2023-08-03 LAB
BH CV LEFT CCA HIDDEN LRR: 1 CM/S
BH CV VAS CAROTID LEFT DISTAL STENT EDV: 23.47 CM/S
BH CV VAS CAROTID LEFT DISTAL STENT: 83.72 CM/S
BH CV VAS CAROTID LEFT DISTAL TO STENT EDV: 24.88 CM/S
BH CV VAS CAROTID LEFT DISTAL TO STENT: 78.28 CM/S
BH CV VAS CAROTID LEFT MID STENT EDV: 17.17 CM/S
BH CV VAS CAROTID LEFT MID STENT: 62.01 CM/S
BH CV VAS CAROTID LEFT PROXIMAL STENT EDV: 9.45 CM/S
BH CV VAS CAROTID LEFT PROXIMAL STENT: 45.39 CM/S
BH CV VAS CAROTID LEFT PROXIMAL TO STENT: 45.39 CM/S
BH CV VAS CAROTID LEFT STENT NATIVE VESSEL PROXIMAL ED: 11.01 CM/S
BH CV XLRA MEAS LEFT DIST CCA EDV: 11 CM/SEC
BH CV XLRA MEAS LEFT DIST CCA PSV: 45.4 CM/SEC
BH CV XLRA MEAS LEFT DIST ICA EDV: 21.6 CM/SEC
BH CV XLRA MEAS LEFT DIST ICA PSV: 60.7 CM/SEC
BH CV XLRA MEAS LEFT ICA/CCA RATIO: 1.58
BH CV XLRA MEAS LEFT MID CCA EDV: 9.5 CM/SEC
BH CV XLRA MEAS LEFT MID CCA PSV: 54.8 CM/SEC
BH CV XLRA MEAS LEFT MID ICA EDV: 20.7 CM/SEC
BH CV XLRA MEAS LEFT MID ICA PSV: 82.5 CM/SEC
BH CV XLRA MEAS LEFT PROX CCA EDV: 11 CM/SEC
BH CV XLRA MEAS LEFT PROX CCA PSV: 54.8 CM/SEC
BH CV XLRA MEAS LEFT PROX ECA EDV: 9.5 CM/SEC
BH CV XLRA MEAS LEFT PROX ECA PSV: 101.7 CM/SEC
BH CV XLRA MEAS LEFT PROX ICA EDV: 23.5 CM/SEC
BH CV XLRA MEAS LEFT PROX ICA PSV: 86.7 CM/SEC
BH CV XLRA MEAS LEFT PROX SCLA EDV: 0 CM/SEC
BH CV XLRA MEAS LEFT PROX SCLA PSV: 150.6 CM/SEC
BH CV XLRA MEAS LEFT VERTEBRAL A EDV: 12.3 CM/SEC
BH CV XLRA MEAS LEFT VERTEBRAL A PSV: 42.3 CM/SEC
BH CV XLRA MEAS RIGHT DIST CCA EDV: 6.6 CM/SEC
BH CV XLRA MEAS RIGHT DIST CCA PSV: 37.5 CM/SEC
BH CV XLRA MEAS RIGHT DIST ICA EDV: 15.7 CM/SEC
BH CV XLRA MEAS RIGHT DIST ICA PSV: 53.1 CM/SEC
BH CV XLRA MEAS RIGHT ICA/CCA RATIO: 6.8
BH CV XLRA MEAS RIGHT MID CCA EDV: 7.1 CM/SEC
BH CV XLRA MEAS RIGHT MID CCA PSV: 42.3 CM/SEC
BH CV XLRA MEAS RIGHT MID ICA EDV: 22.1 CM/SEC
BH CV XLRA MEAS RIGHT MID ICA PSV: 77.1 CM/SEC
BH CV XLRA MEAS RIGHT PROX CCA EDV: 9.2 CM/SEC
BH CV XLRA MEAS RIGHT PROX CCA PSV: 69.2 CM/SEC
BH CV XLRA MEAS RIGHT PROX ECA EDV: 7.1 CM/SEC
BH CV XLRA MEAS RIGHT PROX ECA PSV: 82.1 CM/SEC
BH CV XLRA MEAS RIGHT PROX ICA EDV: 57.4 CM/SEC
BH CV XLRA MEAS RIGHT PROX ICA PSV: 287.8 CM/SEC
BH CV XLRA MEAS RIGHT PROX SCLA EDV: 0 CM/SEC
BH CV XLRA MEAS RIGHT PROX SCLA PSV: 122.7 CM/SEC
BH CV XLRA MEAS RIGHT VERTEBRAL A EDV: 14.9 CM/SEC
BH CV XLRA MEAS RIGHT VERTEBRAL A PSV: 47.7 CM/SEC
BH CVPROX LEFT ICA HIDDEN LRR: 1 CM
CHOLEST SERPL-MCNC: 114 MG/DL (ref 0–200)
HDLC SERPL-MCNC: 36 MG/DL (ref 40–60)
LDLC SERPL CALC-MCNC: 56 MG/DL (ref 0–100)
LDLC/HDLC SERPL: 1.51 {RATIO}
LEFT ARM BP: NORMAL MMHG
RIGHT ARM BP: NORMAL MMHG
TRIGL SERPL-MCNC: 119 MG/DL (ref 0–150)
VLDLC SERPL-MCNC: 22 MG/DL (ref 5–40)

## 2023-08-22 ENCOUNTER — DOCUMENTATION (OUTPATIENT)
Dept: CARDIOLOGY | Facility: HOSPITAL | Age: 78
End: 2023-08-22
Payer: MEDICARE

## 2023-08-22 NOTE — PROGRESS NOTES
"  CREST 2 PI and IMM Note    He remains asymptomatic.     He reports normal BPs at rest. He DID have a clearly normal ABPM evaluation in June 2021 .. this is consistent with his own history of normal home BP readings.     He has no neurologic symptoms.    His LDL was 56 on 8/2/2023.    For now, I have asked CRCs to schedule next visit late this year or early 2024.   The patient knows that, as usual, he may contact me by telephone at any time for questions or issues.    He did have a \"billing\" question and I asked him to defer this to [facility] billing services.    I will see him back in 4-6 months.      "

## 2023-10-06 RX ORDER — AMLODIPINE BESYLATE 10 MG/1
TABLET ORAL
Qty: 90 TABLET | Refills: 3 | Status: SHIPPED | OUTPATIENT
Start: 2023-10-06

## 2023-11-29 RX ORDER — ROSUVASTATIN CALCIUM 40 MG/1
40 TABLET, COATED ORAL NIGHTLY
Qty: 90 TABLET | Refills: 3 | Status: SHIPPED | OUTPATIENT
Start: 2023-11-29

## 2023-12-12 ENCOUNTER — RESEARCH ENCOUNTER (OUTPATIENT)
Dept: OTHER | Facility: OTHER | Age: 78
End: 2023-12-12
Payer: MEDICARE

## 2023-12-12 VITALS
BODY MASS INDEX: 25.62 KG/M2 | WEIGHT: 205 LBS | SYSTOLIC BLOOD PRESSURE: 154 MMHG | DIASTOLIC BLOOD PRESSURE: 78 MMHG | HEART RATE: 60 BPM

## 2023-12-12 DIAGNOSIS — I65.29 STENOSIS OF CAROTID ARTERY, UNSPECIFIED LATERALITY: Primary | ICD-10-CM

## 2023-12-12 DIAGNOSIS — E78.00 HYPERCHOLESTEROLEMIA: ICD-10-CM

## 2023-12-12 DIAGNOSIS — I65.21 CAROTID STENOSIS, ASYMPTOMATIC, RIGHT: ICD-10-CM

## 2023-12-12 NOTE — RESEARCH
12/12/2023      Travon Plummer  1945      CREST-2 42 mn FOLLOWUP    R ICA STENOSIS    ARM OF STUDY:  R PERICO : IMM ALONE    Problem List:       Allergies   Allergen Reactions    Allopurinol Urinary Retention    Lipitor [Atorvastatin] Myalgia       Current Medications  Current Outpatient Medications   Medication Instructions    amLODIPine (NORVASC) 10 MG tablet TAKE ONE TABLET BY MOUTH EVERY DAY    aspirin 81 mg, Oral, Daily    carvedilol (COREG) 6.25 mg, Oral, 2 Times Daily With Meals    ferrous sulfate 325 mg, Oral, Daily With Breakfast    levothyroxine (SYNTHROID, LEVOTHROID) 25 mcg, Oral, Daily, LEVOXYL    ondansetron ODT (ZOFRAN-ODT) 4 MG disintegrating tablet Sublingual, As Needed    pantoprazole (PROTONIX) 40 MG EC tablet TAKE 1 TABLET BY MOUTH ONCE DAILY    Probiotic Product (Probiotic Daily) capsule 1 capsule, Oral, Daily    rosuvastatin (CRESTOR) 40 mg, Oral, Nightly    sodium bicarbonate 650 mg, Oral, 2 Times Daily    triamcinolone (KENALOG) 0.1 % ointment APPLY TWICE DAILY to itchy spots on hands and arms AS DIRECTED    vitamin B-12 (CYANOCOBALAMIN) 100 mcg, Oral, Daily    Vitamin D 2,000 Units, Oral, Daily         Vital signs:  AVERAGE BP(right arm sitting - study device): 154/78  Standing(if indicated): N/A  Heart Rate:60  Weight: 205  lbs    Vitals:    12/12/23 1000 12/12/23 1003 12/12/23 1006   BP: 154/79 156/77 154/78   BP Location: Right arm Right arm Right arm   Patient Position: Sitting Sitting Sitting   Pulse: 62 61 60   Weight: 93 kg (205 lb)       Body mass index is 25.62 kg/m².    NIHSS/mrS: 0/1    Tobacco: NO  Activity: NO  ETOH:  YES  INTERVENT: NO      ORDERS AND MEDICATION CHANGES: Patient seen today for 42 mn CREST- 2 follow up. Mean /78, patient states that he has had increased stress at home with an ill family member and he believes stress is the reason that his BP is elevated at this visit. Medications reviewed, MRJ aware. Patient is aware of s/s stroke to call 911. He  is agreeable to his next follow up ( 6/8/24-8/7/24). Discussed C2LOE, patient not interested.       *THERE IS NO CHARGE FOR THIS VISIT*    Primary Care Provider: Chilango Erickson MD  PRIMARY CARDIOLOGIST:      Rahel Keith

## 2023-12-19 ENCOUNTER — DOCUMENTATION (OUTPATIENT)
Dept: CARDIOLOGY | Facility: HOSPITAL | Age: 78
End: 2023-12-19
Payer: MEDICARE

## 2023-12-19 NOTE — PROGRESS NOTES
"  CREST 2 IMM and PI NOTE    I spoke with Mr. Plummer by telephone this afternoon.    He remains asymptomatic on medical management; he is in     Home BPs remain mildly to moderately elevated in the face of ABPM-documented WHITE COAT HYPERTENSION.    I will ask CRC to check creatinine (it was \"stable\" at 3.49 in February 2023) at the patient's convenience sometime during the next month.     Followup with Leonard Ashford MD  and Chilango Erickson MD will continue as scheduled.    All discussed with the patient. He has my personal cell phone number and may call as needed between visits.     "

## 2023-12-20 DIAGNOSIS — I25.10 CORONARY ARTERY DISEASE INVOLVING NATIVE CORONARY ARTERY OF NATIVE HEART WITHOUT ANGINA PECTORIS: Primary | ICD-10-CM

## 2024-01-02 RX ORDER — CARVEDILOL 6.25 MG/1
6.25 TABLET ORAL 2 TIMES DAILY WITH MEALS
Qty: 180 TABLET | Refills: 3 | Status: SHIPPED | OUTPATIENT
Start: 2024-01-02

## 2024-01-15 ENCOUNTER — RESEARCH ENCOUNTER (OUTPATIENT)
Dept: OTHER | Facility: OTHER | Age: 79
End: 2024-01-15
Payer: MEDICARE

## 2024-01-15 NOTE — RESEARCH
Called the patient today to remind him of BMP order per MRJ. No answer. LVM for patient to call back. Will follow up with this later this week.

## 2024-01-16 ENCOUNTER — RESEARCH ENCOUNTER (OUTPATIENT)
Dept: OTHER | Facility: OTHER | Age: 79
End: 2024-01-16
Payer: MEDICARE

## 2024-01-16 NOTE — RESEARCH
Spoke with patient over the phone, he and his wife asked that his lab order be faxed to his PCP (Georgina) office so that he may go there to have labs completed per MRJ request. Order was faxed over on 1/15/24 by  in [facility] office. Patient is aware that he will need to go to PCP office to complete BMP lab work. MRJ aware.

## 2024-02-05 ENCOUNTER — TELEPHONE (OUTPATIENT)
Dept: CARDIOLOGY | Facility: CLINIC | Age: 79
End: 2024-02-05
Payer: MEDICARE

## 2024-02-05 NOTE — TELEPHONE ENCOUNTER
----- Message from Leonard Ashford MD sent at 2/2/2024 10:48 AM EST -----  Received patient's labs from earlier in January today.  Creatinine and potassium are both elevated more so than previous baselines.  I would like him to get in with his nephrologist  ----- Message -----  From: Radha Villalobos RegSched Rep  Sent: 2/2/2024  10:32 AM EST  To: Leonard Ashford MD

## 2024-02-06 ENCOUNTER — DOCUMENTATION (OUTPATIENT)
Dept: CARDIOLOGY | Facility: HOSPITAL | Age: 79
End: 2024-02-06
Payer: MEDICARE

## 2024-02-06 NOTE — PROGRESS NOTES
CREST 2 PI and IMM Note    I spoke with the patient by telephone this morning.    There are no neuro symptoms. His wife has been ill and he is understandably concerned in this regard.     Chart reviewed ... Chart note from Dr. Ashford yesterday seen and  Mr. Plummer is being referring to nephrology for assessment ... I do not have access to these labs on [employer] at this time as labs were done in [place].     Fully agree with nephrology referral.    He has my contact information.

## 2024-02-14 ENCOUNTER — RESEARCH ENCOUNTER (OUTPATIENT)
Dept: OTHER | Facility: OTHER | Age: 79
End: 2024-02-14
Payer: MEDICARE

## 2024-02-22 ENCOUNTER — RESEARCH ENCOUNTER (OUTPATIENT)
Dept: OTHER | Facility: OTHER | Age: 79
End: 2024-02-22
Payer: MEDICARE

## 2024-02-22 NOTE — RESEARCH
Spoke with the staff of [facility] office about the referral of this patient to nephrology. Patient and Dr. Erickson ( PCP) are aware of referral. MRJ agreeable with this referral and the plan of action per nephrologist regarding labs. Will follow up with this patient at next scheduled CREST-2 follow up (6/8/24-8/7/24).

## 2024-05-13 ENCOUNTER — OFFICE VISIT (OUTPATIENT)
Dept: GASTROENTEROLOGY | Facility: CLINIC | Age: 79
End: 2024-05-13
Payer: MEDICARE

## 2024-05-13 VITALS
TEMPERATURE: 97.8 F | SYSTOLIC BLOOD PRESSURE: 137 MMHG | WEIGHT: 201 LBS | DIASTOLIC BLOOD PRESSURE: 76 MMHG | HEART RATE: 65 BPM | HEIGHT: 75 IN | BODY MASS INDEX: 24.99 KG/M2

## 2024-05-13 DIAGNOSIS — D12.6 ADENOMATOUS POLYP OF COLON, UNSPECIFIED PART OF COLON: ICD-10-CM

## 2024-05-13 DIAGNOSIS — Z87.19 HISTORY OF GI BLEED: ICD-10-CM

## 2024-05-13 DIAGNOSIS — K59.04 CHRONIC IDIOPATHIC CONSTIPATION: Primary | ICD-10-CM

## 2024-05-13 DIAGNOSIS — K55.20 ARTERIOVENOUS MALFORMATION OF INTESTINE: ICD-10-CM

## 2024-05-13 PROCEDURE — 3075F SYST BP GE 130 - 139MM HG: CPT | Performed by: INTERNAL MEDICINE

## 2024-05-13 PROCEDURE — 1160F RVW MEDS BY RX/DR IN RCRD: CPT | Performed by: INTERNAL MEDICINE

## 2024-05-13 PROCEDURE — 3078F DIAST BP <80 MM HG: CPT | Performed by: INTERNAL MEDICINE

## 2024-05-13 PROCEDURE — 1159F MED LIST DOCD IN RCRD: CPT | Performed by: INTERNAL MEDICINE

## 2024-05-13 PROCEDURE — 99213 OFFICE O/P EST LOW 20 MIN: CPT | Performed by: INTERNAL MEDICINE

## 2024-05-13 RX ORDER — FLUOROURACIL 50 MG/G
1 CREAM TOPICAL
COMMUNITY

## 2024-05-13 RX ORDER — FLUTICASONE PROPIONATE 50 MCG
2 SPRAY, SUSPENSION (ML) NASAL DAILY
COMMUNITY
Start: 2024-04-30

## 2024-05-13 NOTE — PROGRESS NOTES
Follow Up Note     Date: 2024   Patient Name: Travon Plummer  MRN: [id number]  : 1945     Referring Physician: Chilango Erickson MD    Chief Complaint:    Chief Complaint   Patient presents with    Anemia    Constipation       Interval History:   2024  Travon Plummer is a 79 y.o. male who is here today for follow up for his blood loss anemia, small bowel AVMs and constipation.  He states that he will have more or less daily bowel movement sometimes he will have a hard stool.  But taking anything for constipation will give him a loose stool and incontinence.     2023  This is a 78-year-old male patient with a prior history of hypertension, hyperlipidemia, coronary artery disease, prior history of CVA, paroxysmal atrial fibrillation not on any anticoagulation, history of prostate cancer in  status post radiation treatment, renal cell cancer status post radical left nephrectomy in , CKD was sent from nephrology office for further evaluation management of his anemia noted with lab work      He has a chronic anemia with a CKD.  He was followed by Dr. Sellers.  Blood work done showed a low hemoglobin and sent to emergency room.  Patient admits that he has been passing black stools many months as  was also taking iron pills.  He did not see any difference in the color of the stool.  Denies any red blood or clots.  He denies any abdominal pain, diarrhea, nausea vomiting.  He states that he normally gets a pellet-like dark stool and feels constipated.  Last good bowel movement for 4 days ago.     He is currently on baby aspirin otherwise not on any blood thinners.  Denies any NSAID use.  No prior history of any peptic ulcer disease or GI bleed.  He had a EGD colonoscopy done by Dr. Sandra Do in 2021 for evaluation of iron deficiency anemia and heme positive stool.  EGD showed a nonobstructing distal esophageal ring was unremarkable.  Colonoscopy with  suboptimal bowel prep and dark liquid stool noted in the colon and 1 benign polyp removed.     In the emergency room he was noted to have a hemoglobin of 5.6 g/dL with normal platelet counts.  His BUN was 38 creatinine was 3.64.  His recent CT abdomen pelvis was on 2/9/2023 unremarkable except asymptomatic gallstones and possible cystitis.  GI consulted for further evaluation.  Subjective      Past Medical History:   Past Medical History:   Diagnosis Date    Broken arm     Cerebrovascular accident 10/31/2008    Coronary artery disease     Dyspnea     Full dentures 08/23/2021    GERD (gastroesophageal reflux disease)     Gout     History of blood transfusion     Hypercholesterolemia     Hypertension 11/10/2015    History of hypertension; hypotensive at the time of office visit on 11/10/2015.    Impaired mobility     Obesity     Osteoarthritis     Paroxysmal atrial fibrillation     History of paroxysmal atrial fibrillation, data deficit.    Prostate cancer 01/2015    Prostate cancer, diagnosed January of 2015, status post radiation therapy x39 treatments, 07/01/2015 at [facility].    Renal cell carcinoma     Renal cell carcinoma, diagnosed March of 2015, status post left nephrectomy.  Elevated creatinine of 2.1 on labs performed 11/04/2015.     Past Surgical History:   Past Surgical History:   Procedure Laterality Date    CAROTID STENT Left 10/31/2008    PTA/left carotid artery stent, 10/31/2008.     COLONOSCOPY N/A 12/23/2021    Procedure: COLONOSCOPY, polypectomy, clip placement x 1;  Surgeon: Deandra Do MD;  Location: [facility] ENDOSCOPY;  Service: Gastroenterology;  Laterality: N/A;    COLONOSCOPY W/ POLYPECTOMY      CORONARY ANGIOPLASTY WITH STENT PLACEMENT      A 3.5 x 13 mm. Cypher ESTIVEN to RCA expanded with 4 mm balloon.     ENDOSCOPY N/A 12/22/2021    Procedure: ESOPHAGOGASTRODUODENOSCOPY with biopsy;  Surgeon: Deandra Do MD;  Location: [facility] ENDOSCOPY;  Service: Gastroenterology;   Laterality: N/A;    ENDOSCOPY N/A 2023    Procedure: ESOPHAGOGASTRODUODENOSCOPY with biopsy;  Surgeon: Georgina Pool MD;  Location: [facility] ENDOSCOPY;  Service: Gastroenterology;  Laterality: N/A;    INGUINAL HERNIA REPAIR      NEPHRECTOMY Left 2015    diagnosed 2015, status post left radical nephrectomy.     PROSTATE FIDUCIAL MARKER PLACEMENT  2016    received XRT for prostate CA in 2016       Family History:   Family History   Problem Relation Age of Onset    No Known Problems Mother     No Known Problems Father     Colon cancer Neg Hx        Social History:   Social History     Socioeconomic History    Marital status:    Tobacco Use    Smoking status: Former     Current packs/day: 0.00     Types: Cigarettes     Quit date:      Years since quittin.3    Smokeless tobacco: Former     Types: Chew   Vaping Use    Vaping status: Never Used   Substance and Sexual Activity    Alcohol use: Yes     Comment: rare    Drug use: No    Sexual activity: Defer       Medications:     Current Outpatient Medications:     amLODIPine (NORVASC) 10 MG tablet, TAKE ONE TABLET BY MOUTH EVERY DAY, Disp: 90 tablet, Rfl: 3    aspirin 81 MG EC tablet, Take 1 tablet by mouth Daily., Disp: 30 tablet, Rfl: 0    carvedilol (COREG) 6.25 MG tablet, Take 1 tablet by mouth 2 (Two) Times a Day With Meals., Disp: 180 tablet, Rfl: 3    Cholecalciferol (Vitamin D) 50 MCG (2000 UT) capsule, Take 1 capsule by mouth Daily., Disp: , Rfl:     Cyanocobalamin (VITAMIN B-12 PO), Take  by mouth Daily., Disp: , Rfl:     ferrous sulfate 325 (65 FE) MG tablet, Take 1 tablet by mouth Daily With Breakfast., Disp: , Rfl:     fluorouracil (EFUDEX) 5 % cream, Apply 1 Application topically to the appropriate area as directed., Disp: , Rfl:     fluticasone (FLONASE) 50 MCG/ACT nasal spray, 2 sprays into the nostril(s) as directed by provider Daily., Disp: , Rfl:     levothyroxine (SYNTHROID, LEVOTHROID) 25 MCG tablet, Take 1  "tablet by mouth Daily. LEVOXYL, Disp: , Rfl: 0    ondansetron ODT (ZOFRAN-ODT) 4 MG disintegrating tablet, Place  under the tongue As Needed., Disp: , Rfl:     Probiotic Product (Probiotic Daily) capsule, Take 1 capsule by mouth Daily., Disp: , Rfl:     rosuvastatin (CRESTOR) 40 MG tablet, Take 1 tablet by mouth Every Night., Disp: 90 tablet, Rfl: 3    sodium bicarbonate 650 MG tablet, Take 1 tablet by mouth 2 (Two) Times a Day., Disp: , Rfl:     triamcinolone (KENALOG) 0.1 % ointment, APPLY TWICE DAILY to itchy spots on hands and arms AS DIRECTED, Disp: , Rfl:     Allergies:   Allergies   Allergen Reactions    Allopurinol Urinary Retention    Lipitor [Atorvastatin] Myalgia       Review of Systems:   Review of Systems   Constitutional:  Positive for unexpected weight loss. Negative for appetite change, fatigue and fever.   HENT:  Negative for trouble swallowing.    Gastrointestinal:  Negative for abdominal distention, abdominal pain, anal bleeding, blood in stool, constipation, diarrhea, nausea, rectal pain, vomiting, GERD and indigestion.       The following portions of the patient's history were reviewed and updated as appropriate: allergies, current medications, past family history, past medical history, past social history, past surgical history and problem list.    Objective     Physical Exam:  Vital Signs:   Vitals:    05/13/24 1329   BP: 137/76  Comment: talking   Pulse: 65   Temp: 97.8 °F (36.6 °C)   TempSrc: Infrared   Weight: 91.2 kg (201 lb)  Comment: with clothing/shoes   Height: 190.5 cm (75\")  Comment: per EPIC       Physical Exam  Constitutional:       Appearance: Normal appearance.   HENT:      Head: Normocephalic and atraumatic.   Eyes:      Conjunctiva/sclera: Conjunctivae normal.   Abdominal:      General: Abdomen is flat. There is no distension.      Palpations: There is no mass.      Tenderness: There is no abdominal tenderness. There is no guarding or rebound.      Hernia: No hernia is present. "   Musculoskeletal:      Cervical back: Normal range of motion and neck supple.   Neurological:      Mental Status: He is alert.         Results Review:   I reviewed the patient's new clinical results.    No visits with results within 90 Day(s) from this visit.   Latest known visit with results is:   Lab on 08/02/2023   Component Date Value Ref Range Status    Total Cholesterol 08/02/2023 114  0 - 200 mg/dL Final    Triglycerides 08/02/2023 119  0 - 150 mg/dL Final    HDL Cholesterol 08/02/2023 36 (L)  40 - 60 mg/dL Final    LDL Cholesterol  08/02/2023 56  0 - 100 mg/dL Final    VLDL Cholesterol 08/02/2023 22  5 - 40 mg/dL Final    LDL/HDL Ratio 08/02/2023 1.51   Final      No radiology results for the last 90 days.      EGD on 2/17/2023  - Normal oropharynx.  - Z-line regular, 42 cm from the incisors.  - No gross lesions in the entire esophagus.  - Erythematous mucosa in the posterior wall of the stomach, antrum and prepyloric region of the stomach.  Biopsied. 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 Previously biopsied.  - Normal first portion of the duodenum, second portion of the duodenum and third portion of the duodenum     Small bowel PillCam study on 2/7/2023  PillCam still in the distal ileum, ??  TI at the end of the recording makes accurate location of the abnormality is difficult.  Couple of small and tiny nonbleeding AVMs in the distal duodenum and the proximal jejunum  Bleeding AVMs in the ileum at least 2 areas filled with clots and red blood.  Multiple nonbleeding AVMs in the ileum mostly in the mid and the distal ileum.  This AVMs can be approached only with balloon assisted enteroscopy for intervention.  Assessment / Plan      1.  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  History of chronic iron deficiency anemia  3.  History of CKD 4-has a single kidney  4.  Chronic idiopathic constipation  5.   Adenomatous colon polyps  5/13/2024  He was referred to Dr. Gr at [facility] for single balloon enteroscopy.  As per report enteroscopy up to 80 cm from IC valve did not reveal any obvious AVMs.  There was a small nonbleeding AVM in the cecum which was ablated.  There was 1 small polyp ascending colon removed which was tubular adenoma.  There were also about 3-5 small polyps in the right side colon which were not removed as patient was under sedation long period for enteroscopy.     No further signs of any GI bleeding.  His recent lab work done on 2/6/2024 revealed hemoglobin of 10.7 g/dL which is more or less baseline.  His BUN 45 creatinine 4.59.  As per patient he does want to undergo hemodialysis.  We had a discussion regarding colonoscopy to remove the polyps in the colon however patient at this time wants to wait at least for 6-month and will decide for the next visit.    Will discuss again next visit.  Continue iron pills  He is getting frequent labs and renal.  Declined any stool softeners or laxative use or Metamucil.  Patient likely need a preprepped with a Dulcolax for couple of days or mag citrate before the actual prep  Follow-up in 6 months schedule colonoscopy    3/21/2023  EGD done on 2/17/2023 which revealed moderate distal aspirin due to gastropathy otherwise unremarkable.  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  Otherwise unremarkable.  Small bowel PillCam study followed by EGD.  He had a delayed transit of the PillCam in the distal ileum with poor views in the distal small bowel.  It appeared PillCam was still in the distal ileum at the end of 7-1/2 hours recorded.  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  This is more suggestive of bleeding AVMs.  Patient likely had intermittent minimal upper GI bleed with drop in his H&H for the last few years.  Given his single kidney and CKD he is not a candidate for any IR guided intervention.      Patient has a chronic anemia over 6 years now.  His hemoglobin runs about 11 to 12 g/dL before however since 2021 his hemoglobin dropped a few occasions between 5 to 6 g/dL.  He had a positive Hemoccult stool test in 2021 and had a EGD colonoscopy done for further evaluation by Dr. Sandra Do which did not reveal any obvious source of bleeding.  Recent CT abdomen without contrast done week ago was unremarkable except asymptomatic gallstones.        Prior history  6.  History of CAD status post coronary artery stent  7.  History of paroxysmal A-fib  8.  History of radical left nephrectomy for RCC  9.  History of prostate cancer with radiation treatment          Follow Up:   No follow-ups on file.    Georgina Pool MD  [facility] [place]  5/13/2024  13:31 EDT     Please note that portions of this note may have been completed with a voice recognition program.

## 2024-07-08 NOTE — PROGRESS NOTES
"[facility] Cardiology  Follow Up Visit  Travon Plummer  1945    VISIT DATE:  07/09/24    PCP:   Chilango Erickson MD  [address]  [facility] [place][note 3] [id number]          CC:  Stenosis of carotid artery, unspecified laterality      Problem List:  Coronary artery disease.  Dyspnea/abnormal MPS, 12/09/2008:  Inferior wall ischemia, EF 70%:  Left heart catheterization by Dr. Gonzalez, 12/15/2008:  Normal LV.  Subtotal occlusion of well collateralized right JEREMIAS.  A 3.5 x 13 mm. Cypher ESTIVEN to RCA expanded with 4 mm balloon.   Echo February 2022: EF 62%, grade 1 diastolic dysfunction, aortic sclerosis.  Cerebrovascular accident with expressive aphasia, 10/31/2008:  PTA/left carotid artery stent, 10/31/2008.   No obstructive disease by duplex, October 2010.  Carotid duplex, 11/10/2014, Petros Cross MD, revealed patent left carotid stent and no evidence of significant stenosis in the right ICA.  Greater than 70% right internal carotid artery stenosis, medical management Crest 2  History of hypertension; hypotensive at the time of office visit on 11/10/2015.  Hypercholesterolemia.   History of paroxysmal atrial fibrillation, data deficit.  Tobacco/chewing abuse.   Gout.  Osteoarthritis.  Gastroesophageal reflux disease.  Obesity.  Acute Pancreatitis, September 2016.  Prostate cancer, diagnosed March of 2015, status post radiation therapy x39 treatments, 07/01/2015 at [facility].  Renal cell carcinoma, diagnosed March of 2015, status post left nephrectomy. 09/17/2015. \"Spots on lung\" and chest treated with radiation, fall 2016.  Surgical history:  Inguinal hernia.   Colon polypectomy           History of Present Illness:  Travon Plummer  Is a 79 y.o. male with pertinent cardiac history detailed above.  He had an EKG today that showed sinus bradycardia but no other abnormalities.  He denies any chest pain.  Denies any new neurologic symptoms.  He does have chronic CKD.  Follows " with nephrology closely.  Carotid duplex from today reviewed.  No significant change in right-sided stenosis.  Left carotid stent remains patent.  Blood pressure at home within normal range.  Undergoing a lot of stress recently.  His wife has been ill and hospitalized a couple times recently      Patient Active Problem List    Diagnosis Date Noted    Upper GI bleed 12/22/2021     Note Last Updated: 12/22/2021     Added automatically from request for surgery [id number]      Symptomatic anemia 12/21/2021    Iron deficiency anemia due to chronic blood loss 12/21/2021    Melena 12/21/2021    Chronic kidney disease, stage IV (severe) 12/21/2021    Nuclear sclerotic cataract of right eye 08/18/2021     Note Last Updated: 8/18/2021     Added automatically from request for surgery [id number]      Coronary artery disease      Note Last Updated: 11/7/2016     Coronary artery disease.  Dyspnea/abnormal MPS, 12/09/2008:  Inferior wall ischemia, EF 70%:  Left heart catheterization by Dr. Gonzalez, 12/15/2008:  Normal LV.   Subtotal occlusion of well collateralized right JEREMIAS.  A 3.5 x 13 mm. Cypher ESTIVEN to RCA expanded with 4 mm balloon.         Dyspnea      Note Last Updated: 11/7/2016     Dyspnea/abnormal MPS, 12/09/2008:  Inferior wall ischemia, EF 70%:  Left heart catheterization by Dr. Gonzalez, 12/15/2008:  Normal LV.   Subtotal occlusion of well collateralized right JEREMIAS.  A 3.5 x 13 mm. Cypher ESTIVEN to RCA expanded with 4 mm balloon.         Hypercholesterolemia     Tobacco abuse      Note Last Updated: 11/7/2016     Tobacco/chewing abuse.       Gout     Osteoarthritis     GERD (gastroesophageal reflux disease)     Obesity     Prostate cancer      Note Last Updated: 11/7/2016     Prostate cancer, diagnosed March of 2015, status post radiation therapy x39 treatments, 07/01/2015 at [facility].      Renal cell carcinoma      Note Last Updated: 11/7/2016     Renal cell carcinoma, diagnosed March of 2015, status post left  nephrectomy.  Elevated creatinine of 2.1 on labs performed 2015.      Essential hypertension 11/10/2015     Note Last Updated: 2016     History of hypertension; hypotensive at the time of office visit on 11/10/2015.      Cerebrovascular accident 10/31/2008     Note Last Updated: 2016     Cerebrovascular accident with expressive aphasia, 10/31/2008:  PTA/left carotid artery stent, 10/31/2008.   No obstructive disease by duplex, 2010.  Carotid duplex, 11/10/2014, Petros Cross MD, revealed patent left carotid stent and no evidence of significant stenosis in the right ICA.           Allergies   Allergen Reactions    Allopurinol Urinary Retention    Lipitor [Atorvastatin] Myalgia       Social History     Socioeconomic History    Marital status:    Tobacco Use    Smoking status: Former     Current packs/day: 0.00     Types: Cigarettes     Quit date:      Years since quittin.5     Passive exposure: Past    Smokeless tobacco: Former     Types: Chew   Vaping Use    Vaping status: Never Used   Substance and Sexual Activity    Alcohol use: Yes     Comment: rare    Drug use: No    Sexual activity: Defer       Family History   Problem Relation Age of Onset    No Known Problems Mother     No Known Problems Father     Colon cancer Neg Hx        Current Medications:    Current Outpatient Medications:     amLODIPine (NORVASC) 10 MG tablet, TAKE ONE TABLET BY MOUTH EVERY DAY, Disp: 90 tablet, Rfl: 3    aspirin 81 MG EC tablet, Take 1 tablet by mouth Daily., Disp: 30 tablet, Rfl: 0    carvedilol (COREG) 6.25 MG tablet, Take 1 tablet by mouth 2 (Two) Times a Day With Meals., Disp: 180 tablet, Rfl: 3    Cholecalciferol (Vitamin D) 50 MCG (2000 UT) capsule, Take 1 capsule by mouth Daily., Disp: , Rfl:     Cyanocobalamin (VITAMIN B-12 PO), Take  by mouth Daily., Disp: , Rfl:     ferrous sulfate 325 (65 FE) MG tablet, Take 1 tablet by mouth Daily With Breakfast., Disp: , Rfl:     fluorouracil  "(EFUDEX) 5 % cream, Apply 1 Application topically to the appropriate area as directed., Disp: , Rfl:     fluticasone (FLONASE) 50 MCG/ACT nasal spray, 2 sprays into the nostril(s) as directed by provider Daily., Disp: , Rfl:     levothyroxine (SYNTHROID, LEVOTHROID) 25 MCG tablet, Take 1 tablet by mouth Daily. LEVOXYL, Disp: , Rfl: 0    ondansetron ODT (ZOFRAN-ODT) 4 MG disintegrating tablet, Place  under the tongue As Needed., Disp: , Rfl:     Probiotic Product (Probiotic Daily) capsule, Take 1 capsule by mouth Daily., Disp: , Rfl:     rosuvastatin (CRESTOR) 40 MG tablet, Take 1 tablet by mouth Every Night., Disp: 90 tablet, Rfl: 3    sodium bicarbonate 650 MG tablet, Take 1 tablet by mouth 2 (Two) Times a Day., Disp: , Rfl:     triamcinolone (KENALOG) 0.1 % ointment, APPLY TWICE DAILY to itchy spots on hands and arms AS DIRECTED, Disp: , Rfl:      Review of Systems   Cardiovascular: Negative.    Respiratory: Negative.     Neurological: Negative.        Vitals:    07/09/24 1257   BP: 138/74   BP Location: Left arm   Patient Position: Sitting   Pulse: 53   SpO2: 99%   Weight: 93 kg (205 lb)   Height: 190.5 cm (75\")       Physical Exam  Neck:      Vascular: Carotid bruit (Right-sided bruit.) present.   Cardiovascular:      Rate and Rhythm: Normal rate and regular rhythm.   Pulmonary:      Effort: Pulmonary effort is normal.   Musculoskeletal:      Right lower leg: No edema.      Left lower leg: No edema.   Neurological:      Mental Status: He is alert.         Diagnostic Data:    ECG 12 Lead    Date/Time: 7/9/2024 1:23 PM  Performed by: Leonard Ashford MD    Authorized by: Leonard Ashford MD  Comparison: compared with previous ECG from 2/16/2023  Rhythm: sinus bradycardia  Rate: bradycardic  BPM: 53  QRS axis: normal    Clinical impression: normal ECG        Lab Results   Component Value Date    TRIG 119 08/02/2023    HDL 36 (L) 08/02/2023     Lab Results   Component Value Date    GLUCOSE 96 " 02/18/2023    BUN 35 (H) 02/18/2023    CREATININE 3.43 (H) 02/18/2023     02/18/2023    K 4.3 02/18/2023     (H) 02/18/2023    CO2 23.4 02/18/2023     Lab Results   Component Value Date    HGBA1C 5.93 (H) 06/24/2020     Lab Results   Component Value Date    WBC 8.36 06/12/2024    HGB 9.2 (L) 06/12/2024    HCT 28.5 (L) 06/12/2024     (L) 06/12/2024       Assessment:   Diagnosis Plan   1. Essential hypertension  ECG 12 Lead          Plan:    1.  CAD  -On aspirin and statin, remote RCA PCI  -EF 62% with mild to moderate MR and aortic sclerosis  -EKG completed today with no ischemic changes     2.  Bilateral carotid artery disease  -History of left carotid stent.  Patent on today's study.  -Stable velocities on the right with greater than 70% stenosis.     3.  Hyperlipidemia  -Continue rosuvastatin 40 mg  -Most recent LDL 57, pending updated labs today     4.  Hypertension  -Continue Coreg and amlodipine.  Graduating from Crest 2 study today.     5.  CKD  -Left renal cell carcinoma, nephrectomy.  -follows with nephrology  -he is considering dialysis.  Not sure he wants to pursue this.     6.  Anemia,   -Status post colonoscopy EGD, possible small bowel bleeding  -ASA 81mg daily  -follows regularly with GI  -last hgb 9.2    No advance care planning documents      Leonard Ashford MD [facility]

## 2024-07-09 ENCOUNTER — LAB (OUTPATIENT)
Dept: LAB | Facility: HOSPITAL | Age: 79
End: 2024-07-09
Payer: MEDICARE

## 2024-07-09 ENCOUNTER — OFFICE VISIT (OUTPATIENT)
Dept: CARDIOLOGY | Facility: CLINIC | Age: 79
End: 2024-07-09
Payer: MEDICARE

## 2024-07-09 ENCOUNTER — RESEARCH ENCOUNTER (OUTPATIENT)
Dept: OTHER | Facility: OTHER | Age: 79
End: 2024-07-09
Payer: MEDICARE

## 2024-07-09 ENCOUNTER — HOSPITAL ENCOUNTER (OUTPATIENT)
Dept: CARDIOLOGY | Facility: HOSPITAL | Age: 79
Discharge: HOME | End: 2024-07-09
Payer: MEDICARE

## 2024-07-09 VITALS
HEIGHT: 75 IN | DIASTOLIC BLOOD PRESSURE: 74 MMHG | HEART RATE: 53 BPM | SYSTOLIC BLOOD PRESSURE: 138 MMHG | OXYGEN SATURATION: 99 % | WEIGHT: 205 LBS | BODY MASS INDEX: 25.49 KG/M2

## 2024-07-09 VITALS — BODY MASS INDEX: 24.99 KG/M2 | WEIGHT: 201 LBS | HEIGHT: 75 IN

## 2024-07-09 VITALS
HEART RATE: 55 BPM | DIASTOLIC BLOOD PRESSURE: 80 MMHG | SYSTOLIC BLOOD PRESSURE: 152 MMHG | WEIGHT: 205 LBS | BODY MASS INDEX: 25.62 KG/M2

## 2024-07-09 DIAGNOSIS — E78.00 HYPERCHOLESTEROLEMIA: ICD-10-CM

## 2024-07-09 DIAGNOSIS — I65.21 CAROTID STENOSIS, ASYMPTOMATIC, RIGHT: ICD-10-CM

## 2024-07-09 DIAGNOSIS — I10 ESSENTIAL HYPERTENSION: Primary | ICD-10-CM

## 2024-07-09 DIAGNOSIS — I25.10 CORONARY ARTERY DISEASE INVOLVING NATIVE CORONARY ARTERY OF NATIVE HEART WITHOUT ANGINA PECTORIS: ICD-10-CM

## 2024-07-09 DIAGNOSIS — I65.29 STENOSIS OF CAROTID ARTERY, UNSPECIFIED LATERALITY: ICD-10-CM

## 2024-07-09 PROCEDURE — 80053 COMPREHEN METABOLIC PANEL: CPT

## 2024-07-09 PROCEDURE — 3078F DIAST BP <80 MM HG: CPT | Performed by: INTERNAL MEDICINE

## 2024-07-09 PROCEDURE — 93000 ELECTROCARDIOGRAM COMPLETE: CPT | Performed by: INTERNAL MEDICINE

## 2024-07-09 PROCEDURE — 80061 LIPID PANEL: CPT

## 2024-07-09 PROCEDURE — 3075F SYST BP GE 130 - 139MM HG: CPT | Performed by: INTERNAL MEDICINE

## 2024-07-09 PROCEDURE — 99214 OFFICE O/P EST MOD 30 MIN: CPT | Performed by: INTERNAL MEDICINE

## 2024-07-09 PROCEDURE — 36415 COLL VENOUS BLD VENIPUNCTURE: CPT

## 2024-07-09 PROCEDURE — 93880 EXTRACRANIAL BILAT STUDY: CPT

## 2024-07-09 NOTE — RESEARCH
07/09/2024      Travon Escobar Kavitha  1945      CREST-2 48 mn FOLLOWUP    R ICA STENOSIS    ARM OF STUDY:  R PERICO IMM ALONE    Problem List:       Allergies   Allergen Reactions    Allopurinol Urinary Retention    Lipitor [Atorvastatin] Myalgia       Current Medications  Current Outpatient Medications   Medication Instructions    amLODIPine (NORVASC) 10 MG tablet TAKE ONE TABLET BY MOUTH EVERY DAY    aspirin 81 mg, Oral, Daily    carvedilol (COREG) 6.25 mg, Oral, 2 Times Daily With Meals    Cyanocobalamin (VITAMIN B-12 PO) Oral, Daily    ferrous sulfate 325 mg, Oral, Daily With Breakfast    fluorouracil (EFUDEX) 5 % cream 1 Application, Topical    fluticasone (FLONASE) 50 MCG/ACT nasal spray 2 sprays, Nasal, Daily    levothyroxine (SYNTHROID, LEVOTHROID) 25 mcg, Oral, Daily, LEVOXYL    ondansetron ODT (ZOFRAN-ODT) 4 MG disintegrating tablet Sublingual, As Needed    Probiotic Product (Probiotic Daily) capsule 1 capsule, Oral, Daily    rosuvastatin (CRESTOR) 40 mg, Oral, Nightly    sodium bicarbonate 650 mg, Oral, 2 Times Daily    triamcinolone (KENALOG) 0.1 % ointment APPLY TWICE DAILY to itchy spots on hands and arms AS DIRECTED    Vitamin D 2,000 Units, Oral, Daily         Vital signs:  AVERAGE BP(right arm sitting - study device): 153/82  Standing(if indicated): N/A  Heart Rate:55  Weight: 205  lbs    Vitals:    07/09/24 1310 07/09/24 1313 07/09/24 1316   BP: 155/86 152/81 152/80   BP Location: Right arm Right arm Right arm   Patient Position: Sitting Sitting Sitting   Pulse: 55 54 55   Weight: 93 kg (205 lb)       Body mass index is 25.62 kg/m².    NIHSS/mrS: 0/1    Tobacco: No  Activity: No  ETOH:  Yes  INTERVENT:  No      CAROTID DUPLEX  07/09/2024        ORDERS AND MEDICATION CHANGES: Patient seen in office today for 48 mn CREST-2 follow up. Mean /82 which is above study protocol. Medications reviewed, MRJ aware. Labs and DUS pending. No new changes to report. Patient refuses cognitive and  INTERVENT communication. Patient was asked to enroll in the C2LOE study at todays visit but he declined. Patient is aware of s/s stroke to call 911.        *THERE IS NO CHARGE FOR THIS VISIT*    Primary Care Provider: Chilango Erickson MD  PRIMARY CARDIOLOGIST:      Rahel Keith

## 2024-07-10 ENCOUNTER — DOCUMENTATION (OUTPATIENT)
Dept: CARDIOLOGY | Facility: HOSPITAL | Age: 79
End: 2024-07-10
Payer: MEDICARE

## 2024-07-10 LAB
ALBUMIN SERPL-MCNC: 4.5 G/DL (ref 3.5–5.2)
ALBUMIN/GLOB SERPL: 1.6 G/DL
ALP SERPL-CCNC: 96 U/L (ref 39–117)
ALT SERPL W P-5'-P-CCNC: 9 U/L (ref 1–41)
ANION GAP SERPL CALCULATED.3IONS-SCNC: 13.7 MMOL/L (ref 5–15)
AST SERPL-CCNC: 18 U/L (ref 1–40)
BH CV LEFT CCA HIDDEN LRR: 1 CM/S
BH CV VAS CAROTID LEFT DISTAL STENT EDV: 27.43 CM/S
BH CV VAS CAROTID LEFT DISTAL STENT: 98 CM/S
BH CV VAS CAROTID LEFT DISTAL TO STENT EDV: 24.72 CM/S
BH CV VAS CAROTID LEFT DISTAL TO STENT: 106 CM/S
BH CV VAS CAROTID LEFT MID STENT EDV: 15.9 CM/S
BH CV VAS CAROTID LEFT MID STENT: 61.34 CM/S
BH CV VAS CAROTID LEFT PROXIMAL STENT EDV: 12.38 CM/S
BH CV VAS CAROTID LEFT PROXIMAL STENT: 51.6 CM/S
BH CV VAS CAROTID LEFT PROXIMAL TO STENT: 50 CM/S
BH CV VAS CAROTID LEFT STENT NATIVE VESSEL PROXIMAL ED: 14.28 CM/S
BH CV XLRA MEAS LEFT DIST CCA EDV: 14.3 CM/SEC
BH CV XLRA MEAS LEFT DIST CCA PSV: 51.6 CM/SEC
BH CV XLRA MEAS LEFT DIST ICA EDV: 19.3 CM/SEC
BH CV XLRA MEAS LEFT DIST ICA PSV: 52.1 CM/SEC
BH CV XLRA MEAS LEFT ICA/CCA RATIO: 2.12
BH CV XLRA MEAS LEFT MID CCA EDV: 14.1 CM/SEC
BH CV XLRA MEAS LEFT MID CCA PSV: 49.4 CM/SEC
BH CV XLRA MEAS LEFT MID ICA EDV: 28.56 CM/SEC
BH CV XLRA MEAS LEFT MID ICA PSV: 104.91 CM/SEC
BH CV XLRA MEAS LEFT PROX CCA EDV: 13.7 CM/SEC
BH CV XLRA MEAS LEFT PROX CCA PSV: 66.6 CM/SEC
BH CV XLRA MEAS LEFT PROX ECA PSV: 131.8 CM/SEC
BH CV XLRA MEAS LEFT PROX ICA EDV: 15.9 CM/SEC
BH CV XLRA MEAS LEFT PROX ICA PSV: 61.3 CM/SEC
BH CV XLRA MEAS LEFT PROX SCLA PSV: 158 CM/SEC
BH CV XLRA MEAS LEFT VERTEBRAL A EDV: 12 CM/SEC
BH CV XLRA MEAS LEFT VERTEBRAL A PSV: 47.6 CM/SEC
BH CV XLRA MEAS RIGHT DIST CCA EDV: 9.5 CM/SEC
BH CV XLRA MEAS RIGHT DIST CCA PSV: 35.9 CM/SEC
BH CV XLRA MEAS RIGHT DIST ICA EDV: 18.1 CM/SEC
BH CV XLRA MEAS RIGHT DIST ICA PSV: 54.7 CM/SEC
BH CV XLRA MEAS RIGHT ICA/CCA RATIO: 6.2
BH CV XLRA MEAS RIGHT MID CCA EDV: 9.5 CM/SEC
BH CV XLRA MEAS RIGHT MID CCA PSV: 46.8 CM/SEC
BH CV XLRA MEAS RIGHT MID ICA EDV: 60.6 CM/SEC
BH CV XLRA MEAS RIGHT MID ICA PSV: 274.2 CM/SEC
BH CV XLRA MEAS RIGHT PROX CCA EDV: 11.8 CM/SEC
BH CV XLRA MEAS RIGHT PROX CCA PSV: 111.8 CM/SEC
BH CV XLRA MEAS RIGHT PROX ECA PSV: 88.3 CM/SEC
BH CV XLRA MEAS RIGHT PROX ICA EDV: 74.3 CM/SEC
BH CV XLRA MEAS RIGHT PROX ICA PSV: 288 CM/SEC
BH CV XLRA MEAS RIGHT PROX SCLA PSV: 207 CM/SEC
BH CV XLRA MEAS RIGHT VERTEBRAL A EDV: 16.9 CM/SEC
BH CV XLRA MEAS RIGHT VERTEBRAL A PSV: 57.1 CM/SEC
BH CVPROX LEFT ICA HIDDEN LRR: 1 CM
BILIRUB SERPL-MCNC: 0.3 MG/DL (ref 0–1.2)
BUN SERPL-MCNC: 36 MG/DL (ref 8–23)
BUN/CREAT SERPL: 7.6 (ref 7–25)
CALCIUM SPEC-SCNC: 9.6 MG/DL (ref 8.6–10.5)
CHLORIDE SERPL-SCNC: 104 MMOL/L (ref 98–107)
CHOLEST SERPL-MCNC: 138 MG/DL (ref 0–200)
CO2 SERPL-SCNC: 20.3 MMOL/L (ref 22–29)
CREAT SERPL-MCNC: 4.75 MG/DL (ref 0.76–1.27)
EGFRCR SERPLBLD CKD-EPI 2021: 11.8 ML/MIN/1.73
GLOBULIN UR ELPH-MCNC: 2.9 GM/DL
GLUCOSE SERPL-MCNC: 102 MG/DL (ref 65–99)
HDLC SERPL-MCNC: 39 MG/DL (ref 40–60)
LDLC SERPL CALC-MCNC: 74 MG/DL (ref 0–100)
LDLC/HDLC SERPL: 1.82 {RATIO}
LEFT ARM BP: NORMAL MMHG
POTASSIUM SERPL-SCNC: 4.6 MMOL/L (ref 3.5–5.2)
PROT SERPL-MCNC: 7.4 G/DL (ref 6–8.5)
RIGHT ARM BP: NORMAL MMHG
SODIUM SERPL-SCNC: 138 MMOL/L (ref 136–145)
TRIGL SERPL-MCNC: 141 MG/DL (ref 0–150)
VLDLC SERPL-MCNC: 25 MG/DL (ref 5–40)

## 2024-07-10 NOTE — PROGRESS NOTES
CREST 2 PI/IMM Note    I spoke with Mr. Plummer and his wife by telephone this morning.    He remains asymptomatic.    His LDL cholesterol was 74 yesterday.     His Creatinine was 4.75 yesterday --- he is seeing a nephrologist who is discussing possible dialysis with him.    LDL was 74 yesterday.    The patient does not want to be in long term CR2 followup but will continue with his current physicians.     I thanked Mr. Plummer for his participation in this trial.

## 2024-07-17 ENCOUNTER — OFFICE VISIT (OUTPATIENT)
Dept: UROLOGY | Facility: CLINIC | Age: 79
End: 2024-07-17
Payer: MEDICARE

## 2024-07-17 VITALS
DIASTOLIC BLOOD PRESSURE: 66 MMHG | SYSTOLIC BLOOD PRESSURE: 124 MMHG | WEIGHT: 205 LBS | OXYGEN SATURATION: 97 % | BODY MASS INDEX: 25.49 KG/M2 | HEIGHT: 75 IN | HEART RATE: 61 BPM | TEMPERATURE: 97.7 F

## 2024-07-17 DIAGNOSIS — N39.41 URGE INCONTINENCE OF URINE: Primary | ICD-10-CM

## 2024-07-17 DIAGNOSIS — R39.9 LOWER URINARY TRACT SYMPTOMS (LUTS): ICD-10-CM

## 2024-07-17 DIAGNOSIS — R31.9 HEMATURIA, UNSPECIFIED TYPE: ICD-10-CM

## 2024-07-17 PROBLEM — I48.0 PAROXYSMAL ATRIAL FIBRILLATION: Status: ACTIVE | Noted: 2019-09-24

## 2024-07-17 PROBLEM — R32 URINARY INCONTINENCE: Status: ACTIVE | Noted: 2021-10-26

## 2024-07-17 PROBLEM — N25.81 SECONDARY HYPERPARATHYROIDISM: Status: ACTIVE | Noted: 2020-09-22

## 2024-07-17 PROBLEM — E55.9 VITAMIN D DEFICIENCY: Status: ACTIVE | Noted: 2020-09-22

## 2024-07-17 PROBLEM — E03.9 ACQUIRED HYPOTHYROIDISM: Status: ACTIVE | Noted: 2021-10-26

## 2024-07-17 PROBLEM — Z90.5 ABSENT KIDNEY: Status: ACTIVE | Noted: 2021-10-26

## 2024-07-17 PROBLEM — E78.5 DYSLIPIDEMIA: Status: ACTIVE | Noted: 2020-09-22

## 2024-07-17 LAB
BILIRUB BLD-MCNC: NEGATIVE MG/DL
CLARITY, POC: CLEAR
COLOR UR: YELLOW
EXPIRATION DATE: ABNORMAL
GLUCOSE UR STRIP-MCNC: NEGATIVE MG/DL
KETONES UR QL: NEGATIVE
LEUKOCYTE EST, POC: NEGATIVE
Lab: ABNORMAL
NITRITE UR-MCNC: NEGATIVE MG/ML
PH UR: 7.5 [PH] (ref 5–8)
PROT UR STRIP-MCNC: ABNORMAL MG/DL
RBC # UR STRIP: ABNORMAL /UL
SP GR UR: 1.02 (ref 1–1.03)
UROBILINOGEN UR QL: ABNORMAL

## 2024-07-18 PROBLEM — R31.9 HEMATURIA: Status: ACTIVE | Noted: 2024-07-18

## 2024-07-18 PROBLEM — R39.9 LOWER URINARY TRACT SYMPTOMS (LUTS): Status: ACTIVE | Noted: 2024-07-18

## 2024-07-18 LAB
APPEARANCE UR: CLEAR
BACTERIA #/AREA URNS HPF: NORMAL /[HPF]
BILIRUB UR QL STRIP: NEGATIVE
CASTS URNS QL MICRO: NORMAL /LPF
COLOR UR: YELLOW
EPI CELLS #/AREA URNS HPF: NORMAL /HPF (ref 0–10)
GLUCOSE UR QL STRIP: NEGATIVE
HGB UR QL STRIP: NEGATIVE
KETONES UR QL STRIP: NEGATIVE
LEUKOCYTE ESTERASE UR QL STRIP: NEGATIVE
MICRO URNS: ABNORMAL
NITRITE UR QL STRIP: NEGATIVE
PH UR STRIP: 7.5 [PH] (ref 5–7.5)
PROT UR QL STRIP: ABNORMAL
RBC #/AREA URNS HPF: NORMAL /HPF (ref 0–2)
SP GR UR STRIP: 1.01 (ref 1–1.03)
UROBILINOGEN UR STRIP-MCNC: 0.2 MG/DL (ref 0.2–1)
WBC #/AREA URNS HPF: NORMAL /HPF (ref 0–5)

## 2024-08-01 NOTE — PROGRESS NOTES
Patient: Travon Plummer    YOB: 1945    Date: 08/02/2024    Primary Care Provider: Chilango Erickson MD    Chief Complaint   Patient presents with    Vascular Access Problem       SUBJECTIVE:    History of present illness:  The patient is in the office today to discuss dialysis access.  Patient requiring dialysis soon as possible.  He would like to try peritoneal dialysis.    The following portions of the patient's history were reviewed and updated as appropriate: allergies, current medications, past family history, past medical history, past social history, past surgical history and problem list.      Review of Systems   Constitutional:  Negative for chills, fever and unexpected weight change.   HENT:  Negative for trouble swallowing and voice change.    Eyes:  Negative for visual disturbance.   Respiratory:  Negative for apnea, cough, chest tightness, shortness of breath and wheezing.    Cardiovascular:  Negative for chest pain, palpitations and leg swelling.   Gastrointestinal:  Negative for abdominal distention, abdominal pain, anal bleeding, blood in stool, constipation, diarrhea, nausea, rectal pain and vomiting.   Endocrine: Negative for cold intolerance and heat intolerance.   Genitourinary:  Negative for difficulty urinating, dysuria, flank pain, scrotal swelling and testicular pain.   Musculoskeletal:  Negative for back pain, gait problem and joint swelling.   Skin:  Negative for color change, rash and wound.   Neurological:  Negative for dizziness, syncope, speech difficulty, weakness, numbness and headaches.   Hematological:  Negative for adenopathy. Does not bruise/bleed easily.   Psychiatric/Behavioral:  Negative for confusion. The patient is not nervous/anxious.          Allergies:  Allergies   Allergen Reactions    Allopurinol Urinary Retention    Lipitor [Atorvastatin] Myalgia       Medications:    Current Outpatient Medications:     amLODIPine (NORVASC) 10 MG tablet, TAKE  ONE TABLET BY MOUTH EVERY DAY, Disp: 90 tablet, Rfl: 3    aspirin 81 MG EC tablet, Take 1 tablet by mouth Daily., Disp: 30 tablet, Rfl: 0    carvedilol (COREG) 6.25 MG tablet, Take 1 tablet by mouth 2 (Two) Times a Day With Meals., Disp: 180 tablet, Rfl: 3    Cholecalciferol (Vitamin D) 50 MCG (2000 UT) capsule, Take 1 capsule by mouth Daily., Disp: , Rfl:     Cyanocobalamin (VITAMIN B-12 PO), Take  by mouth Daily., Disp: , Rfl:     ferrous sulfate 325 (65 FE) MG tablet, Take 1 tablet by mouth Daily With Breakfast., Disp: , Rfl:     fluorouracil (EFUDEX) 5 % cream, Apply 1 Application topically to the appropriate area as directed., Disp: , Rfl:     levothyroxine (SYNTHROID, LEVOTHROID) 25 MCG tablet, Take 1 tablet by mouth Daily. LEVOXYL, Disp: , Rfl: 0    ondansetron ODT (ZOFRAN-ODT) 4 MG disintegrating tablet, Place  under the tongue As Needed., Disp: , Rfl:     Probiotic Product (Probiotic Daily) capsule, Take 1 capsule by mouth Daily., Disp: , Rfl:     rosuvastatin (CRESTOR) 40 MG tablet, Take 1 tablet by mouth Every Night., Disp: 90 tablet, Rfl: 3    sodium bicarbonate 650 MG tablet, Take 1 tablet by mouth 2 (Two) Times a Day., Disp: , Rfl:     triamcinolone (KENALOG) 0.1 % ointment, APPLY TWICE DAILY to itchy spots on hands and arms AS DIRECTED, Disp: , Rfl:     History:  Past Medical History:   Diagnosis Date    Benign hypertensive CKD, stage 5 chronic kidney disease or end stage renal disease     Broken arm     Cerebrovascular accident 10/31/2008    Coronary artery disease     Dyspnea     Full dentures 08/23/2021    GERD (gastroesophageal reflux disease)     Gout     History of blood transfusion     Hypercholesterolemia     Hypertension 11/10/2015    History of hypertension; hypotensive at the time of office visit on 11/10/2015.    Impaired mobility     Obesity     Osteoarthritis     Paroxysmal atrial fibrillation     History of paroxysmal atrial fibrillation, data deficit.    Prostate cancer 01/2015     Prostate cancer, diagnosed 2015, status post radiation therapy x39 treatments, 2015 at [facility].    Renal cell carcinoma     Renal cell carcinoma, diagnosed 2015, status post left nephrectomy.  Elevated creatinine of 2.1 on labs performed 2015.       Past Surgical History:   Procedure Laterality Date    CAROTID STENT Left 10/31/2008    PTA/left carotid artery stent, 10/31/2008.     COLONOSCOPY N/A 2021    Procedure: COLONOSCOPY, polypectomy, clip placement x 1;  Surgeon: Deandra Do MD;  Location: [facility] ENDOSCOPY;  Service: Gastroenterology;  Laterality: N/A;    COLONOSCOPY W/ POLYPECTOMY      CORONARY ANGIOPLASTY WITH STENT PLACEMENT      A 3.5 x 13 mm. Cypher ESTIVEN to RCA expanded with 4 mm balloon.     ENDOSCOPY N/A 2021    Procedure: ESOPHAGOGASTRODUODENOSCOPY with biopsy;  Surgeon: Deandra Do MD;  Location: [facility] ENDOSCOPY;  Service: Gastroenterology;  Laterality: N/A;    ENDOSCOPY N/A 2023    Procedure: ESOPHAGOGASTRODUODENOSCOPY with biopsy;  Surgeon: Georgina Pool MD;  Location: [facility] ENDOSCOPY;  Service: Gastroenterology;  Laterality: N/A;    INGUINAL HERNIA REPAIR      NEPHRECTOMY Left 2015    diagnosed 2015, status post left radical nephrectomy.     PROSTATE FIDUCIAL MARKER PLACEMENT      received XRT for prostate CA in 2016       Family History   Problem Relation Age of Onset    No Known Problems Mother     No Known Problems Father     Colon cancer Neg Hx        Social History     Tobacco Use    Smoking status: Former     Current packs/day: 0.00     Average packs/day: 1 pack/day for 51.0 years (51.0 ttl pk-yrs)     Types: Cigarettes     Start date:      Quit date:      Years since quittin.5     Passive exposure: Past    Smokeless tobacco: Former     Types: Chew   Vaping Use    Vaping status: Never Used   Substance Use Topics    Alcohol use: Yes     Comment: rare    Drug use: No     "    OBJECTIVE:    Vital Signs:   Vitals:    08/02/24 0914   BP: 146/84   Pulse: 59   Temp: 98 °F (36.7 °C)   TempSrc: Temporal   SpO2: 99%   Weight: 94.3 kg (208 lb)   Height: 188 cm (74\")       Physical Exam:       General Appearance:    Alert, cooperative, in no acute distress   Head:    Normocephalic, without obvious abnormality, atraumatic   Eyes:            Normal.  No scleral icterus.  PERRLA    Lungs:     Clear to auscultation,respirations regular, even and                  unlabored    Heart:    Regular rhythm and normal rate,    Abdomen:   Soft and nontender   Extremities:   Moves all extremities well, no edema, no cyanosis, no             redness   Skin:   No bleeding, bruising or rash   Neurologic:   Normal without gross deficits.   Psychiatric: No evidence of depression or anxiety          Results Review:   None    Review of Systems was reviewed and confirmed as accurate as documented by the MA.    ASSESSMENT/PLAN:    1. Chronic kidney disease, stage V        Patient is a suitable candidate for peritoneal dialysis.  I offered him a renal dialysis placement.  He understands procedure.  He also understands the risks of bleeding, infection, intestinal injury, positional malfunction of the catheter requiring repositioning etc. At this time he understands these and wishes to proceed.          Electronically signed by Bijan Noel MD  08/02/24          "

## 2024-08-01 NOTE — H&P (VIEW-ONLY)
Patient: Travon Plummer    YOB: 1945    Date: 08/02/2024    Primary Care Provider: Chilango Erickson MD    Chief Complaint   Patient presents with    Vascular Access Problem       SUBJECTIVE:    History of present illness:  The patient is in the office today to discuss dialysis access.  Patient requiring dialysis soon as possible.  He would like to try peritoneal dialysis.    The following portions of the patient's history were reviewed and updated as appropriate: allergies, current medications, past family history, past medical history, past social history, past surgical history and problem list.      Review of Systems   Constitutional:  Negative for chills, fever and unexpected weight change.   HENT:  Negative for trouble swallowing and voice change.    Eyes:  Negative for visual disturbance.   Respiratory:  Negative for apnea, cough, chest tightness, shortness of breath and wheezing.    Cardiovascular:  Negative for chest pain, palpitations and leg swelling.   Gastrointestinal:  Negative for abdominal distention, abdominal pain, anal bleeding, blood in stool, constipation, diarrhea, nausea, rectal pain and vomiting.   Endocrine: Negative for cold intolerance and heat intolerance.   Genitourinary:  Negative for difficulty urinating, dysuria, flank pain, scrotal swelling and testicular pain.   Musculoskeletal:  Negative for back pain, gait problem and joint swelling.   Skin:  Negative for color change, rash and wound.   Neurological:  Negative for dizziness, syncope, speech difficulty, weakness, numbness and headaches.   Hematological:  Negative for adenopathy. Does not bruise/bleed easily.   Psychiatric/Behavioral:  Negative for confusion. The patient is not nervous/anxious.          Allergies:  Allergies   Allergen Reactions    Allopurinol Urinary Retention    Lipitor [Atorvastatin] Myalgia       Medications:    Current Outpatient Medications:     amLODIPine (NORVASC) 10 MG tablet, TAKE  ONE TABLET BY MOUTH EVERY DAY, Disp: 90 tablet, Rfl: 3    aspirin 81 MG EC tablet, Take 1 tablet by mouth Daily., Disp: 30 tablet, Rfl: 0    carvedilol (COREG) 6.25 MG tablet, Take 1 tablet by mouth 2 (Two) Times a Day With Meals., Disp: 180 tablet, Rfl: 3    Cholecalciferol (Vitamin D) 50 MCG (2000 UT) capsule, Take 1 capsule by mouth Daily., Disp: , Rfl:     Cyanocobalamin (VITAMIN B-12 PO), Take  by mouth Daily., Disp: , Rfl:     ferrous sulfate 325 (65 FE) MG tablet, Take 1 tablet by mouth Daily With Breakfast., Disp: , Rfl:     fluorouracil (EFUDEX) 5 % cream, Apply 1 Application topically to the appropriate area as directed., Disp: , Rfl:     levothyroxine (SYNTHROID, LEVOTHROID) 25 MCG tablet, Take 1 tablet by mouth Daily. LEVOXYL, Disp: , Rfl: 0    ondansetron ODT (ZOFRAN-ODT) 4 MG disintegrating tablet, Place  under the tongue As Needed., Disp: , Rfl:     Probiotic Product (Probiotic Daily) capsule, Take 1 capsule by mouth Daily., Disp: , Rfl:     rosuvastatin (CRESTOR) 40 MG tablet, Take 1 tablet by mouth Every Night., Disp: 90 tablet, Rfl: 3    sodium bicarbonate 650 MG tablet, Take 1 tablet by mouth 2 (Two) Times a Day., Disp: , Rfl:     triamcinolone (KENALOG) 0.1 % ointment, APPLY TWICE DAILY to itchy spots on hands and arms AS DIRECTED, Disp: , Rfl:     History:  Past Medical History:   Diagnosis Date    Benign hypertensive CKD, stage 5 chronic kidney disease or end stage renal disease     Broken arm     Cerebrovascular accident 10/31/2008    Coronary artery disease     Dyspnea     Full dentures 08/23/2021    GERD (gastroesophageal reflux disease)     Gout     History of blood transfusion     Hypercholesterolemia     Hypertension 11/10/2015    History of hypertension; hypotensive at the time of office visit on 11/10/2015.    Impaired mobility     Obesity     Osteoarthritis     Paroxysmal atrial fibrillation     History of paroxysmal atrial fibrillation, data deficit.    Prostate cancer 01/2015     Prostate cancer, diagnosed 2015, status post radiation therapy x39 treatments, 2015 at [facility].    Renal cell carcinoma     Renal cell carcinoma, diagnosed 2015, status post left nephrectomy.  Elevated creatinine of 2.1 on labs performed 2015.       Past Surgical History:   Procedure Laterality Date    CAROTID STENT Left 10/31/2008    PTA/left carotid artery stent, 10/31/2008.     COLONOSCOPY N/A 2021    Procedure: COLONOSCOPY, polypectomy, clip placement x 1;  Surgeon: Deandra Do MD;  Location: [facility] ENDOSCOPY;  Service: Gastroenterology;  Laterality: N/A;    COLONOSCOPY W/ POLYPECTOMY      CORONARY ANGIOPLASTY WITH STENT PLACEMENT      A 3.5 x 13 mm. Cypher ESTIVEN to RCA expanded with 4 mm balloon.     ENDOSCOPY N/A 2021    Procedure: ESOPHAGOGASTRODUODENOSCOPY with biopsy;  Surgeon: Deandra Do MD;  Location: [facility] ENDOSCOPY;  Service: Gastroenterology;  Laterality: N/A;    ENDOSCOPY N/A 2023    Procedure: ESOPHAGOGASTRODUODENOSCOPY with biopsy;  Surgeon: Georgina Pool MD;  Location: [facility] ENDOSCOPY;  Service: Gastroenterology;  Laterality: N/A;    INGUINAL HERNIA REPAIR      NEPHRECTOMY Left 2015    diagnosed 2015, status post left radical nephrectomy.     PROSTATE FIDUCIAL MARKER PLACEMENT      received XRT for prostate CA in 2016       Family History   Problem Relation Age of Onset    No Known Problems Mother     No Known Problems Father     Colon cancer Neg Hx        Social History     Tobacco Use    Smoking status: Former     Current packs/day: 0.00     Average packs/day: 1 pack/day for 51.0 years (51.0 ttl pk-yrs)     Types: Cigarettes     Start date:      Quit date:      Years since quittin.5     Passive exposure: Past    Smokeless tobacco: Former     Types: Chew   Vaping Use    Vaping status: Never Used   Substance Use Topics    Alcohol use: Yes     Comment: rare    Drug use: No     "    OBJECTIVE:    Vital Signs:   Vitals:    08/02/24 0914   BP: 146/84   Pulse: 59   Temp: 98 °F (36.7 °C)   TempSrc: Temporal   SpO2: 99%   Weight: 94.3 kg (208 lb)   Height: 188 cm (74\")       Physical Exam:       General Appearance:    Alert, cooperative, in no acute distress   Head:    Normocephalic, without obvious abnormality, atraumatic   Eyes:            Normal.  No scleral icterus.  PERRLA    Lungs:     Clear to auscultation,respirations regular, even and                  unlabored    Heart:    Regular rhythm and normal rate,    Abdomen:   Soft and nontender   Extremities:   Moves all extremities well, no edema, no cyanosis, no             redness   Skin:   No bleeding, bruising or rash   Neurologic:   Normal without gross deficits.   Psychiatric: No evidence of depression or anxiety          Results Review:   None    Review of Systems was reviewed and confirmed as accurate as documented by the MA.    ASSESSMENT/PLAN:    1. Chronic kidney disease, stage V        Patient is a suitable candidate for peritoneal dialysis.  I offered him a renal dialysis placement.  He understands procedure.  He also understands the risks of bleeding, infection, intestinal injury, positional malfunction of the catheter requiring repositioning etc. At this time he understands these and wishes to proceed.          Electronically signed by Bijan Noel MD  08/02/24          "

## 2024-08-02 ENCOUNTER — OFFICE VISIT (OUTPATIENT)
Dept: SURGERY | Facility: CLINIC | Age: 79
End: 2024-08-02
Payer: MEDICARE

## 2024-08-02 VITALS
HEIGHT: 74 IN | BODY MASS INDEX: 26.69 KG/M2 | DIASTOLIC BLOOD PRESSURE: 84 MMHG | SYSTOLIC BLOOD PRESSURE: 146 MMHG | WEIGHT: 208 LBS | HEART RATE: 59 BPM | TEMPERATURE: 98 F | OXYGEN SATURATION: 99 %

## 2024-08-02 DIAGNOSIS — N18.5 CHRONIC KIDNEY DISEASE, STAGE V: Primary | ICD-10-CM

## 2024-08-02 PROCEDURE — 1160F RVW MEDS BY RX/DR IN RCRD: CPT | Performed by: SURGERY

## 2024-08-02 PROCEDURE — 1159F MED LIST DOCD IN RCRD: CPT | Performed by: SURGERY

## 2024-08-02 PROCEDURE — 99214 OFFICE O/P EST MOD 30 MIN: CPT | Performed by: SURGERY

## 2024-08-02 PROCEDURE — 3079F DIAST BP 80-89 MM HG: CPT | Performed by: SURGERY

## 2024-08-02 PROCEDURE — 3077F SYST BP >= 140 MM HG: CPT | Performed by: SURGERY

## 2024-08-05 NOTE — PRE-PROCEDURE INSTRUCTIONS
PAT phone history completed with patient for upcoming procedure on 8/6/24.    PAT PASS reviewed with patient and he/she verbalized understanding of the following:     Do not eat or drink anything after midnight the night before procedure unless otherwise instructed by physician/surgeon's office, this includes no gum, candy, mints, tobacco products or e-cigarettes.  Do not shave the area to be operated on at least 48 hours prior to procedure.  Do not wear makeup, lotion, hair products, or nail polish.  Do not wear any jewelry and remove all piercings.  Do not wear any adhesive if you wear dentures.  Do not wear contacts; bring in glasses if needed.  Only take medications on the morning of procedure as instructed by PAT nurse per anesthesia guidelines or as instructed by physician's office.   If you are on any blood thinners reach out to the physician/surgeon's office for instructions on when/if they will need to be stopped prior to procedure.   Bring in picture ID and insurance card, advanced directive copies if applicable, CPAP/BIPAP/Inhalers if indicated morning of procedure, leave any other valuables at home.  Ensure you have arranged for someone to drive you home the day of your procedure and someone to care for you at home afterwards. It is recommended that you do not drive, drink alcohol, or make any major legal decisions for at least 24 hours after your procedure is complete.    Instructions given on hospital entrance and registration location.

## 2024-08-06 ENCOUNTER — ANESTHESIA EVENT (OUTPATIENT)
Dept: PERIOP | Facility: HOSPITAL | Age: 79
End: 2024-08-06
Payer: MEDICARE

## 2024-08-06 ENCOUNTER — ANESTHESIA (OUTPATIENT)
Dept: PERIOP | Facility: HOSPITAL | Age: 79
End: 2024-08-06
Payer: MEDICARE

## 2024-08-06 ENCOUNTER — HOSPITAL ENCOUNTER (OUTPATIENT)
Facility: HOSPITAL | Age: 79
Setting detail: HOSPITAL OUTPATIENT SURGERY
Discharge: HOME | End: 2024-08-06
Attending: SURGERY | Admitting: SURGERY
Payer: MEDICARE

## 2024-08-06 VITALS
RESPIRATION RATE: 20 BRPM | OXYGEN SATURATION: 97 % | TEMPERATURE: 97.7 F | SYSTOLIC BLOOD PRESSURE: 160 MMHG | HEART RATE: 48 BPM | DIASTOLIC BLOOD PRESSURE: 81 MMHG

## 2024-08-06 DIAGNOSIS — R53.83 OTHER FATIGUE: ICD-10-CM

## 2024-08-06 DIAGNOSIS — N18.6 END STAGE RENAL DISEASE: ICD-10-CM

## 2024-08-06 DIAGNOSIS — N18.5 CHRONIC KIDNEY DISEASE, STAGE V: Primary | ICD-10-CM

## 2024-08-06 LAB
HAV IGM SERPL QL IA: NORMAL
HBV CORE IGM SERPL QL IA: NORMAL
HBV SURFACE AG SERPL QL IA: NORMAL
HCV AB SER QL: NORMAL

## 2024-08-06 PROCEDURE — 0 LABETALOL 5 MG/ML SOLUTION: Performed by: NURSE ANESTHETIST, CERTIFIED REGISTERED

## 2024-08-06 PROCEDURE — 25010000002 ONDANSETRON PER 1 MG: Performed by: NURSE ANESTHETIST, CERTIFIED REGISTERED

## 2024-08-06 PROCEDURE — 25010000002 METOCLOPRAMIDE PER 10 MG: Performed by: NURSE ANESTHETIST, CERTIFIED REGISTERED

## 2024-08-06 PROCEDURE — 25010000002 BUPIVACAINE (PF) 0.5 % SOLUTION: Performed by: SURGERY

## 2024-08-06 PROCEDURE — 25010000002 DEXAMETHASONE PER 1 MG: Performed by: NURSE ANESTHETIST, CERTIFIED REGISTERED

## 2024-08-06 PROCEDURE — 25010000002 MIDAZOLAM PER 1MG: Performed by: NURSE ANESTHETIST, CERTIFIED REGISTERED

## 2024-08-06 PROCEDURE — 25010000002 FENTANYL CITRATE (PF) 50 MCG/ML SOLUTION: Performed by: NURSE ANESTHETIST, CERTIFIED REGISTERED

## 2024-08-06 PROCEDURE — 25010000002 GLYCOPYRROLATE PF 0.2 MG/ML SOLUTION: Performed by: NURSE ANESTHETIST, CERTIFIED REGISTERED

## 2024-08-06 PROCEDURE — 25010000002 LIDOCAINE 1 % SOLUTION: Performed by: SURGERY

## 2024-08-06 PROCEDURE — 25810000003 SODIUM CHLORIDE 0.9 % SOLUTION: Performed by: NURSE ANESTHETIST, CERTIFIED REGISTERED

## 2024-08-06 PROCEDURE — 80074 ACUTE HEPATITIS PANEL: CPT | Performed by: SURGERY

## 2024-08-06 PROCEDURE — 25010000002 PROPOFOL 10 MG/ML EMULSION: Performed by: NURSE ANESTHETIST, CERTIFIED REGISTERED

## 2024-08-06 PROCEDURE — C1750 CATH, HEMODIALYSIS,LONG-TERM: HCPCS | Performed by: SURGERY

## 2024-08-06 PROCEDURE — 25810000003 SODIUM CHLORIDE 0.9 % SOLUTION: Performed by: SURGERY

## 2024-08-06 PROCEDURE — 49324 LAP INSERT TUNNEL IP CATH: CPT | Performed by: SURGERY

## 2024-08-06 PROCEDURE — 25010000002 CEFAZOLIN PER 500 MG: Performed by: SURGERY

## 2024-08-06 DEVICE — KT CATH DIAL PERITONEAL CURL 2CUFF 62CM: Type: IMPLANTABLE DEVICE | Site: ABDOMEN | Status: FUNCTIONAL

## 2024-08-06 RX ORDER — KETAMINE HCL IN NACL, ISO-OSM 100MG/10ML
SYRINGE (ML) INJECTION AS NEEDED
Status: DISCONTINUED | OUTPATIENT
Start: 2024-08-06 | End: 2024-08-06 | Stop reason: SURG

## 2024-08-06 RX ORDER — SODIUM CHLORIDE 9 MG/ML
500 INJECTION, SOLUTION INTRAVENOUS CONTINUOUS
Status: DISCONTINUED | OUTPATIENT
Start: 2024-08-06 | End: 2024-08-06 | Stop reason: HOSPADM

## 2024-08-06 RX ORDER — LABETALOL HYDROCHLORIDE 5 MG/ML
INJECTION, SOLUTION INTRAVENOUS AS NEEDED
Status: DISCONTINUED | OUTPATIENT
Start: 2024-08-06 | End: 2024-08-06 | Stop reason: SURG

## 2024-08-06 RX ORDER — FENTANYL CITRATE 50 UG/ML
INJECTION, SOLUTION INTRAMUSCULAR; INTRAVENOUS AS NEEDED
Status: DISCONTINUED | OUTPATIENT
Start: 2024-08-06 | End: 2024-08-06 | Stop reason: SURG

## 2024-08-06 RX ORDER — SODIUM CHLORIDE 9 MG/ML
50 INJECTION, SOLUTION INTRAVENOUS CONTINUOUS
Status: DISCONTINUED | OUTPATIENT
Start: 2024-08-06 | End: 2024-08-06 | Stop reason: HOSPADM

## 2024-08-06 RX ORDER — ONDANSETRON 2 MG/ML
INJECTION INTRAMUSCULAR; INTRAVENOUS AS NEEDED
Status: DISCONTINUED | OUTPATIENT
Start: 2024-08-06 | End: 2024-08-06 | Stop reason: SURG

## 2024-08-06 RX ORDER — METOCLOPRAMIDE HYDROCHLORIDE 5 MG/ML
INJECTION INTRAMUSCULAR; INTRAVENOUS AS NEEDED
Status: DISCONTINUED | OUTPATIENT
Start: 2024-08-06 | End: 2024-08-06 | Stop reason: SURG

## 2024-08-06 RX ORDER — LIDOCAINE HYDROCHLORIDE 10 MG/ML
INJECTION, SOLUTION INFILTRATION; PERINEURAL AS NEEDED
Status: DISCONTINUED | OUTPATIENT
Start: 2024-08-06 | End: 2024-08-06 | Stop reason: HOSPADM

## 2024-08-06 RX ORDER — HYDROCODONE BITARTRATE AND ACETAMINOPHEN 5; 325 MG/1; MG/1
1-2 TABLET ORAL EVERY 4 HOURS PRN
Qty: 8 TABLET | Refills: 0 | Status: SHIPPED | OUTPATIENT
Start: 2024-08-06

## 2024-08-06 RX ORDER — MIDAZOLAM HYDROCHLORIDE 2 MG/2ML
INJECTION, SOLUTION INTRAMUSCULAR; INTRAVENOUS AS NEEDED
Status: DISCONTINUED | OUTPATIENT
Start: 2024-08-06 | End: 2024-08-06 | Stop reason: SURG

## 2024-08-06 RX ORDER — SODIUM CHLORIDE 9 MG/ML
500 INJECTION, SOLUTION INTRAVENOUS CONTINUOUS
Status: CANCELLED | OUTPATIENT
Start: 2024-08-06

## 2024-08-06 RX ORDER — BUPIVACAINE HYDROCHLORIDE 5 MG/ML
INJECTION, SOLUTION EPIDURAL; INTRACAUDAL AS NEEDED
Status: DISCONTINUED | OUTPATIENT
Start: 2024-08-06 | End: 2024-08-06 | Stop reason: HOSPADM

## 2024-08-06 RX ORDER — SODIUM CHLORIDE 9 MG/ML
INJECTION, SOLUTION INTRAVENOUS CONTINUOUS PRN
Status: DISCONTINUED | OUTPATIENT
Start: 2024-08-06 | End: 2024-08-06 | Stop reason: SURG

## 2024-08-06 RX ORDER — DEXAMETHASONE SODIUM PHOSPHATE 4 MG/ML
INJECTION, SOLUTION INTRA-ARTICULAR; INTRALESIONAL; INTRAMUSCULAR; INTRAVENOUS; SOFT TISSUE AS NEEDED
Status: DISCONTINUED | OUTPATIENT
Start: 2024-08-06 | End: 2024-08-06 | Stop reason: SURG

## 2024-08-06 RX ADMIN — SODIUM CHLORIDE 2000 MG: 9 INJECTION, SOLUTION INTRAVENOUS at 13:48

## 2024-08-06 RX ADMIN — METOCLOPRAMIDE 5 MG: 5 INJECTION, SOLUTION INTRAMUSCULAR; INTRAVENOUS at 13:50

## 2024-08-06 RX ADMIN — Medication 25 MG: at 13:52

## 2024-08-06 RX ADMIN — ONDANSETRON 4 MG: 2 INJECTION INTRAMUSCULAR; INTRAVENOUS at 13:51

## 2024-08-06 RX ADMIN — DEXAMETHASONE SODIUM PHOSPHATE 4 MG: 4 INJECTION, SOLUTION INTRA-ARTICULAR; INTRALESIONAL; INTRAMUSCULAR; INTRAVENOUS; SOFT TISSUE at 13:51

## 2024-08-06 RX ADMIN — MIDAZOLAM HYDROCHLORIDE 2 MG: 1 INJECTION, SOLUTION INTRAMUSCULAR; INTRAVENOUS at 13:48

## 2024-08-06 RX ADMIN — LABETALOL HYDROCHLORIDE 5 MG: 5 INJECTION, SOLUTION INTRAVENOUS at 13:55

## 2024-08-06 RX ADMIN — GLYCOPYRROLATE 0.1 MCG: 0.2 INJECTION, SOLUTION INTRAMUSCULAR; INTRAVITREAL at 13:49

## 2024-08-06 RX ADMIN — PROPOFOL 120 MCG/KG/MIN: 10 INJECTION, EMULSION INTRAVENOUS at 13:49

## 2024-08-06 RX ADMIN — SODIUM CHLORIDE: 9 INJECTION, SOLUTION INTRAVENOUS at 13:37

## 2024-08-06 RX ADMIN — LABETALOL HYDROCHLORIDE 5 MG: 5 INJECTION, SOLUTION INTRAVENOUS at 14:01

## 2024-08-06 RX ADMIN — FENTANYL CITRATE 50 MCG: 50 INJECTION, SOLUTION INTRAMUSCULAR; INTRAVENOUS at 13:50

## 2024-08-06 RX ADMIN — Medication 25 MG: at 14:00

## 2024-08-06 RX ADMIN — LABETALOL HYDROCHLORIDE 10 MG: 5 INJECTION, SOLUTION INTRAVENOUS at 14:13

## 2024-08-06 RX ADMIN — SODIUM CHLORIDE 500 ML/HR: 9 INJECTION, SOLUTION INTRAVENOUS at 13:37

## 2024-08-06 NOTE — ANESTHESIA POSTPROCEDURE EVALUATION
Patient: Travon Plummer    Procedure Summary       Date: 08/06/24 Room / Location: [facility] OR  /  [facility]    Anesthesia Start: 1348 Anesthesia Stop:     Procedure: LAPAROSCOPIC INTRA PERITONEAL CATHETER PLACEMENT Diagnosis:       Chronic kidney disease, stage V      (Chronic kidney disease, stage V [N18.5])    Surgeons: Bijan Noel MD Provider: Alvaro Segal CRNA    Anesthesia Type: MAC ASA Status: 3            Anesthesia Type: MAC    Vitals  No vitals data found for the desired time range.          Post Anesthesia Care and Evaluation    Patient location during evaluation: PHASE II  Patient participation: complete - patient participated  Level of consciousness: awake  Pain score: 0  Pain management: adequate    Airway patency: patent  Anesthetic complications: No anesthetic complications  PONV Status: none  Cardiovascular status: acceptable  Respiratory status: acceptable and face mask  Hydration status: acceptable    Comments: See R.N. note for postop vital signs.vsss resp spont, reflexes intact, responsive, report given to pacu nurse

## 2024-08-06 NOTE — ANESTHESIA PREPROCEDURE EVALUATION
Anesthesia Evaluation     Patient summary reviewed and Nursing notes reviewed   no history of anesthetic complications:   NPO Solid Status: > 8 hours  NPO Liquid Status: > 8 hours           Airway   Mallampati: II  TM distance: >3 FB  Neck ROM: full  Possible difficult intubation  Dental    (+) upper dentures and lower dentures    Pulmonary    (+) a smoker Former, COPD,shortness of breath, sleep apnea, decreased breath sounds  Cardiovascular     PT is on anticoagulation therapy  Patient on routine beta blocker  Rhythm: regular  Rate: normal    (+) hypertension, CAD, cardiac stents , dysrhythmias Atrial Fib, MANZO, PVD, hyperlipidemia,  carotid artery disease      Neuro/Psych  (+) CVA  GI/Hepatic/Renal/Endo    (+) obesity, morbid obesity, GERD, GI bleeding , renal disease- CRI and ESRD, thyroid problem hypothyroidism    Musculoskeletal     (+) arthralgias, myalgias  Abdominal    Substance History      OB/GYN          Other   arthritis,   history of cancer                  Anesthesia Plan    ASA 3     MAC     (Risks and benefits discussed including risk of aspiration, recall and dental damage. All patient questions answered.    Will continue with plan of care.)  intravenous induction     Anesthetic plan, risks, benefits, and alternatives have been provided, discussed and informed consent has been obtained with: patient.  Pre-procedure education provided

## 2024-08-06 NOTE — DISCHARGE INSTRUCTIONS

## 2024-08-06 NOTE — OP NOTE
PATIENT:    Travon Plummer    DATE OF SURGERY:  8/6/2024    PHYSICIAN:    Bijan Noel MD    REFERRING PHYSICIAN:  Bijan Noel MD    YOB: 1945    PREOPERATIVE DIAGNOSIS:  Renal failure    POSTOPERATIVE DIAGNOSIS:  Renal failure    PROCEDURE:  Peritoneal dialysis catheter placement laparoscopically    EBL: Less than 30 mL    Specimen: None    Complications: None    Anesthesia: Sedation    OPERATIVE PROCEDURE:  The patient was taken to the operating room in the normal manner.  An appropriate timeout was performed by the nursing staff intraoperatively prior to the incision.  Preoperative IV antibiotics were given.  MAC anesthesia was delivered.  The patient was prepped and draped in a normal sterile fashion after being placed in the supine position.     Incision was made in the right upper abdomen.  5 mm trocar was then placed into the peritoneal cavity under direct vision.  Gentle insufflation was then performed using CO2.  Multiple adhesions in the left hemiabdomen were appreciated.  A right paramedian incision was made and the introducer sheath was then inserted and tunneled ultimately into the peritoneal cavity.  Through this the catheter was then advanced into the pelvis.  The catheter was  laying nicely on top of the small bowel.  First cuff was at the level of the fascia.  Through a lateral stab wound the catheter was then tunneled.  The distal cuff was 2 cm proximal to this exit site.  The catheter flushed nicely.  The wound was closed with interrupted deep dermal Vicryl closure and the skin was closed with skin glue.  Dressing was applied.  There was scant blood loss throughout the case and no evidence of bleeding at conclusion.  Patient tolerated the procedure well and was transferred in stable condition to recovery.          Bijan Noel MD  8/6/2024  14:56 EDT

## 2024-08-09 NOTE — PROGRESS NOTES
"Patient: Travon Plummer    YOB: 1945    Date: 08/13/2024    Primary Care Provider: Chilango Erickson MD    Chief Complaint   Patient presents with    Post-op Follow-up     PD CATH        History of present illness:  I saw the patient in the office today as a followup from their recent pd cath placement. They state that they have done well post-operatively and are having no complaints.    Vital Signs:   Vitals:    08/13/24 1028   BP: 142/70   Pulse: 61   Temp: 97.7 °F (36.5 °C)   SpO2: 98%   Weight: 94.3 kg (208 lb)   Height: 188 cm (74.02\")       Physical Exam:    Abdomen: Soft and nontender.  The right upper 5 mm port has very mild erythema.  No drainage.  Otherwise wounds are healing nicely    Assessment / Plan :    1. Postoperative visit      Overall doing well.  There are some mild erythema around the upper 5 mm port.  Given that he has a dialysis catheter I would like to go ahead and treat him with doxycycline in the event that this is cellulitis although it is not clearly infected.    Incidentally, the patient is not interested in pursuing an AV fistula.  He states that if he is unable to do peritoneal dialysis he will not consider hemodialysis.    Electronically signed by Bijan Noel MD  08/13/24                "

## 2024-08-13 ENCOUNTER — OFFICE VISIT (OUTPATIENT)
Dept: SURGERY | Facility: CLINIC | Age: 79
End: 2024-08-13
Payer: MEDICARE

## 2024-08-13 VITALS
TEMPERATURE: 97.7 F | DIASTOLIC BLOOD PRESSURE: 70 MMHG | SYSTOLIC BLOOD PRESSURE: 142 MMHG | HEART RATE: 61 BPM | WEIGHT: 208 LBS | OXYGEN SATURATION: 98 % | HEIGHT: 74 IN | BODY MASS INDEX: 26.69 KG/M2

## 2024-08-13 DIAGNOSIS — Z48.89 POSTOPERATIVE VISIT: Primary | ICD-10-CM

## 2024-08-13 PROCEDURE — 3077F SYST BP >= 140 MM HG: CPT | Performed by: SURGERY

## 2024-08-13 PROCEDURE — 99024 POSTOP FOLLOW-UP VISIT: CPT | Performed by: SURGERY

## 2024-08-13 PROCEDURE — 1160F RVW MEDS BY RX/DR IN RCRD: CPT | Performed by: SURGERY

## 2024-08-13 PROCEDURE — 1159F MED LIST DOCD IN RCRD: CPT | Performed by: SURGERY

## 2024-08-13 PROCEDURE — 3078F DIAST BP <80 MM HG: CPT | Performed by: SURGERY

## 2024-08-13 RX ORDER — DOXYCYCLINE HYCLATE 100 MG/1
100 CAPSULE ORAL 2 TIMES DAILY
Qty: 10 CAPSULE | Refills: 0 | Status: SHIPPED | OUTPATIENT
Start: 2024-08-13 | End: 2024-08-18

## 2024-08-13 NOTE — PROGRESS NOTES
Patient: Travon Plummer  YOB: 1945    Date: 08/16/2024    Primary Care Provider: Chilango Erickson MD    No chief complaint on file.      History: Patient here for follow-up has no complaints.  The following portions of the patient's history were reviewed and updated as appropriate: allergies, current medications, past family history, past medical history, past social history, past surgical history and problem list.    Review of Systems    Vital Signs  There were no vitals filed for this visit.    Allergies:  Allergies   Allergen Reactions    Allopurinol Urinary Retention    Lipitor [Atorvastatin] Myalgia       Medications:    Current Outpatient Medications:     amLODIPine (NORVASC) 10 MG tablet, TAKE ONE TABLET BY MOUTH EVERY DAY, Disp: 90 tablet, Rfl: 3    aspirin 81 MG EC tablet, Take 1 tablet by mouth Daily., Disp: 30 tablet, Rfl: 0    carvedilol (COREG) 6.25 MG tablet, Take 1 tablet by mouth 2 (Two) Times a Day With Meals., Disp: 180 tablet, Rfl: 3    Cholecalciferol (Vitamin D) 50 MCG (2000 UT) capsule, Take 1 capsule by mouth Daily., Disp: , Rfl:     Cyanocobalamin (VITAMIN B-12 PO), Take  by mouth Daily., Disp: , Rfl:     doxycycline (VIBRAMYCIN) 100 MG capsule, Take 1 capsule by mouth 2 (Two) Times a Day for 5 days., Disp: 10 capsule, Rfl: 0    ferrous sulfate 325 (65 FE) MG tablet, Take 1 tablet by mouth Daily With Breakfast., Disp: , Rfl:     fluorouracil (EFUDEX) 5 % cream, Apply 1 Application topically to the appropriate area as directed., Disp: , Rfl:     HYDROcodone-acetaminophen (NORCO) 5-325 MG per tablet, Take 1-2 tablets by mouth Every 4 (Four) Hours As Needed for Moderate Pain or Severe Pain, Disp: 8 tablet, Rfl: 0    levothyroxine (SYNTHROID, LEVOTHROID) 25 MCG tablet, Take 1 tablet by mouth Daily. LEVOXYL, Disp: , Rfl: 0    ondansetron ODT (ZOFRAN-ODT) 4 MG disintegrating tablet, Place  under the tongue As Needed., Disp: , Rfl:     Probiotic Product (Probiotic Daily)  capsule, Take 1 capsule by mouth Daily., Disp: , Rfl:     rosuvastatin (CRESTOR) 40 MG tablet, Take 1 tablet by mouth Every Night., Disp: 90 tablet, Rfl: 3    sodium bicarbonate 650 MG tablet, Take 1 tablet by mouth 2 (Two) Times a Day., Disp: , Rfl:     triamcinolone (KENALOG) 0.1 % ointment, APPLY TWICE DAILY to itchy spots on hands and arms AS DIRECTED, Disp: , Rfl:     Physical Exam:    Abdomen: Soft and nontender.  Incision site appears normal.     Results Review:   None     Review of Systems was reviewed and confirmed as accurate as documented by the MA.    ASSESSMENT/PLAN:    1. Postoperative visit       Overall doing well.  Finish the antibiotics.  There may have been a mild superficial cellulitis versus irritation from adhesive and now essentially resolved.  Follow-up as needed.    Electronically signed by Bijan Noel MD  08/16/24

## 2024-08-16 ENCOUNTER — OFFICE VISIT (OUTPATIENT)
Dept: SURGERY | Facility: CLINIC | Age: 79
End: 2024-08-16
Payer: MEDICARE

## 2024-08-16 DIAGNOSIS — Z48.89 POSTOPERATIVE VISIT: Primary | ICD-10-CM

## 2024-08-16 PROCEDURE — 1159F MED LIST DOCD IN RCRD: CPT | Performed by: SURGERY

## 2024-08-16 PROCEDURE — 99024 POSTOP FOLLOW-UP VISIT: CPT | Performed by: SURGERY

## 2024-08-16 PROCEDURE — 1160F RVW MEDS BY RX/DR IN RCRD: CPT | Performed by: SURGERY

## 2024-08-29 NOTE — PROGRESS NOTES
Patient: Travon Plummer  YOB: 1945    Date: 08/30/2024    Primary Care Provider: Chilango Erickson MD    Chief Complaint   Patient presents with    Follow-up     PD cath/abdominal pain        History: The patient is in the office today in follow up from pd cath placement.  Last several days patient has had a couple episodes of pain.  Initially it awakened him at night and was having pain in the periumbilical and lower abdominal area.  Had another episode yesterday but not as severe.  Today he is asymptomatic.  No nausea or vomiting.  He did complain of associated constipation for which he took lactulose and that has helped.    The following portions of the patient's history were reviewed and updated as appropriate: allergies, current medications, past family history, past medical history, past social history, past surgical history and problem list.    Review of Systems   Constitutional:  Negative for chills, fever and unexpected weight change.   HENT:  Negative for trouble swallowing and voice change.    Eyes:  Negative for visual disturbance.   Respiratory:  Negative for apnea, cough, chest tightness, shortness of breath and wheezing.    Cardiovascular:  Negative for chest pain, palpitations and leg swelling.   Gastrointestinal:  Positive for abdominal pain and constipation. Negative for abdominal distention, anal bleeding, blood in stool, diarrhea, nausea, rectal pain and vomiting.   Endocrine: Negative for cold intolerance and heat intolerance.   Genitourinary:  Negative for difficulty urinating, dysuria, flank pain, scrotal swelling and testicular pain.   Musculoskeletal:  Negative for back pain, gait problem and joint swelling.   Skin:  Negative for color change, rash and wound.   Neurological:  Negative for dizziness, syncope, speech difficulty, weakness, numbness and headaches.   Hematological:  Negative for adenopathy. Does not bruise/bleed easily.   Psychiatric/Behavioral:   "Negative for confusion. The patient is not nervous/anxious.        Vital Signs  Vitals:    08/30/24 1049   BP: 136/64   Pulse: 58   Temp: 98.4 °F (36.9 °C)   SpO2: 98%   Weight: 94.3 kg (208 lb)   Height: 188 cm (74.02\")       Allergies:  Allergies   Allergen Reactions    Allopurinol Urinary Retention    Lipitor [Atorvastatin] Myalgia       Medications:    Current Outpatient Medications:     amLODIPine (NORVASC) 10 MG tablet, TAKE ONE TABLET BY MOUTH EVERY DAY, Disp: 90 tablet, Rfl: 3    aspirin 81 MG EC tablet, Take 1 tablet by mouth Daily., Disp: 30 tablet, Rfl: 0    carvedilol (COREG) 6.25 MG tablet, Take 1 tablet by mouth 2 (Two) Times a Day With Meals., Disp: 180 tablet, Rfl: 3    Cholecalciferol (Vitamin D) 50 MCG (2000 UT) capsule, Take 1 capsule by mouth Daily., Disp: , Rfl:     Cyanocobalamin (VITAMIN B-12 PO), Take  by mouth Daily., Disp: , Rfl:     ferrous sulfate 325 (65 FE) MG tablet, Take 1 tablet by mouth Daily With Breakfast., Disp: , Rfl:     fluorouracil (EFUDEX) 5 % cream, Apply 1 Application topically to the appropriate area as directed., Disp: , Rfl:     lactulose (CHRONULAC) 10 GM/15ML solution, TAKE 15 MILLILITERS BY MOUTH AS NEEDED FOR CONSTIPATION, Disp: , Rfl:     levothyroxine (SYNTHROID, LEVOTHROID) 25 MCG tablet, Take 1 tablet by mouth Daily. LEVOXYL, Disp: , Rfl: 0    ondansetron ODT (ZOFRAN-ODT) 4 MG disintegrating tablet, Place  under the tongue As Needed., Disp: , Rfl:     pantoprazole (PROTONIX) 40 MG EC tablet, Take 1 tablet by mouth Daily., Disp: , Rfl:     Probiotic Product (Probiotic Daily) capsule, Take 1 capsule by mouth Daily., Disp: , Rfl:     rosuvastatin (CRESTOR) 40 MG tablet, Take 1 tablet by mouth Every Night., Disp: 90 tablet, Rfl: 3    sodium bicarbonate 650 MG tablet, Take 1 tablet by mouth 2 (Two) Times a Day., Disp: , Rfl:     triamcinolone (KENALOG) 0.1 % ointment, APPLY TWICE DAILY to itchy spots on hands and arms AS DIRECTED, Disp: , Rfl:     Physical " Exam:    Abdomen: Soft nontender nondistended.  No evidence of infection.     Results Review:   None     Review of Systems was reviewed and confirmed as accurate as documented by the MA.    ASSESSMENT/PLAN:    1. Postoperative visit       Abdominal pain associated with constipation.  Certainly the constipation can cause pain as he did not have a bowel movement for 3 days which is unusual for him.  Pain has improved and is asymptomatic today.  For now I would like him to take MiraLAX daily and we will see how he does.  If he continues to have episodes of pain a CT scan may be warranted.  He will follow-up with me if he has continued issues.    Electronically signed by Bijan Noel MD  08/30/24

## 2024-08-30 ENCOUNTER — OFFICE VISIT (OUTPATIENT)
Dept: SURGERY | Facility: CLINIC | Age: 79
End: 2024-08-30
Payer: MEDICARE

## 2024-08-30 VITALS
HEART RATE: 58 BPM | OXYGEN SATURATION: 98 % | BODY MASS INDEX: 26.69 KG/M2 | TEMPERATURE: 98.4 F | DIASTOLIC BLOOD PRESSURE: 64 MMHG | HEIGHT: 74 IN | WEIGHT: 208 LBS | SYSTOLIC BLOOD PRESSURE: 136 MMHG

## 2024-08-30 DIAGNOSIS — Z48.89 POSTOPERATIVE VISIT: Primary | ICD-10-CM

## 2024-08-30 PROCEDURE — 1159F MED LIST DOCD IN RCRD: CPT | Performed by: SURGERY

## 2024-08-30 PROCEDURE — 1160F RVW MEDS BY RX/DR IN RCRD: CPT | Performed by: SURGERY

## 2024-08-30 PROCEDURE — 3075F SYST BP GE 130 - 139MM HG: CPT | Performed by: SURGERY

## 2024-08-30 PROCEDURE — 99024 POSTOP FOLLOW-UP VISIT: CPT | Performed by: SURGERY

## 2024-08-30 PROCEDURE — 3078F DIAST BP <80 MM HG: CPT | Performed by: SURGERY

## 2024-08-30 RX ORDER — PANTOPRAZOLE SODIUM 40 MG/1
1 TABLET, DELAYED RELEASE ORAL DAILY
COMMUNITY
Start: 2024-08-21

## 2024-08-30 RX ORDER — LACTULOSE 10 G/15ML
SOLUTION ORAL
COMMUNITY
Start: 2024-08-26

## 2024-11-13 ENCOUNTER — OFFICE VISIT (OUTPATIENT)
Dept: GASTROENTEROLOGY | Facility: CLINIC | Age: 79
End: 2024-11-13
Payer: MEDICARE

## 2024-11-13 VITALS
SYSTOLIC BLOOD PRESSURE: 154 MMHG | TEMPERATURE: 98.6 F | HEART RATE: 65 BPM | BODY MASS INDEX: 26.44 KG/M2 | DIASTOLIC BLOOD PRESSURE: 86 MMHG | WEIGHT: 206 LBS | HEIGHT: 74 IN

## 2024-11-13 DIAGNOSIS — Z87.19 HISTORY OF GI BLEED: ICD-10-CM

## 2024-11-13 DIAGNOSIS — K21.9 GASTROESOPHAGEAL REFLUX DISEASE WITHOUT ESOPHAGITIS: Primary | ICD-10-CM

## 2024-11-13 DIAGNOSIS — K59.04 CHRONIC IDIOPATHIC CONSTIPATION: ICD-10-CM

## 2024-11-13 DIAGNOSIS — Z86.2 HISTORY OF IRON DEFICIENCY ANEMIA: ICD-10-CM

## 2024-11-13 PROCEDURE — 1160F RVW MEDS BY RX/DR IN RCRD: CPT | Performed by: INTERNAL MEDICINE

## 2024-11-13 PROCEDURE — 3077F SYST BP >= 140 MM HG: CPT | Performed by: INTERNAL MEDICINE

## 2024-11-13 PROCEDURE — 1159F MED LIST DOCD IN RCRD: CPT | Performed by: INTERNAL MEDICINE

## 2024-11-13 PROCEDURE — 99214 OFFICE O/P EST MOD 30 MIN: CPT | Performed by: INTERNAL MEDICINE

## 2024-11-13 PROCEDURE — 3079F DIAST BP 80-89 MM HG: CPT | Performed by: INTERNAL MEDICINE

## 2024-11-13 RX ORDER — PANTOPRAZOLE SODIUM 20 MG/1
20 TABLET, DELAYED RELEASE ORAL DAILY
Qty: 90 TABLET | Refills: 3 | Status: SHIPPED | OUTPATIENT
Start: 2024-11-13

## 2024-11-13 RX ORDER — PANTOPRAZOLE SODIUM 20 MG/1
20 TABLET, DELAYED RELEASE ORAL DAILY
Qty: 90 TABLET | Refills: 3 | Status: SHIPPED | OUTPATIENT
Start: 2024-11-13 | End: 2024-11-13

## 2024-11-13 RX ORDER — LOSARTAN POTASSIUM 100 MG/1
100 TABLET ORAL DAILY
COMMUNITY

## 2024-11-13 RX ORDER — POTASSIUM CHLORIDE 750 MG/1
10 CAPSULE, EXTENDED RELEASE ORAL 2 TIMES DAILY
COMMUNITY

## 2024-11-13 RX ORDER — AMLODIPINE BESYLATE 5 MG/1
1 TABLET ORAL DAILY
COMMUNITY
Start: 2024-10-24

## 2024-11-13 RX ORDER — CALCITRIOL 0.25 UG/1
0.25 CAPSULE, LIQUID FILLED ORAL DAILY
COMMUNITY

## 2024-11-13 RX ORDER — BUMETANIDE 1 MG/1
1 TABLET ORAL DAILY
COMMUNITY

## 2024-11-13 NOTE — PROGRESS NOTES
Follow Up Note     Date: 2024   Patient Name: Travon Plummer  MRN: [id number]  : 1945     Referring Physician: Chilango Erickson MD    Chief Complaint:    Chief Complaint   Patient presents with    Constipation    HX GI bleeding    AVM of intestine       Interval History:   2024  Travon Plummer is a 79 y.o. male who is here today for follow up for his anemia and history of GI bleed.  He states that he was started on peritoneal dialysis recently 3 months ago.  He had iron infusion and currently on iron pills and hemoglobin stable.  Has been having daily bowel movement without any laxatives denies any abdominal pain.  No history of any GI bleed.    2023  This is a 78-year-old male patient with a prior history of hypertension, hyperlipidemia, coronary artery disease, prior history of CVA, paroxysmal atrial fibrillation not on any anticoagulation, history of prostate cancer in  status post radiation treatment, renal cell cancer status post radical left nephrectomy in , CKD was sent from nephrology office for further evaluation management of his anemia noted with lab work      He has a chronic anemia with a CKD.  He was followed by Dr. Sellers.  Blood work done showed a low hemoglobin and sent to emergency room.  Patient admits that he has been passing black stools many months as  was also taking iron pills.  He did not see any difference in the color of the stool.  Denies any red blood or clots.  He denies any abdominal pain, diarrhea, nausea vomiting.  He states that he normally gets a pellet-like dark stool and feels constipated.  Last good bowel movement for 4 days ago.     He is currently on baby aspirin otherwise not on any blood thinners.  Denies any NSAID use.  No prior history of any peptic ulcer disease or GI bleed.  He had a EGD colonoscopy done by Dr. Sandra Do in 2021 for evaluation of iron deficiency anemia and heme positive stool.  EGD  showed a nonobstructing distal esophageal ring was unremarkable.  Colonoscopy with suboptimal bowel prep and dark liquid stool noted in the colon and 1 benign polyp removed.     In the emergency room he was noted to have a hemoglobin of 5.6 g/dL with normal platelet counts.  His BUN was 38 creatinine was 3.64.  His recent CT abdomen pelvis was on 2/9/2023 unremarkable except asymptomatic gallstones and possible cystitis.  GI consulted for further evaluation.    Subjective      Past Medical History:   Past Medical History:   Diagnosis Date    Benign hypertensive CKD, stage 5 chronic kidney disease or end stage renal disease     Broken arm     Carotid stenosis     bilateral    Cerebrovascular accident 10/31/2008    Coronary artery disease     followed by Dr. Ashford; LOV 7/9/24; denies chest pain, SOB 8/5/24    Dialysis patient 2024    Current 11/13/24    Dyspnea     GERD (gastroesophageal reflux disease)     Gout     History of blood transfusion     Hypercholesterolemia     Hypertension 11/10/2015    History of hypertension; hypotensive at the time of office visit on 11/10/2015.    Impaired mobility     Obesity     Osteoarthritis     Paroxysmal atrial fibrillation     History of paroxysmal atrial fibrillation, data deficit.    Prostate cancer 01/2015    Prostate cancer, diagnosed January of 2015, status post radiation therapy x39 treatments, 07/01/2015 at [facility].    Renal cell carcinoma 2015    Renal cell carcinoma, dx March of 2015, status post left nephrectomy.    Wears dentures     instructed no adhesive DOS     Past Surgical History:   Past Surgical History:   Procedure Laterality Date    CAROTID STENT Left 10/31/2008    PTA/left carotid artery stent, 10/31/2008.     COLONOSCOPY N/A 12/23/2021    Procedure: COLONOSCOPY, polypectomy, clip placement x 1;  Surgeon: Deandra Do MD;  Location: [facility] ENDOSCOPY;  Service: Gastroenterology;  Laterality: N/A;    COLONOSCOPY W/ POLYPECTOMY       CORONARY ANGIOPLASTY WITH STENT PLACEMENT      A 3.5 x 13 mm. Cypher ESTIVEN to RCA expanded with 4 mm balloon.     DIALYSIS FISTULA CREATION N/A     abdominal    ENDOSCOPY N/A 2021    Procedure: ESOPHAGOGASTRODUODENOSCOPY with biopsy;  Surgeon: Deanrda Do MD;  Location: [facility] ENDOSCOPY;  Service: Gastroenterology;  Laterality: N/A;    ENDOSCOPY N/A 2023    Procedure: ESOPHAGOGASTRODUODENOSCOPY with biopsy;  Surgeon: Georgina Pool MD;  Location: [facility] ENDOSCOPY;  Service: Gastroenterology;  Laterality: N/A;    INGUINAL HERNIA REPAIR      NEPHRECTOMY Left 2015    diagnosed 2015, status post left radical nephrectomy.     PROSTATE FIDUCIAL MARKER PLACEMENT      received XRT for prostate CA in        Family History:   Family History   Problem Relation Age of Onset    No Known Problems Mother     No Known Problems Father     Colon cancer Neg Hx        Social History:   Social History     Socioeconomic History    Marital status:    Tobacco Use    Smoking status: Former     Current packs/day: 0.00     Average packs/day: 1 pack/day for 51.0 years (51.0 ttl pk-yrs)     Types: Cigarettes     Start date:      Quit date:      Years since quittin.8     Passive exposure: Past    Smokeless tobacco: Former     Types: Chew   Vaping Use    Vaping status: Never Used   Substance and Sexual Activity    Alcohol use: Yes     Comment: rare    Drug use: No    Sexual activity: Defer       Medications:     Current Outpatient Medications:     amLODIPine (NORVASC) 5 MG tablet, Take 1 tablet by mouth Daily., Disp: , Rfl:     aspirin 81 MG EC tablet, Take 1 tablet by mouth Daily., Disp: 30 tablet, Rfl: 0    bumetanide (BUMEX) 1 MG tablet, Take 1 tablet by mouth Daily., Disp: , Rfl:     calcitriol (ROCALTROL) 0.25 MCG capsule, Take 1 capsule by mouth Daily., Disp: , Rfl:     carvedilol (COREG) 6.25 MG tablet, Take 1 tablet by mouth 2 (Two) Times a Day With Meals. (Patient taking  differently: Take 2 tablets by mouth 2 (Two) Times a Day With Meals.), Disp: 180 tablet, Rfl: 3    Cholecalciferol (Vitamin D) 50 MCG (2000 UT) capsule, Take 1 capsule by mouth Daily., Disp: , Rfl:     Cyanocobalamin (VITAMIN B-12 PO), Take 1,000 mcg by mouth Daily., Disp: , Rfl:     ferrous sulfate 325 (65 FE) MG tablet, Take 1 tablet by mouth 1 (One) Time Per Week. Sunday, Disp: , Rfl:     Iron Sucrose (VENOFER IV), 200 mg Once., Disp: , Rfl:     levothyroxine (SYNTHROID, LEVOTHROID) 25 MCG tablet, Take 1 tablet by mouth Daily. LEVOXYL, Disp: , Rfl: 0    losartan (COZAAR) 100 MG tablet, Take 1 tablet by mouth Daily., Disp: , Rfl:     Methoxy PEG-Epoetin Beta (MIRCERA IJ), Inject 100 mcg under the skin into the appropriate area as directed., Disp: , Rfl:     pantoprazole (PROTONIX) 40 MG EC tablet, Take 1 tablet by mouth Daily., Disp: , Rfl:     potassium chloride (MICRO-K) 10 MEQ CR capsule, Take 1 capsule by mouth 2 (Two) Times a Day., Disp: , Rfl:     rosuvastatin (CRESTOR) 40 MG tablet, Take 1 tablet by mouth Every Night., Disp: 90 tablet, Rfl: 3    sodium bicarbonate 650 MG tablet, Take 1 tablet by mouth 2 (Two) Times a Day., Disp: , Rfl:     fluorouracil (EFUDEX) 5 % cream, Apply 1 Application topically to the appropriate area as directed. (Patient not taking: Reported on 11/13/2024), Disp: , Rfl:     triamcinolone (KENALOG) 0.1 % ointment, APPLY TWICE DAILY to itchy spots on hands and arms AS DIRECTED (Patient not taking: Reported on 11/13/2024), Disp: , Rfl:     Allergies:   Allergies   Allergen Reactions    Allopurinol Urinary Retention    Lipitor [Atorvastatin] Myalgia       Review of Systems:   Review of Systems   Constitutional:  Positive for appetite change and unexpected weight loss. Negative for fatigue and fever.   HENT:  Negative for trouble swallowing.    Gastrointestinal:  Positive for abdominal pain (with his dialysis). Negative for abdominal distention, anal bleeding, blood in stool,  "constipation, diarrhea, nausea, rectal pain, vomiting, GERD and indigestion.       The following portions of the patient's history were reviewed and updated as appropriate: allergies, current medications, past family history, past medical history, past social history, past surgical history and problem list.    Objective     Physical Exam:  Vital Signs:   Vitals:    11/13/24 1557   BP: 154/86   Pulse: 65   Temp: 98.6 °F (37 °C)   TempSrc: Infrared   Weight: 93.4 kg (206 lb)  Comment: with  clothing/shoes   Height: 188 cm (74\")  Comment: per EPIC       Physical Exam  Constitutional:       Appearance: Normal appearance.   HENT:      Head: Normocephalic and atraumatic.   Eyes:      Conjunctiva/sclera: Conjunctivae normal.   Abdominal:      General: Abdomen is flat. There is no distension.      Palpations: There is no mass.      Tenderness: There is no abdominal tenderness. There is no guarding or rebound.      Hernia: No hernia is present.      Comments: Peritoneal dialysis catheter noted   Musculoskeletal:      Cervical back: Normal range of motion and neck supple.   Neurological:      Mental Status: He is alert.         Results Review:   I reviewed the patient's new clinical results.    No visits with results within 90 Day(s) from this visit.   Latest known visit with results is:   Admission on 08/06/2024, Discharged on 08/06/2024   Component Date Value Ref Range Status    Hepatitis B Surface Ag 08/06/2024 Non-Reactive  Non-Reactive Final    Hep A IgM 08/06/2024 Non-Reactive  Non-Reactive Final    Hep B C IgM 08/06/2024 Non-Reactive  Non-Reactive Final    Hepatitis C Ab 08/06/2024 Non-Reactive  Non-Reactive Final      No radiology results for the last 90 days.    EGD on 2/17/2023  - Normal oropharynx.  - Z-line regular, 42 cm from the incisors.  - No gross lesions in the entire esophagus.  - Erythematous mucosa in the posterior wall of the stomach, antrum and prepyloric region of the stomach.  Biopsied. 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 Previously biopsied.  - Normal first portion of the duodenum, second portion of the duodenum and third portion of the duodenum     Small bowel PillCam study on 2/7/2023  PillCam still in the distal ileum, ??  TI at the end of the recording makes accurate location of the abnormality is difficult. Couple of small and tiny nonbleeding AVMs in the distal duodenum and the proximal jejunum  Bleeding AVMs in the ileum at least 2 areas filled with clots and red blood.  Multiple nonbleeding AVMs in the ileum mostly in the mid and the distal ileum.  This AVMs can be approached only with balloon assisted enteroscopy for intervention.    Assessment / Plan      1.  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  History of chronic iron deficiency anemia  3.  End-stage renal disease on peritoneal dialysis now  4.  Chronic idiopathic constipation  5.  Adenomatous colon polyps  11/13/2024  Patient is currently on a peritoneal dialysis.  He had iron infusion and currently on iron pills.  Denies any significant constipation now not taking any laxatives.  Last lab work at renal office on 11/6/2024 reveals a hemoglobin of 12.1.  Platelet count was 143,000.  Denies any significant reflux symptoms.  Due to his prior colon polyps noted with a colonoscopy which were not removed , again discussed on colonoscopy and at this time patient does not want to proceed with any colonoscopy.    Will reduce his Protonix dose to Protonix 20 mg p.o. daily  Continue iron pills  Dulcolax 2 tablets p.o. as needed  We will follow-up in 1 year time    5/13/2024  He was referred to Dr. Gr at [facility] for single balloon enteroscopy.  As per report enteroscopy up to 80 cm from IC valve did not reveal any obvious AVMs.  There was a small nonbleeding AVM in the cecum which was ablated.  There was 1 small polyp ascending colon removed  which was tubular adenoma.  There were also about 3-5 small polyps in the right side colon which were not removed as patient was under sedation long period for enteroscopy.      3/21/2023  EGD done on 2/17/2023 which revealed moderate distal aspirin due to gastropathy otherwise unremarkable.  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  Otherwise unremarkable.  Small bowel PillCam study followed by EGD.  He had a delayed transit of the PillCam in the distal ileum with poor views in the distal small bowel.  It appeared PillCam was still in the distal ileum at the end of 7-1/2 hours recorded.  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  This is more suggestive of bleeding AVMs.  Patient likely had intermittent minimal upper GI bleed with drop in his H&H for the last few years. Given his single kidney and CKD he is not a candidate for any IR guided intervention.      Patient has a chronic anemia over 6 years now.  His hemoglobin runs about 11 to 12 g/dL before however since 2021 his hemoglobin dropped a few occasions between 5 to 6 g/dL.  He had a positive Hemoccult stool test in 2021 and had a EGD colonoscopy done for further evaluation by Dr. Sandra Do which did not reveal any obvious source of bleeding.  Recent CT abdomen without contrast done week ago was unremarkable except asymptomatic gallstones.        Prior history  6.  History of CAD status post coronary artery stent  7.  History of paroxysmal A-fib  8.  History of radical left nephrectomy for RCC  9.  History of prostate cancer with radiation treatment       Follow Up:   No follow-ups on file.    Georgina Pool MD  [facility]  11/13/2024  16:00 EST     Please note that portions of this note may have been completed with a voice recognition program.

## 2024-12-03 ENCOUNTER — APPOINTMENT (OUTPATIENT)
Dept: CT IMAGING | Facility: HOSPITAL | Age: 79
End: 2024-12-03
Payer: MEDICARE

## 2024-12-03 ENCOUNTER — APPOINTMENT (OUTPATIENT)
Dept: GENERAL RADIOLOGY | Facility: HOSPITAL | Age: 79
End: 2024-12-03
Payer: MEDICARE

## 2024-12-03 ENCOUNTER — HOSPITAL ENCOUNTER (INPATIENT)
Facility: HOSPITAL | Age: 79
LOS: 8 days | Discharge: NURSING, REHAB OR LONG-TERM CARE | End: 2024-12-11
Attending: STUDENT IN AN ORGANIZED HEALTH CARE EDUCATION/TRAINING PROGRAM | Admitting: INTERNAL MEDICINE
Payer: MEDICARE

## 2024-12-03 DIAGNOSIS — S72.001A CLOSED FRACTURE OF RIGHT HIP, INITIAL ENCOUNTER: ICD-10-CM

## 2024-12-03 DIAGNOSIS — S72.144A CLOSED NONDISPLACED INTERTROCHANTERIC FRACTURE OF RIGHT FEMUR, INITIAL ENCOUNTER: Primary | ICD-10-CM

## 2024-12-03 LAB
ALBUMIN SERPL-MCNC: 3.6 G/DL (ref 3.5–5.2)
ALBUMIN/GLOB SERPL: 1.3 G/DL
ALP SERPL-CCNC: 100 U/L (ref 39–117)
ALT SERPL W P-5'-P-CCNC: 13 U/L (ref 1–41)
ANION GAP SERPL CALCULATED.3IONS-SCNC: 11.1 MMOL/L (ref 5–15)
AST SERPL-CCNC: 16 U/L (ref 1–40)
BASOPHILS # BLD AUTO: 0.13 10*3/MM3 (ref 0–0.2)
BASOPHILS NFR BLD AUTO: 1.3 % (ref 0–1.5)
BILIRUB SERPL-MCNC: 0.4 MG/DL (ref 0–1.2)
BUN SERPL-MCNC: 26 MG/DL (ref 8–23)
BUN/CREAT SERPL: 5.3 (ref 7–25)
CALCIUM SPEC-SCNC: 9.3 MG/DL (ref 8.6–10.5)
CHLORIDE SERPL-SCNC: 103 MMOL/L (ref 98–107)
CO2 SERPL-SCNC: 26.9 MMOL/L (ref 22–29)
CREAT SERPL-MCNC: 4.93 MG/DL (ref 0.76–1.27)
DEPRECATED RDW RBC AUTO: 50.8 FL (ref 37–54)
EGFRCR SERPLBLD CKD-EPI 2021: 11.3 ML/MIN/1.73
EOSINOPHIL # BLD AUTO: 1.46 10*3/MM3 (ref 0–0.4)
EOSINOPHIL NFR BLD AUTO: 14.4 % (ref 0.3–6.2)
ERYTHROCYTE [DISTWIDTH] IN BLOOD BY AUTOMATED COUNT: 15.1 % (ref 12.3–15.4)
GLOBULIN UR ELPH-MCNC: 2.7 GM/DL
GLUCOSE SERPL-MCNC: 99 MG/DL (ref 65–99)
HCT VFR BLD AUTO: 35.3 % (ref 37.5–51)
HGB BLD-MCNC: 11.4 G/DL (ref 13–17.7)
IMM GRANULOCYTES # BLD AUTO: 0.04 10*3/MM3 (ref 0–0.05)
IMM GRANULOCYTES NFR BLD AUTO: 0.4 % (ref 0–0.5)
LYMPHOCYTES # BLD AUTO: 0.78 10*3/MM3 (ref 0.7–3.1)
LYMPHOCYTES NFR BLD AUTO: 7.7 % (ref 19.6–45.3)
MCH RBC QN AUTO: 29.8 PG (ref 26.6–33)
MCHC RBC AUTO-ENTMCNC: 32.3 G/DL (ref 31.5–35.7)
MCV RBC AUTO: 92.4 FL (ref 79–97)
MONOCYTES # BLD AUTO: 0.63 10*3/MM3 (ref 0.1–0.9)
MONOCYTES NFR BLD AUTO: 6.2 % (ref 5–12)
NEUTROPHILS NFR BLD AUTO: 7.1 10*3/MM3 (ref 1.7–7)
NEUTROPHILS NFR BLD AUTO: 70 % (ref 42.7–76)
NRBC BLD AUTO-RTO: 0 /100 WBC (ref 0–0.2)
PLATELET # BLD AUTO: 136 10*3/MM3 (ref 140–450)
PMV BLD AUTO: 12.6 FL (ref 6–12)
POTASSIUM SERPL-SCNC: 4.8 MMOL/L (ref 3.5–5.2)
PROT SERPL-MCNC: 6.3 G/DL (ref 6–8.5)
RBC # BLD AUTO: 3.82 10*6/MM3 (ref 4.14–5.8)
SODIUM SERPL-SCNC: 141 MMOL/L (ref 136–145)
WBC NRBC COR # BLD AUTO: 10.14 10*3/MM3 (ref 3.4–10.8)

## 2024-12-03 PROCEDURE — 25010000002 MORPHINE PER 10 MG: Performed by: STUDENT IN AN ORGANIZED HEALTH CARE EDUCATION/TRAINING PROGRAM

## 2024-12-03 PROCEDURE — 25010000002 ONDANSETRON PER 1 MG: Performed by: STUDENT IN AN ORGANIZED HEALTH CARE EDUCATION/TRAINING PROGRAM

## 2024-12-03 PROCEDURE — 85025 COMPLETE CBC W/AUTO DIFF WBC: CPT | Performed by: STUDENT IN AN ORGANIZED HEALTH CARE EDUCATION/TRAINING PROGRAM

## 2024-12-03 PROCEDURE — 72125 CT NECK SPINE W/O DYE: CPT

## 2024-12-03 PROCEDURE — 25010000002 HYDROMORPHONE 1 MG/ML SOLUTION: Performed by: INTERNAL MEDICINE

## 2024-12-03 PROCEDURE — 3E1M39Z IRRIGATION OF PERITONEAL CAVITY USING DIALYSATE, PERCUTANEOUS APPROACH: ICD-10-PCS | Performed by: INTERNAL MEDICINE

## 2024-12-03 PROCEDURE — 99223 1ST HOSP IP/OBS HIGH 75: CPT | Performed by: INTERNAL MEDICINE

## 2024-12-03 PROCEDURE — 99285 EMERGENCY DEPT VISIT HI MDM: CPT | Performed by: STUDENT IN AN ORGANIZED HEALTH CARE EDUCATION/TRAINING PROGRAM

## 2024-12-03 PROCEDURE — 80053 COMPREHEN METABOLIC PANEL: CPT | Performed by: STUDENT IN AN ORGANIZED HEALTH CARE EDUCATION/TRAINING PROGRAM

## 2024-12-03 PROCEDURE — 73502 X-RAY EXAM HIP UNI 2-3 VIEWS: CPT

## 2024-12-03 PROCEDURE — 72192 CT PELVIS W/O DYE: CPT

## 2024-12-03 PROCEDURE — 70450 CT HEAD/BRAIN W/O DYE: CPT

## 2024-12-03 RX ORDER — CALCIUM CARBONATE 500 MG/1
2 TABLET, CHEWABLE ORAL 2 TIMES DAILY PRN
Status: DISCONTINUED | OUTPATIENT
Start: 2024-12-03 | End: 2024-12-11 | Stop reason: HOSPADM

## 2024-12-03 RX ORDER — ONDANSETRON 2 MG/ML
4 INJECTION INTRAMUSCULAR; INTRAVENOUS EVERY 6 HOURS PRN
Status: DISCONTINUED | OUTPATIENT
Start: 2024-12-03 | End: 2024-12-11 | Stop reason: HOSPADM

## 2024-12-03 RX ORDER — ASPIRIN 81 MG/1
81 TABLET ORAL DAILY
Status: DISCONTINUED | OUTPATIENT
Start: 2024-12-04 | End: 2024-12-04

## 2024-12-03 RX ORDER — SODIUM CHLORIDE 0.9 % (FLUSH) 0.9 %
10 SYRINGE (ML) INJECTION AS NEEDED
Status: DISCONTINUED | OUTPATIENT
Start: 2024-12-03 | End: 2024-12-11 | Stop reason: HOSPADM

## 2024-12-03 RX ORDER — ONDANSETRON 4 MG/1
4 TABLET, ORALLY DISINTEGRATING ORAL EVERY 6 HOURS PRN
Status: DISCONTINUED | OUTPATIENT
Start: 2024-12-03 | End: 2024-12-11 | Stop reason: HOSPADM

## 2024-12-03 RX ORDER — UREA 10 %
1000 LOTION (ML) TOPICAL DAILY
Status: DISCONTINUED | OUTPATIENT
Start: 2024-12-04 | End: 2024-12-11 | Stop reason: HOSPADM

## 2024-12-03 RX ORDER — CARVEDILOL 12.5 MG/1
12.5 TABLET ORAL 2 TIMES DAILY WITH MEALS
Status: DISCONTINUED | OUTPATIENT
Start: 2024-12-03 | End: 2024-12-11 | Stop reason: HOSPADM

## 2024-12-03 RX ORDER — SODIUM CHLORIDE 9 MG/ML
40 INJECTION, SOLUTION INTRAVENOUS AS NEEDED
Status: DISCONTINUED | OUTPATIENT
Start: 2024-12-03 | End: 2024-12-11 | Stop reason: HOSPADM

## 2024-12-03 RX ORDER — AMOXICILLIN 250 MG
2 CAPSULE ORAL 2 TIMES DAILY PRN
Status: DISCONTINUED | OUTPATIENT
Start: 2024-12-03 | End: 2024-12-11 | Stop reason: HOSPADM

## 2024-12-03 RX ORDER — BISACODYL 5 MG/1
5 TABLET, DELAYED RELEASE ORAL DAILY PRN
Status: DISCONTINUED | OUTPATIENT
Start: 2024-12-03 | End: 2024-12-11 | Stop reason: HOSPADM

## 2024-12-03 RX ORDER — BISACODYL 10 MG
10 SUPPOSITORY, RECTAL RECTAL DAILY PRN
Status: DISCONTINUED | OUTPATIENT
Start: 2024-12-03 | End: 2024-12-11 | Stop reason: HOSPADM

## 2024-12-03 RX ORDER — ONDANSETRON 2 MG/ML
4 INJECTION INTRAMUSCULAR; INTRAVENOUS ONCE
Status: COMPLETED | OUTPATIENT
Start: 2024-12-03 | End: 2024-12-03

## 2024-12-03 RX ORDER — PANTOPRAZOLE SODIUM 40 MG/1
40 TABLET, DELAYED RELEASE ORAL DAILY
Status: DISCONTINUED | OUTPATIENT
Start: 2024-12-04 | End: 2024-12-11 | Stop reason: HOSPADM

## 2024-12-03 RX ORDER — LEVOTHYROXINE SODIUM 25 UG/1
25 TABLET ORAL DAILY
Status: DISCONTINUED | OUTPATIENT
Start: 2024-12-04 | End: 2024-12-11 | Stop reason: HOSPADM

## 2024-12-03 RX ORDER — POLYETHYLENE GLYCOL 3350 17 G/17G
17 POWDER, FOR SOLUTION ORAL DAILY PRN
Status: DISCONTINUED | OUTPATIENT
Start: 2024-12-03 | End: 2024-12-11 | Stop reason: HOSPADM

## 2024-12-03 RX ORDER — ROSUVASTATIN CALCIUM 20 MG/1
40 TABLET, COATED ORAL NIGHTLY
Status: DISCONTINUED | OUTPATIENT
Start: 2024-12-03 | End: 2024-12-11 | Stop reason: HOSPADM

## 2024-12-03 RX ORDER — SODIUM CHLORIDE, SODIUM LACTATE, CALCIUM CHLORIDE, MAGNESIUM CHLORIDE AND DEXTROSE 1.5; 538; 448; 25.7; 5.08 G/100ML; MG/100ML; MG/100ML; MG/100ML; MG/100ML
2000 INJECTION, SOLUTION INTRAPERITONEAL
Status: COMPLETED | OUTPATIENT
Start: 2024-12-03 | End: 2024-12-03

## 2024-12-03 RX ORDER — NITROGLYCERIN 0.4 MG/1
0.4 TABLET SUBLINGUAL
Status: DISCONTINUED | OUTPATIENT
Start: 2024-12-03 | End: 2024-12-11 | Stop reason: HOSPADM

## 2024-12-03 RX ORDER — FERROUS SULFATE 324(65)MG
324 TABLET, DELAYED RELEASE (ENTERIC COATED) ORAL WEEKLY
Status: DISCONTINUED | OUTPATIENT
Start: 2024-12-10 | End: 2024-12-11 | Stop reason: HOSPADM

## 2024-12-03 RX ORDER — CALCITRIOL 0.25 UG/1
0.25 CAPSULE, LIQUID FILLED ORAL DAILY
Status: DISCONTINUED | OUTPATIENT
Start: 2024-12-04 | End: 2024-12-11 | Stop reason: HOSPADM

## 2024-12-03 RX ORDER — ACETAMINOPHEN 325 MG/1
650 TABLET ORAL EVERY 4 HOURS PRN
Status: DISCONTINUED | OUTPATIENT
Start: 2024-12-03 | End: 2024-12-11 | Stop reason: HOSPADM

## 2024-12-03 RX ORDER — SODIUM CHLORIDE 0.9 % (FLUSH) 0.9 %
10 SYRINGE (ML) INJECTION EVERY 12 HOURS SCHEDULED
Status: DISCONTINUED | OUTPATIENT
Start: 2024-12-03 | End: 2024-12-11 | Stop reason: HOSPADM

## 2024-12-03 RX ADMIN — Medication 10 ML: at 21:54

## 2024-12-03 RX ADMIN — MORPHINE SULFATE 4 MG: 4 INJECTION, SOLUTION INTRAMUSCULAR; INTRAVENOUS at 16:16

## 2024-12-03 RX ADMIN — ROSUVASTATIN CALCIUM 40 MG: 20 TABLET, FILM COATED ORAL at 21:53

## 2024-12-03 RX ADMIN — HYDROMORPHONE HYDROCHLORIDE 0.5 MG: 1 INJECTION, SOLUTION INTRAMUSCULAR; INTRAVENOUS; SUBCUTANEOUS at 18:12

## 2024-12-03 RX ADMIN — Medication 5 MG: at 21:53

## 2024-12-03 RX ADMIN — HYDROMORPHONE HYDROCHLORIDE 0.5 MG: 1 INJECTION, SOLUTION INTRAMUSCULAR; INTRAVENOUS; SUBCUTANEOUS at 21:53

## 2024-12-03 RX ADMIN — SODIUM CHLORIDE, SODIUM LACTATE, CALCIUM CHLORIDE, MAGNESIUM CHLORIDE AND DEXTROSE 2000 ML: 1.5; 538; 448; 25.7; 5.08 INJECTION, SOLUTION INTRAPERITONEAL at 23:09

## 2024-12-03 RX ADMIN — MORPHINE SULFATE 4 MG: 4 INJECTION, SOLUTION INTRAMUSCULAR; INTRAVENOUS at 15:01

## 2024-12-03 RX ADMIN — ONDANSETRON 4 MG: 2 INJECTION INTRAMUSCULAR; INTRAVENOUS at 15:00

## 2024-12-03 RX ADMIN — CARVEDILOL 12.5 MG: 12.5 TABLET, FILM COATED ORAL at 18:15

## 2024-12-03 NOTE — ED NOTES
Report given to Maria Del Carmen murillo on 3rd floor. Pt getting x rays of hip att. Pt and belongings ready for transfer. Family at bedside. Transport notified.

## 2024-12-03 NOTE — H&P
[facility]   HISTORY AND PHYSICAL      Name:  Travon Plummer   Age:  79 y.o.  Sex:  male  :  1945  MRN:  [id number]   Visit Number:  [id number]  Admission Date:  12/3/2024  Date Of Service:  24  Primary Care Physician:  Chilango Erickson MD    Chief Complaint:     Right hip pain    History Of Presenting Illness:      79-year-old male with past medical history of CKD, CAD, paroxysmal atrial fibrillation, ESRD on HD, hypertension, hyperlipidemia, obesity, renal cell carcinoma,  status post nephrectomy.  Chief complaint fall.  Patient reports he was walking around his house and tripped on his wife's oxygen cord.  Afterwards he felt pain in his right hip.  No loss of consciousness or head trauma suffered.  Was noted to have right intertrochanteric hip fracture in the ED Dr. Poon was notified who will see the patient in consult..  Nephrology has been notified.  Full code.    Pertinent findings: Creatinine 4.93, hemoglobin 11.4, right hip x-ray showing intertrochanteric hip fracture, CT of the cervical spine with degenerative change and osteopenia, CT of the head showing atrophy and a left maxillary polyp, CT of the pelvis showing mildly displaced comminuted right intratrochanteric fracture remote left superior and inferior pubic rami fractures,    ED Medications:    Medications   UltraBag/Dianeal PD-2/1.5% Dex (keenan #[id number]) (DIANEAL) 2,000 mL (has no administration in time range)   morphine injection 4 mg (4 mg Intravenous Given 12/3/24 1501)   ondansetron (ZOFRAN) injection 4 mg (4 mg Intravenous Given 12/3/24 1500)   morphine injection 4 mg (4 mg Intravenous Given 12/3/24 1616)       Edited by: Justin Brown DO at 12/3/2024 6696     Review Of Systems:    All systems were reviewed and negative except as mentioned in history of presenting illness, assessment and plan.    Past Medical History: Patient  has a past medical history of Benign hypertensive CKD,  stage 5 chronic kidney disease or end stage renal disease, Broken arm, Carotid stenosis, Cerebrovascular accident (10/31/2008), Coronary artery disease, Dialysis patient (2024), Dyspnea, GERD (gastroesophageal reflux disease), Gout, History of blood transfusion, Hypercholesterolemia, Hypertension (11/10/2015), Impaired mobility, Obesity, Osteoarthritis, Paroxysmal atrial fibrillation, Prostate cancer (01/2015), Renal cell carcinoma (2015), and Wears dentures.    Past Surgical History: Patient  has a past surgical history that includes Inguinal hernia repair; Colonoscopy w/ polypectomy; Coronary angioplasty with stent; Carotid stent (Left, 10/31/2008); Nephrectomy (Left, 03/19/2015); Prostate Fiducial Marker Placement (2016); Esophagogastroduodenoscopy (N/A, 12/22/2021); Colonoscopy (N/A, 12/23/2021); Esophagogastroduodenoscopy (N/A, 02/17/2023); and Dialysis fistula creation (N/A).    Social History: Patient  reports that he quit smoking about 16 years ago. His smoking use included cigarettes. He started smoking about 67 years ago. He has a 51 pack-year smoking history. He has been exposed to tobacco smoke. He has quit using smokeless tobacco.  His smokeless tobacco use included chew. He reports that he does not currently use alcohol. He reports that he does not use drugs.    Family History:  Patient's family history has been reviewed and found to be noncontributory.     Allergies:      Allopurinol and Lipitor [atorvastatin]    Home Medications:    Prior to Admission Medications       Prescriptions Last Dose Informant Patient Reported? Taking?    amLODIPine (NORVASC) 5 MG tablet 12/3/2024  Yes Yes    Take 1 tablet by mouth Daily.    aspirin 81 MG EC tablet 12/3/2024  No Yes    Take 1 tablet by mouth Daily.    bumetanide (BUMEX) 1 MG tablet 12/3/2024  Yes Yes    Take 1 tablet by mouth Daily.    calcitriol (ROCALTROL) 0.25 MCG capsule 12/3/2024  Yes Yes    Take 1 capsule by mouth Daily.    carvedilol (COREG) 6.25 MG  "tablet 12/3/2024  No Yes    Take 1 tablet by mouth 2 (Two) Times a Day With Meals.    Patient taking differently:  Take 2 tablets by mouth 2 (Two) Times a Day With Meals.    Cholecalciferol (Vitamin D) 50 MCG (2000 UT) capsule 12/3/2024 Self Yes Yes    Take 1 capsule by mouth Daily.    Cyanocobalamin (VITAMIN B-12 PO) 12/3/2024 Self Yes Yes    Take 1,000 mcg by mouth Daily.    ferrous sulfate 325 (65 FE) MG tablet 12/3/2024 Self Yes Yes    Take 1 tablet by mouth 1 (One) Time Per Week. Sunday    fluorouracil (EFUDEX) 5 % cream 12/3/2024  Yes Yes    Apply 1 Application topically to the appropriate area as directed.    levothyroxine (SYNTHROID, LEVOTHROID) 25 MCG tablet 12/3/2024 Self Yes Yes    Take 1 tablet by mouth Daily. LEVOXYL    losartan (COZAAR) 100 MG tablet 12/3/2024  Yes Yes    Take 1 tablet by mouth Daily.    pantoprazole (PROTONIX) 20 MG EC tablet 12/3/2024  No Yes    Take 1 tablet by mouth Daily.    potassium chloride (MICRO-K) 10 MEQ CR capsule 12/3/2024  Yes Yes    Take 1 capsule by mouth 2 (Two) Times a Day.    rosuvastatin (CRESTOR) 40 MG tablet 12/2/2024  No Yes    Take 1 tablet by mouth Every Night.    sodium bicarbonate 650 MG tablet 12/3/2024  Yes Yes    Take 1 tablet by mouth 2 (Two) Times a Day.    triamcinolone (KENALOG) 0.1 % ointment 12/3/2024  Yes Yes    Iron Sucrose (VENOFER IV)   Yes No    200 mg Once.    Methoxy PEG-Epoetin Beta (MIRCERA IJ)   Yes No    Inject 100 mcg under the skin into the appropriate area as directed.              Vital Signs:  Temp:  [97.5 °F (36.4 °C)-97.6 °F (36.4 °C)] 97.6 °F (36.4 °C)  Heart Rate:  [56] 56  Resp:  [18-20] 20  BP: (158-172)/() 165/82        12/03/24  1345 12/03/24  1739   Weight: 93.4 kg (205 lb 14.6 oz) 90.8 kg (200 lb 2.8 oz)     Body mass index is 25.7 kg/m².    Physical Exam:     Most recent vital Signs: /82 (BP Location: Left arm, Patient Position: Lying)   Pulse 56   Temp 97.6 °F (36.4 °C) (Oral)   Resp 20   Ht 188 cm (74\")   " "Wt 90.8 kg (200 lb 2.8 oz)   SpO2 98%   BMI 25.70 kg/m²     Constitutional: Awake, alert  Eyes: PERRLA, sclerae anicteric, no conjunctival injection  HENT: NCAT, mucous membranes moist  Neck: Supple, no thyromegaly, no lymphadenopathy, trachea midline  Respiratory: Clear to auscultation bilaterally, nonlabored respirations   Cardiovascular: RRR, no murmurs, rubs, or gallops, palpable pedal pulses bilaterally  Gastrointestinal: Positive bowel sounds, soft, nontender, nondistended  Musculoskeletal: No bilateral ankle edema, no clubbing or cyanosis to extremities  Psychiatric: Appropriate affect, cooperative  Neurologic: Oriented x 3, speech clear  Skin: No rashes  Edited by: Justin Brown DO at 12/3/2024 3154      Laboratory data:    I have reviewed the labs done in the emergency room.    Results from last 7 days   Lab Units 12/03/24  1458   SODIUM mmol/L 141   POTASSIUM mmol/L 4.8   CHLORIDE mmol/L 103   CO2 mmol/L 26.9   BUN mg/dL 26*   CREATININE mg/dL 4.93*   CALCIUM mg/dL 9.3   BILIRUBIN mg/dL 0.4   ALK PHOS U/L 100   ALT (SGPT) U/L 13   AST (SGOT) U/L 16   GLUCOSE mg/dL 99     Results from last 7 days   Lab Units 12/03/24  1458   WBC 10*3/mm3 10.14   HEMOGLOBIN g/dL 11.4*   HEMATOCRIT % 35.3*   PLATELETS 10*3/mm3 136*                                   Invalid input(s): \"USDES\", \"NITRITITE\", \"BACT\", \"EP\"    Pain Management Panel           No data to display                EKG:      pending    Radiology:    CT Pelvis Without Contrast    Result Date: 12/3/2024  PROCEDURE: CT PELVIS WO CONTRAST-  HISTORY: Right hip pain, status post fall, eval hip fracture  COMPARISON: None.  PROCEDURE: Axial images were obtained from the iliac crest to the pubic symphysis by computed tomography. This study was performed with techniques to keep radiation doses as low as reasonably achievable, (ALARA). Individualized dose reduction techniques using automated exposure control or adjustment of mA and/or kV according to the " patient size were employed.  FINDINGS:  PELVIS: The appendix is not identified. The urinary bladder is unremarkable. There is no significant fluid or adenopathy. The visualized portions of the GI tract is nonobstructed. Diffuse osteopenia identified. There is a remote left mid superior pubic ramus fracture which has undergone nonunion. There is a remote left inferior pubic ramus fracture with at least partial nonunion. There is a comminuted, mildly displaced right intertrochanteric fracture. Degenerative change of the SI joints noted. Fiducial markers in the prostate noted. There is diverticulosis without evidence of diverticulitis.      Mildly displaced comminuted right intertrochanteric fracture.  Remote left superior and inferior pubic rami fractures as described.    CTDI: 14.49 mGy DLP:755.66 mGy.cm  This report was signed and finalized on 12/3/2024 3:24 PM by Alice Castillo MD.      CT Cervical Spine Without Contrast    Result Date: 12/3/2024  PROCEDURE: CT CERVICAL SPINE WO CONTRAST-  HISTORY: Trauma, eval C-spine fracture  COMPARISON: None.  PROCEDURE: Axial images were obtained from the skull base to the thoracic inlet by computed tomography. 3 D reconstruction images were performed. This study was performed with techniques to keep radiation doses as low as reasonably achievable, (ALARA). Individualized dose reduction techniques using automated exposure control or adjustment of mA and/or kV according to the patient size were employed.  FINDINGS: There is no acute fracture. Minimal anterolisthesis C6 noted on C7. Diffuse osteopenia identified.. There are levels of spinal stenosis and/or neuroforaminal narrowing secondary to degenerative change but not secondary to acute bony abnormality. There is moderate to severe disc space narrowing at all levels with small osteophytes. Diffuse osteopenia identified. Posterior left C2 there is an 11 mm nonspecific lytic lesion. Anteriorly in the C5 vertebral body there is a  smaller similar 5 mm lesion. No prior study document stability. The facets are normally aligned. Bilateral carotid artery calcifications noted. Left carotid stent identified. Motion artifact noted on multiple images. Limited images of the lung apices are unremarkable.      No acute fracture.  Multilevel cervical degenerative change.  Diffuse osteopenia.  Nonspecific lytic lesions as described may be age/osteopenia related.   CTDI: 14.49 mGy DLP:755.66 mGy.cm   This report was signed and finalized on 12/3/2024 3:19 PM by Alice Castillo MD.      CT Head Without Contrast    Result Date: 12/3/2024  PROCEDURE: CT HEAD WO CONTRAST-  HISTORY: Trauma, eval intracranial hemorrhage  COMPARISON: None.  TECHNIQUE: Multiple axial CT images were performed from the foramen magnum to the vertex. Individualized dose reduction techniques using automated exposure control or adjustment of mA and/or kV according to the patient size were employed.  FINDINGS: There is moderate, age-appropriate generalized cerebral atrophy. The ventricles are enlarged. There is no evidence of edema or hemorrhage.  No masses are identified. No extra-axial fluid is seen. The paranasal sinuses demonstrate a left maxillary polyp or mucous retention cyst. Remaining paranasal sinuses and mastoids are clear. Mild small vessel ischemic disease identified.      Atrophy  without acute process.  Left maxillary polyp or mucous retention cyst.   CTDI: 14.49 mGy DLP:755.66 mGy.cm  This report was signed and finalized on 12/3/2024 3:16 PM by Alice Castillo MD.       Assessment/Plan:      Closed right hip fracture  -- Dr. Poon consulted.  Anticipate ORIF in the morning.  In regards to cardiac clearance.  Patient has a history of CHF which is mild and diastolic which is compensated.  Although patient has a history of atrial fibrillation.  Patient currently has mild bradycardia without signs of significant or uncontrolled arrhythmia.  No symptoms to suggest unstable cardiac  condition.  Has remotely had PCI in the past but none recently.  No significant aortic stenosis.  Patient is acceptable risk for surgery.  -- Active Treatments: Hold Bumex, pain control dilaudid and tylenol, continue beta-blocker perioperatively, hold losartan  -- Pending Results: Orthopedic surgery consult, will need to monitor hemoglobin closely on prophylaxis, EKG      Paroxysmal atrial fibrillation  --Chronic medical problem  -- Active Treatments: Not on anticoagulation due to history of anemia  -- Pending Results: Monitor telemetry      Chronic kidney disease, stage V  -- Chronic medical problem  -- Active Treatments: Continue PD  -- Pending Results: Consult to Dr. Sellers              DVT Prophylaxis: SCDs, consider Eliquis for prophylaxis postoperatively  Code Status: Full code  Diet: Cardiac renal, n.p.o. after midnight  Risk assessment: High anticipate greater than two night stay          Edited by: Justin Brown DO at 12/3/2024 1800           Advance Care Planning   ACP discussion was held with the patient during this visit. Patient does not have an advance directive, declines further assistance.           Justin Brown DO  12/03/24  18:00 EST    Dictated utilizing Dragon dictation.

## 2024-12-03 NOTE — ED PROVIDER NOTES
Subjective:  History of Present Illness:    Is a 79-year-old male with history of CKD, CAD, on HD, hypertension, hyperlipidemia, obesity, RCC, paroxysmal A-fib, status post nephrectomy who presents today after a fall.  Reports that he was walking to his house and he became tripped on his wife's oxygen cord and fell.  States that he fell onto his right hip.  Denies hitting his head, denies loss of consciousness.  Unable to get up secondary to pain to his right hip.  He denies any preceding fevers chest pain or shortness of breath.  Was in his normal state of health prior to this.  Denies pain to any other area other than his right hip      Nurses Notes reviewed and agree, including vitals, allergies, social history and prior medical history.     REVIEW OF SYSTEMS: All systems reviewed and not pertinent unless noted.  Review of Systems   Constitutional:  Positive for activity change. Negative for appetite change, chills, fatigue and fever.   HENT:  Negative for congestion, sinus pressure, sneezing and trouble swallowing.    Eyes:  Negative for discharge and itching.   Respiratory:  Negative for cough and shortness of breath.    Cardiovascular:  Negative for chest pain and palpitations.   Gastrointestinal:  Negative for abdominal distention and abdominal pain.   Endocrine: Negative for cold intolerance and heat intolerance.   Genitourinary:  Negative for decreased urine volume, dysuria and urgency.   Musculoskeletal:  Positive for arthralgias. Negative for gait problem, neck pain and neck stiffness.   Skin:  Negative for color change and rash.   Allergic/Immunologic: Negative for immunocompromised state.   Neurological:  Negative for facial asymmetry and headaches.   Hematological:  Negative for adenopathy.   Psychiatric/Behavioral:  Negative for self-injury and suicidal ideas.        Past Medical History:   Diagnosis Date    Benign hypertensive CKD, stage 5 chronic kidney disease or end stage renal disease     Broken  arm     Carotid stenosis     bilateral    Cerebrovascular accident 10/31/2008    Coronary artery disease     followed by Dr. Ashford; LOV 7/9/24; denies chest pain, SOB 8/5/24    Dialysis patient 2024    Current 11/13/24    Dyspnea     GERD (gastroesophageal reflux disease)     Gout     History of blood transfusion     Hypercholesterolemia     Hypertension 11/10/2015    History of hypertension; hypotensive at the time of office visit on 11/10/2015.    Impaired mobility     Obesity     Osteoarthritis     Paroxysmal atrial fibrillation     History of paroxysmal atrial fibrillation, data deficit.    Prostate cancer 01/2015    Prostate cancer, diagnosed January of 2015, status post radiation therapy x39 treatments, 07/01/2015 at [facility].    Renal cell carcinoma 2015    Renal cell carcinoma, dx March of 2015, status post left nephrectomy.    Wears dentures     instructed no adhesive DOS       Allergies:    Allopurinol and Lipitor [atorvastatin]      Past Surgical History:   Procedure Laterality Date    CAROTID STENT Left 10/31/2008    PTA/left carotid artery stent, 10/31/2008.     COLONOSCOPY N/A 12/23/2021    Procedure: COLONOSCOPY, polypectomy, clip placement x 1;  Surgeon: Deandra Do MD;  Location: [facility] ENDOSCOPY;  Service: Gastroenterology;  Laterality: N/A;    COLONOSCOPY W/ POLYPECTOMY      CORONARY ANGIOPLASTY WITH STENT PLACEMENT      A 3.5 x 13 mm. Cypher ESTIVEN to RCA expanded with 4 mm balloon.     DIALYSIS FISTULA CREATION N/A     abdominal    ENDOSCOPY N/A 12/22/2021    Procedure: ESOPHAGOGASTRODUODENOSCOPY with biopsy;  Surgeon: Deandra Do MD;  Location: [facility] ENDOSCOPY;  Service: Gastroenterology;  Laterality: N/A;    ENDOSCOPY N/A 02/17/2023    Procedure: ESOPHAGOGASTRODUODENOSCOPY with biopsy;  Surgeon: Georgina Pool MD;  Location: [facility] ENDOSCOPY;  Service: Gastroenterology;  Laterality: N/A;    INGUINAL HERNIA REPAIR      NEPHRECTOMY Left 03/19/2015    diagnosed  "2015, status post left radical nephrectomy.     PROSTATE FIDUCIAL MARKER PLACEMENT  2016    received XRT for prostate CA in 2016         Social History     Socioeconomic History    Marital status:    Tobacco Use    Smoking status: Former     Current packs/day: 0.00     Average packs/day: 1 pack/day for 51.0 years (51.0 ttl pk-yrs)     Types: Cigarettes     Start date:      Quit date:      Years since quittin.9     Passive exposure: Past    Smokeless tobacco: Former     Types: Chew   Vaping Use    Vaping status: Never Used   Substance and Sexual Activity    Alcohol use: Not Currently     Comment: rare    Drug use: No    Sexual activity: Defer         Family History   Problem Relation Age of Onset    No Known Problems Mother     No Known Problems Father     Colon cancer Neg Hx        Objective  Physical Exam:  /82 (BP Location: Left arm, Patient Position: Lying)   Pulse 56   Temp 97.6 °F (36.4 °C) (Oral)   Resp 20   Ht 188 cm (74\")   Wt 90.8 kg (200 lb 2.8 oz)   SpO2 98%   BMI 25.70 kg/m²      Physical Exam  Constitutional:       General: He is not in acute distress.     Appearance: Normal appearance. He is normal weight. He is not ill-appearing.   HENT:      Head: Normocephalic and atraumatic.      Nose: Nose normal. No congestion or rhinorrhea.      Mouth/Throat:      Mouth: Mucous membranes are moist.      Pharynx: Oropharynx is clear.   Eyes:      Extraocular Movements: Extraocular movements intact.      Conjunctiva/sclera: Conjunctivae normal.      Pupils: Pupils are equal, round, and reactive to light.   Cardiovascular:      Rate and Rhythm: Normal rate and regular rhythm.      Pulses: Normal pulses.   Pulmonary:      Effort: Pulmonary effort is normal. No respiratory distress.      Breath sounds: Normal breath sounds.   Abdominal:      General: Abdomen is flat. Bowel sounds are normal. There is no distension.      Palpations: Abdomen is soft.      Tenderness: There is no " abdominal tenderness.   Musculoskeletal:         General: Tenderness and signs of injury present. No swelling. Normal range of motion.      Cervical back: Normal range of motion and neck supple. No rigidity or tenderness.      Comments: Patient tender to palpation of the right hip with no obvious shortening or rotation   Skin:     General: Skin is warm and dry.      Capillary Refill: Capillary refill takes less than 2 seconds.   Neurological:      General: No focal deficit present.      Mental Status: He is alert and oriented to person, place, and time. Mental status is at baseline.      Cranial Nerves: No cranial nerve deficit.      Sensory: No sensory deficit.      Motor: No weakness.   Psychiatric:         Mood and Affect: Mood normal.         Behavior: Behavior normal.         Thought Content: Thought content normal.         Judgment: Judgment normal.         Procedures    ED Course:         Lab Results (last 24 hours)       Procedure Component Value Units Date/Time    CBC & Differential [[id number]]  (Abnormal) Collected: 12/03/24 1458    Specimen: Blood Updated: 12/03/24 3511    Narrative:      The following orders were created for panel order CBC & Differential.  Procedure                               Abnormality         Status                     ---------                               -----------         ------                     CBC Auto Differential[[id number]]        Abnormal            Final result                 Please view results for these tests on the individual orders.    Comprehensive Metabolic Panel [[id number]]  (Abnormal) Collected: 12/03/24 1458    Specimen: Blood Updated: 12/03/24 1539     Glucose 99 mg/dL      BUN 26 mg/dL      Creatinine 4.93 mg/dL      Sodium 141 mmol/L      Potassium 4.8 mmol/L      Chloride 103 mmol/L      CO2 26.9 mmol/L      Calcium 9.3 mg/dL      Total Protein 6.3 g/dL      Albumin 3.6 g/dL      ALT (SGPT) 13 U/L      AST (SGOT) 16 U/L      Alkaline Phosphatase 100  U/L      Total Bilirubin 0.4 mg/dL      Globulin 2.7 gm/dL      A/G Ratio 1.3 g/dL      BUN/Creatinine Ratio 5.3     Anion Gap 11.1 mmol/L      eGFR 11.3 mL/min/1.73      Comment: <15 Indicative of kidney failure       Narrative:      GFR Normal >60  Chronic Kidney Disease <60  Kidney Failure <15    The GFR formula is only valid for adults with stable renal function between ages 18 and 70.    CBC Auto Differential [[id number]]  (Abnormal) Collected: 12/03/24 1458    Specimen: Blood Updated: 12/03/24 1511     WBC 10.14 10*3/mm3      RBC 3.82 10*6/mm3      Hemoglobin 11.4 g/dL      Hematocrit 35.3 %      MCV 92.4 fL      MCH 29.8 pg      MCHC 32.3 g/dL      RDW 15.1 %      RDW-SD 50.8 fl      MPV 12.6 fL      Platelets 136 10*3/mm3      Neutrophil % 70.0 %      Lymphocyte % 7.7 %      Monocyte % 6.2 %      Eosinophil % 14.4 %      Basophil % 1.3 %      Immature Grans % 0.4 %      Neutrophils, Absolute 7.10 10*3/mm3      Lymphocytes, Absolute 0.78 10*3/mm3      Monocytes, Absolute 0.63 10*3/mm3      Eosinophils, Absolute 1.46 10*3/mm3      Basophils, Absolute 0.13 10*3/mm3      Immature Grans, Absolute 0.04 10*3/mm3      nRBC 0.0 /100 WBC              CT Pelvis Without Contrast    Result Date: 12/3/2024  PROCEDURE: CT PELVIS WO CONTRAST-  HISTORY: Right hip pain, status post fall, eval hip fracture  COMPARISON: None.  PROCEDURE: Axial images were obtained from the iliac crest to the pubic symphysis by computed tomography. This study was performed with techniques to keep radiation doses as low as reasonably achievable, (ALARA). Individualized dose reduction techniques using automated exposure control or adjustment of mA and/or kV according to the patient size were employed.  FINDINGS:  PELVIS: The appendix is not identified. The urinary bladder is unremarkable. There is no significant fluid or adenopathy. The visualized portions of the GI tract is nonobstructed. Diffuse osteopenia identified. There is a remote left mid  superior pubic ramus fracture which has undergone nonunion. There is a remote left inferior pubic ramus fracture with at least partial nonunion. There is a comminuted, mildly displaced right intertrochanteric fracture. Degenerative change of the SI joints noted. Fiducial markers in the prostate noted. There is diverticulosis without evidence of diverticulitis.      Impression: Mildly displaced comminuted right intertrochanteric fracture.  Remote left superior and inferior pubic rami fractures as described.    CTDI: 14.49 mGy DLP:755.66 mGy.cm  This report was signed and finalized on 12/3/2024 3:24 PM by Alice Castillo MD.      CT Cervical Spine Without Contrast    Result Date: 12/3/2024  PROCEDURE: CT CERVICAL SPINE WO CONTRAST-  HISTORY: Trauma, eval C-spine fracture  COMPARISON: None.  PROCEDURE: Axial images were obtained from the skull base to the thoracic inlet by computed tomography. 3 D reconstruction images were performed. This study was performed with techniques to keep radiation doses as low as reasonably achievable, (ALARA). Individualized dose reduction techniques using automated exposure control or adjustment of mA and/or kV according to the patient size were employed.  FINDINGS: There is no acute fracture. Minimal anterolisthesis C6 noted on C7. Diffuse osteopenia identified.. There are levels of spinal stenosis and/or neuroforaminal narrowing secondary to degenerative change but not secondary to acute bony abnormality. There is moderate to severe disc space narrowing at all levels with small osteophytes. Diffuse osteopenia identified. Posterior left C2 there is an 11 mm nonspecific lytic lesion. Anteriorly in the C5 vertebral body there is a smaller similar 5 mm lesion. No prior study document stability. The facets are normally aligned. Bilateral carotid artery calcifications noted. Left carotid stent identified. Motion artifact noted on multiple images. Limited images of the lung apices are  unremarkable.      Impression: No acute fracture.  Multilevel cervical degenerative change.  Diffuse osteopenia.  Nonspecific lytic lesions as described may be age/osteopenia related.   CTDI: 14.49 mGy DLP:755.66 mGy.cm   This report was signed and finalized on 12/3/2024 3:19 PM by Alice Castillo MD.      CT Head Without Contrast    Result Date: 12/3/2024  PROCEDURE: CT HEAD WO CONTRAST-  HISTORY: Trauma, eval intracranial hemorrhage  COMPARISON: None.  TECHNIQUE: Multiple axial CT images were performed from the foramen magnum to the vertex. Individualized dose reduction techniques using automated exposure control or adjustment of mA and/or kV according to the patient size were employed.  FINDINGS: There is moderate, age-appropriate generalized cerebral atrophy. The ventricles are enlarged. There is no evidence of edema or hemorrhage.  No masses are identified. No extra-axial fluid is seen. The paranasal sinuses demonstrate a left maxillary polyp or mucous retention cyst. Remaining paranasal sinuses and mastoids are clear. Mild small vessel ischemic disease identified.      Impression: Atrophy  without acute process.  Left maxillary polyp or mucous retention cyst.   CTDI: 14.49 mGy DLP:755.66 mGy.cm  This report was signed and finalized on 12/3/2024 3:16 PM by Alice Castillo MD.          [facility]      Initial impression of presenting illness: Fall    DDX: includes but is not limited to: Intracranial hemorrhage, C-spine fracture, hip fracture    Patient arrives stable with vitals interpreted by myself.     Pertinent features from physical exam: Clear to auscultation, regular rate and rhythm, no murmur, nontender to abdominal palpation, tender to palpation over the right hip, nontender to palpation of any of the other extremities and joints.    Initial diagnostic plan: CT head, C-spine, CT pelvis    Results from initial plan were reviewed and interpreted by me revealing patient with intertrochanteric fracture seen on CT  imaging, no other traumatic injuries noted    Diagnostic information from other sources: Reviewed past medical records and discussed with EMS on arrival    Interventions / Re-evaluation: Given morphine, Zofran for symptom control    Results/clinical rationale were discussed with patient family at bedside    Consultations/Discussion of results with other physicians: Given my concern for intertrochanteric fracture discussed with Dr. Poon, orthopedics, who will consult on the patient.  Given this, discussed with Dr. Brock, hospitalist, who accepts the patient to his service for further management.    Disposition plan: Admit  -----        Final diagnoses:   Closed nondisplaced intertrochanteric fracture of right femur, initial encounter          Mario Barr MD  12/03/24 [id number]

## 2024-12-03 NOTE — CONSULTS
[facility]      Nephrology Consultation      Referring Provider:   No ref. provider found    Reason for Consultation:  ESRD and associated problems.       Subjective:  Chief complaint   Chief Complaint   Patient presents with    Fall     Pt tripped on his wife's O2 tubing onto his right hip. Pt's only complaint is right hip pain.     Hip Pain     History of present illness:    Patient is 79-year-old male with multimedical problems including end-stage renal disease secondary to diabetes and hypertension as well as decreased renal mass secondary to left nephrectomy, it was renal cell carcinoma, patient also has paroxysmal atrial fibrillation, with other complications of end-stage renal disease including anemia of chronic kidney disease, hyperparathyroidism who has been doing fairly well up until yesterday when he presented to the emergency room after he tripped over wife's oxygen cord and fell.  He had severe right hip pain denies any loss of consciousness or head trauma.  In the ER on further evaluation noted to have intertrochanteric hip fracture and Dr. Poon was notified who was on-call for orthopedic surgery.  Patient is scheduled to have surgical intervention planned for later today.  His aspirin was held.  Patient was noted to have fairly significant pain and has been controlled with IV morphine.  I was notified and we started the peritoneal dialysis overnight.  Currently patient is laying in the bed, awake alert and interactive.  Still in a fair amount of pain.  Denies having any chest pain or shortness of breath.  No nausea vomiting no abdominal pain.  I have reviewed labs/imaging/records from this hospitalization, including ER staff and admitting/attending physicians H/P's and progress notes to establish a comprehensive understanding of this patient's clinical hospital course, as well as to establish plan of care appropriately.     Past Medical History:   Diagnosis Date    Benign  hypertensive CKD, stage 5 chronic kidney disease or end stage renal disease     Broken arm     Carotid stenosis     bilateral    Cerebrovascular accident 10/31/2008    Coronary artery disease     followed by Dr. Ashford; LOV 7/9/24; denies chest pain, SOB 8/5/24    Dialysis patient 2024    Current 11/13/24    Dyspnea     GERD (gastroesophageal reflux disease)     Gout     History of blood transfusion     Hypercholesterolemia     Hypertension 11/10/2015    History of hypertension; hypotensive at the time of office visit on 11/10/2015.    Impaired mobility     Obesity     Osteoarthritis     Paroxysmal atrial fibrillation     History of paroxysmal atrial fibrillation, data deficit.    Prostate cancer 01/2015    Prostate cancer, diagnosed January of 2015, status post radiation therapy x39 treatments, 07/01/2015 at [facility].    Renal cell carcinoma 2015    Renal cell carcinoma, dx March of 2015, status post left nephrectomy.    Wears dentures     instructed no adhesive DOS       Past Surgical History:   Procedure Laterality Date    CAROTID STENT Left 10/31/2008    PTA/left carotid artery stent, 10/31/2008.     COLONOSCOPY N/A 12/23/2021    Procedure: COLONOSCOPY, polypectomy, clip placement x 1;  Surgeon: Deandra Do MD;  Location: [facility] ENDOSCOPY;  Service: Gastroenterology;  Laterality: N/A;    COLONOSCOPY W/ POLYPECTOMY      CORONARY ANGIOPLASTY WITH STENT PLACEMENT      A 3.5 x 13 mm. Cypher ESTIVEN to RCA expanded with 4 mm balloon.     DIALYSIS FISTULA CREATION N/A     abdominal    ENDOSCOPY N/A 12/22/2021    Procedure: ESOPHAGOGASTRODUODENOSCOPY with biopsy;  Surgeon: Deandra Do MD;  Location: [facility] ENDOSCOPY;  Service: Gastroenterology;  Laterality: N/A;    ENDOSCOPY N/A 02/17/2023    Procedure: ESOPHAGOGASTRODUODENOSCOPY with biopsy;  Surgeon: Georgina Pool MD;  Location: [facility] ENDOSCOPY;  Service: Gastroenterology;  Laterality: N/A;    INGUINAL HERNIA REPAIR      NEPHRECTOMY  Left 2015    diagnosed 2015, status post left radical nephrectomy.     PROSTATE FIDUCIAL MARKER PLACEMENT  2016    received XRT for prostate CA in 2016     Family History   Problem Relation Age of Onset    No Known Problems Mother     No Known Problems Father     Colon cancer Neg Hx      negative h/o ESRD     Social History     Tobacco Use    Smoking status: Former     Current packs/day: 0.00     Average packs/day: 1 pack/day for 51.0 years (51.0 ttl pk-yrs)     Types: Cigarettes     Start date:      Quit date:      Years since quittin.9     Passive exposure: Past    Smokeless tobacco: Former     Types: Chew   Vaping Use    Vaping status: Never Used   Substance Use Topics    Alcohol use: Not Currently     Comment: rare    Drug use: No     Home medications:   Prior to Admission Medications       Prescriptions Last Dose Informant Patient Reported? Taking?    amLODIPine (NORVASC) 5 MG tablet 12/3/2024  Yes Yes    Take 1 tablet by mouth Daily.    aspirin 81 MG EC tablet 12/3/2024  No Yes    Take 1 tablet by mouth Daily.    bumetanide (BUMEX) 1 MG tablet 12/3/2024  Yes Yes    Take 1 tablet by mouth Daily.    calcitriol (ROCALTROL) 0.25 MCG capsule 12/3/2024  Yes Yes    Take 1 capsule by mouth Daily.    carvedilol (COREG) 6.25 MG tablet 12/3/2024  No Yes    Take 1 tablet by mouth 2 (Two) Times a Day With Meals.    Patient taking differently:  Take 2 tablets by mouth 2 (Two) Times a Day With Meals.    Cholecalciferol (Vitamin D) 50 MCG (2000 UT) capsule 12/3/2024 Self Yes Yes    Take 1 capsule by mouth Daily.    Cyanocobalamin (VITAMIN B-12 PO) 12/3/2024 Self Yes Yes    Take 1,000 mcg by mouth Daily.    ferrous sulfate 325 (65 FE) MG tablet 12/3/2024 Self Yes Yes    Take 1 tablet by mouth 1 (One) Time Per Week.     fluorouracil (EFUDEX) 5 % cream 12/3/2024  Yes Yes    Apply 1 Application topically to the appropriate area as directed.    Iron Sucrose (VENOFER IV)   Yes No    200 mg Once.     levothyroxine (SYNTHROID, LEVOTHROID) 25 MCG tablet 12/3/2024 Self Yes Yes    Take 1 tablet by mouth Daily. LEVOXYL    losartan (COZAAR) 100 MG tablet 12/3/2024  Yes Yes    Take 1 tablet by mouth Daily.    Methoxy PEG-Epoetin Beta (MIRCERA IJ)   Yes No    Inject 100 mcg under the skin into the appropriate area as directed.    pantoprazole (PROTONIX) 20 MG EC tablet 12/3/2024  No Yes    Take 1 tablet by mouth Daily.    potassium chloride (MICRO-K) 10 MEQ CR capsule 12/3/2024  Yes Yes    Take 1 capsule by mouth 2 (Two) Times a Day.    rosuvastatin (CRESTOR) 40 MG tablet 12/2/2024  No Yes    Take 1 tablet by mouth Every Night.    sodium bicarbonate 650 MG tablet 12/3/2024  Yes Yes    Take 1 tablet by mouth 2 (Two) Times a Day.    triamcinolone (KENALOG) 0.1 % ointment 12/3/2024  Yes Yes          Emergency department medications:   Medications   aspirin EC tablet 81 mg ( Oral Dose Auto Held 12/12/24 0900)   calcitriol (ROCALTROL) capsule 0.25 mcg (has no administration in time range)   carvedilol (COREG) tablet 12.5 mg (12.5 mg Oral Given 12/3/24 1815)   vitamin D3 capsule 5,000 Units (has no administration in time range)   vitamin B-12 (CYANOCOBALAMIN) tablet 1,000 mcg (has no administration in time range)   ferrous sulfate EC tablet 324 mg (has no administration in time range)   levothyroxine (SYNTHROID, LEVOTHROID) tablet 25 mcg (has no administration in time range)   pantoprazole (PROTONIX) EC tablet 40 mg (has no administration in time range)   rosuvastatin (CRESTOR) tablet 40 mg (40 mg Oral Given 12/3/24 2153)   sodium chloride 0.9 % flush 10 mL (10 mL Intravenous Given 12/3/24 2154)   sodium chloride 0.9 % flush 10 mL (has no administration in time range)   sodium chloride 0.9 % infusion 40 mL (has no administration in time range)   acetaminophen (TYLENOL) tablet 650 mg (has no administration in time range)   calcium carbonate (TUMS) chewable tablet 500 mg (200 mg elemental) (has no administration in time range)  "  sennosides-docusate (PERICOLACE) 8.6-50 MG per tablet 2 tablet (has no administration in time range)     And   polyethylene glycol (MIRALAX) packet 17 g (has no administration in time range)     And   bisacodyl (DULCOLAX) EC tablet 5 mg (has no administration in time range)     And   bisacodyl (DULCOLAX) suppository 10 mg (has no administration in time range)   ondansetron ODT (ZOFRAN-ODT) disintegrating tablet 4 mg (has no administration in time range)     Or   ondansetron (ZOFRAN) injection 4 mg (has no administration in time range)   melatonin tablet 5 mg (5 mg Oral Given 12/3/24 2153)   nitroglycerin (NITROSTAT) SL tablet 0.4 mg (has no administration in time range)   Potassium Replacement - Follow Nurse / BPA Driven Protocol (has no administration in time range)   Magnesium Standard Dose Replacement - Follow Nurse / BPA Driven Protocol (has no administration in time range)   Phosphorus Replacement - Follow Nurse / BPA Driven Protocol (has no administration in time range)   Calcium Replacement - Follow Nurse / BPA Driven Protocol (has no administration in time range)   HYDROmorphone (DILAUDID) injection 0.5 mg (0.5 mg Intravenous Given 12/4/24 0516)   morphine injection 4 mg (4 mg Intravenous Given 12/3/24 1501)   ondansetron (ZOFRAN) injection 4 mg (4 mg Intravenous Given 12/3/24 1500)   morphine injection 4 mg (4 mg Intravenous Given 12/3/24 1616)   UltraBag/Dianeal PD-2/1.5% Dex (keenan #[id number]) (DIANEAL) 2,000 mL (2,000 mL Intraperitoneal New Bag 12/3/24 9879)       Allergies:  Allopurinol and Lipitor [atorvastatin]    Review of Systems  14 point review of system were done and are negative except as mentioned in the history of present illness and assessment and plan.      Physical Exam:  Objective:  Vital Signs  /84 (BP Location: Left arm, Patient Position: Lying)   Pulse 63   Temp 97 °F (36.1 °C) (Oral)   Resp 20   Ht 188 cm (74\")   Wt 90.8 kg (200 lb 2.8 oz)   SpO2 92%   BMI 25.70 kg/m² " "  Objective    I/O this shift:  In: 2000   Out: 0     Intake/Output Summary (Last 24 hours) at 12/4/2024 0630  Last data filed at 12/3/2024 2247  Gross per 24 hour   Intake 2000 ml   Output 0 ml   Net 2000 ml        Physical Exam     Constitutional: Awake, alert  Eyes: sclerae anicteric, no conjunctival injection  HEENT: mucous membranes dry  Neck: Supple, no thyromegaly, no lymphadenopathy, trachea midline, No JVD  Respiratory: Clear to auscultation bilaterally, nonlabored respirations   Cardiovascular: RRR, no murmurs, rubs, or gallops.  Gastrointestinal: Positive bowel sounds, obese, soft, nontender, nondistended, peritoneal dialysis catheter in place.  Musculoskeletal: No edema, no clubbing or cyanosis  Psychiatric: Appropriate affect, cooperative  Neurologic: Oriented x 3, moving all extremities, Cranial Nerves grossly intact, speech clear  Skin: warm and dry, no rashes            Results Review:   Results from last 7 days   Lab Units 12/04/24  0529 12/03/24  1458   SODIUM mmol/L 139 141   POTASSIUM mmol/L 4.8 4.8   CHLORIDE mmol/L 103 103   CO2 mmol/L 25.5 26.9   BUN mg/dL 27* 26*   CREATININE mg/dL 4.66* 4.93*   CALCIUM mg/dL 9.1 9.3   ALBUMIN g/dL 3.2* 3.6   BILIRUBIN mg/dL 0.3 0.4   ALK PHOS U/L 91 100   ALT (SGPT) U/L 10 13   AST (SGOT) U/L 10 16   GLUCOSE mg/dL 95 99     Estimated Creatinine Clearance: 16.5 mL/min (A) (by C-G formula based on SCr of 4.66 mg/dL (H)).          Results from last 7 days   Lab Units 12/04/24  0529 12/03/24  1458   WBC 10*3/mm3 9.57 10.14   HEMOGLOBIN g/dL 10.8* 11.4*   PLATELETS 10*3/mm3 134* 136*         Brief Urine Lab Results  (Last result in the past 365 days)        Color   Clarity   Blood   Leuk Est   Nitrite   Protein   CREAT   Urine HCG        07/17/24 1436 Yellow   Clear   Small   Negative   Negative   300 mg/dL                 No results found for: \"UTPCR\"  Imaging Results (Last 24 Hours)       Procedure Component Value Units Date/Time    XR Hip With or Without " Pelvis 2 - 3 View Right [[id number]] Resulted: 12/03/24 1701     Updated: 12/03/24 1702    CT Pelvis Without Contrast [[id number]] Collected: 12/03/24 1519     Updated: 12/03/24 1526    Narrative:      PROCEDURE: CT PELVIS WO CONTRAST-     HISTORY: Right hip pain, status post fall, eval hip fracture     COMPARISON: None.     PROCEDURE: Axial images were obtained from the iliac crest to the pubic  symphysis by computed tomography. This study was performed with  techniques to keep radiation doses as low as reasonably achievable,  (ALARA). Individualized dose reduction techniques using automated  exposure control or adjustment of mA and/or kV according to the patient  size were employed.     FINDINGS:     PELVIS: The appendix is not identified. The urinary bladder is  unremarkable. There is no significant fluid or adenopathy. The  visualized portions of the GI tract is nonobstructed. Diffuse osteopenia  identified. There is a remote left mid superior pubic ramus fracture  which has undergone nonunion. There is a remote left inferior pubic  ramus fracture with at least partial nonunion. There is a comminuted,  mildly displaced right intertrochanteric fracture. Degenerative change  of the SI joints noted. Fiducial markers in the prostate noted. There is  diverticulosis without evidence of diverticulitis.       Impression:      Mildly displaced comminuted right intertrochanteric  fracture.     Remote left superior and inferior pubic rami fractures as described.           CTDI: 14.49 mGy  DLP:755.66 mGy.cm     This report was signed and finalized on 12/3/2024 3:24 PM by Alice Castillo MD.       CT Cervical Spine Without Contrast [[id number]] Collected: 12/03/24 1508     Updated: 12/03/24 1521    Narrative:      PROCEDURE: CT CERVICAL SPINE WO CONTRAST-     HISTORY: Trauma, eval C-spine fracture     COMPARISON: None.     PROCEDURE: Axial images were obtained from the skull base to the  thoracic inlet by computed tomography. 3 D  reconstruction images were  performed. This study was performed with techniques to keep radiation  doses as low as reasonably achievable, (ALARA). Individualized dose  reduction techniques using automated exposure control or adjustment of  mA and/or kV according to the patient size were employed.     FINDINGS: There is no acute fracture. Minimal anterolisthesis C6 noted  on C7. Diffuse osteopenia identified.. There are levels of spinal  stenosis and/or neuroforaminal narrowing secondary to degenerative  change but not secondary to acute bony abnormality. There is moderate to  severe disc space narrowing at all levels with small osteophytes.  Diffuse osteopenia identified. Posterior left C2 there is an 11 mm  nonspecific lytic lesion. Anteriorly in the C5 vertebral body there is a  smaller similar 5 mm lesion. No prior study document stability. The  facets are normally aligned. Bilateral carotid artery calcifications  noted. Left carotid stent identified. Motion artifact noted on multiple  images. Limited images of the lung apices are unremarkable.       Impression:      No acute fracture.     Multilevel cervical degenerative change.     Diffuse osteopenia.     Nonspecific lytic lesions as described may be age/osteopenia related.        CTDI: 14.49 mGy  DLP:755.66 mGy.cm        This report was signed and finalized on 12/3/2024 3:19 PM by Alice Castillo MD.       CT Head Without Contrast [[id number]] Collected: 12/03/24 1513     Updated: 12/03/24 1519    Narrative:      PROCEDURE: CT HEAD WO CONTRAST-     HISTORY: Trauma, eval intracranial hemorrhage     COMPARISON: None.     TECHNIQUE: Multiple axial CT images were performed from the foramen  magnum to the vertex. Individualized dose reduction techniques using  automated exposure control or adjustment of mA and/or kV according to  the patient size were employed.     FINDINGS: There is moderate, age-appropriate generalized cerebral  atrophy. The ventricles are enlarged.  There is no evidence of edema or  hemorrhage.  No masses are identified. No extra-axial fluid is seen. The  paranasal sinuses demonstrate a left maxillary polyp or mucous retention  cyst. Remaining paranasal sinuses and mastoids are clear. Mild small  vessel ischemic disease identified.       Impression:      Atrophy  without acute process.     Left maxillary polyp or mucous retention cyst.        CTDI: 14.49 mGy  DLP:755.66 mGy.cm     This report was signed and finalized on 12/3/2024 3:16 PM by Alice Castillo MD.             [Held by provider] aspirin, 81 mg, Oral, Daily  calcitriol, 0.25 mcg, Oral, Daily  carvedilol, 12.5 mg, Oral, BID With Meals  [START ON 12/10/2024] ferrous sulfate, 324 mg, Oral, Weekly  levothyroxine, 25 mcg, Oral, Daily  melatonin, 5 mg, Oral, Nightly  pantoprazole, 40 mg, Oral, Daily  rosuvastatin, 40 mg, Oral, Nightly  sodium chloride, 10 mL, Intravenous, Q12H  vitamin B-12, 1,000 mcg, Oral, Daily  vitamin D3, 5,000 Units, Oral, Daily           Assessment/Plan:      Closed right hip fracture    Paroxysmal atrial fibrillation    Chronic kidney disease, stage V    End-stage renal disease: Patient with longstanding history of diabetes and hypertension as well as renal cell carcinoma status post left nephrectomy leading to end-stage renal disease.  Patient has been on peritoneal dialysis and has done very well.  Will continue peritoneal dialysis while in the hospital.  Type 2 diabetes: Continue with the sliding scale as per hospitalist service.  Hypertension: Will resume all home medications, he has been under very good control with the medications that he is on.  Anemia: He has responded well to IV iron and not getting CHARLEY currently does not appear to have any issues, likely will drop after surgery.  Hyperparathyroidism: Continue calcitriol for the time being.  Will continue to follow calcium phosphorus and PTH as needed.  Hip fracture: Surgical intervention planned as noted.  Paroxysmal atrial  fibrillation: He has been noted to be in sinus rhythm.      Risk and complexity: High       Plan:  Increased blood pressure noted we will continue home medications I will restart his losartan today he may have to add amlodipine back as well.  Once he is ready to go for surgery we will drain his belly will have to go without any fluid in the belly.  We can resume his peritoneal dialysis once he is back.  Continue with rest of the current treatment plan and surveillance labs.  Details were discussed with the patient no family in the room.    Details were also discussed with the hospitalist service.   Further recommendations will depend on clinical course of the patient during the current hospitalization.    I also discussed the details with the nursing staff.  Rest as ordered.    In closing, I sincerely appreciate opportunity to participate in care of this patient. If I can be of any further assistance with the management of this patient, please don’t hesitate to contact me.    Andrea Sellers MD, JODY    12/04/24  06:30 EST    Dictated using Dragon.

## 2024-12-04 ENCOUNTER — ANESTHESIA (OUTPATIENT)
Dept: PERIOP | Facility: HOSPITAL | Age: 79
End: 2024-12-04
Payer: MEDICARE

## 2024-12-04 ENCOUNTER — ANESTHESIA EVENT CONVERTED (OUTPATIENT)
Dept: ANESTHESIOLOGY | Facility: HOSPITAL | Age: 79
End: 2024-12-04
Payer: MEDICARE

## 2024-12-04 ENCOUNTER — APPOINTMENT (OUTPATIENT)
Dept: GENERAL RADIOLOGY | Facility: HOSPITAL | Age: 79
End: 2024-12-04
Payer: MEDICARE

## 2024-12-04 ENCOUNTER — ANESTHESIA EVENT (OUTPATIENT)
Dept: PERIOP | Facility: HOSPITAL | Age: 79
End: 2024-12-04
Payer: MEDICARE

## 2024-12-04 LAB
ALBUMIN SERPL-MCNC: 3.2 G/DL (ref 3.5–5.2)
ALBUMIN/GLOB SERPL: 1.2 G/DL
ALP SERPL-CCNC: 91 U/L (ref 39–117)
ALT SERPL W P-5'-P-CCNC: 10 U/L (ref 1–41)
ANION GAP SERPL CALCULATED.3IONS-SCNC: 10.5 MMOL/L (ref 5–15)
AST SERPL-CCNC: 10 U/L (ref 1–40)
BILIRUB SERPL-MCNC: 0.3 MG/DL (ref 0–1.2)
BUN SERPL-MCNC: 27 MG/DL (ref 8–23)
BUN/CREAT SERPL: 5.8 (ref 7–25)
CALCIUM SPEC-SCNC: 9.1 MG/DL (ref 8.6–10.5)
CHLORIDE SERPL-SCNC: 103 MMOL/L (ref 98–107)
CO2 SERPL-SCNC: 25.5 MMOL/L (ref 22–29)
CREAT SERPL-MCNC: 4.66 MG/DL (ref 0.76–1.27)
DEPRECATED RDW RBC AUTO: 52.7 FL (ref 37–54)
EGFRCR SERPLBLD CKD-EPI 2021: 12.1 ML/MIN/1.73
ERYTHROCYTE [DISTWIDTH] IN BLOOD BY AUTOMATED COUNT: 15.1 % (ref 12.3–15.4)
GLOBULIN UR ELPH-MCNC: 2.7 GM/DL
GLUCOSE BLDC GLUCOMTR-MCNC: 100 MG/DL (ref 70–130)
GLUCOSE SERPL-MCNC: 95 MG/DL (ref 65–99)
HCT VFR BLD AUTO: 34.6 % (ref 37.5–51)
HGB BLD-MCNC: 10.8 G/DL (ref 13–17.7)
MCH RBC QN AUTO: 29.5 PG (ref 26.6–33)
MCHC RBC AUTO-ENTMCNC: 31.2 G/DL (ref 31.5–35.7)
MCV RBC AUTO: 94.5 FL (ref 79–97)
PLATELET # BLD AUTO: 134 10*3/MM3 (ref 140–450)
PMV BLD AUTO: 12.1 FL (ref 6–12)
POTASSIUM SERPL-SCNC: 4.8 MMOL/L (ref 3.5–5.2)
PROT SERPL-MCNC: 5.9 G/DL (ref 6–8.5)
RBC # BLD AUTO: 3.66 10*6/MM3 (ref 4.14–5.8)
SODIUM SERPL-SCNC: 139 MMOL/L (ref 136–145)
WBC NRBC COR # BLD AUTO: 9.57 10*3/MM3 (ref 3.4–10.8)

## 2024-12-04 PROCEDURE — 25010000002 ROPIVACAINE PER 1 MG: Performed by: NURSE ANESTHETIST, CERTIFIED REGISTERED

## 2024-12-04 PROCEDURE — 25010000002 MORPHINE PER 10 MG: Performed by: ORTHOPAEDIC SURGERY

## 2024-12-04 PROCEDURE — C1713 ANCHOR/SCREW BN/BN,TIS/BN: HCPCS | Performed by: ORTHOPAEDIC SURGERY

## 2024-12-04 PROCEDURE — 25010000002 ONDANSETRON PER 1 MG: Performed by: NURSE ANESTHETIST, CERTIFIED REGISTERED

## 2024-12-04 PROCEDURE — 25010000002 CEFAZOLIN PER 500 MG: Performed by: ORTHOPAEDIC SURGERY

## 2024-12-04 PROCEDURE — 25010000002 LIDOCAINE (CARDIAC): Performed by: NURSE ANESTHETIST, CERTIFIED REGISTERED

## 2024-12-04 PROCEDURE — 80053 COMPREHEN METABOLIC PANEL: CPT | Performed by: INTERNAL MEDICINE

## 2024-12-04 PROCEDURE — 85027 COMPLETE CBC AUTOMATED: CPT | Performed by: INTERNAL MEDICINE

## 2024-12-04 PROCEDURE — 25010000002 GLYCOPYRROLATE PF 0.4 MG/2ML SOLUTION: Performed by: NURSE ANESTHETIST, CERTIFIED REGISTERED

## 2024-12-04 PROCEDURE — 25010000002 PROPOFOL 10 MG/ML EMULSION: Performed by: NURSE ANESTHETIST, CERTIFIED REGISTERED

## 2024-12-04 PROCEDURE — 0QS606Z REPOSITION RIGHT UPPER FEMUR WITH INTRAMEDULLARY INTERNAL FIXATION DEVICE, OPEN APPROACH: ICD-10-PCS | Performed by: ORTHOPAEDIC SURGERY

## 2024-12-04 PROCEDURE — 25010000002 HYDROMORPHONE 1 MG/ML SOLUTION: Performed by: INTERNAL MEDICINE

## 2024-12-04 PROCEDURE — 82948 REAGENT STRIP/BLOOD GLUCOSE: CPT | Performed by: ORTHOPAEDIC SURGERY

## 2024-12-04 PROCEDURE — 99232 SBSQ HOSP IP/OBS MODERATE 35: CPT | Performed by: INTERNAL MEDICINE

## 2024-12-04 PROCEDURE — 25010000002 DEXAMETHASONE PER 1 MG: Performed by: NURSE ANESTHETIST, CERTIFIED REGISTERED

## 2024-12-04 PROCEDURE — 76000 FLUOROSCOPY <1 HR PHYS/QHP: CPT

## 2024-12-04 DEVICE — TROCHANTERIC NAIL KIT, TI
Type: IMPLANTABLE DEVICE | Site: HIP | Status: FUNCTIONAL
Brand: GAMMA

## 2024-12-04 DEVICE — LOCKING SCREW, FULLY THREADED: Type: IMPLANTABLE DEVICE | Site: HIP | Status: FUNCTIONAL

## 2024-12-04 DEVICE — LAG SCREW, TI
Type: IMPLANTABLE DEVICE | Site: HIP | Status: FUNCTIONAL
Brand: GAMMA

## 2024-12-04 RX ORDER — HYDROCODONE BITARTRATE AND ACETAMINOPHEN 7.5; 325 MG/1; MG/1
1 TABLET ORAL EVERY 4 HOURS PRN
Status: DISPENSED | OUTPATIENT
Start: 2024-12-04 | End: 2024-12-09

## 2024-12-04 RX ORDER — TRANEXAMIC ACID 10 MG/ML
1000 INJECTION, SOLUTION INTRAVENOUS ONCE
Status: COMPLETED | OUTPATIENT
Start: 2024-12-04 | End: 2024-12-04

## 2024-12-04 RX ORDER — ROPIVACAINE HYDROCHLORIDE 5 MG/ML
INJECTION, SOLUTION EPIDURAL; INFILTRATION; PERINEURAL
Status: COMPLETED | OUTPATIENT
Start: 2024-12-04 | End: 2024-12-04

## 2024-12-04 RX ORDER — LOSARTAN POTASSIUM 50 MG/1
100 TABLET ORAL
Status: DISCONTINUED | OUTPATIENT
Start: 2024-12-04 | End: 2024-12-11 | Stop reason: HOSPADM

## 2024-12-04 RX ORDER — CLINDAMYCIN PHOSPHATE 900 MG/50ML
900 INJECTION, SOLUTION INTRAVENOUS ONCE
Status: DISCONTINUED | OUTPATIENT
Start: 2024-12-04 | End: 2024-12-04

## 2024-12-04 RX ORDER — ONDANSETRON 2 MG/ML
4 INJECTION INTRAMUSCULAR; INTRAVENOUS EVERY 6 HOURS PRN
Status: DISCONTINUED | OUTPATIENT
Start: 2024-12-04 | End: 2024-12-11 | Stop reason: HOSPADM

## 2024-12-04 RX ORDER — ONDANSETRON 2 MG/ML
4 INJECTION INTRAMUSCULAR; INTRAVENOUS ONCE AS NEEDED
Status: DISCONTINUED | OUTPATIENT
Start: 2024-12-04 | End: 2024-12-04 | Stop reason: HOSPADM

## 2024-12-04 RX ORDER — DEXAMETHASONE SODIUM PHOSPHATE 4 MG/ML
INJECTION, SOLUTION INTRA-ARTICULAR; INTRALESIONAL; INTRAMUSCULAR; INTRAVENOUS; SOFT TISSUE AS NEEDED
Status: DISCONTINUED | OUTPATIENT
Start: 2024-12-04 | End: 2024-12-04 | Stop reason: SURG

## 2024-12-04 RX ORDER — IPRATROPIUM BROMIDE AND ALBUTEROL SULFATE 2.5; .5 MG/3ML; MG/3ML
3 SOLUTION RESPIRATORY (INHALATION) ONCE
Status: DISCONTINUED | OUTPATIENT
Start: 2024-12-04 | End: 2024-12-04 | Stop reason: HOSPADM

## 2024-12-04 RX ORDER — HYDROCODONE BITARTRATE AND ACETAMINOPHEN 7.5; 325 MG/1; MG/1
1 TABLET ORAL EVERY 6 HOURS PRN
Qty: 30 TABLET | Refills: 0 | Status: SHIPPED | OUTPATIENT
Start: 2024-12-04 | End: 2024-12-10

## 2024-12-04 RX ORDER — MORPHINE SULFATE 2 MG/ML
2 INJECTION, SOLUTION INTRAMUSCULAR; INTRAVENOUS
Status: DISCONTINUED | OUTPATIENT
Start: 2024-12-04 | End: 2024-12-04 | Stop reason: HOSPADM

## 2024-12-04 RX ORDER — SODIUM CHLORIDE, SODIUM LACTATE, CALCIUM CHLORIDE, MAGNESIUM CHLORIDE AND DEXTROSE 1.5; 538; 448; 25.7; 5.08 G/100ML; MG/100ML; MG/100ML; MG/100ML; MG/100ML
2000 INJECTION, SOLUTION INTRAPERITONEAL
Status: DISCONTINUED | OUTPATIENT
Start: 2024-12-04 | End: 2024-12-06

## 2024-12-04 RX ORDER — PROPOFOL 10 MG/ML
VIAL (ML) INTRAVENOUS AS NEEDED
Status: DISCONTINUED | OUTPATIENT
Start: 2024-12-04 | End: 2024-12-04 | Stop reason: SURG

## 2024-12-04 RX ORDER — ONDANSETRON 2 MG/ML
INJECTION INTRAMUSCULAR; INTRAVENOUS AS NEEDED
Status: DISCONTINUED | OUTPATIENT
Start: 2024-12-04 | End: 2024-12-04 | Stop reason: SURG

## 2024-12-04 RX ORDER — LORAZEPAM 2 MG/ML
1 INJECTION INTRAMUSCULAR EVERY 4 HOURS PRN
Status: DISCONTINUED | OUTPATIENT
Start: 2024-12-04 | End: 2024-12-04 | Stop reason: HOSPADM

## 2024-12-04 RX ORDER — PHENYLEPHRINE HCL IN 0.9% NACL 1 MG/10 ML
SYRINGE (ML) INTRAVENOUS AS NEEDED
Status: DISCONTINUED | OUTPATIENT
Start: 2024-12-04 | End: 2024-12-04 | Stop reason: SURG

## 2024-12-04 RX ORDER — ENOXAPARIN SODIUM 100 MG/ML
30 INJECTION SUBCUTANEOUS DAILY
Status: DISCONTINUED | OUTPATIENT
Start: 2024-12-05 | End: 2024-12-11 | Stop reason: HOSPADM

## 2024-12-04 RX ORDER — MORPHINE SULFATE 2 MG/ML
2 INJECTION, SOLUTION INTRAMUSCULAR; INTRAVENOUS EVERY 4 HOURS PRN
Status: DISPENSED | OUTPATIENT
Start: 2024-12-04 | End: 2024-12-09

## 2024-12-04 RX ORDER — MIDAZOLAM HYDROCHLORIDE 2 MG/2ML
2 INJECTION, SOLUTION INTRAMUSCULAR; INTRAVENOUS ONCE
Status: DISCONTINUED | OUTPATIENT
Start: 2024-12-04 | End: 2024-12-04 | Stop reason: HOSPADM

## 2024-12-04 RX ORDER — NALOXONE HCL 0.4 MG/ML
0.4 VIAL (ML) INJECTION
Status: DISCONTINUED | OUTPATIENT
Start: 2024-12-04 | End: 2024-12-11 | Stop reason: HOSPADM

## 2024-12-04 RX ORDER — HYDROCODONE BITARTRATE AND ACETAMINOPHEN 7.5; 325 MG/1; MG/1
2 TABLET ORAL EVERY 4 HOURS PRN
Status: DISCONTINUED | OUTPATIENT
Start: 2024-12-04 | End: 2024-12-11 | Stop reason: HOSPADM

## 2024-12-04 RX ADMIN — Medication 200 MCG: at 15:16

## 2024-12-04 RX ADMIN — HYDROMORPHONE HYDROCHLORIDE 0.5 MG: 1 INJECTION, SOLUTION INTRAMUSCULAR; INTRAVENOUS; SUBCUTANEOUS at 05:16

## 2024-12-04 RX ADMIN — Medication 200 MCG: at 15:20

## 2024-12-04 RX ADMIN — CALCITRIOL 0.25 MCG: 0.25 CAPSULE ORAL at 09:45

## 2024-12-04 RX ADMIN — Medication 10 ML: at 09:45

## 2024-12-04 RX ADMIN — TRANEXAMIC ACID 1000 MG: 10 INJECTION, SOLUTION INTRAVENOUS at 15:17

## 2024-12-04 RX ADMIN — HYDROMORPHONE HYDROCHLORIDE 0.5 MG: 1 INJECTION, SOLUTION INTRAMUSCULAR; INTRAVENOUS; SUBCUTANEOUS at 12:02

## 2024-12-04 RX ADMIN — LIDOCAINE HYDROCHLORIDE 60 MG: 20 INJECTION, SOLUTION INTRAVENOUS at 15:06

## 2024-12-04 RX ADMIN — CARVEDILOL 12.5 MG: 12.5 TABLET, FILM COATED ORAL at 17:37

## 2024-12-04 RX ADMIN — Medication 5 MG: at 20:16

## 2024-12-04 RX ADMIN — MORPHINE SULFATE 2 MG: 2 INJECTION, SOLUTION INTRAMUSCULAR; INTRAVENOUS at 20:16

## 2024-12-04 RX ADMIN — ONDANSETRON 4 MG: 2 INJECTION INTRAMUSCULAR; INTRAVENOUS at 15:17

## 2024-12-04 RX ADMIN — Medication 100 MCG: at 15:26

## 2024-12-04 RX ADMIN — SODIUM CHLORIDE 2000 MG: 9 INJECTION, SOLUTION INTRAVENOUS at 22:15

## 2024-12-04 RX ADMIN — PANTOPRAZOLE SODIUM 40 MG: 40 TABLET, DELAYED RELEASE ORAL at 09:45

## 2024-12-04 RX ADMIN — SODIUM CHLORIDE 2 G: 9 INJECTION, SOLUTION INTRAVENOUS at 15:10

## 2024-12-04 RX ADMIN — SODIUM CHLORIDE, SODIUM LACTATE, CALCIUM CHLORIDE, MAGNESIUM CHLORIDE AND DEXTROSE 2000 ML: 1.5; 538; 448; 25.7; 5.08 INJECTION, SOLUTION INTRAPERITONEAL at 18:15

## 2024-12-04 RX ADMIN — GLYCOPYRROLATE 0.4 MCG: 0.2 INJECTION, SOLUTION INTRAMUSCULAR; INTRAVENOUS at 15:16

## 2024-12-04 RX ADMIN — LEVOTHYROXINE SODIUM 25 MCG: 0.03 TABLET ORAL at 09:45

## 2024-12-04 RX ADMIN — Medication 10 ML: at 20:16

## 2024-12-04 RX ADMIN — HYDROMORPHONE HYDROCHLORIDE 0.5 MG: 1 INJECTION, SOLUTION INTRAMUSCULAR; INTRAVENOUS; SUBCUTANEOUS at 09:43

## 2024-12-04 RX ADMIN — Medication 5000 UNITS: at 09:45

## 2024-12-04 RX ADMIN — SODIUM CHLORIDE, SODIUM LACTATE, CALCIUM CHLORIDE, MAGNESIUM CHLORIDE AND DEXTROSE 2000 ML: 1.5; 538; 448; 25.7; 5.08 INJECTION, SOLUTION INTRAPERITONEAL at 23:01

## 2024-12-04 RX ADMIN — CYANOCOBALAMIN TAB 500 MCG 1000 MCG: 500 TAB at 09:45

## 2024-12-04 RX ADMIN — ROSUVASTATIN CALCIUM 40 MG: 20 TABLET, FILM COATED ORAL at 20:16

## 2024-12-04 RX ADMIN — CARVEDILOL 12.5 MG: 12.5 TABLET, FILM COATED ORAL at 09:45

## 2024-12-04 RX ADMIN — ROPIVACAINE HYDROCHLORIDE 30 ML: 5 INJECTION, SOLUTION EPIDURAL; INFILTRATION; PERINEURAL at 15:08

## 2024-12-04 RX ADMIN — DEXAMETHASONE SODIUM PHOSPHATE 4 MG: 4 INJECTION, SOLUTION INTRA-ARTICULAR; INTRALESIONAL; INTRAMUSCULAR; INTRAVENOUS; SOFT TISSUE at 15:17

## 2024-12-04 RX ADMIN — TRANEXAMIC ACID 1000 MG: 10 INJECTION, SOLUTION INTRAVENOUS at 15:36

## 2024-12-04 RX ADMIN — PROPOFOL 100 MG: 10 INJECTION, EMULSION INTRAVENOUS at 15:06

## 2024-12-04 RX ADMIN — HYDROMORPHONE HYDROCHLORIDE 0.5 MG: 1 INJECTION, SOLUTION INTRAMUSCULAR; INTRAVENOUS; SUBCUTANEOUS at 01:56

## 2024-12-04 RX ADMIN — HYDROCODONE BITARTRATE AND ACETAMINOPHEN 1 TABLET: 7.5; 325 TABLET ORAL at 17:39

## 2024-12-04 RX ADMIN — LOSARTAN POTASSIUM 100 MG: 50 TABLET, FILM COATED ORAL at 12:01

## 2024-12-04 NOTE — ANESTHESIA POSTPROCEDURE EVALUATION
Patient: Travon Plummer    Procedure Summary       Date: 12/04/24 Room / Location: [facility] OR  /  [facility]    Anesthesia Start: 1458 Anesthesia Stop: 1556    Procedure: HIP INTERTROCHANTERIC NAILING (Right: Hip) Diagnosis:       Closed fracture of right hip, initial encounter      (Closed fracture of right hip, initial encounter [S72.001A])    Surgeons: Dean Hammonds MD Provider: Joni Segovia CRNA    Anesthesia Type: general with block ASA Status: 3 - Emergent            Anesthesia Type: general with block    Vitals  Vitals Value Taken Time   /80 12/04/24 1700   Temp 98.2 °F (36.8 °C) 12/04/24 1700   Pulse 66 12/04/24 1700   Resp 18 12/04/24 1700   SpO2 92 % 12/04/24 1700           Post Anesthesia Care and Evaluation    Patient location during evaluation: PACU  Patient participation: complete - patient participated  Level of consciousness: awake  Pain score: 3  Pain management: adequate    Airway patency: patent  Anesthetic complications: No anesthetic complications  PONV Status: controlled  Cardiovascular status: acceptable and stable  Respiratory status: acceptable and face mask  Hydration status: acceptable    Comments: See Nursing record for post procedural Vital Signs as per protocol

## 2024-12-04 NOTE — ANESTHESIA PREPROCEDURE EVALUATION
Anesthesia Evaluation     Patient summary reviewed and Nursing notes reviewed   NPO Solid Status: > 8 hours  NPO Liquid Status: > 8 hours           Airway   Mallampati: II  TM distance: >3 FB  Neck ROM: full  No difficulty expected and Possible difficult intubation  Dental      Pulmonary    (+) a smoker Former, COPD,shortness of breath, decreased breath sounds  Cardiovascular     PT is on anticoagulation therapy  Patient on routine beta blocker    (+) hypertension, valvular problems/murmurs (aortic sclerosis per 2022 echo report) MR, CAD, dysrhythmias, PVD, hyperlipidemia,  carotid artery disease    ROS comment: · Calculated left ventricular EF = 62%  · Sigmoid-shaped ventricular septum is present.  · Left ventricular diastolic function is consistent with (grade I) impaired relaxation.  · Mild mitral annular calcification is present. Mild to moderate mitral valve regurgitation is present  · The aortic valve exhibits sclerosis  · Mild tricuspid valve regurgitation is present      Neuro/Psych  (+) CVA  GI/Hepatic/Renal/Endo    (+) obesity, morbid obesity, GERD, GI bleeding , renal disease- CRI and ESRD, thyroid problem hypothyroidism    Musculoskeletal     Abdominal    Substance History      OB/GYN          Other   arthritis, blood dyscrasia anemia,   history of cancer    ROS/Med Hx Other: Labs reviewed  10/34 bun 27 crt 4.66  EKG sb  Carotid us  There is >70% stenosis in the ight internal carotid artery.  ·  The stented left internal carotid artery is widely patent.  ·  Bilateral vertebral artery flow is antegrade.  2022 echo  · Calculated left ventricular EF = 62%  · Sigmoid-shaped ventricular septum is present.  · Left ventricular diastolic function is consistent with (grade I) impaired relaxation.  · Mild mitral annular calcification is present. Mild to moderate mitral valve regurgitation is present  · The aortic valve exhibits sclerosis  · Mild tricuspid valve regurgitation is presen                   Anesthesia Plan    ASA 3 - emergent     general with block     (Risks and benefits discussed including risk of aspiration, recall and dental damage. All patient questions answered.    Will continue with plan of care.)  intravenous induction     Anesthetic plan, risks, benefits, and alternatives have been provided, discussed and informed consent has been obtained with: patient.  Pre-procedure education provided      CODE STATUS:    Code Status (Patient has no pulse and is not breathing): CPR (Attempt to Resuscitate)  Medical Interventions (Patient has pulse or is breathing): Full Support

## 2024-12-04 NOTE — ANESTHESIA PROCEDURE NOTES
Peripheral Block      Patient reassessed immediately prior to procedure    Reason for block: at surgeon's request and post-op pain management  Preanesthetic Checklist  Completed: patient identified, IV checked, site marked, risks and benefits discussed, surgical consent, monitors and equipment checked, pre-op evaluation and timeout performed  Prep:  Pt Position: supine  Sterile barriers:cap, gloves and mask  Prep: ChloraPrep  Patient monitoring: EKG, continuous pulse oximetry and blood pressure monitoring  Procedure    Sedation: yes    Guidance:ultrasound guided  Images:still images obtained    Block Type:fascia iliaca compartment  Injection Technique:single-shot  Needle Type:echogenic      Medications Used: ropivacaine (NAROPIN) injection 0.5 % - Peripheral Nerve   30 mL - 12/4/2024 3:08:00 PM      Medications  Comment:Adjuncts per total volume of LA:    Decadron 8 mg       If required, intravenous sedation was given -- see meds on anesthesia record.    Post Assessment  Injection Assessment: negative aspiration for heme, no paresthesia on injection and incremental injection  Patient Tolerance:comfortable throughout block  Complications:no  Additional Notes  Procedure:          FASCIA ILIACA BLOCK                                Patient analgesia was achieved with General Anesthesia      Pt was placed in the supine position.   The insertion site was prepped in sterile fashion with Chloraprep.  A BBraun echogenic needle was advance In-plane under ultrasound guidance. The Anterior superior Iliac crest was initially visualized and the probe was directed slightly medially and slightly towards the umbilicus.  The course of the needle was tracked over the sartorius muscle through the fascia Iliacus and into the anterior portion of the Iliacus muscle.  Major vessels where identified and avoided as were structures of the peritoneal cavity.  LA injection was made incrementally in 1-5 ml amounts and spread was visualized  superiorly below the fascia iliacus.  Injection was completed with negative aspiration of blood and negative intravascular injection.  Injection pressures were normal or minimal resistance.    Performed by: Bubba Bo CRNA

## 2024-12-04 NOTE — OP NOTE
[address],  [address]  [place]  [address]  [phone]      OPERATIVE REPORT      PATIENT NAME:  Travon Plummer                            YOB: 1945        PREOP DIAGNOSIS:   Right hip comminuted displaced and unstable intertrochanteric fracture.    POSTOP DIAGNOSIS:   Same.    PROCEDURE:   Open reduction and internal fixation of Right proximal femur fracture.    SURGEON:     Wing Hammonds MD    OPERATIVE TEAM:   Circulator: Paulette Burdick RN; Carlita Brock RN; Maria Luz Meza RN  Scrub Person: Susan Williamson  Vendor Representative: Landen Pineda    ANESTHETIST:  CRNA: Joni Segovia CRNA; Bubba Bo CRNA    ANESTHESIA:   General with Block        ESTIM BLOOD LOSS:   50 ml      FINDINGS:     Comminuted displaced unstable proximal femoral fracture.    IMPLANTS:     Ashlee gamma nail     DRAINS:     Lopez to gravity.    COMPLICATIONS    None    DISPOSITION:    Stable to recovery.    INDICATIONS:   Painful, displaced unstable proximal femur fracture on     clinical exam and imaging.    NARRATIVE:    Risks, benefits and alternatives discussed and informed consent for surgical procedure obtained.  Risks discussed including but not limited to anesthesia, infection, nerve/vessel/tendon injury, fracture, prosthetic or implant loosening, wear or dislocation, morbidities from any chronic medical conditions, DVT, PE and death.  Goals include pain relief, earlier mobilization and rehabilitation to improve ambulatory and functional level.  Patient presents for planned fracture stabilization surgery.     Antibiotic prophylaxis was given.  Surgeon site marking was performed.  A time out was performed prior to the procedure.  Anesthesia was effective and well tolerated.  The patient was placed onto the fracture table in the supine position with care taken to pad all areas of the body and observe skin precautions.  Also, care was taken to  provide multimodal DVT/PE prophylaxis.  The away leg was kept in a comfortable position in the padded saenz and to provide room for the C-arm image.  The involved leg was placed in a padded fracture boot after performing a gentle closed reduction and placed in mild traction.  At this point, C-arm AP and lateral views of the hip showed a good reduction of the comminuted fracture.  The hip and leg were prepped and draped in the usual sterile fashion.    After sterile prep and drape, a small incision was made starting at the tip of the greater trochanter and extending proximally.  A small opening was made in the tensor fascia anjelica and a cannulated hand awl was passed down the trochanteric aspect into the proximal femur and across the fracture into the femoral canal.  C-arm image showed good position in AP and lateral planes.  The guide wire was passed down the femoral canal via the cannulated awl and the awl was removed.  Over the guidewire, proximal reaming was performed appropriate for the nail size and in good position.    Over the wire, the actual trochanteric nail was passed across the fracture site and down the canal.  The nail provided a neck angle slot to place the planned lag screw based on both pre-operative and intraoperative radiographic measurements.  The nail entry handle was used to place the lag screw into the femoral head with usual trochar placement, threaded pin passage, C-arm image guidance, depth gauge measurement, reaming over the pin and lag screw placement.  Good position noted on C-arm AP and lateral view.  The compression set screw was then placed into the top of the nail with good purchase.  Lastly, with the distal interlock slot of the entry handle, the distal interlock screw was placed with standard trochar placement, drilling, depth gauge measure, and screw placement.  The entry handle assembly was removed as the final nail construct was complete.    Final C-arm views were saved showing  good fracture reduction considering the degree of comminution and good hardware position.  The small wounds were irrigated copiously.  Routine closure was performed and a sterile dressing applied.  The patient was gently removed from the fracture table and supine on the hospital bed.  Anesthesia was effective and well tolerated.  There were no complications of the procedure.  The patient was transferred stable to recovery.

## 2024-12-04 NOTE — CASE MANAGEMENT/SOCIAL WORK
"Discharge Planning Assessment   [facility]     Patient Name: Travon Plummer  MRN: [id number]  Today's Date: 12/4/2024    Admit Date: 12/3/2024    Plan: DCP-STR. Family wants [facility].   Discharge Needs Assessment       Row Name 12/04/24 1441       Living Environment    People in Home spouse    Name(s) of People in Home Pat/Wife    Current Living Arrangements home    Duration at Residence \"All my Life\"    Potentially Unsafe Housing Conditions unable to assess    In the past 12 months has the electric, gas, oil, or water company threatened to shut off services in your home? No    Primary Care Provided by self    Provides Primary Care For no one    Family Caregiver if Needed spouse    Family Caregiver Names Pat/Wife    Quality of Family Relationships unable to assess    Able to Return to Prior Arrangements other (see comments)  Will need Rehab.       Resource/Environmental Concerns    Resource/Environmental Concerns none    Transportation Concerns none       Transportation Needs    In the past 12 months, has lack of transportation kept you from medical appointments or from getting medications? no    In the past 12 months, has lack of transportation kept you from meetings, work, or from getting things needed for daily living? No       Food Insecurity    Within the past 12 months, you worried that your food would run out before you got the money to buy more. Never true    Within the past 12 months, the food you bought just didn't last and you didn't have money to get more. Never true       Transition Planning    Patient/Family Anticipates Transition to inpatient rehabilitation facility    Patient/Family Anticipated Services at Transition rehabilitation services    Transportation Anticipated family or friend will provide       Discharge Needs Assessment    Readmission Within the Last 30 Days no previous admission in last 30 days    Equipment Currently Used at Home bp cuff    Concerns to be Addressed basic " "needs;discharge planning    Do you want help finding or keeping work or a job? I do not need or want help    Do you want help with school or training? For example, starting or completing job training or getting a high school diploma, GED or equivalent No    Anticipated Changes Related to Illness inability to care for self    Equipment Needed After Discharge other (see comments)  To be determined.    Outpatient/Agency/Support Group Needs inpatient rehabilitation facility    Discharge Facility/Level of Care Needs rehabilitation facility    Provided Post Acute Provider List? N/A                   Discharge Plan       Row Name 24 1450       Plan    Plan DCP-STR. Family wants [facility].      Row Name 24 1444       Plan    Plan Comments CM spoke with pt at bedside. Permission given to discuss DCP. Pt confirmed name,,address, and insurance. States Yes to LW, Yes POA, CM requested documents when able.  No  service. PCP confirmed as UBALDO Erickson, last seen 24. Pharmacy used [facility]. Agreed to meds to bed. DME listed above. Denies any additional DME needs. 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 Was (I) with ADL's and (I) with Mobility. Drives self to all appointments. States \"fell at home due to a 02 cord wrapped around his foot.\" Denies any finanical issues. DCP will need STR due to fracture. Family has requested [place].                  Continued Care and Services - Admitted Since 12/3/2024    No active coordination exists for this encounter.          Demographic Summary       Row Name 24 1433       General Information    Admission Type inpatient    Arrived From emergency department    Required Notices Provided Important Message from Medicare    Referral Source admission list    Reason for Consult discharge planning    Preferred Language English       Contact Information    Permission Granted to Share Info With                    Functional Status       Row Name " 12/04/24 1435       Functional Status    Usual Activity Tolerance good    Current Activity Tolerance good       Physical Activity    On average, how many days per week do you engage in moderate to strenuous exercise (like a brisk walk)? 0 days    On average, how many minutes do you engage in exercise at this level? 0 min    Number of minutes of exercise per week 0       Assessment of Health Literacy    How often do you have someone help you read hospital materials? Never    How often do you have problems learning about your medical condition because of difficulty understanding written information? Never    How often do you have a problem understanding what is told to you about your medical condition? Never    How confident are you filling out medical forms by yourself? Quite a bit    Health Literacy Good       Functional Status, IADL    Medications independent    Meal Preparation independent    Housekeeping independent    Laundry independent    Shopping assistive person;independent    If for any reason you need help with day-to-day activities such as bathing, preparing meals, shopping, managing finances, etc., do you get the help you need? I don't need any help    IADL Comments States (I) with all IADL's       Mental Status    General Appearance WDL WDL       Mental Status Summary    Recent Changes in Mental Status/Cognitive Functioning no changes       Employment/    Employment Status retired    Employment/ Comments Carlos Rayo Name 12/04/24 1440       Developmental Stage (Eriksson's Stages of Development)    Developmental Stage Stage 8 (65 years-death/Late Adulthood) Integrity vs. Despair                   Abuse/Neglect    No documentation.                  Legal    No documentation.                  Substance Abuse    No documentation.                  Patient Forms    No documentation.                     Kareen Brewer RN

## 2024-12-04 NOTE — CONSULTS
Patient Care Team:  Chilango Erickson MD as PCP - General (Internal Medicine)  Andrea Sellers MD, JODY as Consulting Physician (Nephrology)  Deandra Do MD as Surgeon (General Surgery)  Leonard Ashford MD as Consulting Physician (Cardiology)  Georgina Pool MD as Consulting Physician (Gastroenterology)    Chief complaint right hip pain    Subjective     Patient is a 79 y.o. male presents with right hip pain following a ground-level fall.  He was a typically community ambulator.  He was seen through the ER had been evaluated and had a intertrochanteric fracture to the right hip.  He has no other complaints seen today with granddaughter at bedside.       Review of Systems   Pertinent items are noted in HPI    History  Past Medical History:   Diagnosis Date    Benign hypertensive CKD, stage 5 chronic kidney disease or end stage renal disease     Broken arm     Carotid stenosis     bilateral    Cerebrovascular accident 10/31/2008    Coronary artery disease     followed by Dr. Ashford; LOV 7/9/24; denies chest pain, SOB 8/5/24    Dialysis patient 2024    Current 11/13/24    Dyspnea     GERD (gastroesophageal reflux disease)     Gout     History of blood transfusion     Hypercholesterolemia     Hypertension 11/10/2015    History of hypertension; hypotensive at the time of office visit on 11/10/2015.    Impaired mobility     Obesity     Osteoarthritis     Paroxysmal atrial fibrillation     History of paroxysmal atrial fibrillation, data deficit.    Prostate cancer 01/2015    Prostate cancer, diagnosed January of 2015, status post radiation therapy x39 treatments, 07/01/2015 at [facility].    Renal cell carcinoma 2015    Renal cell carcinoma, dx March of 2015, status post left nephrectomy.    Wears dentures     instructed no adhesive DOS     Past Surgical History:   Procedure Laterality Date    CAROTID STENT Left 10/31/2008    PTA/left carotid artery stent, 10/31/2008.      COLONOSCOPY N/A 2021    Procedure: COLONOSCOPY, polypectomy, clip placement x 1;  Surgeon: Deandra Do MD;  Location: [facility] ENDOSCOPY;  Service: Gastroenterology;  Laterality: N/A;    COLONOSCOPY W/ POLYPECTOMY      CORONARY ANGIOPLASTY WITH STENT PLACEMENT      A 3.5 x 13 mm. Cypher ESTIVEN to RCA expanded with 4 mm balloon.     DIALYSIS FISTULA CREATION N/A     abdominal    ENDOSCOPY N/A 2021    Procedure: ESOPHAGOGASTRODUODENOSCOPY with biopsy;  Surgeon: Deandra Do MD;  Location: [facility] ENDOSCOPY;  Service: Gastroenterology;  Laterality: N/A;    ENDOSCOPY N/A 2023    Procedure: ESOPHAGOGASTRODUODENOSCOPY with biopsy;  Surgeon: Georgina Pool MD;  Location: [facility] ENDOSCOPY;  Service: Gastroenterology;  Laterality: N/A;    INGUINAL HERNIA REPAIR      NEPHRECTOMY Left 2015    diagnosed 2015, status post left radical nephrectomy.     PROSTATE FIDUCIAL MARKER PLACEMENT      received XRT for prostate CA in      Family History   Problem Relation Age of Onset    No Known Problems Mother     No Known Problems Father     Colon cancer Neg Hx      Social History     Tobacco Use    Smoking status: Former     Current packs/day: 0.00     Average packs/day: 1 pack/day for 51.0 years (51.0 ttl pk-yrs)     Types: Cigarettes     Start date:      Quit date:      Years since quittin.9     Passive exposure: Past    Smokeless tobacco: Former     Types: Chew   Vaping Use    Vaping status: Never Used   Substance Use Topics    Alcohol use: Not Currently     Comment: rare    Drug use: No     Medications Prior to Admission   Medication Sig Dispense Refill Last Dose/Taking    amLODIPine (NORVASC) 5 MG tablet Take 1 tablet by mouth Daily.   12/3/2024    aspirin 81 MG EC tablet Take 1 tablet by mouth Daily. 30 tablet 0 12/3/2024    bumetanide (BUMEX) 1 MG tablet Take 1 tablet by mouth Daily.   12/3/2024    calcitriol (ROCALTROL) 0.25 MCG capsule Take 1 capsule by mouth  Daily.   12/3/2024    carvedilol (COREG) 6.25 MG tablet Take 1 tablet by mouth 2 (Two) Times a Day With Meals. (Patient taking differently: Take 2 tablets by mouth 2 (Two) Times a Day With Meals.) 180 tablet 3 12/3/2024    Cholecalciferol (Vitamin D) 50 MCG (2000 UT) capsule Take 1 capsule by mouth Daily.   12/3/2024    Cyanocobalamin (VITAMIN B-12 PO) Take 1,000 mcg by mouth Daily.   12/3/2024    ferrous sulfate 325 (65 FE) MG tablet Take 1 tablet by mouth 1 (One) Time Per Week. Scott   12/3/2024    fluorouracil (EFUDEX) 5 % cream Apply 1 Application topically to the appropriate area as directed.   12/3/2024    levothyroxine (SYNTHROID, LEVOTHROID) 25 MCG tablet Take 1 tablet by mouth Daily. LEVOXYL  0 12/3/2024    losartan (COZAAR) 100 MG tablet Take 1 tablet by mouth Daily.   12/3/2024    pantoprazole (PROTONIX) 20 MG EC tablet Take 1 tablet by mouth Daily. 90 tablet 3 12/3/2024    potassium chloride (MICRO-K) 10 MEQ CR capsule Take 1 capsule by mouth 2 (Two) Times a Day.   12/3/2024    rosuvastatin (CRESTOR) 40 MG tablet Take 1 tablet by mouth Every Night. 90 tablet 3 12/2/2024    sodium bicarbonate 650 MG tablet Take 1 tablet by mouth 2 (Two) Times a Day.   12/3/2024    triamcinolone (KENALOG) 0.1 % ointment    12/3/2024    Iron Sucrose (VENOFER IV) 200 mg Once.       Methoxy PEG-Epoetin Beta (MIRCERA IJ) Inject 100 mcg under the skin into the appropriate area as directed.        Allergies:  Allopurinol and Lipitor [atorvastatin]    Objective     Vital Signs  Temp:  [97 °F (36.1 °C)-98.2 °F (36.8 °C)] 97.2 °F (36.2 °C)  Heart Rate:  [56-63] 58  Resp:  [18-20] 20  BP: (158-178)/() 162/82    Physical Exam:      General Appearance:  Alert, cooperative, in no acute distress   Head:  Normocephalic, without obvious abnormality, atraumatic   Eyes:  Lids and lashes normal, conjunctivae and sclerae normal, no icterus, no pallor, corneas clear, PERRLA   Ears:  Ears appear intact with no abnormalities noted    Throat:  No oral lesions, no thrush, oral mucosa moist   Neck:  No adenopathy, supple, trachea midline, no thyromegaly, no carotid bruit, no JVD   Back:  No kyphosis present, no scoliosis present, no skin lesions, erythema or scars, no tenderness to percussion or palpation, range of motion normal   Lungs:  Clear to auscultation, respirations regular, even and unlabored    Heart:  Regular rhythm and normal rate, normal S1 and S2, no murmur, no gallop, no rub, no click   Chest Wall:  No abnormalities observed   Abdomen:  Normal bowel sounds, no masses, no organomegaly, soft non-tender, non-distended, no guarding, no rebound tenderness   Rectal:  Deferred   Extremities:  Moves all extremities well, no edema, no cyanosis, no redness shortened externally rotated right lower extremity   Pulses:  Pulses palpable and equal bilaterally   Skin:  No bleeding, bruising or rash   Lymph nodes:  No palpable adenopathy   Neurologic:  Cranial nerves 2 - 12 grossly intact, sensation intact, DTR present and equal bilaterally                                                                               Results Review:    I reviewed the patient's new clinical results.    Assessment & Plan       Closed right hip fracture    Paroxysmal atrial fibrillation    Chronic kidney disease, stage V      Plan is for a right hip intramedullary nailing he understands this he understands procedure risk and benefits and decides to proceed he understands postop protocol.  Understands risk of infection malunion nonunion risk with anesthesia and decides to proceed.    I discussed the patients findings and my recommendations with patient.     Dean Hammonds MD  12/04/24  09:49 EST

## 2024-12-04 NOTE — THERAPY EVALUATION
PT order was received.  Patient is scheduled to have surgery this afternoon.  Pt will follow up tomorrow.

## 2024-12-04 NOTE — PROGRESS NOTES
[facility]    Name:  Travon Plummer   Age:  79 y.o.  Sex:  male  :  1945  MRN:  [id number]   Visit Number:  [id number]  Admission Date:  12/3/2024  Date Of Service:  24  Primary Care Physician:  Chilango Erickson MD     LOS: 1 day :    Chief Complaint:      Right hip pain    Subjective:    2024: Patient is today for right hip repair.  Notified nephrology of ongoing PD needs.  Seen in postop recovery.  Stable.    History of presenting illness:    79-year-old male with past medical history of CKD, CAD, paroxysmal atrial fibrillation, ESRD on HD, hypertension, hyperlipidemia, obesity, renal cell carcinoma,  status post nephrectomy.  Chief complaint fall.  Patient reports he was walking around his house and tripped on his wife's oxygen cord.  Afterwards he felt pain in his right hip.  No loss of consciousness or head trauma suffered.  Was noted to have right intertrochanteric hip fracture in the ED Dr. Poon was notified who will see the patient in consult..  Nephrology has been notified.  Full code.    Pertinent findings: Creatinine 4.93, hemoglobin 11.4, right hip x-ray showing intertrochanteric hip fracture, CT of the cervical spine with degenerative change and osteopenia, CT of the head showing atrophy and a left maxillary polyp, CT of the pelvis showing mildly displaced comminuted right intratrochanteric fracture remote left superior and inferior pubic rami fractures,    Edited by: Justin Brown DO at 2024 5859     Review of Systems:     All systems were reviewed and negative except as mentioned in subjective, assessment and plan.    Vital Signs:    Temp:  [97 °F (36.1 °C)-98.2 °F (36.8 °C)] 98.2 °F (36.8 °C)  Heart Rate:  [55-84] 76  Resp:  [11-22] 18  BP: (144-178)/(76-93) 144/76    Intake and output:    I/O last 3 completed shifts:  In:    Out: 0   I/O this shift:  In: 315 [P.O.:15; IV Piggyback:300]  Out: 700  "[Urine:100; Blood:100]    Physical Examination:    Constitutional: Awake, alert  Eyes: PERRLA, sclerae anicteric, no conjunctival injection  HENT: NCAT, mucous membranes moist  Neck: Supple, no thyromegaly, no lymphadenopathy, trachea midline  Respiratory: Clear to auscultation bilaterally, nonlabored respirations   Cardiovascular: RRR, no murmurs, rubs, or gallops, palpable pedal pulses bilaterally  Gastrointestinal: Positive bowel sounds, soft, nontender, nondistended  Musculoskeletal: No bilateral ankle edema, no clubbing or cyanosis to extremities  Psychiatric: Appropriate affect, cooperative  Neurologic: Oriented x 3, speech clear  Skin: No rashes  Exam stable 12/4/2024  Edited by: Justin Brown, DO at 12/4/2024 5650     Laboratory results:    Results from last 7 days   Lab Units 12/04/24  0529 12/03/24  1458   SODIUM mmol/L 139 141   POTASSIUM mmol/L 4.8 4.8   CHLORIDE mmol/L 103 103   CO2 mmol/L 25.5 26.9   BUN mg/dL 27* 26*   CREATININE mg/dL 4.66* 4.93*   CALCIUM mg/dL 9.1 9.3   BILIRUBIN mg/dL 0.3 0.4   ALK PHOS U/L 91 100   ALT (SGPT) U/L 10 13   AST (SGOT) U/L 10 16   GLUCOSE mg/dL 95 99     Results from last 7 days   Lab Units 12/04/24  0529 12/03/24  1458   WBC 10*3/mm3 9.57 10.14   HEMOGLOBIN g/dL 10.8* 11.4*   HEMATOCRIT % 34.6* 35.3*   PLATELETS 10*3/mm3 134* 136*                 No results for input(s): \"PHART\", \"XBZ9DVG\", \"PO2ART\", \"IBG1JRA\", \"BASEEXCESS\" in the last 8760 hours.   I have reviewed the patient's laboratory results.    Radiology results:    Peripheral Block    Result Date: 12/4/2024  Bubba BoGAVIN     12/4/2024  3:08 PM Peripheral Block Patient reassessed immediately prior to procedure Reason for block: at surgeon's request and post-op pain management Preanesthetic Checklist Completed: patient identified, IV checked, site marked, risks and benefits discussed, surgical consent, monitors and equipment checked, pre-op evaluation and timeout performed Prep: Pt " Position: supine Sterile barriers:cap, gloves and mask Prep: ChloraPrep Patient monitoring: EKG, continuous pulse oximetry and blood pressure monitoring Procedure Sedation: yes Guidance:ultrasound guided Images:still images obtained Block Type:fascia iliaca compartment Injection Technique:single-shot Needle Type:echogenic Medications Used: ropivacaine (NAROPIN) injection 0.5 % - Peripheral Nerve  30 mL - 12/4/2024 3:08:00 PM Medications Comment:Adjuncts per total volume of LA: Decadron 8 mg If required, intravenous sedation was given -- see meds on anesthesia record. Post Assessment Injection Assessment: negative aspiration for heme, no paresthesia on injection and incremental injection Patient Tolerance:comfortable throughout block Complications:no Additional Notes Procedure:          FASCIA ILIACA BLOCK                             Patient analgesia was achieved with General Anesthesia   Pt was placed in the supine position.   The insertion site was prepped in sterile fashion with Chloraprep.  A BBraun echogenic needle was advance In-plane under ultrasound guidance. The Anterior superior Iliac crest was initially visualized and the probe was directed slightly medially and slightly towards the umbilicus.  The course of the needle was tracked over the sartorius muscle through the fascia Iliacus and into the anterior portion of the Iliacus muscle.  Major vessels where identified and avoided as were structures of the peritoneal cavity.  LA injection was made incrementally in 1-5 ml amounts and spread was visualized superiorly below the fascia iliacus.  Injection was completed with negative aspiration of blood and negative intravascular injection.  Injection pressures were normal or minimal resistance. Performed by: Bubba Bo CRNA     XR Hip With or Without Pelvis 2 - 3 View Right    Result Date: 12/4/2024   PROCEDURE: XR HIP W OR WO PELVIS 2-3 VIEW RIGHT-  HISTORY: Further characterization of known  intertrochanteric fracture  COMPARISON: CT performed earlier the same day.  FINDINGS:  A 2 view exam demonstrates a mildly displaced mildly comminuted right intratrochanteric fracture. There is diffuse osteopenia. There are remote left inferior and superior pubic rami fractures. Fiducial markers are seen in the region of the prostate. There are degenerative changes of the SI joints bilaterally         Impression: Right intertrochanteric fracture as seen on recent CT.         Images were reviewed, interpreted, and dictated by Dr. Alice Castillo MD Transcribed by Julia Vasquez PA-C.  This report was signed and finalized on 12/4/2024 8:56 AM by Alice Castillo MD.      CT Pelvis Without Contrast    Result Date: 12/3/2024  PROCEDURE: CT PELVIS WO CONTRAST-  HISTORY: Right hip pain, status post fall, eval hip fracture  COMPARISON: None.  PROCEDURE: Axial images were obtained from the iliac crest to the pubic symphysis by computed tomography. This study was performed with techniques to keep radiation doses as low as reasonably achievable, (ALARA). Individualized dose reduction techniques using automated exposure control or adjustment of mA and/or kV according to the patient size were employed.  FINDINGS:  PELVIS: The appendix is not identified. The urinary bladder is unremarkable. There is no significant fluid or adenopathy. The visualized portions of the GI tract is nonobstructed. Diffuse osteopenia identified. There is a remote left mid superior pubic ramus fracture which has undergone nonunion. There is a remote left inferior pubic ramus fracture with at least partial nonunion. There is a comminuted, mildly displaced right intertrochanteric fracture. Degenerative change of the SI joints noted. Fiducial markers in the prostate noted. There is diverticulosis without evidence of diverticulitis.      Impression: Mildly displaced comminuted right intertrochanteric fracture.  Remote left superior and inferior pubic rami  fractures as described.    CTDI: 14.49 mGy DLP:755.66 mGy.cm  This report was signed and finalized on 12/3/2024 3:24 PM by Alice Castillo MD.      CT Cervical Spine Without Contrast    Result Date: 12/3/2024  PROCEDURE: CT CERVICAL SPINE WO CONTRAST-  HISTORY: Trauma, eval C-spine fracture  COMPARISON: None.  PROCEDURE: Axial images were obtained from the skull base to the thoracic inlet by computed tomography. 3 D reconstruction images were performed. This study was performed with techniques to keep radiation doses as low as reasonably achievable, (ALARA). Individualized dose reduction techniques using automated exposure control or adjustment of mA and/or kV according to the patient size were employed.  FINDINGS: There is no acute fracture. Minimal anterolisthesis C6 noted on C7. Diffuse osteopenia identified.. There are levels of spinal stenosis and/or neuroforaminal narrowing secondary to degenerative change but not secondary to acute bony abnormality. There is moderate to severe disc space narrowing at all levels with small osteophytes. Diffuse osteopenia identified. Posterior left C2 there is an 11 mm nonspecific lytic lesion. Anteriorly in the C5 vertebral body there is a smaller similar 5 mm lesion. No prior study document stability. The facets are normally aligned. Bilateral carotid artery calcifications noted. Left carotid stent identified. Motion artifact noted on multiple images. Limited images of the lung apices are unremarkable.      Impression: No acute fracture.  Multilevel cervical degenerative change.  Diffuse osteopenia.  Nonspecific lytic lesions as described may be age/osteopenia related.   CTDI: 14.49 mGy DLP:755.66 mGy.cm   This report was signed and finalized on 12/3/2024 3:19 PM by Alice Castillo MD.      CT Head Without Contrast    Result Date: 12/3/2024  PROCEDURE: CT HEAD WO CONTRAST-  HISTORY: Trauma, eval intracranial hemorrhage  COMPARISON: None.  TECHNIQUE: Multiple axial CT images were  performed from the foramen magnum to the vertex. Individualized dose reduction techniques using automated exposure control or adjustment of mA and/or kV according to the patient size were employed.  FINDINGS: There is moderate, age-appropriate generalized cerebral atrophy. The ventricles are enlarged. There is no evidence of edema or hemorrhage.  No masses are identified. No extra-axial fluid is seen. The paranasal sinuses demonstrate a left maxillary polyp or mucous retention cyst. Remaining paranasal sinuses and mastoids are clear. Mild small vessel ischemic disease identified.      Impression: Atrophy  without acute process.  Left maxillary polyp or mucous retention cyst.   CTDI: 14.49 mGy DLP:755.66 mGy.cm  This report was signed and finalized on 12/3/2024 3:16 PM by Alice Castillo MD.     I have reviewed the patient's radiology reports.    Medication Review:     I have reviewed the patient's active and prn medications.     Problem List:      Closed right hip fracture    Paroxysmal atrial fibrillation    Chronic kidney disease, stage V      Assessment/Plan:      Closed right hip fracture  --  Status post right hip gamma nail Dr. Hammonds 12/4/2024.  -- Active Treatments: Hold Bumex, pain control dilaudid and tylenol, continue beta-blocker perioperatively, hold losartan  -- Pending Results: Orthopedic surgery consult, will need to monitor hemoglobin closely on prophylaxis, EKG      Paroxysmal atrial fibrillation  --Chronic medical problem  -- Active Treatments: Not on anticoagulation due to history of anemia  -- Pending Results: Monitor telemetry      Chronic kidney disease, stage V  -- Chronic medical problem  -- Active Treatments: Continue PD  -- Pending Results: Consult to Dr. Sellers              DVT Prophylaxis: SCDs, consider Eliquis for prophylaxis postoperatively  Code Status: Full code  Diet: Cardiac renal, n.p.o. after midnight  Risk assessment: High anticipate greater than two night stay          Justin LEVIN  DO Stephanie  12/04/24  17:44 EST    Dictated utilizing Dragon dictation.

## 2024-12-04 NOTE — ANESTHESIA PROCEDURE NOTES
Airway  Urgency: elective    Airway not difficult    General Information and Staff    Patient location during procedure: OR  CRNA/CAA: Bubba Bo CRNA    Indications and Patient Condition  Indications for airway management: airway protection    Preoxygenated: yes  Mask difficulty assessment: 0 - not attempted    Final Airway Details  Final airway type: supraglottic airway      Successful airway: classic  Size 4     Number of attempts at approach: 1  Assessment: lips, teeth, and gum same as pre-op and atraumatic intubation    Additional Comments  Airway pressure leak at <20cm/h20

## 2024-12-04 NOTE — PLAN OF CARE
Goal Outcome Evaluation:         VSS on RA. Pain effectively controlled with PRN medication. NPO at midnight. PD completed without complications. Will continue plan of care.

## 2024-12-04 NOTE — SIGNIFICANT NOTE
7754- Dr. Brown at bedside assessed pt and spoke with him concerning plan of care, pt verbalized understanding.

## 2024-12-05 LAB
ANION GAP SERPL CALCULATED.3IONS-SCNC: 19.2 MMOL/L (ref 5–15)
BUN SERPL-MCNC: 36 MG/DL (ref 8–23)
BUN/CREAT SERPL: 6.9 (ref 7–25)
CALCIUM SPEC-SCNC: 9.5 MG/DL (ref 8.6–10.5)
CHLORIDE SERPL-SCNC: 99 MMOL/L (ref 98–107)
CO2 SERPL-SCNC: 24.8 MMOL/L (ref 22–29)
CREAT SERPL-MCNC: 5.24 MG/DL (ref 0.76–1.27)
DEPRECATED RDW RBC AUTO: 52.6 FL (ref 37–54)
EGFRCR SERPLBLD CKD-EPI 2021: 10.5 ML/MIN/1.73
ERYTHROCYTE [DISTWIDTH] IN BLOOD BY AUTOMATED COUNT: 15.2 % (ref 12.3–15.4)
GLUCOSE SERPL-MCNC: 136 MG/DL (ref 65–99)
HCT VFR BLD AUTO: 32.2 % (ref 37.5–51)
HGB BLD-MCNC: 10.1 G/DL (ref 13–17.7)
MCH RBC QN AUTO: 29.4 PG (ref 26.6–33)
MCHC RBC AUTO-ENTMCNC: 31.4 G/DL (ref 31.5–35.7)
MCV RBC AUTO: 93.9 FL (ref 79–97)
PLATELET # BLD AUTO: 132 10*3/MM3 (ref 140–450)
PMV BLD AUTO: 12.4 FL (ref 6–12)
POTASSIUM SERPL-SCNC: 5.2 MMOL/L (ref 3.5–5.2)
RBC # BLD AUTO: 3.43 10*6/MM3 (ref 4.14–5.8)
SODIUM SERPL-SCNC: 143 MMOL/L (ref 136–145)
WBC NRBC COR # BLD AUTO: 11.78 10*3/MM3 (ref 3.4–10.8)

## 2024-12-05 PROCEDURE — 97162 PT EVAL MOD COMPLEX 30 MIN: CPT

## 2024-12-05 PROCEDURE — 25010000002 ENOXAPARIN PER 10 MG: Performed by: ORTHOPAEDIC SURGERY

## 2024-12-05 PROCEDURE — 97166 OT EVAL MOD COMPLEX 45 MIN: CPT

## 2024-12-05 PROCEDURE — 25010000002 MORPHINE PER 10 MG: Performed by: ORTHOPAEDIC SURGERY

## 2024-12-05 PROCEDURE — 99232 SBSQ HOSP IP/OBS MODERATE 35: CPT | Performed by: INTERNAL MEDICINE

## 2024-12-05 PROCEDURE — 25010000002 CEFAZOLIN PER 500 MG: Performed by: ORTHOPAEDIC SURGERY

## 2024-12-05 PROCEDURE — 85027 COMPLETE CBC AUTOMATED: CPT | Performed by: INTERNAL MEDICINE

## 2024-12-05 PROCEDURE — 80048 BASIC METABOLIC PNL TOTAL CA: CPT | Performed by: ORTHOPAEDIC SURGERY

## 2024-12-05 RX ADMIN — SODIUM CHLORIDE, SODIUM LACTATE, CALCIUM CHLORIDE, MAGNESIUM CHLORIDE AND DEXTROSE 2000 ML: 1.5; 538; 448; 25.7; 5.08 INJECTION, SOLUTION INTRAPERITONEAL at 23:15

## 2024-12-05 RX ADMIN — MORPHINE SULFATE 2 MG: 2 INJECTION, SOLUTION INTRAMUSCULAR; INTRAVENOUS at 06:22

## 2024-12-05 RX ADMIN — HYDROCODONE BITARTRATE AND ACETAMINOPHEN 1 TABLET: 7.5; 325 TABLET ORAL at 10:10

## 2024-12-05 RX ADMIN — Medication 5 MG: at 20:10

## 2024-12-05 RX ADMIN — CARVEDILOL 12.5 MG: 12.5 TABLET, FILM COATED ORAL at 08:53

## 2024-12-05 RX ADMIN — SODIUM CHLORIDE 2000 MG: 9 INJECTION, SOLUTION INTRAVENOUS at 06:00

## 2024-12-05 RX ADMIN — ENOXAPARIN SODIUM 30 MG: 100 INJECTION SUBCUTANEOUS at 08:53

## 2024-12-05 RX ADMIN — PANTOPRAZOLE SODIUM 40 MG: 40 TABLET, DELAYED RELEASE ORAL at 08:53

## 2024-12-05 RX ADMIN — Medication 10 ML: at 20:10

## 2024-12-05 RX ADMIN — Medication 10 ML: at 08:53

## 2024-12-05 RX ADMIN — CARVEDILOL 12.5 MG: 12.5 TABLET, FILM COATED ORAL at 17:06

## 2024-12-05 RX ADMIN — CYANOCOBALAMIN TAB 500 MCG 1000 MCG: 500 TAB at 08:53

## 2024-12-05 RX ADMIN — SODIUM CHLORIDE, SODIUM LACTATE, CALCIUM CHLORIDE, MAGNESIUM CHLORIDE AND DEXTROSE 2000 ML: 1.5; 538; 448; 25.7; 5.08 INJECTION, SOLUTION INTRAPERITONEAL at 12:02

## 2024-12-05 RX ADMIN — SODIUM CHLORIDE, SODIUM LACTATE, CALCIUM CHLORIDE, MAGNESIUM CHLORIDE AND DEXTROSE 2000 ML: 1.5; 538; 448; 25.7; 5.08 INJECTION, SOLUTION INTRAPERITONEAL at 17:06

## 2024-12-05 RX ADMIN — LEVOTHYROXINE SODIUM 25 MCG: 0.03 TABLET ORAL at 08:53

## 2024-12-05 RX ADMIN — CALCITRIOL 0.25 MCG: 0.25 CAPSULE ORAL at 08:53

## 2024-12-05 RX ADMIN — Medication 5000 UNITS: at 08:53

## 2024-12-05 RX ADMIN — ROSUVASTATIN CALCIUM 40 MG: 20 TABLET, FILM COATED ORAL at 20:10

## 2024-12-05 RX ADMIN — SODIUM CHLORIDE, SODIUM LACTATE, CALCIUM CHLORIDE, MAGNESIUM CHLORIDE AND DEXTROSE 2000 ML: 1.5; 538; 448; 25.7; 5.08 INJECTION, SOLUTION INTRAPERITONEAL at 07:56

## 2024-12-05 RX ADMIN — LOSARTAN POTASSIUM 100 MG: 50 TABLET, FILM COATED ORAL at 08:53

## 2024-12-05 NOTE — THERAPY EVALUATION
Patient Name: Travon Plummer  : 1945    MRN: [id number]                              Today's Date: 2024       Admit Date: 12/3/2024    Visit Dx:     ICD-10-CM ICD-9-CM   1. Closed nondisplaced intertrochanteric fracture of right femur, initial encounter  S72.144A 820.21   2. Closed fracture of right hip, initial encounter  S72.001A 820.8     Patient Active Problem List   Diagnosis    Coronary artery disease    Dyspnea    Cerebrovascular accident    Essential hypertension    Hypercholesterolemia    Tobacco abuse    Gout    Osteoarthritis    GERD (gastroesophageal reflux disease)    Obesity    Prostate cancer    Renal cell carcinoma    Nuclear sclerotic cataract of right eye    Symptomatic anemia    Iron deficiency anemia due to chronic blood loss    Melena    Chronic kidney disease, stage IV (severe)    Upper GI bleed    Absent kidney    Acquired hypothyroidism    Benign hypertensive kidney disease with chronic kidney disease    Dyslipidemia    Elevated prostate specific antigen (PSA)    Paroxysmal atrial fibrillation    Secondary hyperparathyroidism    Vitamin D deficiency    Urinary incontinence    Hematuria    Lower urinary tract symptoms (LUTS)    Chronic kidney disease, stage V    Closed right hip fracture     Past Medical History:   Diagnosis Date    Benign hypertensive CKD, stage 5 chronic kidney disease or end stage renal disease     Broken arm     Carotid stenosis     bilateral    Cerebrovascular accident 10/31/2008    Coronary artery disease     followed by Dr. Ashford; LOV 24; denies chest pain, SOB 24    Dialysis patient     Current 24    Dyspnea     GERD (gastroesophageal reflux disease)     Gout     History of blood transfusion     Hypercholesterolemia     Hypertension 11/10/2015    History of hypertension; hypotensive at the time of office visit on 11/10/2015.    Impaired mobility     Obesity     Osteoarthritis     Paroxysmal atrial fibrillation     History of  paroxysmal atrial fibrillation, data deficit.    Prostate cancer 01/2015    Prostate cancer, diagnosed January of 2015, status post radiation therapy x39 treatments, 07/01/2015 [facility].    Renal cell carcinoma 2015    Renal cell carcinoma, dx March of 2015, status post left nephrectomy.    Wears dentures     instructed no adhesive DOS     Past Surgical History:   Procedure Laterality Date    CAROTID STENT Left 10/31/2008    PTA/left carotid artery stent, 10/31/2008.     COLONOSCOPY N/A 12/23/2021    Procedure: COLONOSCOPY, polypectomy, clip placement x 1;  Surgeon: Deandra Do MD;  Location: [facility] ENDOSCOPY;  Service: Gastroenterology;  Laterality: N/A;    COLONOSCOPY W/ POLYPECTOMY      CORONARY ANGIOPLASTY WITH STENT PLACEMENT      A 3.5 x 13 mm. Cypher ESTIVEN to RCA expanded with 4 mm balloon.     DIALYSIS FISTULA CREATION N/A     abdominal    ENDOSCOPY N/A 12/22/2021    Procedure: ESOPHAGOGASTRODUODENOSCOPY with biopsy;  Surgeon: Deandra Do MD;  Location: [facility] ENDOSCOPY;  Service: Gastroenterology;  Laterality: N/A;    ENDOSCOPY N/A 02/17/2023    Procedure: ESOPHAGOGASTRODUODENOSCOPY with biopsy;  Surgeon: Georgina Pool MD;  Location: [facility] ENDOSCOPY;  Service: Gastroenterology;  Laterality: N/A;    HIP INTERTROCHANTERIC NAILING Right 12/4/2024    Procedure: HIP INTERTROCHANTERIC NAILING;  Surgeon: Dean Hammonds MD;  Location: [facility] OR;  Service: Orthopedics;  Laterality: Right;    INGUINAL HERNIA REPAIR      NEPHRECTOMY Left 03/19/2015    diagnosed January 2015, status post left radical nephrectomy.     PROSTATE FIDUCIAL MARKER PLACEMENT  2016    received XRT for prostate CA in 2016      General Information       Row Name 12/05/24 1327          OT Time and Intention    Subjective Information complains of;pain  -SD     Document Type evaluation  -SD     Mode of Treatment occupational therapy  -SD     Patient Effort good  -SD     Symptoms Noted During/After Treatment  increased pain  -SD       Row Name 12/05/24 1327          General Information    Patient Profile Reviewed yes  -SD     Prior Level of Function independent:;all household mobility;ADL's  -SD     Existing Precautions/Restrictions fall  -SD     Barriers to Rehab medically complex  -SD       Row Name 12/05/24 1327          Living Environment    People in Home spouse  -SD       Row Name 12/05/24 1327          Home Main Entrance    Number of Stairs, Main Entrance none  -SD       Row Name 12/05/24 1327          Cognition    Orientation Status (Cognition) oriented x 3  -SD       Row Name 12/05/24 1327          Safety Issues/Impairments Affecting Functional Mobility    Safety Issues Affecting Function (Mobility) safety precautions follow-through/compliance;safety precaution awareness  -SD     Impairments Affecting Function (Mobility) balance;endurance/activity tolerance;strength;pain  -SD               User Key  (r) = Recorded By, (t) = Taken By, (c) = Cosigned By      Initials Name Provider Type    SD Lynette Contreras OT Occupational Therapist                     Mobility/ADL's       Row Name 12/05/24 1328          Bed Mobility    Bed Mobility supine-sit;sit-supine  -SD     Supine-Sit [place] (Bed Mobility) moderate assist (50% patient effort);2 person assist  -SD     Sit-Supine [place] (Bed Mobility) moderate assist (50% patient effort);2 person assist  -SD     Bed Mobility, Safety Issues decreased use of arms for pushing/pulling;decreased use of legs for bridging/pushing;impaired trunk control for bed mobility  -SD     Assistive Device (Bed Mobility) bed rails;head of bed elevated;repositioning sheet  -SD       Row Name 12/05/24 1328          Transfers    Transfers sit-stand transfer  -SD       Row Name 12/05/24 1328          Sit-Stand Transfer    Sit-Stand [place] (Transfers) moderate assist (50% patient effort);2 person assist  -SD     Assistive Device (Sit-Stand Transfers) walker, front-wheeled  -SD        Row Name 12/05/24 1328          Functional Mobility    Functional Mobility- Ind. Level unable to perform  -SD       Row Name 12/05/24 1328          Activities of Daily Living    BADL Assessment/Intervention bathing;upper body dressing;lower body dressing;grooming;feeding;toileting  -SD       Row Name 12/05/24 1328          Bathing Assessment/Intervention    [place] Level (Bathing) upper body;minimum assist (75% patient effort);lower body;maximum assist (25% patient effort)  -SD       Row Name 12/05/24 1328          Upper Body Dressing Assessment/Training    [place] Level (Upper Body Dressing) minimum assist (75% patient effort)  -SD       Row Name 12/05/24 132          Lower Body Dressing Assessment/Training    [place] Level (Lower Body Dressing) don;socks;maximum assist (25% patient effort)  -SD       Row Name 12/05/24 1328          Grooming Assessment/Training    [place] Level (Grooming) minimum assist (75% patient effort)  -SD       Row Name 12/05/24 132          Self-Feeding Assessment/Training    [place] Level (Feeding) supervision  -SD       Row Name 12/05/24 132          Toileting Assessment/Training    [place] Level (Toileting) maximum assist (25% patient effort)  -SD               User Key  (r) = Recorded By, (t) = Taken By, (c) = Cosigned By      Initials Name Provider Type    Lynette Pemberton OT Occupational Therapist                   Obj/Interventions       Row Name 12/05/24 1330          Range of Motion Comprehensive    General Range of Motion bilateral upper extremity ROM WFL  -SD       Row Name 12/05/24 1330          Strength Comprehensive (MMT)    General Manual Muscle Testing (MMT) Assessment upper extremity strength deficits identified  -SD     Comment, General Manual Muscle Testing (MMT) Assessment UB 4-/5  -SD               User Key  (r) = Recorded By, (t) = Taken By, (c) = Cosigned By      Initials Name Provider Type    Lynette Pemberton OT  Occupational Therapist                   Goals/Plan       Row Name 12/05/24 1332          Transfer Goal 1 (OT)    Activity/Assistive Device (Transfer Goal 1, OT) sit-to-stand/stand-to-sit;walker, rolling  -SD     [place] Level/Cues Needed (Transfer Goal 1, OT) minimum assist (75% or more patient effort)  -SD     Time Frame (Transfer Goal 1, OT) long term goal (LTG)  -SD     Progress/Outcome (Transfer Goal 1, OT) goal ongoing  -SD       Row Name 12/05/24 1338          Dressing Goal 1 (OT)    Activity/Device (Dressing Goal 1, OT) lower body dressing  -SD     [place]/Cues Needed (Dressing Goal 1, OT) moderate assist (50-74% patient effort)  -SD     Time Frame (Dressing Goal 1, OT) 2 weeks  -SD     Progress/Outcome (Dressing Goal 1, OT) goal ongoing  -SD       Row Name 12/05/24 1338          Toileting Goal 1 (OT)    Activity/Device (Toileting Goal 1, OT) toileting skills, all;commode, bedside without drop arms  -SD     [place] Level/Cues Needed (Toileting Goal 1, OT) moderate assist (50-74% patient effort)  -SD     Time Frame (Toileting Goal 1, OT) 2 weeks  -SD     Progress/Outcome (Toileting Goal 1, OT) goal ongoing  -SD       Row Name 12/05/24 1338          Strength Goal 1 (OT)    Strength Goal 1 (OT) Patient to perform UB ther ex as tolerated  -SD     Time Frame (Strength Goal 1, OT) long term goal (LTG)  -SD     Progress/Outcome (Strength Goal 1, OT) goal ongoing  -SD       Row Name 12/05/24 1331          Therapy Assessment/Plan (OT)    Planned Therapy Interventions (OT) activity tolerance training;adaptive equipment training;BADL retraining;patient/caregiver education/training;ROM/therapeutic exercise;strengthening exercise;transfer/mobility retraining  -SD               User Key  (r) = Recorded By, (t) = Taken By, (c) = Cosigned By      Initials Name Provider Type    Lynette Pemberton OT Occupational Therapist                   Clinical Impression       Row Name 12/05/24 1333          Pain  Assessment    Pretreatment Pain Rating 8/10  -SD     Posttreatment Pain Rating 8/10  -SD     Pain Location extremity  -SD     Pain Side/Orientation right;lower  -SD     Pain Management Interventions exercise or physical activity utilized  -SD     Response to Pain Interventions no change per patient report  -SD       Row Name 12/05/24 1330          Plan of Care Review    Plan of Care Reviewed With patient  -SD     Progress no change  -SD     Outcome Evaluation OT eval completed. Patient is supine in bed and willing to participate. Patient is Ox3, denies pain at rest. Patient reports he lives with his wife, does HD at home. Patient reports he is normally ind with HH mobility and ADLs. Patient performed supine to sit with mod A x 2, able to sit unsupported CGA. Patient performed sit to stand with mod A x 2, unable to advance mobility further d/t decreased motor control of RLE and pain. Patient requires min A for UB self care, max A for LB. Patient is expected to benefit from continued OT services prior to DC to address deficits in strength, endurance, balance, mobility and ADL performance. Patient would benefit from STR to facilitate safe return to PLOF.  -SD       Row Name 12/05/24 1330          Therapy Assessment/Plan (OT)    Patient/Family Therapy Goal Statement (OT) increase strength and mobility  -SD     Rehab Potential (OT) good  -SD     Criteria for Skilled Therapeutic Interventions Met (OT) skilled treatment is necessary  -SD     Therapy Frequency (OT) 5 times/wk  Mon-Fri  -SD       Row Name 12/05/24 1330          Therapy Plan Review/Discharge Plan (OT)    Anticipated Discharge Disposition (OT) inpatient rehabilitation facility  -SD       Row Name 12/05/24 1330          Vital Signs    O2 Delivery Pre Treatment room air  -SD     O2 Delivery Intra Treatment room air  -SD     O2 Delivery Post Treatment room air  -SD       Row Name 12/05/24 1330          Positioning and Restraints    Pre-Treatment Position in bed   -SD     Post Treatment Position bed  -SD     In Bed fowlers;call light within reach;encouraged to call for assist;exit alarm on  -SD               User Key  (r) = Recorded By, (t) = Taken By, (c) = Cosigned By      Initials Name Provider Type    Lynette Pemberton OT Occupational Therapist                   Outcome Measures       Row Name 12/05/24 1340          How much help from another is currently needed...    Putting on and taking off regular lower body clothing? 2  -SD     Bathing (including washing, rinsing, and drying) 2  -SD     Toileting (which includes using toilet bed pan or urinal) 2  -SD     Putting on and taking off regular upper body clothing 3  -SD     Taking care of personal grooming (such as brushing teeth) 3  -SD     Eating meals 3  -SD     AM-PAC 6 Clicks Score (OT) 15  -SD       Row Name 12/05/24 0938 12/05/24 0739       How much help from another person do you currently need...    Turning from your back to your side while in flat bed without using bedrails? 2 (P)   -RK 2  -TS    Moving from lying on back to sitting on the side of a flat bed without bedrails? 2 (P)   -RK 2  -TS    Moving to and from a bed to a chair (including a wheelchair)? 2 (P)   -RK 2  -TS    Standing up from a chair using your arms (e.g., wheelchair, bedside chair)? 2 (P)   -RK 2  -TS    Climbing 3-5 steps with a railing? 1 (P)   -RK 1  -TS    To walk in hospital room? 1 (P)   -RK 2  -TS    AM-PAC 6 Clicks Score (PT) 10 (P)   -RK 11  -TS    Highest Level of Mobility Goal 4 --> Transfer to chair/commode (P)   -RK 4 --> Transfer to chair/commode  -TS      Row Name 12/05/24 1340 12/05/24 0938       Functional Assessment    Outcome Measure Options AM-PAC 6 Clicks Daily Activity (OT)  -SD AM-PAC 6 Clicks Basic Mobility (PT) (P)   -RK              User Key  (r) = Recorded By, (t) = Taken By, (c) = Cosigned By      Initials Name Provider Type    Lynette Pemberton OT Occupational Therapist    Mignon Ji RN  Registered Nurse    Anabel Cooper, PT Student PT Student                    Occupational Therapy Education       Title: PT OT SLP Therapies (In Progress)       Topic: Occupational Therapy (In Progress)       Point: ADL training (Done)       Description:   Instruct learner(s) on proper safety adaptation and remediation techniques during self care or transfers.   Instruct in proper use of assistive devices.                  Learning Progress Summary            Patient Acceptance, E,TB, VU by SD at 12/5/2024 1340    Comment: OT POC                      Point: Home exercise program (Not Started)       Description:   Instruct learner(s) on appropriate technique for monitoring, assisting and/or progressing therapeutic exercises/activities.                  Learner Progress:  Not documented in this visit.              Point: Precautions (Not Started)       Description:   Instruct learner(s) on prescribed precautions during self-care and functional transfers.                  Learner Progress:  Not documented in this visit.              Point: Body mechanics (Not Started)       Description:   Instruct learner(s) on proper positioning and spine alignment during self-care, functional mobility activities and/or exercises.                  Learner Progress:  Not documented in this visit.                              User Key       Initials Effective Dates Name Provider Type Discipline    SD 06/16/21 -  Lynette Contreras OT Occupational Therapist OT                  OT Recommendation and Plan  Planned Therapy Interventions (OT): activity tolerance training, adaptive equipment training, BADL retraining, patient/caregiver education/training, ROM/therapeutic exercise, strengthening exercise, transfer/mobility retraining  Therapy Frequency (OT): 5 times/wk (Mon-Fri)  Plan of Care Review  Plan of Care Reviewed With: patient  Progress: no change  Outcome Evaluation: OT eval completed. Patient is supine in bed and willing to  participate. Patient is Ox3, denies pain at rest. Patient reports he lives with his wife, does HD at home. Patient reports he is normally ind with HH mobility and ADLs. Patient performed supine to sit with mod A x 2, able to sit unsupported CGA. Patient performed sit to stand with mod A x 2, unable to advance mobility further d/t decreased motor control of RLE and pain. Patient requires min A for UB self care, max A for LB. Patient is expected to benefit from continued OT services prior to DC to address deficits in strength, endurance, balance, mobility and ADL performance. Patient would benefit from STR to facilitate safe return to PLOF.     Time Calculation:   Evaluation Complexity (OT)  Review Occupational Profile/Medical/Therapy History Complexity: expanded/moderate complexity  Assessment, Occupational Performance/Identification of Deficit Complexity: 3-5 performance deficits  Clinical Decision Making Complexity (OT): detailed assessment/moderate complexity  Overall Complexity of Evaluation (OT): moderate complexity     Time Calculation- OT       Row Name 12/05/24 1341             Time Calculation- OT    OT Start Time 0939  -SD      OT Received On 12/05/24  -SD      OT Goal Re-Cert Due Date 12/17/24  -SD         Untimed Charges    OT Eval/Re-eval Minutes 60  -SD         Total Minutes    Untimed Charges Total Minutes 60  -SD       Total Minutes 60  -SD                User Key  (r) = Recorded By, (t) = Taken By, (c) = Cosigned By      Initials Name Provider Type    Lynette Pemberton OT Occupational Therapist                  Therapy Charges for Today       Code Description Service Date Service Provider Modifiers Qty    [id number] HC OT EVAL MOD COMPLEXITY 4 12/5/2024 Lynette Contreras OT GO 1                 Lynette Contreras OT  12/5/2024

## 2024-12-05 NOTE — CASE MANAGEMENT/SOCIAL WORK
Spoke to pt regarding discharge plans.He is declining rehab plan is to return home with home health does not  have a preference  referral sent to [place] [id number] Pt was accepted by [facility]

## 2024-12-05 NOTE — PLAN OF CARE
Goal Outcome Evaluation:  Plan of Care Reviewed With: patient        Progress: no change  Outcome Evaluation: OT eval completed. Patient is supine in bed and willing to participate. Patient is Ox3, denies pain at rest. Patient reports he lives with his wife, does HD at home. Patient reports he is normally ind with HH mobility and ADLs. Patient performed supine to sit with mod A x 2, able to sit unsupported CGA. Patient performed sit to stand with mod A x 2, unable to advance mobility further d/t decreased motor control of RLE and pain. Patient requires min A for UB self care, max A for LB. Patient is expected to benefit from continued OT services prior to DC to address deficits in strength, endurance, balance, mobility and ADL performance. Patient would benefit from STR to facilitate safe return to PLOF.    Anticipated Discharge Disposition (OT): inpatient rehabilitation facility

## 2024-12-05 NOTE — PLAN OF CARE
Goal Outcome Evaluation:      VSS on RA. Pain controlled with PRN pain medication. Peritoneal dialysis completed per MAR. Patient tolerated well.

## 2024-12-05 NOTE — PLAN OF CARE
"Goal Outcome Evaluation:  Plan of Care Reviewed With: (P) patient        Progress: (P) no change  Outcome Evaluation: (P) Pt participated well in PT evaluation. He presents supine in bed, oriented to self, place, and situation. He c/o 8/10 pain in his R LE with movement, that decreases to 0/10 when he is still. He lives with his wife and typically does not need an AD for mobility. Today pt states he cannot extend his R knee and his R LE still feels \"nuumb from the nerve block\". He was able to move to the EOB mod A and tf STS mod A x2. He stood at the EOB for 2min with mod A with R LE buckling and c/o of increased pain. Pt exhibited poor motor control and weakness in the R > L LE. pt was returned to supine in bed, resting comfortably with his L LE on a pillow, and his call light within reach. Nsg notified about pt's pain level and request for additional medicine. Pt would benefit from continued PT services to improve strength, endurance, mobility, and saftey. Recommend IRF upon d/c to address any further deficits.    Anticipated Discharge Disposition (PT): (P) inpatient rehabilitation facility                        "

## 2024-12-05 NOTE — PROGRESS NOTES
[facility] Progress Note  [facility]. [place]        Patient Name: Travon Plummer  : 1945  MRN: [id number]   LOS: 2 days    Patient Care Team:  Chilango Erickson MD as PCP - General (Internal Medicine)  Andrea Sellers MD, JODY as Consulting Physician (Nephrology)  Deandra Do MD as Surgeon (General Surgery)  Leonard Ashford MD as Consulting Physician (Cardiology)  Georgina Pool MD as Consulting Physician (Gastroenterology)    Chief Complaint:    Chief Complaint   Patient presents with    Fall     Pt tripped on his wife's O2 tubing onto his right hip. Pt's only complaint is right hip pain.     Hip Pain     Primary Care Physician:  Chilango Erickson MD  Date of admission: 12/3/2024    Subjective     Interval History:   Follow-up ESRD.  Status post fall and hip fracture.  Patient is laying in the bed awake alert and interactive, he said he feels a lot better.  Denies any chest pain or shortness of breath.  Pain is under very good control.  Events noted from last 24 hours.  I reviewed the chart and other providers notes, labs and procedures done since my last note.    Review of Systems:   As noted above.    Objective     Vitals:   Temp:  [97.5 °F (36.4 °C)-98.3 °F (36.8 °C)] 97.5 °F (36.4 °C)  Heart Rate:  [57-71] 71  Resp:  [16-18] 16  BP: (111-153)/(66-87) 152/66    Intake/Output Summary (Last 24 hours) at 2024 1819  Last data filed at 2024 1800  Gross per 24 hour   Intake 9200 ml   Output 8400 ml   Net 800 ml       Physical Exam:    General Appearance: alert, oriented x 3, no acute distress   Skin: warm and dry  HEENT: oral mucosa normal, nonicteric sclera  Neck: supple, no JVD  Lungs: CTA  Heart: RRR, normal S1 and S2  Abdomen: obese, soft, nontender, non distended and positive bowel sounds.  Peritoneal dialysis catheter in place.  : no palpable bladder  Extremities: Trace edema, no cyanosis or clubbing  Neuro: normal  speech and mental status     Scheduled Meds:     Current Facility-Administered Medications   Medication Dose Route Frequency Provider Last Rate Last Admin    acetaminophen (TYLENOL) tablet 650 mg  650 mg Oral Q4H PRN Dean Hammonds MD        sennosides-docusate (PERICOLACE) 8.6-50 MG per tablet 2 tablet  2 tablet Oral BID PRN Dean Hammonds MD        And    polyethylene glycol (MIRALAX) packet 17 g  17 g Oral Daily PRN Dean Hammonds MD        And    bisacodyl (DULCOLAX) EC tablet 5 mg  5 mg Oral Daily PRN Dean Hammonds MD        And    bisacodyl (DULCOLAX) suppository 10 mg  10 mg Rectal Daily PRN Dean Hammonds MD        calcitriol (ROCALTROL) capsule 0.25 mcg  0.25 mcg Oral Daily Dean Hammonds MD   0.25 mcg at 12/05/24 0853    calcium carbonate (TUMS) chewable tablet 500 mg (200 mg elemental)  2 tablet Oral BID PRN Dean Hammonds MD        Calcium Replacement - Follow Nurse / BPA Driven Protocol   Not Applicable PRN Dean Hammonds MD        carvedilol (COREG) tablet 12.5 mg  12.5 mg Oral BID With Meals Dean Hammonds MD   12.5 mg at 12/05/24 1706    Enoxaparin Sodium (LOVENOX) syringe 30 mg  30 mg Subcutaneous Daily Dean Hammonds MD   30 mg at 12/05/24 0853    [START ON 12/10/2024] ferrous sulfate EC tablet 324 mg  324 mg Oral Weekly Dean Hammonds MD        HYDROcodone-acetaminophen (NORCO) 7.5-325 MG per tablet 1 tablet  1 tablet Oral Q4H PRN Dean Hammonds MD   1 tablet at 12/05/24 1010    HYDROcodone-acetaminophen (NORCO) 7.5-325 MG per tablet 2 tablet  2 tablet Oral Q4H PRN Dean Hammonds MD        HYDROmorphone (DILAUDID) injection 0.5 mg  0.5 mg Intravenous Q2H PRN Dean Hammonds MD   0.5 mg at 12/04/24 1202    levothyroxine (SYNTHROID, LEVOTHROID) tablet 25 mcg  25 mcg Oral Daily Dean Hammonds MD   25 mcg at 12/05/24 0853    losartan (COZAAR) tablet 100 mg  100 mg Oral Q24H Dean Hammonds MD   100 mg at 12/05/24 0853    Magnesium Standard Dose Replacement - Follow Nurse  / BPA Driven Protocol   Not Applicable PRN Dean Hammonds MD        melatonin tablet 5 mg  5 mg Oral Nightly Dean Hammonds MD   5 mg at 12/04/24 2016    Morphine sulfate (PF) injection 2 mg  2 mg Intravenous Q4H PRN Dean Hammonds MD   2 mg at 12/05/24 0622    And    naloxone (NARCAN) injection 0.4 mg  0.4 mg Intravenous Q5 Min PRN Dean Hammonds MD        nitroglycerin (NITROSTAT) SL tablet 0.4 mg  0.4 mg Sublingual Q5 Min PRN Dean Hammonds MD        ondansetron ODT (ZOFRAN-ODT) disintegrating tablet 4 mg  4 mg Oral Q6H PRN Dean Hammonds MD        Or    ondansetron (ZOFRAN) injection 4 mg  4 mg Intravenous Q6H PRN Dean Hammonds MD        ondansetron (ZOFRAN) injection 4 mg  4 mg Intravenous Q6H PRN Dean Hammonds MD        pantoprazole (PROTONIX) EC tablet 40 mg  40 mg Oral Daily Dean Hammonds MD   40 mg at 12/05/24 0853    Phosphorus Replacement - Follow Nurse / BPA Driven Protocol   Not Applicable PRN Dean Hammonds MD        Potassium Replacement - Follow Nurse / BPA Driven Protocol   Not Applicable PRDean Laboy MD        rosuvastatin (CRESTOR) tablet 40 mg  40 mg Oral Nightly Dean Hammonds MD   40 mg at 12/04/24 2016    sodium chloride 0.9 % flush 10 mL  10 mL Intravenous Q12H Dean Hammonds MD   10 mL at 12/05/24 0853    sodium chloride 0.9 % flush 10 mL  10 mL Intravenous PRN Dean Hammonds MD        sodium chloride 0.9 % infusion 40 mL  40 mL Intravenous PRN Dean Hammonds MD        UltraBag/Dianeal PD-2/1.5% Dex (keenan #[id number]) (DIANEAL) 2,000 mL  2,000 mL Intraperitoneal 4 Exchanges Daily - Dwell Overnight Dean Hammonds MD   2,000 mL at 12/05/24 1706    vitamin B-12 (CYANOCOBALAMIN) tablet 1,000 mcg  1,000 mcg Oral Daily Dean Hammonds MD   1,000 mcg at 12/05/24 0853    vitamin D3 capsule 5,000 Units  5,000 Units Oral Daily Dean Hammonds MD   5,000 Units at 12/05/24 0853       calcitriol, 0.25 mcg, Oral, Daily  carvedilol, 12.5 mg, Oral, BID With  "Meals  enoxaparin, 30 mg, Subcutaneous, Daily  [START ON 12/10/2024] ferrous sulfate, 324 mg, Oral, Weekly  levothyroxine, 25 mcg, Oral, Daily  losartan, 100 mg, Oral, Q24H  melatonin, 5 mg, Oral, Nightly  pantoprazole, 40 mg, Oral, Daily  rosuvastatin, 40 mg, Oral, Nightly  sodium chloride, 10 mL, Intravenous, Q12H  UltraBag/Dianeal PD-2/1.5% Dex (keenan #[id number]), 2,000 mL, Intraperitoneal, 4 Exchanges Daily - Dwell Overnight  vitamin B-12, 1,000 mcg, Oral, Daily  vitamin D3, 5,000 Units, Oral, Daily        IV Meds:        Results Reviewed:   I have personally reviewed the results from the time of this admission to 12/5/2024 18:19 EST     Results from last 7 days   Lab Units 12/05/24  0609 12/04/24  0529 12/03/24  1458   SODIUM mmol/L 143 139 141   POTASSIUM mmol/L 5.2 4.8 4.8   CHLORIDE mmol/L 99 103 103   CO2 mmol/L 24.8 25.5 26.9   BUN mg/dL 36* 27* 26*   CREATININE mg/dL 5.24* 4.66* 4.93*   CALCIUM mg/dL 9.5 9.1 9.3   BILIRUBIN mg/dL  --  0.3 0.4   ALK PHOS U/L  --  91 100   ALT (SGPT) U/L  --  10 13   AST (SGOT) U/L  --  10 16   GLUCOSE mg/dL 136* 95 99       Estimated Creatinine Clearance: 14.7 mL/min (A) (by C-G formula based on SCr of 5.24 mg/dL (H)).                Results from last 7 days   Lab Units 12/05/24  0609 12/04/24  0529 12/03/24  1458   WBC 10*3/mm3 11.78* 9.57 10.14   HEMOGLOBIN g/dL 10.1* 10.8* 11.4*   PLATELETS 10*3/mm3 132* 134* 136*             Brief Urine Lab Results  (Last result in the past 365 days)        Color   Clarity   Blood   Leuk Est   Nitrite   Protein   CREAT   Urine HCG        07/17/24 1436 Yellow   Clear   Small   Negative   Negative   300 mg/dL                   No results found for: \"UTPCR\"    Imaging Results (Last 24 Hours)       Procedure Component Value Units Date/Time    FL C Arm During Surgery [[id number]] Resulted: 12/04/24 1819     Updated: 12/04/24 1819    Narrative:      This procedure was auto-finalized with no dictation required.                Assessment / Plan "     ASSESSMENT:    Closed right hip fracture    Paroxysmal atrial fibrillation    Chronic kidney disease, stage V    End-stage renal disease: Patient with longstanding history of diabetes and hypertension as well as renal cell carcinoma status post left nephrectomy leading to end-stage renal disease.  Patient has been on peritoneal dialysis and has done very well.  Will continue peritoneal dialysis while in the hospital.  Type 2 diabetes: Continue with the sliding scale as per hospitalist service.  Hypertension: Will resume all home medications, he has been under very good control with the medications that he is on.  Anemia: He has responded well to IV iron and not getting CHARLEY currently does not appear to have any issues, likely will drop after surgery.  Hyperparathyroidism: Continue calcitriol for the time being.  Will continue to follow calcium phosphorus and PTH as needed.  Hip fracture: Surgical intervention planned as noted.  Status post right hip gamma nail procedure.  Paroxysmal atrial fibrillation: He has been noted to be in sinus rhythm.     PLAN:  Low blood pressure noted, continue with 1.5% solution for peritoneal dialysis.  Once blood pressure is more stable he likely will need higher concentration fluids.  Will reassess in the morning.  Details were discussed with the patient no family in the room.  Patient is very comfortable going home with home PT and OT.  He does not want to go to the rehab.  Details were also discussed with the hospitalist service and or other providers as needed.   Continue with rest of the current treatment plan, and monitor with surveillance labs.  Further recommendations will depend on clinical course of the patient during the current hospitalization.   I have reviewed the copied text to this note, it was edited and the changes made as needed.  It is accurate to the point, when the note was signed today.     Thank you for involving us in the care of Travon Plummer.   Please feel free to call with any questions.    Andrea Sellers MD, FASN  12/05/24  18:19 RUST    [facility]  [phone]      Part of this note may be an electronic transcription/translation of spoken language to printed text using the Dragon Dictation System.

## 2024-12-05 NOTE — PROGRESS NOTES
[facility]    Name:  Travon Plummer   Age:  79 y.o.  Sex:  male  :  1945  MRN:  [id number]   Visit Number:  [id number]  Admission Date:  12/3/2024  Date Of Service:  24  Primary Care Physician:  Chilango Erickson MD     LOS: 2 days :    Chief Complaint:      weakness    Subjective:    24: VSS, no labs this am as yet. More awake and conversant    History of presenting illness:    79-year-old male with past medical history of CKD, CAD, paroxysmal atrial fibrillation, ESRD on HD, hypertension, hyperlipidemia, obesity, renal cell carcinoma,  status post nephrectomy.  Chief complaint fall.  Patient reports he was walking around his house and tripped on his wife's oxygen cord.  Afterwards he felt pain in his right hip.  No loss of consciousness or head trauma suffered.  Was noted to have right intertrochanteric hip fracture in the ED Dr. Poon was notified who will see the patient in consult..  Nephrology has been notified.  Full code.    Pertinent findings: Creatinine 4.93, hemoglobin 11.4, right hip x-ray showing intertrochanteric hip fracture, CT of the cervical spine with degenerative change and osteopenia, CT of the head showing atrophy and a left maxillary polyp, CT of the pelvis showing mildly displaced comminuted right intratrochanteric fracture remote left superior and inferior pubic rami fractures,    ED Medications:    Medications   UltraBag/Dianeal PD-2/1.5% Dex (keenan #[id number]) (DIANEAL) 2,000 mL (has no administration in time range)   morphine injection 4 mg (4 mg Intravenous Given 12/3/24 1501)   ondansetron (ZOFRAN) injection 4 mg (4 mg Intravenous Given 12/3/24 1500)   morphine injection 4 mg (4 mg Intravenous Given 12/3/24 1616)       Edited by: Justin Brown DO at 2024 0745     Review of Systems:     All systems were reviewed and negative except as mentioned in subjective, assessment and plan.    Vital  "Signs:    Temp:  [97.5 °F (36.4 °C)-98.3 °F (36.8 °C)] 97.5 °F (36.4 °C)  Heart Rate:  [57-84] 61  Resp:  [11-22] 16  BP: (111-168)/(67-93) 153/67    Intake and output:    I/O last 3 completed shifts:  In: 4315 [P.O.:15; IV Piggyback:300]  Out: 4500 [Urine:100; Blood:100]  I/O this shift:  In: 2600 [P.O.:600]  Out: 1500     Physical Examination:    Constitutional: Awake, alert  Eyes: PERRLA, sclerae anicteric, no conjunctival injection  HENT: NCAT, mucous membranes moist  Neck: Supple, no thyromegaly, no lymphadenopathy, trachea midline  Respiratory: Clear to auscultation bilaterally, nonlabored respirations   Cardiovascular: RRR, no murmurs, rubs, or gallops, palpable pedal pulses bilaterally  Gastrointestinal: Positive bowel sounds, soft, nontender, nondistended  Musculoskeletal: No bilateral ankle edema, no clubbing or cyanosis to extremities  Psychiatric: Appropriate affect, cooperative  Neurologic: Oriented x 3, speech clear  Skin: No rashes  Exam stable 12/5/2024  Edited by: Justin Brown,  at 12/5/2024 0748     Laboratory results:    Results from last 7 days   Lab Units 12/05/24  0609 12/04/24  0529 12/03/24  1458   SODIUM mmol/L 143 139 141   POTASSIUM mmol/L 5.2 4.8 4.8   CHLORIDE mmol/L 99 103 103   CO2 mmol/L 24.8 25.5 26.9   BUN mg/dL 36* 27* 26*   CREATININE mg/dL 5.24* 4.66* 4.93*   CALCIUM mg/dL 9.5 9.1 9.3   BILIRUBIN mg/dL  --  0.3 0.4   ALK PHOS U/L  --  91 100   ALT (SGPT) U/L  --  10 13   AST (SGOT) U/L  --  10 16   GLUCOSE mg/dL 136* 95 99     Results from last 7 days   Lab Units 12/05/24  0609 12/04/24  0529 12/03/24  1458   WBC 10*3/mm3 11.78* 9.57 10.14   HEMOGLOBIN g/dL 10.1* 10.8* 11.4*   HEMATOCRIT % 32.2* 34.6* 35.3*   PLATELETS 10*3/mm3 132* 134* 136*                 No results for input(s): \"PHART\", \"ILJ9PNS\", \"PO2ART\", \"XPG7FKJ\", \"BASEEXCESS\" in the last 8760 hours.   I have reviewed the patient's laboratory results.    Radiology results:    FL C Arm During Surgery    Result " Date: 12/4/2024  This procedure was auto-finalized with no dictation required.    Peripheral Block    Result Date: 12/4/2024  Bo, Bubba LoreesolomonGAVIN ragsdale     12/4/2024  3:08 PM Peripheral Block Patient reassessed immediately prior to procedure Reason for block: at surgeon's request and post-op pain management Preanesthetic Checklist Completed: patient identified, IV checked, site marked, risks and benefits discussed, surgical consent, monitors and equipment checked, pre-op evaluation and timeout performed Prep: Pt Position: supine Sterile barriers:cap, gloves and mask Prep: ChloraPrep Patient monitoring: EKG, continuous pulse oximetry and blood pressure monitoring Procedure Sedation: yes Guidance:ultrasound guided Images:still images obtained Block Type:fascia iliaca compartment Injection Technique:single-shot Needle Type:echogenic Medications Used: ropivacaine (NAROPIN) injection 0.5 % - Peripheral Nerve  30 mL - 12/4/2024 3:08:00 PM Medications Comment:Adjuncts per total volume of LA: Decadron 8 mg If required, intravenous sedation was given -- see meds on anesthesia record. Post Assessment Injection Assessment: negative aspiration for heme, no paresthesia on injection and incremental injection Patient Tolerance:comfortable throughout block Complications:no Additional Notes Procedure:          FASCIA ILIACA BLOCK                             Patient analgesia was achieved with General Anesthesia   Pt was placed in the supine position.   The insertion site was prepped in sterile fashion with Chloraprep.  A BBraun echogenic needle was advance In-plane under ultrasound guidance. The Anterior superior Iliac crest was initially visualized and the probe was directed slightly medially and slightly towards the umbilicus.  The course of the needle was tracked over the sartorius muscle through the fascia Iliacus and into the anterior portion of the Iliacus muscle.  Major vessels where identified and avoided as were  structures of the peritoneal cavity.  LA injection was made incrementally in 1-5 ml amounts and spread was visualized superiorly below the fascia iliacus.  Injection was completed with negative aspiration of blood and negative intravascular injection.  Injection pressures were normal or minimal resistance. Performed by: Bubba Bo CRNA     XR Hip With or Without Pelvis 2 - 3 View Right    Result Date: 12/4/2024   PROCEDURE: XR HIP W OR WO PELVIS 2-3 VIEW RIGHT-  HISTORY: Further characterization of known intertrochanteric fracture  COMPARISON: CT performed earlier the same day.  FINDINGS:  A 2 view exam demonstrates a mildly displaced mildly comminuted right intratrochanteric fracture. There is diffuse osteopenia. There are remote left inferior and superior pubic rami fractures. Fiducial markers are seen in the region of the prostate. There are degenerative changes of the SI joints bilaterally         Impression: Right intertrochanteric fracture as seen on recent CT.         Images were reviewed, interpreted, and dictated by Dr. Alice Castillo MD Transcribed by Julia Vasquez PA-C.  This report was signed and finalized on 12/4/2024 8:56 AM by Alice Castillo MD.      CT Pelvis Without Contrast    Result Date: 12/3/2024  PROCEDURE: CT PELVIS WO CONTRAST-  HISTORY: Right hip pain, status post fall, eval hip fracture  COMPARISON: None.  PROCEDURE: Axial images were obtained from the iliac crest to the pubic symphysis by computed tomography. This study was performed with techniques to keep radiation doses as low as reasonably achievable, (ALARA). Individualized dose reduction techniques using automated exposure control or adjustment of mA and/or kV according to the patient size were employed.  FINDINGS:  PELVIS: The appendix is not identified. The urinary bladder is unremarkable. There is no significant fluid or adenopathy. The visualized portions of the GI tract is nonobstructed. Diffuse osteopenia  identified. There is a remote left mid superior pubic ramus fracture which has undergone nonunion. There is a remote left inferior pubic ramus fracture with at least partial nonunion. There is a comminuted, mildly displaced right intertrochanteric fracture. Degenerative change of the SI joints noted. Fiducial markers in the prostate noted. There is diverticulosis without evidence of diverticulitis.      Impression: Mildly displaced comminuted right intertrochanteric fracture.  Remote left superior and inferior pubic rami fractures as described.    CTDI: 14.49 mGy DLP:755.66 mGy.cm  This report was signed and finalized on 12/3/2024 3:24 PM by Alice Castillo MD.      CT Cervical Spine Without Contrast    Result Date: 12/3/2024  PROCEDURE: CT CERVICAL SPINE WO CONTRAST-  HISTORY: Trauma, eval C-spine fracture  COMPARISON: None.  PROCEDURE: Axial images were obtained from the skull base to the thoracic inlet by computed tomography. 3 D reconstruction images were performed. This study was performed with techniques to keep radiation doses as low as reasonably achievable, (ALARA). Individualized dose reduction techniques using automated exposure control or adjustment of mA and/or kV according to the patient size were employed.  FINDINGS: There is no acute fracture. Minimal anterolisthesis C6 noted on C7. Diffuse osteopenia identified.. There are levels of spinal stenosis and/or neuroforaminal narrowing secondary to degenerative change but not secondary to acute bony abnormality. There is moderate to severe disc space narrowing at all levels with small osteophytes. Diffuse osteopenia identified. Posterior left C2 there is an 11 mm nonspecific lytic lesion. Anteriorly in the C5 vertebral body there is a smaller similar 5 mm lesion. No prior study document stability. The facets are normally aligned. Bilateral carotid artery calcifications noted. Left carotid stent identified. Motion artifact noted on multiple images. Limited  images of the lung apices are unremarkable.      Impression: No acute fracture.  Multilevel cervical degenerative change.  Diffuse osteopenia.  Nonspecific lytic lesions as described may be age/osteopenia related.   CTDI: 14.49 mGy DLP:755.66 mGy.cm   This report was signed and finalized on 12/3/2024 3:19 PM by Alice Castillo MD.      CT Head Without Contrast    Result Date: 12/3/2024  PROCEDURE: CT HEAD WO CONTRAST-  HISTORY: Trauma, eval intracranial hemorrhage  COMPARISON: None.  TECHNIQUE: Multiple axial CT images were performed from the foramen magnum to the vertex. Individualized dose reduction techniques using automated exposure control or adjustment of mA and/or kV according to the patient size were employed.  FINDINGS: There is moderate, age-appropriate generalized cerebral atrophy. The ventricles are enlarged. There is no evidence of edema or hemorrhage.  No masses are identified. No extra-axial fluid is seen. The paranasal sinuses demonstrate a left maxillary polyp or mucous retention cyst. Remaining paranasal sinuses and mastoids are clear. Mild small vessel ischemic disease identified.      Impression: Atrophy  without acute process.  Left maxillary polyp or mucous retention cyst.   CTDI: 14.49 mGy DLP:755.66 mGy.cm  This report was signed and finalized on 12/3/2024 3:16 PM by Alice Castillo MD.     I have reviewed the patient's radiology reports.    Medication Review:     I have reviewed the patient's active and prn medications.     Problem List:      Closed right hip fracture    Paroxysmal atrial fibrillation    Chronic kidney disease, stage V      Assessment/Plan:      Closed right hip fracture  --  Status post right hip gamma nail Dr. Hammonds 12/4/2024.  -- Active Treatments: Hold Bumex, pain control dilaudid and tylenol, continue beta-blocker, hold losartan  -- Pending Results: will need to monitor hemoglobin closely on prophylaxis, EKG      Paroxysmal atrial fibrillation  --Chronic medical problem  --  Active Treatments: Not on anticoagulation due to history of anemia  -- Pending Results: Monitor telemetry      Chronic kidney disease, stage V  -- Chronic medical problem Dr. Sellers following  -- Active Treatments: Continue PD              DVT Prophylaxis: lovenox will need 4 weeks  Code Status: Full code  Diet: Cardiac renal,   Disposition: Anticipate short-term rehab in 1 to 2 days          Edited by: Justin Brown DO at 12/5/2024 0748           Justin Brown DO  12/05/24  11:42 EST    Dictated utilizing Dragon dictation.

## 2024-12-05 NOTE — THERAPY EVALUATION
Patient Name: Travon Plummer  : 1945    MRN: [id number]                              Today's Date: 2024       Admit Date: 12/3/2024    Visit Dx:     ICD-10-CM ICD-9-CM   1. Closed nondisplaced intertrochanteric fracture of right femur, initial encounter  S72.144A 820.21   2. Closed fracture of right hip, initial encounter  S72.001A 820.8     Patient Active Problem List   Diagnosis    Coronary artery disease    Dyspnea    Cerebrovascular accident    Essential hypertension    Hypercholesterolemia    Tobacco abuse    Gout    Osteoarthritis    GERD (gastroesophageal reflux disease)    Obesity    Prostate cancer    Renal cell carcinoma    Nuclear sclerotic cataract of right eye    Symptomatic anemia    Iron deficiency anemia due to chronic blood loss    Melena    Chronic kidney disease, stage IV (severe)    Upper GI bleed    Absent kidney    Acquired hypothyroidism    Benign hypertensive kidney disease with chronic kidney disease    Dyslipidemia    Elevated prostate specific antigen (PSA)    Paroxysmal atrial fibrillation    Secondary hyperparathyroidism    Vitamin D deficiency    Urinary incontinence    Hematuria    Lower urinary tract symptoms (LUTS)    Chronic kidney disease, stage V    Closed right hip fracture     Past Medical History:   Diagnosis Date    Benign hypertensive CKD, stage 5 chronic kidney disease or end stage renal disease     Broken arm     Carotid stenosis     bilateral    Cerebrovascular accident 10/31/2008    Coronary artery disease     followed by Dr. Ashford; LOV 24; denies chest pain, SOB 24    Dialysis patient     Current 24    Dyspnea     GERD (gastroesophageal reflux disease)     Gout     History of blood transfusion     Hypercholesterolemia     Hypertension 11/10/2015    History of hypertension; hypotensive at the time of office visit on 11/10/2015.    Impaired mobility     Obesity     Osteoarthritis     Paroxysmal atrial fibrillation     History of  paroxysmal atrial fibrillation, data deficit.    Prostate cancer 01/2015    Prostate cancer, diagnosed January of 2015, status post radiation therapy x39 treatments, 07/01/2015 at [facility].    Renal cell carcinoma 2015    Renal cell carcinoma, dx March of 2015, status post left nephrectomy.    Wears dentures     instructed no adhesive DOS     Past Surgical History:   Procedure Laterality Date    CAROTID STENT Left 10/31/2008    PTA/left carotid artery stent, 10/31/2008.     COLONOSCOPY N/A 12/23/2021    Procedure: COLONOSCOPY, polypectomy, clip placement x 1;  Surgeon: Deandra Do MD;  Location: [facility] ENDOSCOPY;  Service: Gastroenterology;  Laterality: N/A;    COLONOSCOPY W/ POLYPECTOMY      CORONARY ANGIOPLASTY WITH STENT PLACEMENT      A 3.5 x 13 mm. Cypher ESTIVEN to RCA expanded with 4 mm balloon.     DIALYSIS FISTULA CREATION N/A     abdominal    ENDOSCOPY N/A 12/22/2021    Procedure: ESOPHAGOGASTRODUODENOSCOPY with biopsy;  Surgeon: Deandra Do MD;  Location: [facility] ENDOSCOPY;  Service: Gastroenterology;  Laterality: N/A;    ENDOSCOPY N/A 02/17/2023    Procedure: ESOPHAGOGASTRODUODENOSCOPY with biopsy;  Surgeon: Georgina Pool MD;  Location: [facility] ENDOSCOPY;  Service: Gastroenterology;  Laterality: N/A;    HIP INTERTROCHANTERIC NAILING Right 12/4/2024    Procedure: HIP INTERTROCHANTERIC NAILING;  Surgeon: Dean Hammonds MD;  Location: [facility] OR;  Service: Orthopedics;  Laterality: Right;    INGUINAL HERNIA REPAIR      NEPHRECTOMY Left 03/19/2015    diagnosed January 2015, status post left radical nephrectomy.     PROSTATE FIDUCIAL MARKER PLACEMENT  2016    received XRT for prostate CA in 2016      General Information       Row Name 12/05/24 0938          Physical Therapy Time and Intention    Document Type evaluation (P)   -RK     Mode of Treatment physical therapy (P)   -RK       Row Name 12/05/24 0938          General Information    Patient Profile Reviewed yes (P)   -RK      Prior Level of Function independent:;all household mobility;ADL's (P)   -RK     Existing Precautions/Restrictions fall (P)   -RK     Barriers to Rehab medically complex (P)   -RK       Row Name 12/05/24 0938          Living Environment    People in Home spouse (P)   -RK       Row Name 12/05/24 0938          Cognition    Orientation Status (Cognition) oriented x 3;disoriented to;time (P)   -RK       Row Name 12/05/24 0938          Safety Issues/Impairments Affecting Functional Mobility    Safety Issues Affecting Function (Mobility) ability to follow commands;safety precautions follow-through/compliance;safety precaution awareness;problem-solving (P)   -RK     Impairments Affecting Function (Mobility) sensation/sensory awareness;endurance/activity tolerance;strength;pain (P)   -RK     Comment, Safety Issues/Impairments (Mobility) pt R LE sensation temporarily altered d/t nerve block placed for surgey (P)   -RK               User Key  (r) = Recorded By, (t) = Taken By, (c) = Cosigned By      Initials Name Provider Type    RK Anabel Evans, PT Student PT Student                   Mobility       Row Name 12/05/24 0938          Bed Mobility    Bed Mobility bed mobility (all) activities (P)   -RK     All Activities, [place] (Bed Mobility) moderate assist (50% patient effort) (P)   -RK       Row Name 12/05/24 0938          Sit-Stand Transfer    Sit-Stand [place] (Transfers) moderate assist (50% patient effort);2 person assist (P)   -RK     Assistive Device (Sit-Stand Transfers) walker, front-wheeled;other (see comments) (P)   -RK     Comment, (Sit-Stand Transfer) gaitbelt (P)   -RK       Row Name 12/05/24 0938          Gait/Stairs (Locomotion)    Patient was able to Ambulate no, other medical factors prevent ambulation (P)   -RK     Reason Patient was unable to Ambulate Uncontrolled Pain (P)   -RK     [place] Level (Stairs) not tested (P)   -RK     Comment, (Gait/Stairs) Gaitbelt used during gait training  and amb (P)   -               User Key  (r) = Recorded By, (t) = Taken By, (c) = Cosigned By      Initials Name Provider Type    Anabel Cooper, PT Student PT Student                   Obj/Interventions       Row Name 12/05/24 0938          Range of Motion Comprehensive    General Range of Motion other (see comments) (P)   -     Comment, General Range of Motion L LE WFL, RLE difficult to assess d/t temporary effects from the nerve block and pt c/o pain (P)   -       Row Name 12/05/24 0938          Strength Comprehensive (MMT)    General Manual Muscle Testing (MMT) Assessment lower extremity strength deficits identified (P)   -       Row Name 12/05/24 0938          Balance    Balance Assessment sitting static balance;sitting dynamic balance;standing static balance;standing dynamic balance (P)   -RK     Static Sitting Balance contact guard (P)   -RK     Dynamic Sitting Balance contact guard (P)   -RK     Static Standing Balance minimal assist (P)   -     Dynamic Standing Balance moderate assist (P)   -     Position/Device Used, Standing Balance walker, front-wheeled;other (see comments) (P)   -RK     Comment, Balance gaitbelt used during all balance activities (P)   -       Row Name 12/05/24 0938          Sensory Assessment (Somatosensory)    Sensory Assessment (Somatosensory) other (see comments) (P)   pt c/o residual numbness in R LE d/t nerve block.  -       Row Name 12/05/24 0938          Lower Extremity (Manual Muscle Testing)    Comment, MMT: Lower Extremity B LE grossly 3+/5 (P)   -               User Key  (r) = Recorded By, (t) = Taken By, (c) = Cosigned By      Initials Name Provider Type    Anabel Cooper, PT Student PT Student                   Goals/Plan       Row Name 12/05/24 0938          Bed Mobility Goal 1 (PT)    Activity/Assistive Device (Bed Mobility Goal 1, PT) bed mobility activities, all (P)   -RK     [place] Level/Cues Needed (Bed Mobility Goal 1, PT) minimum assist  (75% or more patient effort) (P)   -RK     Time Frame (Bed Mobility Goal 1, PT) short term goal (STG);5 days (P)   -RK     Progress/Outcomes (Bed Mobility Goal 1, PT) goal ongoing (P)   -RK       Row Name 12/05/24 0938          Transfer Goal 1 (PT)    Activity/Assistive Device (Transfer Goal 1, PT) transfers, all (P)   -RK     [place] Level/Cues Needed (Transfer Goal 1, PT) minimum assist (75% or more patient effort) (P)   -RK     Time Frame (Transfer Goal 1, PT) short term goal (STG);5 days (P)   -RK     Progress/Outcome (Transfer Goal 1, PT) goal ongoing (P)   -RK       Row Name 12/05/24 0938          Gait Training Goal 1 (PT)    Activity/Assistive Device (Gait Training Goal 1, PT) gait (walking locomotion);assistive device use;increase endurance/gait distance;increase energy conservation;walker, standard (P)   -RK     [place] Level (Gait Training Goal 1, PT) minimum assist (75% or more patient effort) (P)   -RK     Distance (Gait Training Goal 1, PT) 50 (P)   -RK     Time Frame (Gait Training Goal 1, PT) long term goal (LTG);10 days (P)   -RK     Progress/Outcome (Gait Training Goal 1, PT) goal ongoing (P)   -RK       Row Name 12/05/24 0938          Patient Education Goal (PT)    Activity (Patient Education Goal, PT) pt to complete B LE exercises x20 to improve strength and endurance (P)   -RK     [place]/Cues/Accuracy (Memory Goal 2, PT) demonstrates adequately (P)   -RK     Time Frame (Patient Education Goal, PT) short term goal (STG);3 days (P)   -RK     Progress/Outcome (Patient Education Goal, PT) goal ongoing (P)   -RK       Row Name 12/05/24 0938          Therapy Assessment/Plan (PT)    Planned Therapy Interventions (PT) balance training;bed mobility training;patient/family education;ROM (range of motion);strengthening;stretching;transfer training;gait training;home exercise program (P)   -RK               User Key  (r) = Recorded By, (t) = Taken By, (c) = Cosigned By      Initials Name  "Provider Type    RK Anabel Evans, PT Student PT Student                   Clinical Impression       Row Name 12/05/24 0938          Pain    Pretreatment Pain Rating 8/10 (P)   pain with movement  -RK     Posttreatment Pain Rating 0/10 - no pain (P)   pain at rest  -RK     Pain Location extremity (P)   -RK     Pain Side/Orientation right;lower (P)   -RK     Pain Management Interventions exercise or physical activity utilized;positioning techniques utilized (P)   -RK     Response to Pain Interventions activity participation with tolerable pain;activity level improved (P)   -RK       Row Name 12/05/24 0934          Plan of Care Review    Plan of Care Reviewed With patient (P)   -RK     Progress no change (P)   -RK     Outcome Evaluation Pt participated well in PT evaluation. He presents supine in bed, oriented to self, place, and situation. He c/o 8/10 pain in his R LE with movement, that decreases to 0/10 when he is still. He lives with his wife and typically does not need an AD for mobility. Today pt states he cannot extend his R knee and his R LE still feels \"nuumb from the nerve block\". He was able to move to the EOB mod A and tf STS mod A x2. He stood at the EOB for 2min with mod A with R LE buckling and c/o of increased pain. Pt exhibited poor motor control and weakness in the R > L LE. pt was returned to supine in bed, resting comfortably with his L LE on a pillow, and his call light within reach. Nsg notified about pt's pain level and request for additional medicine. Pt would benefit from continued PT services to improve strength, endurance, mobility, and saftey. Recommend IRF upon d/c to address any further deficits. (P)   -RK       Row Name 12/05/24 0913          Therapy Assessment/Plan (PT)    Patient/Family Therapy Goals Statement (PT) pt wants his R LE to feel better so he can start moving around again (P)   -RK     Rehab Potential (PT) good (P)   -RK     Criteria for Skilled Interventions Met (PT) " yes;meets criteria;skilled treatment is necessary (P)   -RK     Therapy Frequency (PT) 5 times/wk (P)   -RK     Predicted Duration of Therapy Intervention (PT) 10 days (P)   -RK       Row Name 12/05/24 0938          Positioning and Restraints    Pre-Treatment Position in bed (P)   -RK     Post Treatment Position bed (P)   -RK     In Bed supine;call light within reach;encouraged to call for assist;exit alarm on;notified nsg;RLE elevated (P)   -RK               User Key  (r) = Recorded By, (t) = Taken By, (c) = Cosigned By      Initials Name Provider Type    Anabel Cooper, PT Student PT Student                   Outcome Measures       Row Name 12/05/24 0938 12/05/24 0739       How much help from another person do you currently need...    Turning from your back to your side while in flat bed without using bedrails? 2 (P)   -RK 2  -TS    Moving from lying on back to sitting on the side of a flat bed without bedrails? 2 (P)   -RK 2  -TS    Moving to and from a bed to a chair (including a wheelchair)? 2 (P)   -RK 2  -TS    Standing up from a chair using your arms (e.g., wheelchair, bedside chair)? 2 (P)   -RK 2  -TS    Climbing 3-5 steps with a railing? 1 (P)   -RK 1  -TS    To walk in hospital room? 1 (P)   -RK 2  -TS    AM-PAC 6 Clicks Score (PT) 10 (P)   -RK 11  -TS    Highest Level of Mobility Goal 4 --> Transfer to chair/commode (P)   -RK 4 --> Transfer to chair/commode  -TS      Row Name 12/05/24 0938          Functional Assessment    Outcome Measure Options AM-PAC 6 Clicks Basic Mobility (PT) (P)   -RK               User Key  (r) = Recorded By, (t) = Taken By, (c) = Cosigned By      Initials Name Provider Type    Mignon Ji, RN Registered Nurse    Anabel Cooper, PT Student PT Student                                 Physical Therapy Education       Title: PT OT SLP Therapies (In Progress)       Topic: Physical Therapy (In Progress)       Point: Mobility training (Done)       Learning Progress  "Summary            Patient Acceptance, E,TB, VU by MARILU at 12/5/2024 8144    Comment: pt educated on the role of PT and his POC                      Point: Home exercise program (Not Started)       Learner Progress:  Not documented in this visit.              Point: Body mechanics (Not Started)       Learner Progress:  Not documented in this visit.              Point: Precautions (Not Started)       Learner Progress:  Not documented in this visit.                              User Key       Initials Effective Dates Name Provider Type Discipline    MARILU 09/24/24 -  Anabel Evans, PT Student PT Student PT                  PT Recommendation and Plan  Planned Therapy Interventions (PT): (P) balance training, bed mobility training, patient/family education, ROM (range of motion), strengthening, stretching, transfer training, gait training, home exercise program  Progress: (P) no change  Outcome Evaluation: (P) Pt participated well in PT evaluation. He presents supine in bed, oriented to self, place, and situation. He c/o 8/10 pain in his R LE with movement, that decreases to 0/10 when he is still. He lives with his wife and typically does not need an AD for mobility. Today pt states he cannot extend his R knee and his R LE still feels \"nuumb from the nerve block\". He was able to move to the EOB mod A and tf STS mod A x2. He stood at the EOB for 2min with mod A with R LE buckling and c/o of increased pain. Pt exhibited poor motor control and weakness in the R > L LE. pt was returned to supine in bed, resting comfortably with his L LE on a pillow, and his call light within reach. Nsg notified about pt's pain level and request for additional medicine. Pt would benefit from continued PT services to improve strength, endurance, mobility, and saftey. Recommend IRF upon d/c to address any further deficits.     Time Calculation:   PT Evaluation Complexity  History, PT Evaluation Complexity: (P) 3 or more personal factors and/or " comorbidities  Examination of Body Systems (PT Eval Complexity): (P) total of 3 or more elements  Clinical Presentation (PT Evaluation Complexity): (P) evolving  Clinical Decision Making (PT Evaluation Complexity): (P) moderate complexity  Overall Complexity (PT Evaluation Complexity): (P) moderate complexity     PT Charges       Row Name 12/05/24 0938             Time Calculation    Start Time 0938 (P)   -RK      PT Received On 12/05/24 (P)   -RK      PT Goal Re-Cert Due Date 12/15/24 (P)   -RK         Untimed Charges    PT Eval/Re-eval Minutes 53 (P)   -RK         Total Minutes    Untimed Charges Total Minutes 53 (P)   -RK       Total Minutes 53 (P)   -RK                User Key  (r) = Recorded By, (t) = Taken By, (c) = Cosigned By      Initials Name Provider Type    Anabel Cooper, PT Student PT Student                  Therapy Charges for Today       Code Description Service Date Service Provider Modifiers Qty    [id number] HC PT EVAL MOD COMPLEXITY 4 12/5/2024 Anabel Evans, PT Student GP 1            PT G-Codes  Outcome Measure Options: (P) AM-PAC 6 Clicks Basic Mobility (PT)  AM-PAC 6 Clicks Score (PT): (P) 10  PT Discharge Summary  Anticipated Discharge Disposition (PT): (P) inpatient rehabilitation facility    Anabel Evans PT Student  12/5/2024

## 2024-12-06 ENCOUNTER — DOCUMENTATION (OUTPATIENT)
Dept: HOME HEALTH SERVICES | Facility: HOME HEALTHCARE | Age: 79
End: 2024-12-06
Payer: MEDICARE

## 2024-12-06 LAB
ANION GAP SERPL CALCULATED.3IONS-SCNC: 8.4 MMOL/L (ref 5–15)
BUN SERPL-MCNC: 42 MG/DL (ref 8–23)
BUN/CREAT SERPL: 7.1 (ref 7–25)
CALCIUM SPEC-SCNC: 8.6 MG/DL (ref 8.6–10.5)
CHLORIDE SERPL-SCNC: 101 MMOL/L (ref 98–107)
CO2 SERPL-SCNC: 24.6 MMOL/L (ref 22–29)
CREAT SERPL-MCNC: 5.94 MG/DL (ref 0.76–1.27)
DEPRECATED RDW RBC AUTO: 52.9 FL (ref 37–54)
EGFRCR SERPLBLD CKD-EPI 2021: 9 ML/MIN/1.73
ERYTHROCYTE [DISTWIDTH] IN BLOOD BY AUTOMATED COUNT: 15.4 % (ref 12.3–15.4)
GLUCOSE SERPL-MCNC: 113 MG/DL (ref 65–99)
HAV IGM SERPL QL IA: NORMAL
HBV CORE IGM SERPL QL IA: NORMAL
HBV SURFACE AG SERPL QL IA: NORMAL
HCT VFR BLD AUTO: 26.6 % (ref 37.5–51)
HCV AB SER QL: NORMAL
HGB BLD-MCNC: 8.6 G/DL (ref 13–17.7)
MCH RBC QN AUTO: 30 PG (ref 26.6–33)
MCHC RBC AUTO-ENTMCNC: 32.3 G/DL (ref 31.5–35.7)
MCV RBC AUTO: 92.7 FL (ref 79–97)
PLATELET # BLD AUTO: 125 10*3/MM3 (ref 140–450)
PMV BLD AUTO: 12.6 FL (ref 6–12)
POTASSIUM SERPL-SCNC: 4.6 MMOL/L (ref 3.5–5.2)
RBC # BLD AUTO: 2.87 10*6/MM3 (ref 4.14–5.8)
SODIUM SERPL-SCNC: 134 MMOL/L (ref 136–145)
WBC NRBC COR # BLD AUTO: 11.01 10*3/MM3 (ref 3.4–10.8)

## 2024-12-06 PROCEDURE — 97110 THERAPEUTIC EXERCISES: CPT

## 2024-12-06 PROCEDURE — 97530 THERAPEUTIC ACTIVITIES: CPT

## 2024-12-06 PROCEDURE — 25010000002 ENOXAPARIN PER 10 MG: Performed by: ORTHOPAEDIC SURGERY

## 2024-12-06 PROCEDURE — 80048 BASIC METABOLIC PNL TOTAL CA: CPT | Performed by: INTERNAL MEDICINE

## 2024-12-06 PROCEDURE — 97535 SELF CARE MNGMENT TRAINING: CPT

## 2024-12-06 PROCEDURE — 80074 ACUTE HEPATITIS PANEL: CPT | Performed by: INTERNAL MEDICINE

## 2024-12-06 PROCEDURE — 99232 SBSQ HOSP IP/OBS MODERATE 35: CPT | Performed by: INTERNAL MEDICINE

## 2024-12-06 PROCEDURE — 85027 COMPLETE CBC AUTOMATED: CPT | Performed by: INTERNAL MEDICINE

## 2024-12-06 RX ORDER — SODIUM CHLORIDE, SODIUM LACTATE, CALCIUM CHLORIDE, MAGNESIUM CHLORIDE AND DEXTROSE 2.5; 538; 448; 25.7; 5.08 G/100ML; MG/100ML; MG/100ML; MG/100ML; MG/100ML
2000 INJECTION, SOLUTION INTRAPERITONEAL
Status: DISCONTINUED | OUTPATIENT
Start: 2024-12-06 | End: 2024-12-11 | Stop reason: HOSPADM

## 2024-12-06 RX ADMIN — SODIUM CHLORIDE, SODIUM LACTATE, CALCIUM CHLORIDE, MAGNESIUM CHLORIDE AND DEXTROSE 2000 ML: 2.5; 538; 448; 25.7; 5.08 INJECTION, SOLUTION INTRAPERITONEAL at 23:03

## 2024-12-06 RX ADMIN — Medication 5000 UNITS: at 08:11

## 2024-12-06 RX ADMIN — ENOXAPARIN SODIUM 30 MG: 100 INJECTION SUBCUTANEOUS at 08:11

## 2024-12-06 RX ADMIN — HYDROCODONE BITARTRATE AND ACETAMINOPHEN 1 TABLET: 7.5; 325 TABLET ORAL at 13:08

## 2024-12-06 RX ADMIN — ROSUVASTATIN CALCIUM 40 MG: 20 TABLET, FILM COATED ORAL at 20:11

## 2024-12-06 RX ADMIN — HYDROCODONE BITARTRATE AND ACETAMINOPHEN 1 TABLET: 7.5; 325 TABLET ORAL at 17:14

## 2024-12-06 RX ADMIN — CARVEDILOL 12.5 MG: 12.5 TABLET, FILM COATED ORAL at 17:14

## 2024-12-06 RX ADMIN — PANTOPRAZOLE SODIUM 40 MG: 40 TABLET, DELAYED RELEASE ORAL at 08:11

## 2024-12-06 RX ADMIN — CARVEDILOL 12.5 MG: 12.5 TABLET, FILM COATED ORAL at 08:11

## 2024-12-06 RX ADMIN — LEVOTHYROXINE SODIUM 25 MCG: 0.03 TABLET ORAL at 08:11

## 2024-12-06 RX ADMIN — LOSARTAN POTASSIUM 100 MG: 50 TABLET, FILM COATED ORAL at 08:11

## 2024-12-06 RX ADMIN — Medication 10 ML: at 08:13

## 2024-12-06 RX ADMIN — HYDROCODONE BITARTRATE AND ACETAMINOPHEN 1 TABLET: 7.5; 325 TABLET ORAL at 06:26

## 2024-12-06 RX ADMIN — CALCITRIOL 0.25 MCG: 0.25 CAPSULE ORAL at 08:11

## 2024-12-06 RX ADMIN — BISACODYL 5 MG: 5 TABLET, COATED ORAL at 11:04

## 2024-12-06 RX ADMIN — SODIUM CHLORIDE, SODIUM LACTATE, CALCIUM CHLORIDE, MAGNESIUM CHLORIDE AND DEXTROSE 2000 ML: 2.5; 538; 448; 25.7; 5.08 INJECTION, SOLUTION INTRAPERITONEAL at 13:03

## 2024-12-06 RX ADMIN — Medication 10 ML: at 20:11

## 2024-12-06 RX ADMIN — Medication 5 MG: at 20:10

## 2024-12-06 RX ADMIN — SODIUM CHLORIDE, SODIUM LACTATE, CALCIUM CHLORIDE, MAGNESIUM CHLORIDE AND DEXTROSE 2000 ML: 1.5; 538; 448; 25.7; 5.08 INJECTION, SOLUTION INTRAPERITONEAL at 09:06

## 2024-12-06 RX ADMIN — SODIUM CHLORIDE, SODIUM LACTATE, CALCIUM CHLORIDE, MAGNESIUM CHLORIDE AND DEXTROSE 2000 ML: 2.5; 538; 448; 25.7; 5.08 INJECTION, SOLUTION INTRAPERITONEAL at 18:16

## 2024-12-06 RX ADMIN — CYANOCOBALAMIN TAB 500 MCG 1000 MCG: 500 TAB at 08:11

## 2024-12-06 NOTE — PLAN OF CARE
Goal Outcome Evaluation:           Progress: no change  Outcome Evaluation: vss on room air. refused turning throughout the night. no acute events during shift. dc pending

## 2024-12-06 NOTE — CASE MANAGEMENT/SOCIAL WORK
DCP; STR. Met with pt at bedside. He was on the phone with his wife and they were discussing rehab. Pt agreed to have a referral sent to [facility]. SW updated. CM continues to follow for any needs.

## 2024-12-06 NOTE — PLAN OF CARE
Goal Outcome Evaluation:  Plan of Care Reviewed With: spouse        Progress: no change  Outcome Evaluation: Pt was recieved in the bed for OT. He denies pain at rest and reports he is planning to dc to [facility]. He required mod A x2 to move to eob with increased time and cues due to onset of R LE pain with movement. He required increased time for positioning at eob. He transferred to standing with mod A x2 and tolerated standing x40 seconds before excruiting pain required him to sit down. He required max A to don a brief with inability to forward flex at the hip or initiate figure four sitting due to pain. He was assisted to supine with max A x2, rolled to the R with max A and required max A to scoot in the bed for repositioning. He was positioned in fowlers and set up for lunch with all needs in reach. Cont current POC and recommend dc to inpatient rehab to maximize his return to funcational independence.

## 2024-12-06 NOTE — PLAN OF CARE
Problem: Pain Acute  Goal: Optimal Pain Control and Function  Outcome: Progressing  Intervention: Optimize Psychosocial Wellbeing  Recent Flowsheet Documentation  Taken 12/6/2024 0811 by Elizabeth Beauchamp RN  Diversional Activities: television  Intervention: Develop Pain Management Plan  Recent Flowsheet Documentation  Taken 12/6/2024 1308 by Elizabeth Beauchamp RN  Pain Management Interventions: pain medication given  Intervention: Prevent or Manage Pain  Recent Flowsheet Documentation  Taken 12/6/2024 1200 by Elizabeth Beauchamp RN  Medication Review/Management: medications reviewed  Taken 12/6/2024 0811 by Elizabeth Beauchamp RN  Medication Review/Management: medications reviewed     Problem: Fall Injury Risk  Goal: Absence of Fall and Fall-Related Injury  Outcome: Progressing  Intervention: Identify and Manage Contributors  Recent Flowsheet Documentation  Taken 12/6/2024 1200 by Elizabeth Beauchamp RN  Medication Review/Management: medications reviewed  Taken 12/6/2024 0811 by Elizabeth Beauchamp RN  Medication Review/Management: medications reviewed  Intervention: Promote Injury-Free Environment  Recent Flowsheet Documentation  Taken 12/6/2024 1600 by Elizabeth Beauchamp RN  Safety Promotion/Fall Prevention:   activity supervised   assistive device/personal items within reach   clutter free environment maintained   fall prevention program maintained   safety round/check completed  Taken 12/6/2024 1400 by Elizabeth Beauchamp RN  Safety Promotion/Fall Prevention:   safety round/check completed   activity supervised   assistive device/personal items within reach   clutter free environment maintained   fall prevention program maintained   nonskid shoes/slippers when out of bed  Taken 12/6/2024 1200 by Elizabeth Beauchamp RN  Safety Promotion/Fall Prevention:   safety round/check completed   activity supervised   assistive device/personal items within reach   clutter free environment maintained   fall prevention program  maintained  Taken 12/6/2024 1000 by Elizabeth Beauchamp, RN  Safety Promotion/Fall Prevention:   safety round/check completed   activity supervised   assistive device/personal items within reach   clutter free environment maintained   fall prevention program maintained   nonskid shoes/slippers when out of bed  Taken 12/6/2024 0811 by Elizabeth Beauchamp, RN  Safety Promotion/Fall Prevention:   activity supervised   assistive device/personal items within reach   clutter free environment maintained   gait belt   fall prevention program maintained   safety round/check completed   Goal Outcome Evaluation:  Plan of Care Reviewed With: patient        Progress: improving  Outcome Evaluation: No acute events noted today. Plan of care continued.

## 2024-12-06 NOTE — CASE MANAGEMENT/SOCIAL WORK
Case Management/Social Work    Patient Name:  Travon Plummer  YOB: 1945  MRN: [id number]  Admit Date:  12/3/2024        NAI sent referral to [facility]SEPH[facility] Sarmiento as pt gets PD but needs STR. NAI following.     14:24 EST NAI spoke with Xiomara Sarmiento and asked for a hep b panel. NAI asked provider for order. NAI following.       Electronically signed by:  JULIO Ashley  12/06/24 09:59 EST

## 2024-12-06 NOTE — PROGRESS NOTES
[facility] Progress Note  [facility]. [place]        Patient Name: Travon Plummer  : 1945  MRN: [id number]   LOS: 3 days    Patient Care Team:  Chilango Erickson MD as PCP - General (Internal Medicine)  Andrea Sellers MD, JODY as Consulting Physician (Nephrology)  Deandra Do MD as Surgeon (General Surgery)  Leonard Ashford MD as Consulting Physician (Cardiology)  Georgina Pool MD as Consulting Physician (Gastroenterology)    Chief Complaint:    Chief Complaint   Patient presents with    Fall     Pt tripped on his wife's O2 tubing onto his right hip. Pt's only complaint is right hip pain.     Hip Pain     Primary Care Physician:  Chilango Erickson MD  Date of admission: 12/3/2024    Subjective     Interval History:   Follow-up ESRD.  Status post fall and hip fracture.  Patient is laying in the bed awake alert and interactive, he said he feels a lot better.  Denies any chest pain or shortness of breath.  Pain is under very good control.  Events noted from last 24 hours.  I reviewed the chart and other providers notes, labs and procedures done since my last note.    Review of Systems:   As noted above.    Objective     Vitals:   Temp:  [97.3 °F (36.3 °C)-97.5 °F (36.4 °C)] 97.3 °F (36.3 °C)  Heart Rate:  [61-71] 61  Resp:  [16-18] 18  BP: (152-172)/(66-73) 172/73    Intake/Output Summary (Last 24 hours) at 2024 0725  Last data filed at 2024 2345  Gross per 24 hour   Intake 9200 ml   Output 6050 ml   Net 3150 ml       Physical Exam:    General Appearance: alert, oriented x 3, no acute distress   Skin: warm and dry  HEENT: oral mucosa normal, nonicteric sclera  Neck: supple, no JVD  Lungs: CTA  Heart: RRR, normal S1 and S2  Abdomen: obese, soft, nontender, non distended and positive bowel sounds.  Peritoneal dialysis catheter in place.  : no palpable bladder  Extremities: Trace edema, no cyanosis or clubbing  Neuro:  normal speech and mental status     Scheduled Meds:     Current Facility-Administered Medications   Medication Dose Route Frequency Provider Last Rate Last Admin    acetaminophen (TYLENOL) tablet 650 mg  650 mg Oral Q4H PRN Dean Hammonds MD        sennosides-docusate (PERICOLACE) 8.6-50 MG per tablet 2 tablet  2 tablet Oral BID PRN Dean Hammonds MD        And    polyethylene glycol (MIRALAX) packet 17 g  17 g Oral Daily PRN Dean Hammonds MD        And    bisacodyl (DULCOLAX) EC tablet 5 mg  5 mg Oral Daily PRN Dean Hammonds MD        And    bisacodyl (DULCOLAX) suppository 10 mg  10 mg Rectal Daily PRN Dean Hammonds MD        calcitriol (ROCALTROL) capsule 0.25 mcg  0.25 mcg Oral Daily Dean Hammonds MD   0.25 mcg at 12/05/24 0853    calcium carbonate (TUMS) chewable tablet 500 mg (200 mg elemental)  2 tablet Oral BID PRN Dean Hammonds MD        Calcium Replacement - Follow Nurse / BPA Driven Protocol   Not Applicable PRN Dean Hammonds MD        carvedilol (COREG) tablet 12.5 mg  12.5 mg Oral BID With Meals Dean Hammonds MD   12.5 mg at 12/05/24 1706    Enoxaparin Sodium (LOVENOX) syringe 30 mg  30 mg Subcutaneous Daily Dean Hammonds MD   30 mg at 12/05/24 0853    [START ON 12/10/2024] ferrous sulfate EC tablet 324 mg  324 mg Oral Weekly Dean Hammonds MD        HYDROcodone-acetaminophen (NORCO) 7.5-325 MG per tablet 1 tablet  1 tablet Oral Q4H PRN Dean Hammonds MD   1 tablet at 12/06/24 0626    HYDROcodone-acetaminophen (NORCO) 7.5-325 MG per tablet 2 tablet  2 tablet Oral Q4H PRN Dean Hammonds MD        HYDROmorphone (DILAUDID) injection 0.5 mg  0.5 mg Intravenous Q2H PRN Dean Hammonds MD   0.5 mg at 12/04/24 1202    levothyroxine (SYNTHROID, LEVOTHROID) tablet 25 mcg  25 mcg Oral Daily Dean Hammonds MD   25 mcg at 12/05/24 0853    losartan (COZAAR) tablet 100 mg  100 mg Oral Q24H Dean Hammonds MD   100 mg at 12/05/24 0853    Magnesium Standard Dose Replacement -  Follow Nurse / BPA Driven Protocol   Not Applicable PRN Dean Hammonds MD        melatonin tablet 5 mg  5 mg Oral Nightly Dean Hammonds MD   5 mg at 12/05/24 2010    Morphine sulfate (PF) injection 2 mg  2 mg Intravenous Q4H PRN Dean Hammonds MD   2 mg at 12/05/24 0622    And    naloxone (NARCAN) injection 0.4 mg  0.4 mg Intravenous Q5 Min PRN Dean Hammonds MD        nitroglycerin (NITROSTAT) SL tablet 0.4 mg  0.4 mg Sublingual Q5 Min PRN Dean Hammonds MD        ondansetron ODT (ZOFRAN-ODT) disintegrating tablet 4 mg  4 mg Oral Q6H PRN Dean Hammonds MD        Or    ondansetron (ZOFRAN) injection 4 mg  4 mg Intravenous Q6H PRN Dean Hammonds MD        ondansetron (ZOFRAN) injection 4 mg  4 mg Intravenous Q6H PRN Dean Hammonds MD        pantoprazole (PROTONIX) EC tablet 40 mg  40 mg Oral Daily Dean Hammonds MD   40 mg at 12/05/24 0853    Phosphorus Replacement - Follow Nurse / BPA Driven Protocol   Not Applicable PRN Dean Hammonds MD        Potassium Replacement - Follow Nurse / BPA Driven Protocol   Not Applicable PRDean Laboy MD        rosuvastatin (CRESTOR) tablet 40 mg  40 mg Oral Nightly Dean Hammonds MD   40 mg at 12/05/24 2010    sodium chloride 0.9 % flush 10 mL  10 mL Intravenous Q12H Dean Hammonds MD   10 mL at 12/05/24 2010    sodium chloride 0.9 % flush 10 mL  10 mL Intravenous PRN Dean Hammonds MD        sodium chloride 0.9 % infusion 40 mL  40 mL Intravenous PRN Dean Hammonds MD        UltraBag/Dianeal PD-2/1.5% Dex (keenan #[id number]) (DIANEAL) 2,000 mL  2,000 mL Intraperitoneal 4 Exchanges Daily - Dwell Overnight Dean Hammonds MD   2,000 mL at 12/05/24 2315    vitamin B-12 (CYANOCOBALAMIN) tablet 1,000 mcg  1,000 mcg Oral Daily Dean Hammonds MD   1,000 mcg at 12/05/24 0853    vitamin D3 capsule 5,000 Units  5,000 Units Oral Daily Dean Hammonds MD   5,000 Units at 12/05/24 0853       calcitriol, 0.25 mcg, Oral, Daily  carvedilol, 12.5 mg, Oral, BID  "With Meals  enoxaparin, 30 mg, Subcutaneous, Daily  [START ON 12/10/2024] ferrous sulfate, 324 mg, Oral, Weekly  levothyroxine, 25 mcg, Oral, Daily  losartan, 100 mg, Oral, Q24H  melatonin, 5 mg, Oral, Nightly  pantoprazole, 40 mg, Oral, Daily  rosuvastatin, 40 mg, Oral, Nightly  sodium chloride, 10 mL, Intravenous, Q12H  UltraBag/Dianeal PD-2/1.5% Dex (keenan #[id number]), 2,000 mL, Intraperitoneal, 4 Exchanges Daily - Dwell Overnight  vitamin B-12, 1,000 mcg, Oral, Daily  vitamin D3, 5,000 Units, Oral, Daily        IV Meds:        Results Reviewed:   I have personally reviewed the results from the time of this admission to 12/6/2024 07:25 EST     Results from last 7 days   Lab Units 12/06/24 0425 12/05/24 0609 12/04/24 0529 12/03/24  1458   SODIUM mmol/L 134* 143 139 141   POTASSIUM mmol/L 4.6 5.2 4.8 4.8   CHLORIDE mmol/L 101 99 103 103   CO2 mmol/L 24.6 24.8 25.5 26.9   BUN mg/dL 42* 36* 27* 26*   CREATININE mg/dL 5.94* 5.24* 4.66* 4.93*   CALCIUM mg/dL 8.6 9.5 9.1 9.3   BILIRUBIN mg/dL  --   --  0.3 0.4   ALK PHOS U/L  --   --  91 100   ALT (SGPT) U/L  --   --  10 13   AST (SGOT) U/L  --   --  10 16   GLUCOSE mg/dL 113* 136* 95 99       Estimated Creatinine Clearance: 13 mL/min (A) (by C-G formula based on SCr of 5.94 mg/dL (H)).                Results from last 7 days   Lab Units 12/06/24 0425 12/05/24  0609 12/04/24  0529 12/03/24  1458   WBC 10*3/mm3 11.01* 11.78* 9.57 10.14   HEMOGLOBIN g/dL 8.6* 10.1* 10.8* 11.4*   PLATELETS 10*3/mm3 125* 132* 134* 136*             Brief Urine Lab Results  (Last result in the past 365 days)        Color   Clarity   Blood   Leuk Est   Nitrite   Protein   CREAT   Urine HCG        07/17/24 1436 Yellow   Clear   Small   Negative   Negative   300 mg/dL                   No results found for: \"UTPCR\"    Imaging Results (Last 24 Hours)       ** No results found for the last 24 hours. **                Assessment / Plan     ASSESSMENT:    Closed right hip fracture    Paroxysmal " atrial fibrillation    Chronic kidney disease, stage V    End-stage renal disease: Patient with longstanding history of diabetes and hypertension as well as renal cell carcinoma status post left nephrectomy leading to end-stage renal disease.  Patient has been on peritoneal dialysis and has done very well.  Will continue peritoneal dialysis while in the hospital.  Type 2 diabetes: Continue with the sliding scale as per hospitalist service.  Hypertension: Will resume all home medications, he has been under very good control with the medications that he is on.  Anemia: He has responded well to IV iron and not getting CHARLEY currently does not appear to have any issues, likely will drop after surgery.  Hyperparathyroidism: Continue calcitriol for the time being.  Will continue to follow calcium phosphorus and PTH as needed.  Hip fracture: Surgical intervention planned as noted.  Status post right hip gamma nail procedure.  Paroxysmal atrial fibrillation: He has been noted to be in sinus rhythm.     PLAN:  His blood pressure is fairly stable, I will go ahead and switch him to 2.5% solution.  Details were discussed with the patient no family in the room.  Patient is very comfortable going home with home PT and OT.  He does not want to go to the rehab.  During further discussion he has not been able to even get up and walk by himself.  I encouraged him to go to the rehab facility as he will be able to get out of bed and walk by himself.  He said he will have to discuss this with his wife and children.  Details were also discussed with the hospitalist service and or other providers as needed.   Continue with rest of the current treatment plan, and monitor with surveillance labs.  Further recommendations will depend on clinical course of the patient during the current hospitalization.   I have reviewed the copied text to this note, it was edited and the changes made as needed.  It is accurate to the point, when the note was  signed today.     Thank you for involving us in the care of Travon Plummer.  Please feel free to call with any questions.    Andrea Sellers MD, NICOLEN  12/06/24  07:25 [facility]    [facility]  [phone]      Part of this note may be an electronic transcription/translation of spoken language to printed text using the Dragon Dictation System.

## 2024-12-06 NOTE — PLAN OF CARE
"Goal Outcome Evaluation:  Plan of Care Reviewed With: (P) patient        Progress: (P) improving  Outcome Evaluation: (P) Pt paricipated well in PT tx. He presents supine in bed, AOx3 (uncertain of the date), with c/o 0/10 pain at rest but \"14/10 pain\" with movement. He was willing to work with therapy and states he is going to [facility] for rehab. The mobility and sensation in his R LE was improved from yesterday, however he required mod A x2 to move EOB and perform a STS. He was able to stand for ~40sec before c/o pain and had to return to sitting, still difficulty with WB-ing in the R LE. He needed max A x2 to return from sitting to supine. Pt reports his pain level at the end of the session is still high, agreed his pain felt like a 9/10 when asked on the number scale. PT will continue POC to pt tolerance.    Anticipated Discharge Disposition (PT): (P) inpatient rehabilitation facility                        "

## 2024-12-06 NOTE — PROGRESS NOTES
We accepted the referral, but the pt will now be going to [facility].

## 2024-12-06 NOTE — THERAPY TREATMENT NOTE
Patient Name: Travon Plummer  : 1945    MRN: [id number]                              Today's Date: 2024       Admit Date: 12/3/2024    Visit Dx:     ICD-10-CM ICD-9-CM   1. Closed nondisplaced intertrochanteric fracture of right femur, initial encounter  S72.144A 820.21   2. Closed fracture of right hip, initial encounter  S72.001A 820.8     Patient Active Problem List   Diagnosis    Coronary artery disease    Dyspnea    Cerebrovascular accident    Essential hypertension    Hypercholesterolemia    Tobacco abuse    Gout    Osteoarthritis    GERD (gastroesophageal reflux disease)    Obesity    Prostate cancer    Renal cell carcinoma    Nuclear sclerotic cataract of right eye    Symptomatic anemia    Iron deficiency anemia due to chronic blood loss    Melena    Chronic kidney disease, stage IV (severe)    Upper GI bleed    Absent kidney    Acquired hypothyroidism    Benign hypertensive kidney disease with chronic kidney disease    Dyslipidemia    Elevated prostate specific antigen (PSA)    Paroxysmal atrial fibrillation    Secondary hyperparathyroidism    Vitamin D deficiency    Urinary incontinence    Hematuria    Lower urinary tract symptoms (LUTS)    Chronic kidney disease, stage V    Closed right hip fracture     Past Medical History:   Diagnosis Date    Benign hypertensive CKD, stage 5 chronic kidney disease or end stage renal disease     Broken arm     Carotid stenosis     bilateral    Cerebrovascular accident 10/31/2008    Coronary artery disease     followed by Dr. Ashford; LOV 24; denies chest pain, SOB 24    Dialysis patient     Current 24    Dyspnea     GERD (gastroesophageal reflux disease)     Gout     History of blood transfusion     Hypercholesterolemia     Hypertension 11/10/2015    History of hypertension; hypotensive at the time of office visit on 11/10/2015.    Impaired mobility     Obesity     Osteoarthritis     Paroxysmal atrial fibrillation     History of  paroxysmal atrial fibrillation, data deficit.    Prostate cancer 01/2015    Prostate cancer, diagnosed January of 2015, status post radiation therapy x39 treatments, 07/01/2015 at [facility].    Renal cell carcinoma 2015    Renal cell carcinoma, dx March of 2015, status post left nephrectomy.    Wears dentures     instructed no adhesive DOS     Past Surgical History:   Procedure Laterality Date    CAROTID STENT Left 10/31/2008    PTA/left carotid artery stent, 10/31/2008.     COLONOSCOPY N/A 12/23/2021    Procedure: COLONOSCOPY, polypectomy, clip placement x 1;  Surgeon: Deandra Do MD;  Location: [facility] ENDOSCOPY;  Service: Gastroenterology;  Laterality: N/A;    COLONOSCOPY W/ POLYPECTOMY      CORONARY ANGIOPLASTY WITH STENT PLACEMENT      A 3.5 x 13 mm. Cypher ESTIVEN to RCA expanded with 4 mm balloon.     DIALYSIS FISTULA CREATION N/A     abdominal    ENDOSCOPY N/A 12/22/2021    Procedure: ESOPHAGOGASTRODUODENOSCOPY with biopsy;  Surgeon: Deandra Do MD;  Location: [facility] ENDOSCOPY;  Service: Gastroenterology;  Laterality: N/A;    ENDOSCOPY N/A 02/17/2023    Procedure: ESOPHAGOGASTRODUODENOSCOPY with biopsy;  Surgeon: Georgina Pool MD;  Location: [facility] ENDOSCOPY;  Service: Gastroenterology;  Laterality: N/A;    HIP INTERTROCHANTERIC NAILING Right 12/4/2024    Procedure: HIP INTERTROCHANTERIC NAILING;  Surgeon: Dean Hammonds MD;  Location: [facility] OR;  Service: Orthopedics;  Laterality: Right;    INGUINAL HERNIA REPAIR      NEPHRECTOMY Left 03/19/2015    diagnosed January 2015, status post left radical nephrectomy.     PROSTATE FIDUCIAL MARKER PLACEMENT  2016    received XRT for prostate CA in 2016      General Information       Row Name 12/06/24 1315          OT Time and Intention    Subjective Information complains of;pain  -SP     Document Type therapy note (daily note)  -SP     Mode of Treatment occupational therapy  -SP     Total Minutes, Occupational Therapy 29  -SP     Patient  Effort good  -SP     Symptoms Noted During/After Treatment increased pain  -SP       Row Name 12/06/24 1315          General Information    Patient Profile Reviewed yes  -SP     Existing Precautions/Restrictions fall  -SP       Row Name 12/06/24 1315          Safety Issues/Impairments Affecting Functional Mobility    Safety Issues Affecting Function (Mobility) safety precaution awareness;safety precautions follow-through/compliance;insight into deficits/self-awareness;positioning of assistive device  -SP     Impairments Affecting Function (Mobility) balance;endurance/activity tolerance;pain;strength  -SP               User Key  (r) = Recorded By, (t) = Taken By, (c) = Cosigned By      Initials Name Provider Type    SP Osiris Mckeon OT Occupational Therapist                     Mobility/ADL's       Row Name 12/06/24 1316          Bed Mobility    Bed Mobility supine-sit;sit-supine  -SP     Supine-Sit [place] (Bed Mobility) moderate assist (50% patient effort);2 person assist;verbal cues;nonverbal cues (demo/gesture)  -SP     Sit-Supine [place] (Bed Mobility) maximum assist (25% patient effort);verbal cues;nonverbal cues (demo/gesture);2 person assist  -SP     Bed Mobility, Safety Issues decreased use of arms for pushing/pulling;decreased use of legs for bridging/pushing  -SP     Assistive Device (Bed Mobility) bed rails;head of bed elevated;repositioning sheet  -SP       Row Name 12/06/24 1316          Transfers    Transfers sit-stand transfer  -SP       Row Name 12/06/24 1316          Sit-Stand Transfer    Sit-Stand [place] (Transfers) moderate assist (50% patient effort);2 person assist;verbal cues;nonverbal cues (demo/gesture)  -SP     Assistive Device (Sit-Stand Transfers) walker, front-wheeled  -SP       Row Name 12/06/24 1316          Functional Mobility    Functional Mobility- Ind. Level unable to perform  -SP     Patient was able to Ambulate no, other medical factors prevent ambulation  -SP      Reason Patient was unable to Ambulate Uncontrolled Pain  -SP       Row Name 12/06/24 1316          Activities of Daily Living    BADL Assessment/Intervention lower body dressing  -SP       Row Name 12/06/24 1316          Lower Body Dressing Assessment/Training    [place] Level (Lower Body Dressing) don;pants/bottoms;maximum assist (25% patient effort)  -SP     Position (Lower Body Dressing) edge of bed sitting;supported standing  -SP       Row Name 12/06/24 1316          Self-Feeding Assessment/Training    [place] Level (Feeding) set up  -SP     Position (Feeding) sitting up in bed  -SP               User Key  (r) = Recorded By, (t) = Taken By, (c) = Cosigned By      Initials Name Provider Type    SP Osiris Mckeon OT Occupational Therapist                   Obj/Interventions    No documentation.                  Goals/Plan    No documentation.                  Clinical Impression       Row Name 12/06/24 1318          Pain Assessment    Pretreatment Pain Rating 0/10 - no pain  -SP     Posttreatment Pain Rating 9/10  -SP     Pain Location extremity  -SP     Pain Side/Orientation right;lower  -SP     Pain Management Interventions positioning techniques utilized;exercise or physical activity utilized  -SP     Response to Pain Interventions activity participation with increased pain  -SP       Row Name 12/06/24 1318          Plan of Care Review    Plan of Care Reviewed With spouse  -SP     Progress no change  -SP     Outcome Evaluation Pt was recieved in the bed for OT. He denies pain at rest and reports he is planning to dc to [facility]. He required mod A x2 to move to eob with increased time and cues due to onset of R LE pain with movement. He required increased time for positioning at eob. He transferred to standing with mod A x2 and tolerated standing x40 seconds before excruiting pain required him to sit down. He required max A to don a brief with inability to forward flex at the hip or initiate figure four  sitting due to pain. He was assisted to supine with max A x2, rolled to the R with max A and required max A to scoot in the bed for repositioning. He was positioned in fowlers and set up for lunch with all needs in reach. Cont current POC and recommend dc to inpatient rehab to maximize his return to funcational independence.  -SP       Row Name 12/06/24 1318          Vital Signs    O2 Delivery Pre Treatment room air  -SP     O2 Delivery Intra Treatment room air  -SP     O2 Delivery Post Treatment room air  -SP     Pre Patient Position Supine  -SP     Intra Patient Position Standing  -SP     Post Patient Position Supine  -SP       Row Name 12/06/24 1318          Positioning and Restraints    Pre-Treatment Position in bed  -SP     Post Treatment Position bed  -SP     In Bed fowlers;call light within reach;encouraged to call for assist;exit alarm on  -SP               User Key  (r) = Recorded By, (t) = Taken By, (c) = Cosigned By      Initials Name Provider Type    SP Osiris Mckeon OT Occupational Therapist                   Outcome Measures       Row Name 12/06/24 1321          How much help from another is currently needed...    Putting on and taking off regular lower body clothing? 2  -SP     Bathing (including washing, rinsing, and drying) 2  -SP     Toileting (which includes using toilet bed pan or urinal) 1  -SP     Putting on and taking off regular upper body clothing 3  -SP     Taking care of personal grooming (such as brushing teeth) 3  -SP     Eating meals 3  -SP     AM-PAC 6 Clicks Score (OT) 14  -SP       Row Name 12/06/24 1140 12/06/24 0811       How much help from another person do you currently need...    Turning from your back to your side while in flat bed without using bedrails? 2 (P)   -RK 2  -BW    Moving from lying on back to sitting on the side of a flat bed without bedrails? 2 (P)   -RK 2  -BW    Moving to and from a bed to a chair (including a wheelchair)? 2 (P)   -RK 2  -BW    Standing up from  a chair using your arms (e.g., wheelchair, bedside chair)? 2 (P)   -RK 2  -BW    Climbing 3-5 steps with a railing? 1 (P)   -RK 1  -BW    To walk in hospital room? 1 (P)   -RK 1  -BW    AM-PAC 6 Clicks Score (PT) 10 (P)   -RK 10  -BW    Highest Level of Mobility Goal 4 --> Transfer to chair/commode (P)   -RK 4 --> Transfer to chair/commode  -BW      Row Name 12/06/24 1321 12/06/24 1140       Functional Assessment    Outcome Measure Options AM-PAC 6 Clicks Daily Activity (OT)  -SP AM-PAC 6 Clicks Basic Mobility (PT) (P)   -RK              User Key  (r) = Recorded By, (t) = Taken By, (c) = Cosigned By      Initials Name Provider Type    Elizabeth Tran, RN Registered Nurse    Osiris Williamson, OT Occupational Therapist    Anabel Cooper, PT Student PT Student                    Occupational Therapy Education       Title: PT OT SLP Therapies (In Progress)       Topic: Occupational Therapy (In Progress)       Point: ADL training (In Progress)       Description:   Instruct learner(s) on proper safety adaptation and remediation techniques during self care or transfers.   Instruct in proper use of assistive devices.                  Learning Progress Summary            Patient Nonacceptance, E,TB, NR by SP at 12/6/2024 1322    Comment: Pt was educated on LBD techniques but is limited by pain at this time. Cont to educate.    Acceptance, E,TB, VU by SD at 12/5/2024 1340    Comment: OT POC                      Point: Home exercise program (Not Started)       Description:   Instruct learner(s) on appropriate technique for monitoring, assisting and/or progressing therapeutic exercises/activities.                  Learner Progress:  Not documented in this visit.              Point: Precautions (Not Started)       Description:   Instruct learner(s) on prescribed precautions during self-care and functional transfers.                  Learner Progress:  Not documented in this visit.              Point: Body mechanics (Not  Started)       Description:   Instruct learner(s) on proper positioning and spine alignment during self-care, functional mobility activities and/or exercises.                  Learner Progress:  Not documented in this visit.                              User Key       Initials Effective Dates Name Provider Type Discipline    SD 06/16/21 -  Lynette Contreras OT Occupational Therapist OT    SP 09/08/22 -  Osiris Mckeon OT Occupational Therapist OT                  OT Recommendation and Plan     Plan of Care Review  Plan of Care Reviewed With: spouse  Progress: no change  Outcome Evaluation: Pt was recieved in the bed for OT. He denies pain at rest and reports he is planning to dc to [facility]. He required mod A x2 to move to eob with increased time and cues due to onset of R LE pain with movement. He required increased time for positioning at eob. He transferred to standing with mod A x2 and tolerated standing x40 seconds before excruiting pain required him to sit down. He required max A to don a brief with inability to forward flex at the hip or initiate figure four sitting due to pain. He was assisted to supine with max A x2, rolled to the R with max A and required max A to scoot in the bed for repositioning. He was positioned in fowlers and set up for lunch with all needs in reach. Cont current POC and recommend dc to inpatient rehab to maximize his return to funcational independence.     Time Calculation:         Time Calculation- OT       Row Name 12/06/24 1322             Time Calculation- OT    OT Start Time 1141  -SP      OT Stop Time 1210  -SP      OT Time Calculation (min) 29 min  -SP      OT Received On 12/06/24  -SP      OT Goal Re-Cert Due Date 12/17/24  -SP         Timed Charges    96386 - OT Therapeutic Activity Minutes 15  -SP      31228 - OT Self Care/Mgmt Minutes 14  -SP         Total Minutes    Timed Charges Total Minutes 29  -SP       Total Minutes 29  -SP                User Key  (r) = Recorded By, (t)  = Taken By, (c) = Cosigned By      Initials Name Provider Type    SP Osiris Mckeon OT Occupational Therapist                  Therapy Charges for Today       Code Description Service Date Service Provider Modifiers Qty    50653614395  OT THERAPEUTIC ACT EA 15 MIN 12/6/2024 Osiris Mckeon OT GO 1    [id number]  OT SELF CARE/MGMT/TRAIN EA 15 MIN 12/6/2024 Osiris Mckeon OT GO 1                 Osiris Mckeon OT  12/6/2024

## 2024-12-06 NOTE — PROGRESS NOTES
Patient: Travon Escobar Kavitha  Procedure(s):  HIP INTERTROCHANTERIC NAILING  Anesthesia type: general with block    Patient location: [facility] Surgical Floor  Last vitals:   Vitals:    12/06/24 1057   BP: 157/72   Pulse: 56   Resp: 18   Temp: 98.5 °F (36.9 °C)   SpO2: 97%     Level of consciousness: awake, alert, and oriented    Post-anesthesia pain: adequate analgesia  Airway patency: patent  Respiratory: unassisted  Cardiovascular: stable and blood pressure at baseline  Hydration: euvolemic    Anesthetic complications: no

## 2024-12-06 NOTE — PROGRESS NOTES
[facility]    Name:  Travon Plummer   Age:  79 y.o.  Sex:  male  :  1945  MRN:  [id number]   Visit Number:  [id number]  Admission Date:  12/3/2024  Date Of Service:  24  Primary Care Physician:  Chilango Erickson MD     LOS: 3 days :    Chief Complaint:      weakness    Subjective:    24: not doing much work with therapy as yet. PT/OT recommends IPR patient told CM he wants home.  After further discussion agreeable to IPR. Labs and vitals mostly stable.    History of presenting illness:    79-year-old male with past medical history of CKD, CAD, paroxysmal atrial fibrillation, ESRD on HD, hypertension, hyperlipidemia, obesity, renal cell carcinoma,  status post nephrectomy.  Chief complaint fall.  Patient reports he was walking around his house and tripped on his wife's oxygen cord.  Afterwards he felt pain in his right hip.  No loss of consciousness or head trauma suffered.  Was noted to have right intertrochanteric hip fracture in the ED Dr. Poon was notified who will see the patient in consult..  Nephrology has been notified.  Full code.    Pertinent findings: Creatinine 4.93, hemoglobin 11.4, right hip x-ray showing intertrochanteric hip fracture, CT of the cervical spine with degenerative change and osteopenia, CT of the head showing atrophy and a left maxillary polyp, CT of the pelvis showing mildly displaced comminuted right intratrochanteric fracture remote left superior and inferior pubic rami fractures,      Edited by: Justin Brown DO at 2024 0958     Review of Systems:     All systems were reviewed and negative except as mentioned in subjective, assessment and plan.    Vital Signs:    Temp:  [97.3 °F (36.3 °C)-97.9 °F (36.6 °C)] 97.9 °F (36.6 °C)  Heart Rate:  [57-71] 57  Resp:  [16-20] 20  BP: (143-172)/(66-77) 143/77    Intake and output:    I/O last 3 completed shifts:  In: 07470 [P.O.:1200]  Out: 7550   I/O this  "shift:  In: 2240 [P.O.:240]  Out: 1600     Physical Examination:    Constitutional: Awake, alert  Eyes: PERRLA, sclerae anicteric, no conjunctival injection  HENT: NCAT, mucous membranes moist  Neck: Supple, no thyromegaly, no lymphadenopathy, trachea midline  Respiratory: Clear to auscultation bilaterally, nonlabored respirations   Cardiovascular: RRR, no murmurs, rubs, or gallops, palpable pedal pulses bilaterally  Gastrointestinal: Positive bowel sounds, soft, nontender, nondistended  Musculoskeletal: No bilateral ankle edema, no clubbing or cyanosis to extremities  Psychiatric: Appropriate affect, cooperative  Neurologic: Oriented x 3, speech clear  Skin: No rashes  Exam stable 12/6/2024  Edited by: Justin Brown,  at 12/6/2024 0958     Laboratory results:    Results from last 7 days   Lab Units 12/06/24  0425 12/05/24  0609 12/04/24  0529 12/03/24  1458   SODIUM mmol/L 134* 143 139 141   POTASSIUM mmol/L 4.6 5.2 4.8 4.8   CHLORIDE mmol/L 101 99 103 103   CO2 mmol/L 24.6 24.8 25.5 26.9   BUN mg/dL 42* 36* 27* 26*   CREATININE mg/dL 5.94* 5.24* 4.66* 4.93*   CALCIUM mg/dL 8.6 9.5 9.1 9.3   BILIRUBIN mg/dL  --   --  0.3 0.4   ALK PHOS U/L  --   --  91 100   ALT (SGPT) U/L  --   --  10 13   AST (SGOT) U/L  --   --  10 16   GLUCOSE mg/dL 113* 136* 95 99     Results from last 7 days   Lab Units 12/06/24  0425 12/05/24  0609 12/04/24  0529   WBC 10*3/mm3 11.01* 11.78* 9.57   HEMOGLOBIN g/dL 8.6* 10.1* 10.8*   HEMATOCRIT % 26.6* 32.2* 34.6*   PLATELETS 10*3/mm3 125* 132* 134*                 No results for input(s): \"PHART\", \"CZV7DBK\", \"PO2ART\", \"RVD6RBY\", \"BASEEXCESS\" in the last 8760 hours.   I have reviewed the patient's laboratory results.    Radiology results:    FL C Arm During Surgery    Result Date: 12/4/2024  This procedure was auto-finalized with no dictation required.    Peripheral Block    Result Date: 12/4/2024  Bubba Bo CRNA     12/4/2024  3:08 PM Peripheral Block Patient " reassessed immediately prior to procedure Reason for block: at surgeon's request and post-op pain management Preanesthetic Checklist Completed: patient identified, IV checked, site marked, risks and benefits discussed, surgical consent, monitors and equipment checked, pre-op evaluation and timeout performed Prep: Pt Position: supine Sterile barriers:cap, gloves and mask Prep: ChloraPrep Patient monitoring: EKG, continuous pulse oximetry and blood pressure monitoring Procedure Sedation: yes Guidance:ultrasound guided Images:still images obtained Block Type:fascia iliaca compartment Injection Technique:single-shot Needle Type:echogenic Medications Used: ropivacaine (NAROPIN) injection 0.5 % - Peripheral Nerve  30 mL - 12/4/2024 3:08:00 PM Medications Comment:Adjuncts per total volume of LA: Decadron 8 mg If required, intravenous sedation was given -- see meds on anesthesia record. Post Assessment Injection Assessment: negative aspiration for heme, no paresthesia on injection and incremental injection Patient Tolerance:comfortable throughout block Complications:no Additional Notes Procedure:          FASCIA ILIACA BLOCK                             Patient analgesia was achieved with General Anesthesia   Pt was placed in the supine position.   The insertion site was prepped in sterile fashion with Chloraprep.  A BBraun echogenic needle was advance In-plane under ultrasound guidance. The Anterior superior Iliac crest was initially visualized and the probe was directed slightly medially and slightly towards the umbilicus.  The course of the needle was tracked over the sartorius muscle through the fascia Iliacus and into the anterior portion of the Iliacus muscle.  Major vessels where identified and avoided as were structures of the peritoneal cavity.  LA injection was made incrementally in 1-5 ml amounts and spread was visualized superiorly below the fascia iliacus.  Injection was completed with negative aspiration of  blood and negative intravascular injection.  Injection pressures were normal or minimal resistance. Performed by: Bubba Bo CRNA    I have reviewed the patient's radiology reports.    Medication Review:     I have reviewed the patient's active and prn medications.     Problem List:      Closed right hip fracture    Paroxysmal atrial fibrillation    Chronic kidney disease, stage V      Assessment/Plan:      Closed intertrochanteric right hip fracture  --  Status post right hip gamma nail Dr. Hammonds 12/4/2024.  -- Active Treatments: Hold Bumex, pain control dilaudid and tylenol, continue beta-blocker, hold losartan  -- Pending Results: will need to monitor hemoglobin closely on prophylaxis, EKG      Paroxysmal atrial fibrillation  --Chronic medical problem  -- Active Treatments: Not on anticoagulation due to history of anemia  -- Pending Results: Monitor telemetry      Chronic kidney disease, stage V  -- Chronic medical problem Dr. Sellers following  -- Active Treatments: Continue PD              DVT Prophylaxis: lovenox will need 4 weeks  Code Status: Full code  Diet: Cardiac renal,   Disposition: Anticipate short-term rehab in 1 to 2 days          Edited by: Justin Brown DO at 12/6/2024 0958           Justin Brown DO  12/06/24  09:58 EST    Dictated utilizing Dragon dictation.

## 2024-12-06 NOTE — THERAPY TREATMENT NOTE
Patient Name: Travon Plummer  : 1945    MRN: [id number]                              Today's Date: 2024       Admit Date: 12/3/2024    Visit Dx:     ICD-10-CM ICD-9-CM   1. Closed nondisplaced intertrochanteric fracture of right femur, initial encounter  S72.144A 820.21   2. Closed fracture of right hip, initial encounter  S72.001A 820.8     Patient Active Problem List   Diagnosis    Coronary artery disease    Dyspnea    Cerebrovascular accident    Essential hypertension    Hypercholesterolemia    Tobacco abuse    Gout    Osteoarthritis    GERD (gastroesophageal reflux disease)    Obesity    Prostate cancer    Renal cell carcinoma    Nuclear sclerotic cataract of right eye    Symptomatic anemia    Iron deficiency anemia due to chronic blood loss    Melena    Chronic kidney disease, stage IV (severe)    Upper GI bleed    Absent kidney    Acquired hypothyroidism    Benign hypertensive kidney disease with chronic kidney disease    Dyslipidemia    Elevated prostate specific antigen (PSA)    Paroxysmal atrial fibrillation    Secondary hyperparathyroidism    Vitamin D deficiency    Urinary incontinence    Hematuria    Lower urinary tract symptoms (LUTS)    Chronic kidney disease, stage V    Closed right hip fracture     Past Medical History:   Diagnosis Date    Benign hypertensive CKD, stage 5 chronic kidney disease or end stage renal disease     Broken arm     Carotid stenosis     bilateral    Cerebrovascular accident 10/31/2008    Coronary artery disease     followed by Dr. Ashford; LOV 24; denies chest pain, SOB 24    Dialysis patient     Current 24    Dyspnea     GERD (gastroesophageal reflux disease)     Gout     History of blood transfusion     Hypercholesterolemia     Hypertension 11/10/2015    History of hypertension; hypotensive at the time of office visit on 11/10/2015.    Impaired mobility     Obesity     Osteoarthritis     Paroxysmal atrial fibrillation     History of  paroxysmal atrial fibrillation, data deficit.    Prostate cancer 01/2015    Prostate cancer, diagnosed January of 2015, status post radiation therapy x39 treatments, 07/01/2015 at [facility].    Renal cell carcinoma 2015    Renal cell carcinoma, dx March of 2015, status post left nephrectomy.    Wears dentures     instructed no adhesive DOS     Past Surgical History:   Procedure Laterality Date    CAROTID STENT Left 10/31/2008    PTA/left carotid artery stent, 10/31/2008.     COLONOSCOPY N/A 12/23/2021    Procedure: COLONOSCOPY, polypectomy, clip placement x 1;  Surgeon: Deandra Do MD;  Location: [facility] ENDOSCOPY;  Service: Gastroenterology;  Laterality: N/A;    COLONOSCOPY W/ POLYPECTOMY      CORONARY ANGIOPLASTY WITH STENT PLACEMENT      A 3.5 x 13 mm. Cypher ESTIVEN to RCA expanded with 4 mm balloon.     DIALYSIS FISTULA CREATION N/A     abdominal    ENDOSCOPY N/A 12/22/2021    Procedure: ESOPHAGOGASTRODUODENOSCOPY with biopsy;  Surgeon: Deandra Do MD;  Location: [facility] ENDOSCOPY;  Service: Gastroenterology;  Laterality: N/A;    ENDOSCOPY N/A 02/17/2023    Procedure: ESOPHAGOGASTRODUODENOSCOPY with biopsy;  Surgeon: Georgina Pool MD;  Location: [facility] ENDOSCOPY;  Service: Gastroenterology;  Laterality: N/A;    HIP INTERTROCHANTERIC NAILING Right 12/4/2024    Procedure: HIP INTERTROCHANTERIC NAILING;  Surgeon: Dean Hammonds MD;  Location: [facility] OR;  Service: Orthopedics;  Laterality: Right;    INGUINAL HERNIA REPAIR      NEPHRECTOMY Left 03/19/2015    diagnosed January 2015, status post left radical nephrectomy.     PROSTATE FIDUCIAL MARKER PLACEMENT  2016    received XRT for prostate CA in 2016      General Information       Row Name 12/06/24 1140          Physical Therapy Time and Intention    Document Type therapy note (daily note) (P)   -RK     Mode of Treatment physical therapy (P)   -RK       Row Name 12/06/24 1140          General Information    Patient Profile Reviewed yes  (P)   -RK       Row Name 12/06/24 1140          Cognition    Orientation Status (Cognition) oriented x 3;disoriented to;time (P)   -RK               User Key  (r) = Recorded By, (t) = Taken By, (c) = Cosigned By      Initials Name Provider Type    Anabel Cooper, PT Student PT Student                   Mobility       Row Name 12/06/24 1140          Bed Mobility    Bed Mobility supine-sit;sit-supine (P)   -RK     Supine-Sit [place] (Bed Mobility) moderate assist (50% patient effort);2 person assist (P)   -RK     Sit-Supine [place] (Bed Mobility) maximum assist (25% patient effort);2 person assist (P)   -RK     Assistive Device (Bed Mobility) bed rails;head of bed elevated;repositioning sheet (P)   -RK       Row Name 12/06/24 1140          Sit-Stand Transfer    Sit-Stand [place] (Transfers) moderate assist (50% patient effort);2 person assist (P)   -RK     Assistive Device (Sit-Stand Transfers) walker, front-wheeled (P)   -RK     Comment, (Sit-Stand Transfer) gaitbelt (P)   -RK       Row Name 12/06/24 1140          Gait/Stairs (Locomotion)    Patient was able to Ambulate no, other medical factors prevent ambulation (P)   -RK     Reason Patient was unable to Ambulate Uncontrolled Pain (P)   -RK               User Key  (r) = Recorded By, (t) = Taken By, (c) = Cosigned By      Initials Name Provider Type    Anabel oCoper PT Student PT Student                   Obj/Interventions       Row Name 12/06/24 1140          Balance    Balance Assessment sitting static balance;sitting dynamic balance;standing static balance;standing dynamic balance (P)   -RK     Static Sitting Balance contact guard (P)   -RK     Dynamic Sitting Balance minimal assist (P)   -RK     Position, Sitting Balance supported;sitting edge of bed (P)   -RK     Static Standing Balance moderate assist (P)   -RK     Dynamic Standing Balance moderate assist (P)   -RK     Position/Device Used, Standing Balance walker, front-wheeled;other  "(see comments) (P)   gaitbelt  -RK       Row Name 12/06/24 1140          Sensory Assessment (Somatosensory)    Sensory Assessment (Somatosensory) other (see comments) (P)   pt reports sensation in R LE is improved from yesterday  -RK               User Key  (r) = Recorded By, (t) = Taken By, (c) = Cosigned By      Initials Name Provider Type    RK Anabel Evans, PT Student PT Student                   Goals/Plan    No documentation.                  Clinical Impression       Row Name 12/06/24 1140          Pain    Pretreatment Pain Rating 10/10 (P)   -RK     Posttreatment Pain Rating 9/10 (P)   -RK     Pain Location extremity (P)   -RK     Pain Side/Orientation right;lower (P)   -RK     Pain Management Interventions exercise or physical activity utilized;positioning techniques utilized (P)   -RK     Response to Pain Interventions activity participation with increased pain;activity level improved (P)   -RK       Row Name 12/06/24 1140          Plan of Care Review    Plan of Care Reviewed With patient (P)   -RK     Progress improving (P)   -RK     Outcome Evaluation Pt paricipated well in PT tx. He presents supine in bed, AOx3 (uncertain of the date), with c/o 0/10 pain at rest but \"14/10 pain\" with movement. He was willing to work with therapy and states he is going to [facility] for rehab. The mobility and sensation in his R LE was improved from yesterday, however he required mod A x2 to move EOB and perform a STS. He was able to stand for ~40sec before c/o pain and had to return to sitting, still difficulty with WB-ing in the R LE. He needed max A x2 to return from sitting to supine. Pt reports his pain level at the end of the session is still high, agreed his pain felt like a 9/10 when asked on the number scale. PT will continue POC to pt tolerance. (P)   -RK       Row Name 12/06/24 1140          Positioning and Restraints    Pre-Treatment Position in bed (P)   -RK     Post Treatment Position bed (P)   -RK     " In Bed fowlers;call light within reach;encouraged to call for assist;exit alarm on (P)   -RK               User Key  (r) = Recorded By, (t) = Taken By, (c) = Cosigned By      Initials Name Provider Type    Anabel Cooper, PT Student PT Student                   Outcome Measures       Row Name 12/06/24 1140 12/06/24 0811       How much help from another person do you currently need...    Turning from your back to your side while in flat bed without using bedrails? 2 (P)   -RK 2  -BW    Moving from lying on back to sitting on the side of a flat bed without bedrails? 2 (P)   -RK 2  -BW    Moving to and from a bed to a chair (including a wheelchair)? 2 (P)   -RK 2  -BW    Standing up from a chair using your arms (e.g., wheelchair, bedside chair)? 2 (P)   -RK 2  -BW    Climbing 3-5 steps with a railing? 1 (P)   -RK 1  -BW    To walk in hospital room? 1 (P)   -RK 1  -BW    AM-PAC 6 Clicks Score (PT) 10 (P)   -RK 10  -BW    Highest Level of Mobility Goal 4 --> Transfer to chair/commode (P)   -RK 4 --> Transfer to chair/commode  -BW      Row Name 12/06/24 1140          Functional Assessment    Outcome Measure Options AM-PAC 6 Clicks Basic Mobility (PT) (P)   -RK               User Key  (r) = Recorded By, (t) = Taken By, (c) = Cosigned By      Initials Name Provider Type    Elizabeth Tran RN Registered Nurse    Anabel Cooper, PT Student PT Student                                 Physical Therapy Education       Title: PT OT SLP Therapies (In Progress)       Topic: Physical Therapy (In Progress)       Point: Mobility training (Done)       Learning Progress Summary            Patient Acceptance, E,TB, VU by MARILU at 12/5/2024 1326    Comment: pt educated on the role of PT and his POC                      Point: Home exercise program (Not Started)       Learner Progress:  Not documented in this visit.              Point: Body mechanics (Done)       Learning Progress Summary            Patient Acceptance, E,TB, VU by  "RK at 12/6/2024 1313    Comment: pt educated on the proper body mechanics to perform a STS with a RW                      Point: Precautions (Not Started)       Learner Progress:  Not documented in this visit.                              User Key       Initials Effective Dates Name Provider Type Discipline    RK 09/24/24 -  Anabel Evans, PT Student PT Student PT                  PT Recommendation and Plan  Planned Therapy Interventions (PT): balance training, bed mobility training, patient/family education, ROM (range of motion), strengthening, stretching, transfer training, gait training, home exercise program  Progress: (P) improving  Outcome Evaluation: (P) Pt paricipated well in PT tx. He presents supine in bed, AOx3 (uncertain of the date), with c/o 0/10 pain at rest but \"14/10 pain\" with movement. He was willing to work with therapy and states he is going to [facility] for rehab. The mobility and sensation in his R LE was improved from yesterday, however he required mod A x2 to move EOB and perform a STS. He was able to stand for ~40sec before c/o pain and had to return to sitting, still difficulty with WB-ing in the R LE. He needed max A x2 to return from sitting to supine. Pt reports his pain level at the end of the session is still high, agreed his pain felt like a 9/10 when asked on the number scale. PT will continue POC to pt tolerance.     Time Calculation:   PT Evaluation Complexity  History, PT Evaluation Complexity: 3 or more personal factors and/or comorbidities  Examination of Body Systems (PT Eval Complexity): total of 3 or more elements  Clinical Presentation (PT Evaluation Complexity): evolving  Clinical Decision Making (PT Evaluation Complexity): moderate complexity  Overall Complexity (PT Evaluation Complexity): moderate complexity     PT Charges       Row Name 12/06/24 1140             Time Calculation    Start Time 1140 (P)   -RK      Stop Time 1209 (P)   -RK      Time Calculation (min) " 29 min (P)   -RK      PT Received On 12/06/24 (P)   -RK      PT Goal Re-Cert Due Date 12/15/24 (P)   -RK         Timed Charges    94213 - PT Therapeutic Exercise Minutes 15 (P)   -RK      31239 - PT Therapeutic Activity Minutes 14 (P)   -RK         Total Minutes    Timed Charges Total Minutes 29 (P)   -RK       Total Minutes 29 (P)   -RK                User Key  (r) = Recorded By, (t) = Taken By, (c) = Cosigned By      Initials Name Provider Type    RK Anabel Evans, PT Student PT Student                  Therapy Charges for Today       Code Description Service Date Service Provider Modifiers Qty    47692002194 HC PT EVAL MOD COMPLEXITY 4 12/5/2024 Anabel Evans, PT Student GP 1    97102614508 HC PT THER PROC EA 15 MIN 12/6/2024 Anabel Evans, PT Student GP 1    89496801516 HC PT THERAPEUTIC ACT EA 15 MIN 12/6/2024 Anabel Evans, PT Student GP 1            PT G-Codes  Outcome Measure Options: (P) AM-PAC 6 Clicks Basic Mobility (PT)  AM-PAC 6 Clicks Score (PT): (P) 10  AM-PAC 6 Clicks Score (OT): 15  PT Discharge Summary  Anticipated Discharge Disposition (PT): (P) inpatient rehabilitation facility    Anabel Evans PT Student  12/6/2024

## 2024-12-06 NOTE — DISCHARGE PLACEMENT REQUEST
"STR Referral     Travon Abdalla (79 y.o. Male)       Date of Birth   1945    Social Security Number       Address   [address] [place] [id number]    Home Phone   [phone]    MRN   [id number]       [facility]    Marital Status                               Admission Date   12/3/24    Admission Type   Emergency    Admitting Provider   Justin Brown DO    Attending Provider   Justin Brown DO    Department, Room/Bed   [facility] TELEMETRY 3, 312/1       Discharge Date       Discharge Disposition       Discharge Destination                                 Attending Provider: Justin Brown DO    Allergies: Allopurinol, Lipitor [Atorvastatin]    Isolation: None   Infection: None   Code Status: CPR    Ht: 188 cm (74\")   Wt: 90.8 kg (200 lb 2.8 oz)    Admission Cmt: None   Principal Problem: Closed right hip fracture [S72.001A]                   Active Insurance as of 12/3/2024       Primary Coverage       Payor Plan Insurance Group Employer/Plan Group    MEDICARE MEDICARE A & B        Payor Plan Address Payor Plan Phone Number Payor Plan Fax Number Effective Dates    [address] [phone]  1/1/2010 - None Entered    [facility] [id number]         Subscriber Name Subscriber Birth Date Member ID       TRAVON ABDALLA 1945 [id number]               Secondary Coverage       Payor Plan Insurance Group Employer/Plan Group    [employer] [place] [employer] [place] [employer]        Payor Plan Address Payor Plan Phone Number Payor Plan Fax Number Effective Dates    [address] [employer] [place] [employer] [address] [phone]  3/1/2012 - None Entered    [place] [id number]         Subscriber Name Subscriber Birth Date Member ID       TRAVON ABDALLA 1945 [id number]                     Emergency Contacts        (Rel.) Home Phone Work Phone Mobile Phone    Miley Abdalla (Spouse) [phone] -- [phone]    richardaudelia (Daughter) -- -- [phone] "                 History & Physical        Justin Brown DO at 24 1800            [facility]   HISTORY AND PHYSICAL      Name:  Travon Plummer   Age:  79 y.o.  Sex:  male  :  1945  MRN:  [id number]   Visit Number:  [id number]  Admission Date:  12/3/2024  Date Of Service:  24  Primary Care Physician:  Chilango Erickson MD    Chief Complaint:     Right hip pain    History Of Presenting Illness:      79-year-old male with past medical history of CKD, CAD, paroxysmal atrial fibrillation, ESRD on HD, hypertension, hyperlipidemia, obesity, renal cell carcinoma,  status post nephrectomy.  Chief complaint fall.  Patient reports he was walking around his house and tripped on his wife's oxygen cord.  Afterwards he felt pain in his right hip.  No loss of consciousness or head trauma suffered.  Was noted to have right intertrochanteric hip fracture in the ED Dr. Poon was notified who will see the patient in consult..  Nephrology has been notified.  Full code.    Pertinent findings: Creatinine 4.93, hemoglobin 11.4, right hip x-ray showing intertrochanteric hip fracture, CT of the cervical spine with degenerative change and osteopenia, CT of the head showing atrophy and a left maxillary polyp, CT of the pelvis showing mildly displaced comminuted right intratrochanteric fracture remote left superior and inferior pubic rami fractures,    ED Medications:    Medications   UltraBag/Dianeal PD-2/1.5% Dex (keenan #[id number]) (DIANEAL) 2,000 mL (has no administration in time range)   morphine injection 4 mg (4 mg Intravenous Given 12/3/24 1501)   ondansetron (ZOFRAN) injection 4 mg (4 mg Intravenous Given 12/3/24 1500)   morphine injection 4 mg (4 mg Intravenous Given 12/3/24 1616)       Edited by: Justin Brown DO at 12/3/2024 1755     Review Of Systems:    All systems were reviewed and negative except as mentioned in history of presenting illness, assessment and  plan.    Past Medical History: Patient  has a past medical history of Benign hypertensive CKD, stage 5 chronic kidney disease or end stage renal disease, Broken arm, Carotid stenosis, Cerebrovascular accident (10/31/2008), Coronary artery disease, Dialysis patient (2024), Dyspnea, GERD (gastroesophageal reflux disease), Gout, History of blood transfusion, Hypercholesterolemia, Hypertension (11/10/2015), Impaired mobility, Obesity, Osteoarthritis, Paroxysmal atrial fibrillation, Prostate cancer (01/2015), Renal cell carcinoma (2015), and Wears dentures.    Past Surgical History: Patient  has a past surgical history that includes Inguinal hernia repair; Colonoscopy w/ polypectomy; Coronary angioplasty with stent; Carotid stent (Left, 10/31/2008); Nephrectomy (Left, 03/19/2015); Prostate Fiducial Marker Placement (2016); Esophagogastroduodenoscopy (N/A, 12/22/2021); Colonoscopy (N/A, 12/23/2021); Esophagogastroduodenoscopy (N/A, 02/17/2023); and Dialysis fistula creation (N/A).    Social History: Patient  reports that he quit smoking about 16 years ago. His smoking use included cigarettes. He started smoking about 67 years ago. He has a 51 pack-year smoking history. He has been exposed to tobacco smoke. He has quit using smokeless tobacco.  His smokeless tobacco use included chew. He reports that he does not currently use alcohol. He reports that he does not use drugs.    Family History:  Patient's family history has been reviewed and found to be noncontributory.     Allergies:      Allopurinol and Lipitor [atorvastatin]    Home Medications:    Prior to Admission Medications       Prescriptions Last Dose Informant Patient Reported? Taking?    amLODIPine (NORVASC) 5 MG tablet 12/3/2024  Yes Yes    Take 1 tablet by mouth Daily.    aspirin 81 MG EC tablet 12/3/2024  No Yes    Take 1 tablet by mouth Daily.    bumetanide (BUMEX) 1 MG tablet 12/3/2024  Yes Yes    Take 1 tablet by mouth Daily.    calcitriol (ROCALTROL) 0.25  MCG capsule 12/3/2024  Yes Yes    Take 1 capsule by mouth Daily.    carvedilol (COREG) 6.25 MG tablet 12/3/2024  No Yes    Take 1 tablet by mouth 2 (Two) Times a Day With Meals.    Patient taking differently:  Take 2 tablets by mouth 2 (Two) Times a Day With Meals.    Cholecalciferol (Vitamin D) 50 MCG (2000 UT) capsule 12/3/2024 Self Yes Yes    Take 1 capsule by mouth Daily.    Cyanocobalamin (VITAMIN B-12 PO) 12/3/2024 Self Yes Yes    Take 1,000 mcg by mouth Daily.    ferrous sulfate 325 (65 FE) MG tablet 12/3/2024 Self Yes Yes    Take 1 tablet by mouth 1 (One) Time Per Week. Sunday    fluorouracil (EFUDEX) 5 % cream 12/3/2024  Yes Yes    Apply 1 Application topically to the appropriate area as directed.    levothyroxine (SYNTHROID, LEVOTHROID) 25 MCG tablet 12/3/2024 Self Yes Yes    Take 1 tablet by mouth Daily. LEVOXYL    losartan (COZAAR) 100 MG tablet 12/3/2024  Yes Yes    Take 1 tablet by mouth Daily.    pantoprazole (PROTONIX) 20 MG EC tablet 12/3/2024  No Yes    Take 1 tablet by mouth Daily.    potassium chloride (MICRO-K) 10 MEQ CR capsule 12/3/2024  Yes Yes    Take 1 capsule by mouth 2 (Two) Times a Day.    rosuvastatin (CRESTOR) 40 MG tablet 12/2/2024  No Yes    Take 1 tablet by mouth Every Night.    sodium bicarbonate 650 MG tablet 12/3/2024  Yes Yes    Take 1 tablet by mouth 2 (Two) Times a Day.    triamcinolone (KENALOG) 0.1 % ointment 12/3/2024  Yes Yes    Iron Sucrose (VENOFER IV)   Yes No    200 mg Once.    Methoxy PEG-Epoetin Beta (MIRCERA IJ)   Yes No    Inject 100 mcg under the skin into the appropriate area as directed.              Vital Signs:  Temp:  [97.5 °F (36.4 °C)-97.6 °F (36.4 °C)] 97.6 °F (36.4 °C)  Heart Rate:  [56] 56  Resp:  [18-20] 20  BP: (158-172)/() 165/82        12/03/24  1345 12/03/24  1739   Weight: 93.4 kg (205 lb 14.6 oz) 90.8 kg (200 lb 2.8 oz)     Body mass index is 25.7 kg/m².    Physical Exam:     Most recent vital Signs: /82 (BP Location: Left arm, Patient  "Position: Lying)   Pulse 56   Temp 97.6 °F (36.4 °C) (Oral)   Resp 20   Ht 188 cm (74\")   Wt 90.8 kg (200 lb 2.8 oz)   SpO2 98%   BMI 25.70 kg/m²     Constitutional: Awake, alert  Eyes: PERRLA, sclerae anicteric, no conjunctival injection  HENT: NCAT, mucous membranes moist  Neck: Supple, no thyromegaly, no lymphadenopathy, trachea midline  Respiratory: Clear to auscultation bilaterally, nonlabored respirations   Cardiovascular: RRR, no murmurs, rubs, or gallops, palpable pedal pulses bilaterally  Gastrointestinal: Positive bowel sounds, soft, nontender, nondistended  Musculoskeletal: No bilateral ankle edema, no clubbing or cyanosis to extremities  Psychiatric: Appropriate affect, cooperative  Neurologic: Oriented x 3, speech clear  Skin: No rashes  Edited by: Justin Brown DO at 12/3/2024 7606      Laboratory data:    I have reviewed the labs done in the emergency room.    Results from last 7 days   Lab Units 12/03/24  1458   SODIUM mmol/L 141   POTASSIUM mmol/L 4.8   CHLORIDE mmol/L 103   CO2 mmol/L 26.9   BUN mg/dL 26*   CREATININE mg/dL 4.93*   CALCIUM mg/dL 9.3   BILIRUBIN mg/dL 0.4   ALK PHOS U/L 100   ALT (SGPT) U/L 13   AST (SGOT) U/L 16   GLUCOSE mg/dL 99     Results from last 7 days   Lab Units 12/03/24  1458   WBC 10*3/mm3 10.14   HEMOGLOBIN g/dL 11.4*   HEMATOCRIT % 35.3*   PLATELETS 10*3/mm3 136*                                   Invalid input(s): \"USDES\", \"NITRITITE\", \"BACT\", \"EP\"    Pain Management Panel           No data to display                EKG:      pending    Radiology:    CT Pelvis Without Contrast    Result Date: 12/3/2024  PROCEDURE: CT PELVIS WO CONTRAST-  HISTORY: Right hip pain, status post fall, eval hip fracture  COMPARISON: None.  PROCEDURE: Axial images were obtained from the iliac crest to the pubic symphysis by computed tomography. This study was performed with techniques to keep radiation doses as low as reasonably achievable, (ALARA). Individualized dose reduction " techniques using automated exposure control or adjustment of mA and/or kV according to the patient size were employed.  FINDINGS:  PELVIS: The appendix is not identified. The urinary bladder is unremarkable. There is no significant fluid or adenopathy. The visualized portions of the GI tract is nonobstructed. Diffuse osteopenia identified. There is a remote left mid superior pubic ramus fracture which has undergone nonunion. There is a remote left inferior pubic ramus fracture with at least partial nonunion. There is a comminuted, mildly displaced right intertrochanteric fracture. Degenerative change of the SI joints noted. Fiducial markers in the prostate noted. There is diverticulosis without evidence of diverticulitis.      Mildly displaced comminuted right intertrochanteric fracture.  Remote left superior and inferior pubic rami fractures as described.    CTDI: 14.49 mGy DLP:755.66 mGy.cm  This report was signed and finalized on 12/3/2024 3:24 PM by Alice Castillo MD.      CT Cervical Spine Without Contrast    Result Date: 12/3/2024  PROCEDURE: CT CERVICAL SPINE WO CONTRAST-  HISTORY: Trauma, eval C-spine fracture  COMPARISON: None.  PROCEDURE: Axial images were obtained from the skull base to the thoracic inlet by computed tomography. 3 D reconstruction images were performed. This study was performed with techniques to keep radiation doses as low as reasonably achievable, (ALARA). Individualized dose reduction techniques using automated exposure control or adjustment of mA and/or kV according to the patient size were employed.  FINDINGS: There is no acute fracture. Minimal anterolisthesis C6 noted on C7. Diffuse osteopenia identified.. There are levels of spinal stenosis and/or neuroforaminal narrowing secondary to degenerative change but not secondary to acute bony abnormality. There is moderate to severe disc space narrowing at all levels with small osteophytes. Diffuse osteopenia identified. Posterior left C2  there is an 11 mm nonspecific lytic lesion. Anteriorly in the C5 vertebral body there is a smaller similar 5 mm lesion. No prior study document stability. The facets are normally aligned. Bilateral carotid artery calcifications noted. Left carotid stent identified. Motion artifact noted on multiple images. Limited images of the lung apices are unremarkable.      No acute fracture.  Multilevel cervical degenerative change.  Diffuse osteopenia.  Nonspecific lytic lesions as described may be age/osteopenia related.   CTDI: 14.49 mGy DLP:755.66 mGy.cm   This report was signed and finalized on 12/3/2024 3:19 PM by Alice Castillo MD.      CT Head Without Contrast    Result Date: 12/3/2024  PROCEDURE: CT HEAD WO CONTRAST-  HISTORY: Trauma, eval intracranial hemorrhage  COMPARISON: None.  TECHNIQUE: Multiple axial CT images were performed from the foramen magnum to the vertex. Individualized dose reduction techniques using automated exposure control or adjustment of mA and/or kV according to the patient size were employed.  FINDINGS: There is moderate, age-appropriate generalized cerebral atrophy. The ventricles are enlarged. There is no evidence of edema or hemorrhage.  No masses are identified. No extra-axial fluid is seen. The paranasal sinuses demonstrate a left maxillary polyp or mucous retention cyst. Remaining paranasal sinuses and mastoids are clear. Mild small vessel ischemic disease identified.      Atrophy  without acute process.  Left maxillary polyp or mucous retention cyst.   CTDI: 14.49 mGy DLP:755.66 mGy.cm  This report was signed and finalized on 12/3/2024 3:16 PM by Alice Castillo MD.       Assessment/Plan:      Closed right hip fracture  -- Dr. Poon consulted.  Anticipate ORIF in the morning.  In regards to cardiac clearance.  Patient has a history of CHF which is mild and diastolic which is compensated.  Although patient has a history of atrial fibrillation.  Patient currently has mild bradycardia without  signs of significant or uncontrolled arrhythmia.  No symptoms to suggest unstable cardiac condition.  Has remotely had PCI in the past but none recently.  No significant aortic stenosis.  Patient is acceptable risk for surgery.  -- Active Treatments: Hold Bumex, pain control dilaudid and tylenol, continue beta-blocker perioperatively, hold losartan  -- Pending Results: Orthopedic surgery consult, will need to monitor hemoglobin closely on prophylaxis, EKG      Paroxysmal atrial fibrillation  --Chronic medical problem  -- Active Treatments: Not on anticoagulation due to history of anemia  -- Pending Results: Monitor telemetry      Chronic kidney disease, stage V  -- Chronic medical problem  -- Active Treatments: Continue PD  -- Pending Results: Consult to Dr. Sellers              DVT Prophylaxis: SCDs, consider Eliquis for prophylaxis postoperatively  Code Status: Full code  Diet: Cardiac renal, n.p.o. after midnight  Risk assessment: High anticipate greater than two night stay          Edited by: Justin Brown DO at 12/3/2024 1800           Advance Care Planning  ACP discussion was held with the patient during this visit. Patient does not have an advance directive, declines further assistance.           Justin Brown DO  12/03/24  18:00 EST    Dictated utilizing Dragon dictation.      Electronically signed by Justin Brown DO at 12/03/24 1801       Current Facility-Administered Medications   Medication Dose Route Frequency Provider Last Rate Last Admin    acetaminophen (TYLENOL) tablet 650 mg  650 mg Oral Q4H PRN Dean Hammonds MD        sennosides-docusate (PERICOLACE) 8.6-50 MG per tablet 2 tablet  2 tablet Oral BID PRN Dean Hammonds MD        And    polyethylene glycol (MIRALAX) packet 17 g  17 g Oral Daily PRN Dean Hammonds MD        And    bisacodyl (DULCOLAX) EC tablet 5 mg  5 mg Oral Daily PRN Dean Hammonds MD        And    bisacodyl (DULCOLAX) suppository 10 mg  10 mg Rectal Daily  PRN Dean Hammonds MD        calcitriol (ROCALTROL) capsule 0.25 mcg  0.25 mcg Oral Daily Dean Hammonds MD   0.25 mcg at 12/06/24 0811    calcium carbonate (TUMS) chewable tablet 500 mg (200 mg elemental)  2 tablet Oral BID PRN Dean Hammonds MD        Calcium Replacement - Follow Nurse / BPA Driven Protocol   Not Applicable PRN Dean Hammonds MD        carvedilol (COREG) tablet 12.5 mg  12.5 mg Oral BID With Meals Dean Hammonds MD   12.5 mg at 12/06/24 0811    Enoxaparin Sodium (LOVENOX) syringe 30 mg  30 mg Subcutaneous Daily Dean Hammonds MD   30 mg at 12/06/24 0811    [START ON 12/10/2024] ferrous sulfate EC tablet 324 mg  324 mg Oral Weekly Dean Hammonds MD        HYDROcodone-acetaminophen (NORCO) 7.5-325 MG per tablet 1 tablet  1 tablet Oral Q4H PRN Dean Hammonds MD   1 tablet at 12/06/24 0626    HYDROcodone-acetaminophen (NORCO) 7.5-325 MG per tablet 2 tablet  2 tablet Oral Q4H PRN Dean Hammonds MD        HYDROmorphone (DILAUDID) injection 0.5 mg  0.5 mg Intravenous Q2H PRN Dean Hammonds MD   0.5 mg at 12/04/24 1202    levothyroxine (SYNTHROID, LEVOTHROID) tablet 25 mcg  25 mcg Oral Daily Dean Hammonds MD   25 mcg at 12/06/24 0811    losartan (COZAAR) tablet 100 mg  100 mg Oral Q24H Dean Hammonds MD   100 mg at 12/06/24 0811    Magnesium Standard Dose Replacement - Follow Nurse / BPA Driven Protocol   Not Applicable PRN Dean Hammonds MD        melatonin tablet 5 mg  5 mg Oral Nightly Dean Hammonds MD   5 mg at 12/05/24 2010    Morphine sulfate (PF) injection 2 mg  2 mg Intravenous Q4H PRN Dean Hammonds MD   2 mg at 12/05/24 0622    And    naloxone (NARCAN) injection 0.4 mg  0.4 mg Intravenous Q5 Min PRN Dean Hammonds MD        nitroglycerin (NITROSTAT) SL tablet 0.4 mg  0.4 mg Sublingual Q5 Min PRN Dean Hammonds MD        ondansetron ODT (ZOFRAN-ODT) disintegrating tablet 4 mg  4 mg Oral Q6H PRN Dean Hammonds MD        Or    ondansetron (ZOFRAN) injection 4  mg  4 mg Intravenous Q6H PRN Dean Hammonds MD        ondansetron (ZOFRAN) injection 4 mg  4 mg Intravenous Q6H PRN Dean Hammonds MD        pantoprazole (PROTONIX) EC tablet 40 mg  40 mg Oral Daily Dean Hammonds MD   40 mg at 24 0811    Phosphorus Replacement - Follow Nurse / BPA Driven Protocol   Not Applicable PRN Dean Hammonds MD        Potassium Replacement - Follow Nurse / BPA Driven Protocol   Not Applicable PRN Dean Hammonds MD        rosuvastatin (CRESTOR) tablet 40 mg  40 mg Oral Nightly Dean Hammonds MD   40 mg at 24    sodium chloride 0.9 % flush 10 mL  10 mL Intravenous Q12H Dean Hammonds MD   10 mL at 24 0813    sodium chloride 0.9 % flush 10 mL  10 mL Intravenous PRN Dean Hammonds MD        sodium chloride 0.9 % infusion 40 mL  40 mL Intravenous PRN Dean Hammonds MD        UltraBag/Dianeal PD-2/1.5% Dex (keenan #[id number]) (DIANEAL) 2,000 mL  2,000 mL Intraperitoneal 4 Exchanges Daily - Dwell Overnight Dean Hammonds MD   2,000 mL at 24 0906    vitamin B-12 (CYANOCOBALAMIN) tablet 1,000 mcg  1,000 mcg Oral Daily Dean Hammonds MD   1,000 mcg at 24 0811    vitamin D3 capsule 5,000 Units  5,000 Units Oral Daily Dean Hammonds MD   5,000 Units at 24 0811        Physician Progress Notes (most recent note)        Justin Brown DO at 24 1142              [facility]    Name:  Travon Plummer   Age:  79 y.o.  Sex:  male  :  1945  MRN:  [id number]   Visit Number:  [id number]  Admission Date:  12/3/2024  Date Of Service:  24  Primary Care Physician:  Chilango Erickson MD     LOS: 2 days :    Chief Complaint:      weakness    Subjective:    24: VSS, no labs this am as yet. More awake and conversant    History of presenting illness:    79-year-old male with past medical history of CKD, CAD, paroxysmal atrial fibrillation, ESRD on HD, hypertension,  hyperlipidemia, obesity, renal cell carcinoma,  status post nephrectomy.  Chief complaint fall.  Patient reports he was walking around his house and tripped on his wife's oxygen cord.  Afterwards he felt pain in his right hip.  No loss of consciousness or head trauma suffered.  Was noted to have right intertrochanteric hip fracture in the ED Dr. Poon was notified who will see the patient in consult..  Nephrology has been notified.  Full code.    Pertinent findings: Creatinine 4.93, hemoglobin 11.4, right hip x-ray showing intertrochanteric hip fracture, CT of the cervical spine with degenerative change and osteopenia, CT of the head showing atrophy and a left maxillary polyp, CT of the pelvis showing mildly displaced comminuted right intratrochanteric fracture remote left superior and inferior pubic rami fractures,    ED Medications:    Medications   UltraBag/Dianeal PD-2/1.5% Dex (keenan #[id number]) (DIANEAL) 2,000 mL (has no administration in time range)   morphine injection 4 mg (4 mg Intravenous Given 12/3/24 1501)   ondansetron (ZOFRAN) injection 4 mg (4 mg Intravenous Given 12/3/24 1500)   morphine injection 4 mg (4 mg Intravenous Given 12/3/24 1616)       Edited by: Justin Brown DO at 12/5/2024 6564     Review of Systems:     All systems were reviewed and negative except as mentioned in subjective, assessment and plan.    Vital Signs:    Temp:  [97.5 °F (36.4 °C)-98.3 °F (36.8 °C)] 97.5 °F (36.4 °C)  Heart Rate:  [57-84] 61  Resp:  [11-22] 16  BP: (111-168)/(67-93) 153/67    Intake and output:    I/O last 3 completed shifts:  In: 4315 [P.O.:15; IV Piggyback:300]  Out: 4500 [Urine:100; Blood:100]  I/O this shift:  In: 2600 [P.O.:600]  Out: 1500     Physical Examination:    Constitutional: Awake, alert  Eyes: PERRLA, sclerae anicteric, no conjunctival injection  HENT: NCAT, mucous membranes moist  Neck: Supple, no thyromegaly, no lymphadenopathy, trachea midline  Respiratory: Clear to auscultation  "bilaterally, nonlabored respirations   Cardiovascular: RRR, no murmurs, rubs, or gallops, palpable pedal pulses bilaterally  Gastrointestinal: Positive bowel sounds, soft, nontender, nondistended  Musculoskeletal: No bilateral ankle edema, no clubbing or cyanosis to extremities  Psychiatric: Appropriate affect, cooperative  Neurologic: Oriented x 3, speech clear  Skin: No rashes  Exam stable 12/5/2024  Edited by: Justin Brown, DO at 12/5/2024 0748     Laboratory results:    Results from last 7 days   Lab Units 12/05/24  0609 12/04/24  0529 12/03/24  1458   SODIUM mmol/L 143 139 141   POTASSIUM mmol/L 5.2 4.8 4.8   CHLORIDE mmol/L 99 103 103   CO2 mmol/L 24.8 25.5 26.9   BUN mg/dL 36* 27* 26*   CREATININE mg/dL 5.24* 4.66* 4.93*   CALCIUM mg/dL 9.5 9.1 9.3   BILIRUBIN mg/dL  --  0.3 0.4   ALK PHOS U/L  --  91 100   ALT (SGPT) U/L  --  10 13   AST (SGOT) U/L  --  10 16   GLUCOSE mg/dL 136* 95 99     Results from last 7 days   Lab Units 12/05/24  0609 12/04/24  0529 12/03/24  1458   WBC 10*3/mm3 11.78* 9.57 10.14   HEMOGLOBIN g/dL 10.1* 10.8* 11.4*   HEMATOCRIT % 32.2* 34.6* 35.3*   PLATELETS 10*3/mm3 132* 134* 136*                 No results for input(s): \"PHART\", \"LEW4LNQ\", \"PO2ART\", \"GQS2WAD\", \"BASEEXCESS\" in the last 8760 hours.   I have reviewed the patient's laboratory results.    Radiology results:    FL C Arm During Surgery    Result Date: 12/4/2024  This procedure was auto-finalized with no dictation required.    Peripheral Block    Result Date: 12/4/2024  Bo Bubba Ralphyang, GAVIN     12/4/2024  3:08 PM Peripheral Block Patient reassessed immediately prior to procedure Reason for block: at surgeon's request and post-op pain management Preanesthetic Checklist Completed: patient identified, IV checked, site marked, risks and benefits discussed, surgical consent, monitors and equipment checked, pre-op evaluation and timeout performed Prep: Pt Position: supine Sterile barriers:cap, gloves and mask " Prep: ChloraPrep Patient monitoring: EKG, continuous pulse oximetry and blood pressure monitoring Procedure Sedation: yes Guidance:ultrasound guided Images:still images obtained Block Type:fascia iliaca compartment Injection Technique:single-shot Needle Type:echogenic Medications Used: ropivacaine (NAROPIN) injection 0.5 % - Peripheral Nerve  30 mL - 12/4/2024 3:08:00 PM Medications Comment:Adjuncts per total volume of LA: Decadron 8 mg If required, intravenous sedation was given -- see meds on anesthesia record. Post Assessment Injection Assessment: negative aspiration for heme, no paresthesia on injection and incremental injection Patient Tolerance:comfortable throughout block Complications:no Additional Notes Procedure:          FASCIA ILIACA BLOCK                             Patient analgesia was achieved with General Anesthesia   Pt was placed in the supine position.   The insertion site was prepped in sterile fashion with Chloraprep.  A BBraun echogenic needle was advance In-plane under ultrasound guidance. The Anterior superior Iliac crest was initially visualized and the probe was directed slightly medially and slightly towards the umbilicus.  The course of the needle was tracked over the sartorius muscle through the fascia Iliacus and into the anterior portion of the Iliacus muscle.  Major vessels where identified and avoided as were structures of the peritoneal cavity.  LA injection was made incrementally in 1-5 ml amounts and spread was visualized superiorly below the fascia iliacus.  Injection was completed with negative aspiration of blood and negative intravascular injection.  Injection pressures were normal or minimal resistance. Performed by: Bubba Bo CRNA     XR Hip With or Without Pelvis 2 - 3 View Right    Result Date: 12/4/2024   PROCEDURE: XR HIP W OR WO PELVIS 2-3 VIEW RIGHT-  HISTORY: Further characterization of known intertrochanteric fracture  COMPARISON: CT performed  earlier the same day.  FINDINGS:  A 2 view exam demonstrates a mildly displaced mildly comminuted right intratrochanteric fracture. There is diffuse osteopenia. There are remote left inferior and superior pubic rami fractures. Fiducial markers are seen in the region of the prostate. There are degenerative changes of the SI joints bilaterally         Impression: Right intertrochanteric fracture as seen on recent CT.         Images were reviewed, interpreted, and dictated by Dr. Alice Castillo MD Transcribed by Julia Vasquez PA-C.  This report was signed and finalized on 12/4/2024 8:56 AM by Alice Castillo MD.      CT Pelvis Without Contrast    Result Date: 12/3/2024  PROCEDURE: CT PELVIS WO CONTRAST-  HISTORY: Right hip pain, status post fall, eval hip fracture  COMPARISON: None.  PROCEDURE: Axial images were obtained from the iliac crest to the pubic symphysis by computed tomography. This study was performed with techniques to keep radiation doses as low as reasonably achievable, (ALARA). Individualized dose reduction techniques using automated exposure control or adjustment of mA and/or kV according to the patient size were employed.  FINDINGS:  PELVIS: The appendix is not identified. The urinary bladder is unremarkable. There is no significant fluid or adenopathy. The visualized portions of the GI tract is nonobstructed. Diffuse osteopenia identified. There is a remote left mid superior pubic ramus fracture which has undergone nonunion. There is a remote left inferior pubic ramus fracture with at least partial nonunion. There is a comminuted, mildly displaced right intertrochanteric fracture. Degenerative change of the SI joints noted. Fiducial markers in the prostate noted. There is diverticulosis without evidence of diverticulitis.      Impression: Mildly displaced comminuted right intertrochanteric fracture.  Remote left superior and inferior pubic rami fractures as described.    CTDI: 14.49 mGy DLP:755.66  mGy.cm  This report was signed and finalized on 12/3/2024 3:24 PM by Alice Castillo MD.      CT Cervical Spine Without Contrast    Result Date: 12/3/2024  PROCEDURE: CT CERVICAL SPINE WO CONTRAST-  HISTORY: Trauma, eval C-spine fracture  COMPARISON: None.  PROCEDURE: Axial images were obtained from the skull base to the thoracic inlet by computed tomography. 3 D reconstruction images were performed. This study was performed with techniques to keep radiation doses as low as reasonably achievable, (ALARA). Individualized dose reduction techniques using automated exposure control or adjustment of mA and/or kV according to the patient size were employed.  FINDINGS: There is no acute fracture. Minimal anterolisthesis C6 noted on C7. Diffuse osteopenia identified.. There are levels of spinal stenosis and/or neuroforaminal narrowing secondary to degenerative change but not secondary to acute bony abnormality. There is moderate to severe disc space narrowing at all levels with small osteophytes. Diffuse osteopenia identified. Posterior left C2 there is an 11 mm nonspecific lytic lesion. Anteriorly in the C5 vertebral body there is a smaller similar 5 mm lesion. No prior study document stability. The facets are normally aligned. Bilateral carotid artery calcifications noted. Left carotid stent identified. Motion artifact noted on multiple images. Limited images of the lung apices are unremarkable.      Impression: No acute fracture.  Multilevel cervical degenerative change.  Diffuse osteopenia.  Nonspecific lytic lesions as described may be age/osteopenia related.   CTDI: 14.49 mGy DLP:755.66 mGy.cm   This report was signed and finalized on 12/3/2024 3:19 PM by Alice Castillo MD.      CT Head Without Contrast    Result Date: 12/3/2024  PROCEDURE: CT HEAD WO CONTRAST-  HISTORY: Trauma, eval intracranial hemorrhage  COMPARISON: None.  TECHNIQUE: Multiple axial CT images were performed from the foramen magnum to the vertex.  Individualized dose reduction techniques using automated exposure control or adjustment of mA and/or kV according to the patient size were employed.  FINDINGS: There is moderate, age-appropriate generalized cerebral atrophy. The ventricles are enlarged. There is no evidence of edema or hemorrhage.  No masses are identified. No extra-axial fluid is seen. The paranasal sinuses demonstrate a left maxillary polyp or mucous retention cyst. Remaining paranasal sinuses and mastoids are clear. Mild small vessel ischemic disease identified.      Impression: Atrophy  without acute process.  Left maxillary polyp or mucous retention cyst.   CTDI: 14.49 mGy DLP:755.66 mGy.cm  This report was signed and finalized on 12/3/2024 3:16 PM by Alice Castillo MD.     I have reviewed the patient's radiology reports.    Medication Review:     I have reviewed the patient's active and prn medications.     Problem List:      Closed right hip fracture    Paroxysmal atrial fibrillation    Chronic kidney disease, stage V      Assessment/Plan:      Closed right hip fracture  --  Status post right hip gamma nail Dr. Hammonds 12/4/2024.  -- Active Treatments: Hold Bumex, pain control dilaudid and tylenol, continue beta-blocker, hold losartan  -- Pending Results: will need to monitor hemoglobin closely on prophylaxis, EKG      Paroxysmal atrial fibrillation  --Chronic medical problem  -- Active Treatments: Not on anticoagulation due to history of anemia  -- Pending Results: Monitor telemetry      Chronic kidney disease, stage V  -- Chronic medical problem Dr. Sellers following  -- Active Treatments: Continue PD              DVT Prophylaxis: lovenox will need 4 weeks  Code Status: Full code  Diet: Cardiac renal,   Disposition: Anticipate short-term rehab in 1 to 2 days          Edited by: Justin Brown DO at 12/5/2024 0748           Justin Brown DO  12/05/24  11:42 EST    Dictated utilizing Dragon dictation.        Electronically signed by Stephanie  Justin Guerra DO at 24 1143          Physical Therapy Notes (most recent note)        Anabel Evans, PT Student at 24 [phone]  Version 1 of 1      Attestation signed by Aggie Powell, PT at 24 [phone]    I attest to the accuracy and completeness of this note.                Patient Name: Travon Plummer  : 1945    MRN: [id number]                              Today's Date: 2024       Admit Date: 12/3/2024    Visit Dx:     ICD-10-CM ICD-9-CM   1. Closed nondisplaced intertrochanteric fracture of right femur, initial encounter  S72.144A 820.21   2. Closed fracture of right hip, initial encounter  S72.001A 820.8     Patient Active Problem List   Diagnosis    Coronary artery disease    Dyspnea    Cerebrovascular accident    Essential hypertension    Hypercholesterolemia    Tobacco abuse    Gout    Osteoarthritis    GERD (gastroesophageal reflux disease)    Obesity    Prostate cancer    Renal cell carcinoma    Nuclear sclerotic cataract of right eye    Symptomatic anemia    Iron deficiency anemia due to chronic blood loss    Melena    Chronic kidney disease, stage IV (severe)    Upper GI bleed    Absent kidney    Acquired hypothyroidism    Benign hypertensive kidney disease with chronic kidney disease    Dyslipidemia    Elevated prostate specific antigen (PSA)    Paroxysmal atrial fibrillation    Secondary hyperparathyroidism    Vitamin D deficiency    Urinary incontinence    Hematuria    Lower urinary tract symptoms (LUTS)    Chronic kidney disease, stage V    Closed right hip fracture     Past Medical History:   Diagnosis Date    Benign hypertensive CKD, stage 5 chronic kidney disease or end stage renal disease     Broken arm     Carotid stenosis     bilateral    Cerebrovascular accident 10/31/2008    Coronary artery disease     followed by Dr. Ashford; LOV 24; denies chest pain, SOB 24    Dialysis patient     Current 24    Dyspnea     GERD (gastroesophageal reflux  disease)     Gout     History of blood transfusion     Hypercholesterolemia     Hypertension 11/10/2015    History of hypertension; hypotensive at the time of office visit on 11/10/2015.    Impaired mobility     Obesity     Osteoarthritis     Paroxysmal atrial fibrillation     History of paroxysmal atrial fibrillation, data deficit.    Prostate cancer 01/2015    Prostate cancer, diagnosed January of 2015, status post radiation therapy x39 treatments, 07/01/2015 at [facility].    Renal cell carcinoma 2015    Renal cell carcinoma, dx March of 2015, status post left nephrectomy.    Wears dentures     instructed no adhesive DOS     Past Surgical History:   Procedure Laterality Date    CAROTID STENT Left 10/31/2008    PTA/left carotid artery stent, 10/31/2008.     COLONOSCOPY N/A 12/23/2021    Procedure: COLONOSCOPY, polypectomy, clip placement x 1;  Surgeon: Deandra Do MD;  Location: [facility] ENDOSCOPY;  Service: Gastroenterology;  Laterality: N/A;    COLONOSCOPY W/ POLYPECTOMY      CORONARY ANGIOPLASTY WITH STENT PLACEMENT      A 3.5 x 13 mm. Cypher ESTIVEN to RCA expanded with 4 mm balloon.     DIALYSIS FISTULA CREATION N/A     abdominal    ENDOSCOPY N/A 12/22/2021    Procedure: ESOPHAGOGASTRODUODENOSCOPY with biopsy;  Surgeon: Deandra Do MD;  Location: [facility] ENDOSCOPY;  Service: Gastroenterology;  Laterality: N/A;    ENDOSCOPY N/A 02/17/2023    Procedure: ESOPHAGOGASTRODUODENOSCOPY with biopsy;  Surgeon: Georgina Pool MD;  Location: [facility] ENDOSCOPY;  Service: Gastroenterology;  Laterality: N/A;    HIP INTERTROCHANTERIC NAILING Right 12/4/2024    Procedure: HIP INTERTROCHANTERIC NAILING;  Surgeon: Dean Hammonds MD;  Location: [facility] OR;  Service: Orthopedics;  Laterality: Right;    INGUINAL HERNIA REPAIR      NEPHRECTOMY Left 03/19/2015    diagnosed January 2015, status post left radical nephrectomy.     PROSTATE FIDUCIAL MARKER PLACEMENT  2016    received XRT for prostate CA in 2016       General Information       Row Name 12/05/24 0938          Physical Therapy Time and Intention    Document Type evaluation (P)   -RK     Mode of Treatment physical therapy (P)   -RK       Row Name 12/05/24 0938          General Information    Patient Profile Reviewed yes (P)   -RK     Prior Level of Function independent:;all household mobility;ADL's (P)   -RK     Existing Precautions/Restrictions fall (P)   -RK     Barriers to Rehab medically complex (P)   -RK       Row Name 12/05/24 0938          Living Environment    People in Home spouse (P)   -RK       Row Name 12/05/24 0938          Cognition    Orientation Status (Cognition) oriented x 3;disoriented to;time (P)   -RK       Row Name 12/05/24 0938          Safety Issues/Impairments Affecting Functional Mobility    Safety Issues Affecting Function (Mobility) ability to follow commands;safety precautions follow-through/compliance;safety precaution awareness;problem-solving (P)   -RK     Impairments Affecting Function (Mobility) sensation/sensory awareness;endurance/activity tolerance;strength;pain (P)   -RK     Comment, Safety Issues/Impairments (Mobility) pt R LE sensation temporarily altered d/t nerve block placed for surgey (P)   -RK               User Key  (r) = Recorded By, (t) = Taken By, (c) = Cosigned By      Initials Name Provider Type    RK Anabel Evans, PT Student PT Student                   Mobility       Row Name 12/05/24 0938          Bed Mobility    Bed Mobility bed mobility (all) activities (P)   -RK     All Activities, [place] (Bed Mobility) moderate assist (50% patient effort) (P)   -RK       Row Name 12/05/24 0938          Sit-Stand Transfer    Sit-Stand [place] (Transfers) moderate assist (50% patient effort);2 person assist (P)   -RK     Assistive Device (Sit-Stand Transfers) walker, front-wheeled;other (see comments) (P)   -RK     Comment, (Sit-Stand Transfer) gaitbelt (P)   -RK       Row Name 12/05/24 0938           Gait/Stairs (Locomotion)    Patient was able to Ambulate no, other medical factors prevent ambulation (P)   -     Reason Patient was unable to Ambulate Uncontrolled Pain (P)   -RK     [place] Level (Stairs) not tested (P)   -     Comment, (Gait/Stairs) Gaitbelt used during gait training and amb (P)   -               User Key  (r) = Recorded By, (t) = Taken By, (c) = Cosigned By      Initials Name Provider Type    Anabel Cooper, PT Student PT Student                   Obj/Interventions       Row Name 12/05/24 0962          Range of Motion Comprehensive    General Range of Motion other (see comments) (P)   -     Comment, General Range of Motion L LE WFL, RLE difficult to assess d/t temporary effects from the nerve block and pt c/o pain (P)   -       Row Name 12/05/24 0938          Strength Comprehensive (MMT)    General Manual Muscle Testing (MMT) Assessment lower extremity strength deficits identified (P)   -       Row Name 12/05/24 0938          Balance    Balance Assessment sitting static balance;sitting dynamic balance;standing static balance;standing dynamic balance (P)   -RK     Static Sitting Balance contact guard (P)   -RK     Dynamic Sitting Balance contact guard (P)   -     Static Standing Balance minimal assist (P)   -     Dynamic Standing Balance moderate assist (P)   -     Position/Device Used, Standing Balance walker, front-wheeled;other (see comments) (P)   -     Comment, Balance gaitbelt used during all balance activities (P)   -       Row Name 12/05/24 0938          Sensory Assessment (Somatosensory)    Sensory Assessment (Somatosensory) other (see comments) (P)   pt c/o residual numbness in R LE d/t nerve block.  -       Row Name 12/05/24 0938          Lower Extremity (Manual Muscle Testing)    Comment, MMT: Lower Extremity B LE grossly 3+/5 (P)   -               User Key  (r) = Recorded By, (t) = Taken By, (c) = Cosigned By      Initials Name Provider Type    MARILU  Anabel Evans, PT Student PT Student                   Goals/Plan       Row Name 12/05/24 0938          Bed Mobility Goal 1 (PT)    Activity/Assistive Device (Bed Mobility Goal 1, PT) bed mobility activities, all (P)   -RK     [place] Level/Cues Needed (Bed Mobility Goal 1, PT) minimum assist (75% or more patient effort) (P)   -RK     Time Frame (Bed Mobility Goal 1, PT) short term goal (STG);5 days (P)   -RK     Progress/Outcomes (Bed Mobility Goal 1, PT) goal ongoing (P)   -RK       Row Name 12/05/24 0938          Transfer Goal 1 (PT)    Activity/Assistive Device (Transfer Goal 1, PT) transfers, all (P)   -RK     [place] Level/Cues Needed (Transfer Goal 1, PT) minimum assist (75% or more patient effort) (P)   -RK     Time Frame (Transfer Goal 1, PT) short term goal (STG);5 days (P)   -RK     Progress/Outcome (Transfer Goal 1, PT) goal ongoing (P)   -RK       Row Name 12/05/24 0938          Gait Training Goal 1 (PT)    Activity/Assistive Device (Gait Training Goal 1, PT) gait (walking locomotion);assistive device use;increase endurance/gait distance;increase energy conservation;walker, standard (P)   -RK     [place] Level (Gait Training Goal 1, PT) minimum assist (75% or more patient effort) (P)   -RK     Distance (Gait Training Goal 1, PT) 50 (P)   -RK     Time Frame (Gait Training Goal 1, PT) long term goal (LTG);10 days (P)   -RK     Progress/Outcome (Gait Training Goal 1, PT) goal ongoing (P)   -RK       Row Name 12/05/24 0938          Patient Education Goal (PT)    Activity (Patient Education Goal, PT) pt to complete B LE exercises x20 to improve strength and endurance (P)   -RK     [place]/Cues/Accuracy (Memory Goal 2, PT) demonstrates adequately (P)   -RK     Time Frame (Patient Education Goal, PT) short term goal (STG);3 days (P)   -RK     Progress/Outcome (Patient Education Goal, PT) goal ongoing (P)   -RK       Row Name 12/05/24 0938          Therapy Assessment/Plan (PT)    Planned  "Therapy Interventions (PT) balance training;bed mobility training;patient/family education;ROM (range of motion);strengthening;stretching;transfer training;gait training;home exercise program (P)   -RK               User Key  (r) = Recorded By, (t) = Taken By, (c) = Cosigned By      Initials Name Provider Type    RK Anabel Evans, PT Student PT Student                   Clinical Impression       Row Name 12/05/24 0938          Pain    Pretreatment Pain Rating 8/10 (P)   pain with movement  -RK     Posttreatment Pain Rating 0/10 - no pain (P)   pain at rest  -RK     Pain Location extremity (P)   -RK     Pain Side/Orientation right;lower (P)   -RK     Pain Management Interventions exercise or physical activity utilized;positioning techniques utilized (P)   -RK     Response to Pain Interventions activity participation with tolerable pain;activity level improved (P)   -RK       Row Name 12/05/24 0983          Plan of Care Review    Plan of Care Reviewed With patient (P)   -RK     Progress no change (P)   -RK     Outcome Evaluation Pt participated well in PT evaluation. He presents supine in bed, oriented to self, place, and situation. He c/o 8/10 pain in his R LE with movement, that decreases to 0/10 when he is still. He lives with his wife and typically does not need an AD for mobility. Today pt states he cannot extend his R knee and his R LE still feels \"nuumb from the nerve block\". He was able to move to the EOB mod A and tf STS mod A x2. He stood at the EOB for 2min with mod A with R LE buckling and c/o of increased pain. Pt exhibited poor motor control and weakness in the R > L LE. pt was returned to supine in bed, resting comfortably with his L LE on a pillow, and his call light within reach. Nsg notified about pt's pain level and request for additional medicine. Pt would benefit from continued PT services to improve strength, endurance, mobility, and saftey. Recommend IRF upon d/c to address any further " deficits. (P)   -RK       Row Name 12/05/24 0938          Therapy Assessment/Plan (PT)    Patient/Family Therapy Goals Statement (PT) pt wants his R LE to feel better so he can start moving around again (P)   -RK     Rehab Potential (PT) good (P)   -RK     Criteria for Skilled Interventions Met (PT) yes;meets criteria;skilled treatment is necessary (P)   -RK     Therapy Frequency (PT) 5 times/wk (P)   -RK     Predicted Duration of Therapy Intervention (PT) 10 days (P)   -RK       Row Name 12/05/24 0938          Positioning and Restraints    Pre-Treatment Position in bed (P)   -RK     Post Treatment Position bed (P)   -RK     In Bed supine;call light within reach;encouraged to call for assist;exit alarm on;notified nsg;RLE elevated (P)   -RK               User Key  (r) = Recorded By, (t) = Taken By, (c) = Cosigned By      Initials Name Provider Type    Anabel Cooper, PT Student PT Student                   Outcome Measures       Row Name 12/05/24 0938 12/05/24 0739       How much help from another person do you currently need...    Turning from your back to your side while in flat bed without using bedrails? 2 (P)   -RK 2  -TS    Moving from lying on back to sitting on the side of a flat bed without bedrails? 2 (P)   -RK 2  -TS    Moving to and from a bed to a chair (including a wheelchair)? 2 (P)   -RK 2  -TS    Standing up from a chair using your arms (e.g., wheelchair, bedside chair)? 2 (P)   -RK 2  -TS    Climbing 3-5 steps with a railing? 1 (P)   -RK 1  -TS    To walk in hospital room? 1 (P)   -RK 2  -TS    AM-PAC 6 Clicks Score (PT) 10 (P)   -RK 11  -TS    Highest Level of Mobility Goal 4 --> Transfer to chair/commode (P)   -RK 4 --> Transfer to chair/commode  -TS      Row Name 12/05/24 0938          Functional Assessment    Outcome Measure Options AM-PAC 6 Clicks Basic Mobility (PT) (P)   -RK               User Key  (r) = Recorded By, (t) = Taken By, (c) = Cosigned By      Initials Name Provider Type    TS  "Mignno Shah, RN Registered Nurse    Anabel Cooper, PT Student PT Student                                 Physical Therapy Education       Title: PT OT SLP Therapies (In Progress)       Topic: Physical Therapy (In Progress)       Point: Mobility training (Done)       Learning Progress Summary            Patient Acceptance, E,TB, VU by MARILU at 12/5/2024 0572    Comment: pt educated on the role of PT and his POC                      Point: Home exercise program (Not Started)       Learner Progress:  Not documented in this visit.              Point: Body mechanics (Not Started)       Learner Progress:  Not documented in this visit.              Point: Precautions (Not Started)       Learner Progress:  Not documented in this visit.                              User Key       Initials Effective Dates Name Provider Type Discipline    MARILU 09/24/24 -  Anabel Evans, PT Student PT Student PT                  PT Recommendation and Plan  Planned Therapy Interventions (PT): (P) balance training, bed mobility training, patient/family education, ROM (range of motion), strengthening, stretching, transfer training, gait training, home exercise program  Progress: (P) no change  Outcome Evaluation: (P) Pt participated well in PT evaluation. He presents supine in bed, oriented to self, place, and situation. He c/o 8/10 pain in his R LE with movement, that decreases to 0/10 when he is still. He lives with his wife and typically does not need an AD for mobility. Today pt states he cannot extend his R knee and his R LE still feels \"nuumb from the nerve block\". He was able to move to the EOB mod A and tf STS mod A x2. He stood at the EOB for 2min with mod A with R LE buckling and c/o of increased pain. Pt exhibited poor motor control and weakness in the R > L LE. pt was returned to supine in bed, resting comfortably with his L LE on a pillow, and his call light within reach. Nsg notified about pt's pain level and request for " additional medicine. Pt would benefit from continued PT services to improve strength, endurance, mobility, and saftey. Recommend IRF upon d/c to address any further deficits.     Time Calculation:   PT Evaluation Complexity  History, PT Evaluation Complexity: (P) 3 or more personal factors and/or comorbidities  Examination of Body Systems (PT Eval Complexity): (P) total of 3 or more elements  Clinical Presentation (PT Evaluation Complexity): (P) evolving  Clinical Decision Making (PT Evaluation Complexity): (P) moderate complexity  Overall Complexity (PT Evaluation Complexity): (P) moderate complexity     PT Charges       Row Name 24 0938             Time Calculation    Start Time 938 (P)   -RK      PT Received On 24 (P)   -RK      PT Goal Re-Cert Due Date 12/15/24 (P)   -RK         Untimed Charges    PT Eval/Re-eval Minutes 53 (P)   -RK         Total Minutes    Untimed Charges Total Minutes 53 (P)   -RK       Total Minutes 53 (P)   -RK                User Key  (r) = Recorded By, (t) = Taken By, (c) = Cosigned By      Initials Name Provider Type    Anabel Cooper, PT Student PT Student                  Therapy Charges for Today       Code Description Service Date Service Provider Modifiers Qty    [id number] HC PT EVAL MOD COMPLEXITY 4 2024 Anabel Evans, PT Student GP 1            PT G-Codes  Outcome Measure Options: (P) AM-PAC 6 Clicks Basic Mobility (PT)  AM-PAC 6 Clicks Score (PT): (P) 10  PT Discharge Summary  Anticipated Discharge Disposition (PT): (P) inpatient rehabilitation facility    Anabel Evans PT Student  2024      Electronically signed by Aggie Powell, PT at 24 1613          Occupational Therapy Notes (most recent note)        Lynette Contreras, OT at 24 1342          Patient Name: Travon Plummer  : 1945    MRN: [id number]                              Today's Date: 2024       Admit Date: 12/3/2024    Visit Dx:     ICD-10-CM ICD-9-CM   1.  Closed nondisplaced intertrochanteric fracture of right femur, initial encounter  S72.144A 820.21   2. Closed fracture of right hip, initial encounter  S72.001A 820.8     Patient Active Problem List   Diagnosis    Coronary artery disease    Dyspnea    Cerebrovascular accident    Essential hypertension    Hypercholesterolemia    Tobacco abuse    Gout    Osteoarthritis    GERD (gastroesophageal reflux disease)    Obesity    Prostate cancer    Renal cell carcinoma    Nuclear sclerotic cataract of right eye    Symptomatic anemia    Iron deficiency anemia due to chronic blood loss    Melena    Chronic kidney disease, stage IV (severe)    Upper GI bleed    Absent kidney    Acquired hypothyroidism    Benign hypertensive kidney disease with chronic kidney disease    Dyslipidemia    Elevated prostate specific antigen (PSA)    Paroxysmal atrial fibrillation    Secondary hyperparathyroidism    Vitamin D deficiency    Urinary incontinence    Hematuria    Lower urinary tract symptoms (LUTS)    Chronic kidney disease, stage V    Closed right hip fracture     Past Medical History:   Diagnosis Date    Benign hypertensive CKD, stage 5 chronic kidney disease or end stage renal disease     Broken arm     Carotid stenosis     bilateral    Cerebrovascular accident 10/31/2008    Coronary artery disease     followed by Dr. Ashford; LOV 7/9/24; denies chest pain, SOB 8/5/24    Dialysis patient 2024    Current 11/13/24    Dyspnea     GERD (gastroesophageal reflux disease)     Gout     History of blood transfusion     Hypercholesterolemia     Hypertension 11/10/2015    History of hypertension; hypotensive at the time of office visit on 11/10/2015.    Impaired mobility     Obesity     Osteoarthritis     Paroxysmal atrial fibrillation     History of paroxysmal atrial fibrillation, data deficit.    Prostate cancer 01/2015    Prostate cancer, diagnosed January of 2015, status post radiation therapy x39 treatments, 07/01/2015 at [facility].    Renal cell carcinoma 2015    Renal cell carcinoma, dx March of 2015, status post left nephrectomy.    Wears dentures     instructed no adhesive DOS     Past Surgical History:   Procedure Laterality Date    CAROTID STENT Left 10/31/2008    PTA/left carotid artery stent, 10/31/2008.     COLONOSCOPY N/A 12/23/2021    Procedure: COLONOSCOPY, polypectomy, clip placement x 1;  Surgeon: Deandra Do MD;  Location: [facility] ENDOSCOPY;  Service: Gastroenterology;  Laterality: N/A;    COLONOSCOPY W/ POLYPECTOMY      CORONARY ANGIOPLASTY WITH STENT PLACEMENT      A 3.5 x 13 mm. Cypher ESTIVEN to RCA expanded with 4 mm balloon.     DIALYSIS FISTULA CREATION N/A     abdominal    ENDOSCOPY N/A 12/22/2021    Procedure: ESOPHAGOGASTRODUODENOSCOPY with biopsy;  Surgeon: Deandra Do MD;  Location: [facility] ENDOSCOPY;  Service: Gastroenterology;  Laterality: N/A;    ENDOSCOPY N/A 02/17/2023    Procedure: ESOPHAGOGASTRODUODENOSCOPY with biopsy;  Surgeon: Georgina Pool MD;  Location: [facility] ENDOSCOPY;  Service: Gastroenterology;  Laterality: N/A;    HIP INTERTROCHANTERIC NAILING Right 12/4/2024    Procedure: HIP INTERTROCHANTERIC NAILING;  Surgeon: Dean Hammonds MD;  Location: [facility] OR;  Service: Orthopedics;  Laterality: Right;    INGUINAL HERNIA REPAIR      NEPHRECTOMY Left 03/19/2015    diagnosed January 2015, status post left radical nephrectomy.     PROSTATE FIDUCIAL MARKER PLACEMENT  2016    received XRT for prostate CA in 2016      General Information       Row Name 12/05/24 1323          OT Time and Intention    Subjective Information complains of;pain  -SD     Document Type evaluation  -SD     Mode of Treatment occupational therapy  -SD     Patient Effort good  -SD     Symptoms Noted During/After Treatment increased pain  -SD       Row Name 12/05/24 1320          General Information    Patient Profile Reviewed yes  -SD     Prior Level of Function independent:;all household mobility;ADL's  -SD      Existing Precautions/Restrictions fall  -SD     Barriers to Rehab medically complex  -SD       Row Name 12/05/24 1327          Living Environment    People in Home spouse  -SD       Row Name 12/05/24 1327          Home Main Entrance    Number of Stairs, Main Entrance none  -SD       Row Name 12/05/24 1327          Cognition    Orientation Status (Cognition) oriented x 3  -SD       Row Name 12/05/24 1327          Safety Issues/Impairments Affecting Functional Mobility    Safety Issues Affecting Function (Mobility) safety precautions follow-through/compliance;safety precaution awareness  -SD     Impairments Affecting Function (Mobility) balance;endurance/activity tolerance;strength;pain  -SD               User Key  (r) = Recorded By, (t) = Taken By, (c) = Cosigned By      Initials Name Provider Type    SD Lynette Contreras OT Occupational Therapist                     Mobility/ADL's       Row Name 12/05/24 1328          Bed Mobility    Bed Mobility supine-sit;sit-supine  -SD     Supine-Sit [place] (Bed Mobility) moderate assist (50% patient effort);2 person assist  -SD     Sit-Supine [place] (Bed Mobility) moderate assist (50% patient effort);2 person assist  -SD     Bed Mobility, Safety Issues decreased use of arms for pushing/pulling;decreased use of legs for bridging/pushing;impaired trunk control for bed mobility  -SD     Assistive Device (Bed Mobility) bed rails;head of bed elevated;repositioning sheet  -SD       Row Name 12/05/24 1328          Transfers    Transfers sit-stand transfer  -SD       Row Name 12/05/24 1328          Sit-Stand Transfer    Sit-Stand [place] (Transfers) moderate assist (50% patient effort);2 person assist  -SD     Assistive Device (Sit-Stand Transfers) walker, front-wheeled  -SD       Row Name 12/05/24 1328          Functional Mobility    Functional Mobility- Ind. Level unable to perform  -SD       Row Name 12/05/24 1328          Activities of Daily Living    BADL  Assessment/Intervention bathing;upper body dressing;lower body dressing;grooming;feeding;toileting  -SD       Row Name 12/05/24 1328          Bathing Assessment/Intervention    [place] Level (Bathing) upper body;minimum assist (75% patient effort);lower body;maximum assist (25% patient effort)  -SD       Row Name 12/05/24 1328          Upper Body Dressing Assessment/Training    [place] Level (Upper Body Dressing) minimum assist (75% patient effort)  -SD       Row Name 12/05/24 1328          Lower Body Dressing Assessment/Training    [place] Level (Lower Body Dressing) don;socks;maximum assist (25% patient effort)  -SD       Row Name 12/05/24 1328          Grooming Assessment/Training    [place] Level (Grooming) minimum assist (75% patient effort)  -SD       Row Name 12/05/24 1328          Self-Feeding Assessment/Training    [place] Level (Feeding) supervision  -SD       Row Name 12/05/24 1328          Toileting Assessment/Training    [place] Level (Toileting) maximum assist (25% patient effort)  -SD               User Key  (r) = Recorded By, (t) = Taken By, (c) = Cosigned By      Initials Name Provider Type    Lynette Pemberton OT Occupational Therapist                   Obj/Interventions       Row Name 12/05/24 1330          Range of Motion Comprehensive    General Range of Motion bilateral upper extremity ROM WFL  -SD       Row Name 12/05/24 1330          Strength Comprehensive (MMT)    General Manual Muscle Testing (MMT) Assessment upper extremity strength deficits identified  -SD     Comment, General Manual Muscle Testing (MMT) Assessment UB 4-/5  -SD               User Key  (r) = Recorded By, (t) = Taken By, (c) = Cosigned By      Initials Name Provider Type    Lynette Pemberton OT Occupational Therapist                   Goals/Plan       Row Name 12/05/24 1338          Transfer Goal 1 (OT)    Activity/Assistive Device (Transfer Goal 1, OT) sit-to-stand/stand-to-sit;walker,  rolling  -SD     [place] Level/Cues Needed (Transfer Goal 1, OT) minimum assist (75% or more patient effort)  -SD     Time Frame (Transfer Goal 1, OT) long term goal (LTG)  -SD     Progress/Outcome (Transfer Goal 1, OT) goal ongoing  -SD       Row Name 12/05/24 1338          Dressing Goal 1 (OT)    Activity/Device (Dressing Goal 1, OT) lower body dressing  -SD     [place]/Cues Needed (Dressing Goal 1, OT) moderate assist (50-74% patient effort)  -SD     Time Frame (Dressing Goal 1, OT) 2 weeks  -SD     Progress/Outcome (Dressing Goal 1, OT) goal ongoing  -SD       Row Name 12/05/24 1338          Toileting Goal 1 (OT)    Activity/Device (Toileting Goal 1, OT) toileting skills, all;commode, bedside without drop arms  -SD     [place] Level/Cues Needed (Toileting Goal 1, OT) moderate assist (50-74% patient effort)  -SD     Time Frame (Toileting Goal 1, OT) 2 weeks  -SD     Progress/Outcome (Toileting Goal 1, OT) goal ongoing  -SD       Row Name 12/05/24 1338          Strength Goal 1 (OT)    Strength Goal 1 (OT) Patient to perform UB ther ex as tolerated  -SD     Time Frame (Strength Goal 1, OT) long term goal (LTG)  -SD     Progress/Outcome (Strength Goal 1, OT) goal ongoing  -SD       Row Name 12/05/24 1332          Therapy Assessment/Plan (OT)    Planned Therapy Interventions (OT) activity tolerance training;adaptive equipment training;BADL retraining;patient/caregiver education/training;ROM/therapeutic exercise;strengthening exercise;transfer/mobility retraining  -SD               User Key  (r) = Recorded By, (t) = Taken By, (c) = Cosigned By      Initials Name Provider Type    Lynette Pemberton OT Occupational Therapist                   Clinical Impression       Row Name 12/05/24 1330          Pain Assessment    Pretreatment Pain Rating 8/10  -SD     Posttreatment Pain Rating 8/10  -SD     Pain Location extremity  -SD     Pain Side/Orientation right;lower  -SD     Pain Management Interventions  exercise or physical activity utilized  -SD     Response to Pain Interventions no change per patient report  -SD       Row Name 12/05/24 1330          Plan of Care Review    Plan of Care Reviewed With patient  -SD     Progress no change  -SD     Outcome Evaluation OT eval completed. Patient is supine in bed and willing to participate. Patient is Ox3, denies pain at rest. Patient reports he lives with his wife, does HD at home. Patient reports he is normally ind with HH mobility and ADLs. Patient performed supine to sit with mod A x 2, able to sit unsupported CGA. Patient performed sit to stand with mod A x 2, unable to advance mobility further d/t decreased motor control of RLE and pain. Patient requires min A for UB self care, max A for LB. Patient is expected to benefit from continued OT services prior to DC to address deficits in strength, endurance, balance, mobility and ADL performance. Patient would benefit from STR to facilitate safe return to PLOF.  -SD       Row Name 12/05/24 1330          Therapy Assessment/Plan (OT)    Patient/Family Therapy Goal Statement (OT) increase strength and mobility  -SD     Rehab Potential (OT) good  -SD     Criteria for Skilled Therapeutic Interventions Met (OT) skilled treatment is necessary  -SD     Therapy Frequency (OT) 5 times/wk  Mon-Fri  -SD       Row Name 12/05/24 1330          Therapy Plan Review/Discharge Plan (OT)    Anticipated Discharge Disposition (OT) inpatient rehabilitation facility  -SD       Row Name 12/05/24 1330          Vital Signs    O2 Delivery Pre Treatment room air  -SD     O2 Delivery Intra Treatment room air  -SD     O2 Delivery Post Treatment room air  -SD       Row Name 12/05/24 1330          Positioning and Restraints    Pre-Treatment Position in bed  -SD     Post Treatment Position bed  -SD     In Bed fowlers;call light within reach;encouraged to call for assist;exit alarm on  -SD               User Key  (r) = Recorded By, (t) = Taken By, (c) =  Cosigned By      Initials Name Provider Type    Lynette Pemberton OT Occupational Therapist                   Outcome Measures       Row Name 12/05/24 1340          How much help from another is currently needed...    Putting on and taking off regular lower body clothing? 2  -SD     Bathing (including washing, rinsing, and drying) 2  -SD     Toileting (which includes using toilet bed pan or urinal) 2  -SD     Putting on and taking off regular upper body clothing 3  -SD     Taking care of personal grooming (such as brushing teeth) 3  -SD     Eating meals 3  -SD     AM-PAC 6 Clicks Score (OT) 15  -SD       Row Name 12/05/24 0938 12/05/24 0739       How much help from another person do you currently need...    Turning from your back to your side while in flat bed without using bedrails? 2 (P)   -RK 2  -TS    Moving from lying on back to sitting on the side of a flat bed without bedrails? 2 (P)   -RK 2  -TS    Moving to and from a bed to a chair (including a wheelchair)? 2 (P)   -RK 2  -TS    Standing up from a chair using your arms (e.g., wheelchair, bedside chair)? 2 (P)   -RK 2  -TS    Climbing 3-5 steps with a railing? 1 (P)   -RK 1  -TS    To walk in hospital room? 1 (P)   -RK 2  -TS    AM-PAC 6 Clicks Score (PT) 10 (P)   -RK 11  -TS    Highest Level of Mobility Goal 4 --> Transfer to chair/commode (P)   -RK 4 --> Transfer to chair/commode  -TS      Row Name 12/05/24 1340 12/05/24 0938       Functional Assessment    Outcome Measure Options AM-PAC 6 Clicks Daily Activity (OT)  -SD AM-PAC 6 Clicks Basic Mobility (PT) (P)   -RK              User Key  (r) = Recorded By, (t) = Taken By, (c) = Cosigned By      Initials Name Provider Type    Lynette Pemberton OT Occupational Therapist    Mignon Ji, RN Registered Nurse    Anabel Cooper, PT Student PT Student                    Occupational Therapy Education       Title: PT OT SLP Therapies (In Progress)       Topic: Occupational Therapy (In  Progress)       Point: ADL training (Done)       Description:   Instruct learner(s) on proper safety adaptation and remediation techniques during self care or transfers.   Instruct in proper use of assistive devices.                  Learning Progress Summary            Patient Acceptance, E,TB, VU by SD at 12/5/2024 1340    Comment: OT POC                      Point: Home exercise program (Not Started)       Description:   Instruct learner(s) on appropriate technique for monitoring, assisting and/or progressing therapeutic exercises/activities.                  Learner Progress:  Not documented in this visit.              Point: Precautions (Not Started)       Description:   Instruct learner(s) on prescribed precautions during self-care and functional transfers.                  Learner Progress:  Not documented in this visit.              Point: Body mechanics (Not Started)       Description:   Instruct learner(s) on proper positioning and spine alignment during self-care, functional mobility activities and/or exercises.                  Learner Progress:  Not documented in this visit.                              User Key       Initials Effective Dates Name Provider Type Discipline    SD 06/16/21 -  Lynette Contreras OT Occupational Therapist OT                  OT Recommendation and Plan  Planned Therapy Interventions (OT): activity tolerance training, adaptive equipment training, BADL retraining, patient/caregiver education/training, ROM/therapeutic exercise, strengthening exercise, transfer/mobility retraining  Therapy Frequency (OT): 5 times/wk (Mon-Fri)  Plan of Care Review  Plan of Care Reviewed With: patient  Progress: no change  Outcome Evaluation: OT eval completed. Patient is supine in bed and willing to participate. Patient is Ox3, denies pain at rest. Patient reports he lives with his wife, does HD at home. Patient reports he is normally ind with HH mobility and ADLs. Patient performed supine to sit  with mod A x 2, able to sit unsupported CGA. Patient performed sit to stand with mod A x 2, unable to advance mobility further d/t decreased motor control of RLE and pain. Patient requires min A for UB self care, max A for LB. Patient is expected to benefit from continued OT services prior to DC to address deficits in strength, endurance, balance, mobility and ADL performance. Patient would benefit from STR to facilitate safe return to PLOF.     Time Calculation:   Evaluation Complexity (OT)  Review Occupational Profile/Medical/Therapy History Complexity: expanded/moderate complexity  Assessment, Occupational Performance/Identification of Deficit Complexity: 3-5 performance deficits  Clinical Decision Making Complexity (OT): detailed assessment/moderate complexity  Overall Complexity of Evaluation (OT): moderate complexity     Time Calculation- OT       Row Name 12/05/24 1341             Time Calculation- OT    OT Start Time 0939  -SD      OT Received On 12/05/24  -SD      OT Goal Re-Cert Due Date 12/17/24  -SD         Untimed Charges    OT Eval/Re-eval Minutes 60  -SD         Total Minutes    Untimed Charges Total Minutes 60  -SD       Total Minutes 60  -SD                User Key  (r) = Recorded By, (t) = Taken By, (c) = Cosigned By      Initials Name Provider Type    Lynette Pemberton OT Occupational Therapist                  Therapy Charges for Today       Code Description Service Date Service Provider Modifiers Qty    [id number]  OT EVAL MOD COMPLEXITY 4 12/5/2024 Lynette Contreras OT GO 1                 Lynette Contreras OT  12/5/2024    Electronically signed by Lynette Contreras OT at 12/05/24 1341

## 2024-12-07 LAB
ANION GAP SERPL CALCULATED.3IONS-SCNC: 11.5 MMOL/L (ref 5–15)
BUN SERPL-MCNC: 42 MG/DL (ref 8–23)
BUN/CREAT SERPL: 6.6 (ref 7–25)
CALCIUM SPEC-SCNC: 8.8 MG/DL (ref 8.6–10.5)
CHLORIDE SERPL-SCNC: 97 MMOL/L (ref 98–107)
CO2 SERPL-SCNC: 25.5 MMOL/L (ref 22–29)
CREAT SERPL-MCNC: 6.38 MG/DL (ref 0.76–1.27)
DEPRECATED RDW RBC AUTO: 52.7 FL (ref 37–54)
EGFRCR SERPLBLD CKD-EPI 2021: 8.3 ML/MIN/1.73
ERYTHROCYTE [DISTWIDTH] IN BLOOD BY AUTOMATED COUNT: 15.6 % (ref 12.3–15.4)
GLUCOSE SERPL-MCNC: 153 MG/DL (ref 65–99)
HCT VFR BLD AUTO: 28.6 % (ref 37.5–51)
HGB BLD-MCNC: 9.3 G/DL (ref 13–17.7)
MCH RBC QN AUTO: 30.1 PG (ref 26.6–33)
MCHC RBC AUTO-ENTMCNC: 32.5 G/DL (ref 31.5–35.7)
MCV RBC AUTO: 92.6 FL (ref 79–97)
PLATELET # BLD AUTO: 127 10*3/MM3 (ref 140–450)
PMV BLD AUTO: 12 FL (ref 6–12)
POTASSIUM SERPL-SCNC: 4.4 MMOL/L (ref 3.5–5.2)
RBC # BLD AUTO: 3.09 10*6/MM3 (ref 4.14–5.8)
SODIUM SERPL-SCNC: 134 MMOL/L (ref 136–145)
WBC NRBC COR # BLD AUTO: 9.01 10*3/MM3 (ref 3.4–10.8)

## 2024-12-07 PROCEDURE — 25010000002 ENOXAPARIN PER 10 MG: Performed by: ORTHOPAEDIC SURGERY

## 2024-12-07 PROCEDURE — 80048 BASIC METABOLIC PNL TOTAL CA: CPT | Performed by: INTERNAL MEDICINE

## 2024-12-07 PROCEDURE — 85027 COMPLETE CBC AUTOMATED: CPT | Performed by: INTERNAL MEDICINE

## 2024-12-07 PROCEDURE — 97530 THERAPEUTIC ACTIVITIES: CPT

## 2024-12-07 PROCEDURE — 99232 SBSQ HOSP IP/OBS MODERATE 35: CPT | Performed by: INTERNAL MEDICINE

## 2024-12-07 PROCEDURE — 25010000002 MORPHINE PER 10 MG: Performed by: ORTHOPAEDIC SURGERY

## 2024-12-07 RX ADMIN — CARVEDILOL 12.5 MG: 12.5 TABLET, FILM COATED ORAL at 08:11

## 2024-12-07 RX ADMIN — CYANOCOBALAMIN TAB 500 MCG 1000 MCG: 500 TAB at 08:11

## 2024-12-07 RX ADMIN — MORPHINE SULFATE 2 MG: 2 INJECTION, SOLUTION INTRAMUSCULAR; INTRAVENOUS at 10:51

## 2024-12-07 RX ADMIN — LOSARTAN POTASSIUM 100 MG: 50 TABLET, FILM COATED ORAL at 08:11

## 2024-12-07 RX ADMIN — SODIUM CHLORIDE, SODIUM LACTATE, CALCIUM CHLORIDE, MAGNESIUM CHLORIDE AND DEXTROSE 2000 ML: 2.5; 538; 448; 25.7; 5.08 INJECTION, SOLUTION INTRAPERITONEAL at 18:13

## 2024-12-07 RX ADMIN — SODIUM CHLORIDE, SODIUM LACTATE, CALCIUM CHLORIDE, MAGNESIUM CHLORIDE AND DEXTROSE 2000 ML: 2.5; 538; 448; 25.7; 5.08 INJECTION, SOLUTION INTRAPERITONEAL at 08:08

## 2024-12-07 RX ADMIN — HYDROCODONE BITARTRATE AND ACETAMINOPHEN 2 TABLET: 7.5; 325 TABLET ORAL at 09:53

## 2024-12-07 RX ADMIN — SENNOSIDES AND DOCUSATE SODIUM 2 TABLET: 50; 8.6 TABLET ORAL at 08:11

## 2024-12-07 RX ADMIN — Medication 5000 UNITS: at 08:11

## 2024-12-07 RX ADMIN — SODIUM CHLORIDE, SODIUM LACTATE, CALCIUM CHLORIDE, MAGNESIUM CHLORIDE AND DEXTROSE 2000 ML: 2.5; 538; 448; 25.7; 5.08 INJECTION, SOLUTION INTRAPERITONEAL at 23:09

## 2024-12-07 RX ADMIN — ROSUVASTATIN CALCIUM 40 MG: 20 TABLET, FILM COATED ORAL at 20:42

## 2024-12-07 RX ADMIN — Medication 5 MG: at 20:42

## 2024-12-07 RX ADMIN — LEVOTHYROXINE SODIUM 25 MCG: 0.03 TABLET ORAL at 08:11

## 2024-12-07 RX ADMIN — CARVEDILOL 12.5 MG: 12.5 TABLET, FILM COATED ORAL at 18:16

## 2024-12-07 RX ADMIN — Medication 10 ML: at 08:11

## 2024-12-07 RX ADMIN — SODIUM CHLORIDE, SODIUM LACTATE, CALCIUM CHLORIDE, MAGNESIUM CHLORIDE AND DEXTROSE 2000 ML: 2.5; 538; 448; 25.7; 5.08 INJECTION, SOLUTION INTRAPERITONEAL at 13:03

## 2024-12-07 RX ADMIN — PANTOPRAZOLE SODIUM 40 MG: 40 TABLET, DELAYED RELEASE ORAL at 08:11

## 2024-12-07 RX ADMIN — Medication 10 ML: at 20:43

## 2024-12-07 RX ADMIN — ENOXAPARIN SODIUM 30 MG: 100 INJECTION SUBCUTANEOUS at 08:11

## 2024-12-07 RX ADMIN — CALCITRIOL 0.25 MCG: 0.25 CAPSULE ORAL at 08:11

## 2024-12-07 NOTE — PROGRESS NOTES
[facility]    Name:  Travon Plummer   Age:  79 y.o.  Sex:  male  :  1945  MRN:  [id number]   Visit Number:  [id number]  Admission Date:  12/3/2024  Date Of Service:  24  Primary Care Physician:  Chilango Erickson MD     LOS: 4 days :    Chief Complaint:      Weakness/pain    Subjective:    2024: Case management and PT OT notes reviewed.  Hepatitis negative.  Discussed with case management/NAI. [facility] considering him.  Labs pending. Working with therapy improving, premedicating with pain meds help.    History of presenting illness:    79-year-old male with past medical history of CKD, CAD, paroxysmal atrial fibrillation, ESRD on HD, hypertension, hyperlipidemia, obesity, renal cell carcinoma,  status post nephrectomy.  Chief complaint fall.  Patient reports he was walking around his house and tripped on his wife's oxygen cord.  Afterwards he felt pain in his right hip.  No loss of consciousness or head trauma suffered.  Was noted to have right intertrochanteric hip fracture in the ED Dr. Poon was notified who will see the patient in consult..  Nephrology has been notified.  Full code.    Pertinent findings: Creatinine 4.93, hemoglobin 11.4, right hip x-ray showing intertrochanteric hip fracture, CT of the cervical spine with degenerative change and osteopenia, CT of the head showing atrophy and a left maxillary polyp, CT of the pelvis showing mildly displaced comminuted right intratrochanteric fracture remote left superior and inferior pubic rami fractures,  Edited by: Justin Brown DO at 2024 1111     Review of Systems:     All systems were reviewed and negative except as mentioned in subjective, assessment and plan.    Vital Signs:    Temp:  [97.7 °F (36.5 °C)-98.4 °F (36.9 °C)] 98.2 °F (36.8 °C)  Heart Rate:  [53-71] 61  Resp:  [18] 18  BP: (141-168)/(69-77) 168/77    Intake and output:    I/O last 3 completed  "shifts:  In: 01103 [P.O.:420]  Out: 8850   I/O this shift:  In: 2600 [P.O.:600]  Out: 1900     Physical Examination:    Constitutional: Awake, alert  Eyes: PERRLA, sclerae anicteric, no conjunctival injection  HENT: NCAT, mucous membranes moist  Neck: Supple, no thyromegaly, no lymphadenopathy, trachea midline  Respiratory: Clear to auscultation bilaterally, nonlabored respirations   Cardiovascular: RRR, no murmurs, rubs, or gallops, palpable pedal pulses bilaterally  Gastrointestinal: Positive bowel sounds, soft, nontender, nondistended  Musculoskeletal: No bilateral ankle edema, no clubbing or cyanosis to extremities  Psychiatric: Appropriate affect, cooperative  Neurologic: Oriented x 3, speech clear  Skin: No rashes  Exam stable 12/7/2024  Edited by: Justin Brown, DO at 12/7/2024 1111     Laboratory results:    Results from last 7 days   Lab Units 12/07/24  0918 12/06/24 0425 12/05/24  0609 12/04/24  0529 12/03/24  1458   SODIUM mmol/L 134* 134* 143 139 141   POTASSIUM mmol/L 4.4 4.6 5.2 4.8 4.8   CHLORIDE mmol/L 97* 101 99 103 103   CO2 mmol/L 25.5 24.6 24.8 25.5 26.9   BUN mg/dL 42* 42* 36* 27* 26*   CREATININE mg/dL 6.38* 5.94* 5.24* 4.66* 4.93*   CALCIUM mg/dL 8.8 8.6 9.5 9.1 9.3   BILIRUBIN mg/dL  --   --   --  0.3 0.4   ALK PHOS U/L  --   --   --  91 100   ALT (SGPT) U/L  --   --   --  10 13   AST (SGOT) U/L  --   --   --  10 16   GLUCOSE mg/dL 153* 113* 136* 95 99     Results from last 7 days   Lab Units 12/07/24  0918 12/06/24  0425 12/05/24  0609   WBC 10*3/mm3 9.01 11.01* 11.78*   HEMOGLOBIN g/dL 9.3* 8.6* 10.1*   HEMATOCRIT % 28.6* 26.6* 32.2*   PLATELETS 10*3/mm3 127* 125* 132*                 No results for input(s): \"PHART\", \"QGE1EUV\", \"PO2ART\", \"LNC2YOM\", \"BASEEXCESS\" in the last 8760 hours.   I have reviewed the patient's laboratory results.    Radiology results:    No radiology results from the last 24 hrs  I have reviewed the patient's radiology reports.    Medication Review:     I have " reviewed the patient's active and prn medications.     Problem List:      Closed right hip fracture    Paroxysmal atrial fibrillation    Chronic kidney disease, stage V      Assessment/Plan:      Closed intertrochanteric right hip fracture  --  Status post right hip gamma nail Dr. Hammonds 12/4/2024.  -- Active Treatments: Hold Bumex, pain control dilaudid and tylenol, continue beta-blocker, hold losartan  -- Pending Results: will need to monitor hemoglobin closely on prophylaxis, EKG      Paroxysmal atrial fibrillation  --Chronic medical problem  -- Active Treatments: Not on anticoagulation due to history of anemia  -- Pending Results: Monitor telemetry      Chronic kidney disease, stage V  -- Chronic medical problem Dr. Sellers following  -- Active Treatments: Continue PD              DVT Prophylaxis: lovenox will need 4 weeks  Code Status: Full code  Diet: Cardiac renal,   Disposition: Anticipate short-term rehab in 2 to 3 days          Edited by: Justin Brown DO at 12/7/2024 1111           Justin Brown DO  12/07/24  11:13 EST    Dictated utilizing Dragon dictation.

## 2024-12-07 NOTE — CASE MANAGEMENT/SOCIAL WORK
Faxed PD run sheets and order to [facility] Possible acceptance there on Monday will need to confirm Monday

## 2024-12-07 NOTE — PROGRESS NOTES
[facility]"" Progress Note  [facility]. [place]        Patient Name: Travon Plummer  : 1945  MRN: [id number]   LOS: 4 days    Patient Care Team:  Chilango Erickson MD as PCP - General (Internal Medicine)  Andrea Sellers MD, JODY as Consulting Physician (Nephrology)  Deandra Do MD as Surgeon (General Surgery)  Leonard Ashford MD as Consulting Physician (Cardiology)  Georgina Pool MD as Consulting Physician (Gastroenterology)    Chief Complaint:    Chief Complaint   Patient presents with    Fall     Pt tripped on his wife's O2 tubing onto his right hip. Pt's only complaint is right hip pain.     Hip Pain     Primary Care Physician:  Chilango Erickson MD  Date of admission: 12/3/2024    Subjective     Interval History:   Follow-up ESRD.  Status post fall and hip fracture.  Patient is laying in the bed awake alert and interactive, he said he feels a lot better.  Denies any chest pain or shortness of breath.  Pain is under very good control.  Events noted from last 24 hours.  I reviewed the chart and other providers notes, labs and procedures done since my last note.    Review of Systems:   As noted above.    Objective     Vitals:   Temp:  [97.7 °F (36.5 °C)-98.5 °F (36.9 °C)] 98.2 °F (36.8 °C)  Heart Rate:  [53-71] 53  Resp:  [18] 18  BP: (141-168)/(69-77) 168/77    Intake/Output Summary (Last 24 hours) at 2024 0808  Last data filed at 2024 2352  Gross per 24 hour   Intake 8420 ml   Output 7700 ml   Net 720 ml       Physical Exam:    General Appearance: alert, oriented x 3, no acute distress   Skin: warm and dry  HEENT: oral mucosa normal, nonicteric sclera  Neck: supple, no JVD  Lungs: CTA  Heart: RRR, normal S1 and S2  Abdomen: obese, soft, nontender, non distended and positive bowel sounds.  Peritoneal dialysis catheter in place.  : no palpable bladder  Extremities: Trace edema, no cyanosis or clubbing  Neuro: normal  speech and mental status     Scheduled Meds:     Current Facility-Administered Medications   Medication Dose Route Frequency Provider Last Rate Last Admin    acetaminophen (TYLENOL) tablet 650 mg  650 mg Oral Q4H PRN Dean Hammonds MD        sennosides-docusate (PERICOLACE) 8.6-50 MG per tablet 2 tablet  2 tablet Oral BID PRN Dean Hammonds MD        And    polyethylene glycol (MIRALAX) packet 17 g  17 g Oral Daily PRN Dean Hammonds MD        And    bisacodyl (DULCOLAX) EC tablet 5 mg  5 mg Oral Daily PRN Dean Hammonds MD   5 mg at 12/06/24 1104    And    bisacodyl (DULCOLAX) suppository 10 mg  10 mg Rectal Daily PRN Dean Hammonds MD        calcitriol (ROCALTROL) capsule 0.25 mcg  0.25 mcg Oral Daily Dean Hammonds MD   0.25 mcg at 12/06/24 0811    calcium carbonate (TUMS) chewable tablet 500 mg (200 mg elemental)  2 tablet Oral BID PRN Dean Hammonds MD        Calcium Replacement - Follow Nurse / BPA Driven Protocol   Not Applicable PRN Dean Hammonds MD        carvedilol (COREG) tablet 12.5 mg  12.5 mg Oral BID With Meals Dean Hammonds MD   12.5 mg at 12/06/24 1714    Enoxaparin Sodium (LOVENOX) syringe 30 mg  30 mg Subcutaneous Daily Dean Hammonds MD   30 mg at 12/06/24 0811    [START ON 12/10/2024] ferrous sulfate EC tablet 324 mg  324 mg Oral Weekly Dean Hammonds MD        HYDROcodone-acetaminophen (NORCO) 7.5-325 MG per tablet 1 tablet  1 tablet Oral Q4H PRN Dean Hammonds MD   1 tablet at 12/06/24 1714    HYDROcodone-acetaminophen (NORCO) 7.5-325 MG per tablet 2 tablet  2 tablet Oral Q4H PRN Dean Hammonds MD        HYDROmorphone (DILAUDID) injection 0.5 mg  0.5 mg Intravenous Q2H PRN Dean Hammonds MD   0.5 mg at 12/04/24 1202    levothyroxine (SYNTHROID, LEVOTHROID) tablet 25 mcg  25 mcg Oral Daily Dean Hammonds MD   25 mcg at 12/06/24 0811    losartan (COZAAR) tablet 100 mg  100 mg Oral Q24H Dean Hammonds MD   100 mg at 12/06/24 0811    Magnesium Standard Dose  Replacement - Follow Nurse / BPA Driven Protocol   Not Applicable Dean Blanc MD        melatonin tablet 5 mg  5 mg Oral Nightly Dean Hammonds MD   5 mg at 12/06/24 2010    Morphine sulfate (PF) injection 2 mg  2 mg Intravenous Q4H PRN Dean Hammonds MD   2 mg at 12/05/24 0622    And    naloxone (NARCAN) injection 0.4 mg  0.4 mg Intravenous Q5 Min PRN Dean Hammonds MD        nitroglycerin (NITROSTAT) SL tablet 0.4 mg  0.4 mg Sublingual Q5 Min PRN Dean Hammonds MD        ondansetron ODT (ZOFRAN-ODT) disintegrating tablet 4 mg  4 mg Oral Q6H PRN Dean Hammonds MD        Or    ondansetron (ZOFRAN) injection 4 mg  4 mg Intravenous Q6H PRN Dean Hammonds MD        ondansetron (ZOFRAN) injection 4 mg  4 mg Intravenous Q6H PRN Dean Hammonds MD        pantoprazole (PROTONIX) EC tablet 40 mg  40 mg Oral Daily Dean Hammonds MD   40 mg at 12/06/24 0811    Phosphorus Replacement - Follow Nurse / BPA Driven Protocol   Not Applicable PRN Dean Hammonds MD        Potassium Replacement - Follow Nurse / BPA Driven Protocol   Not Applicable Dean Blanc MD        rosuvastatin (CRESTOR) tablet 40 mg  40 mg Oral Nightly Dean Hammonds MD   40 mg at 12/06/24 2011    sodium chloride 0.9 % flush 10 mL  10 mL Intravenous Q12H Dean Hammonds MD   10 mL at 12/06/24 2011    sodium chloride 0.9 % flush 10 mL  10 mL Intravenous PRN Dean Hammonds MD        sodium chloride 0.9 % infusion 40 mL  40 mL Intravenous PRN Dean Hammonds MD        UltraBag/Dianeal PD-2/2.5% Dex (keenan #[id number]) (DIANEAL) 2,000 mL  2,000 mL Intraperitoneal 4 Exchanges Daily - Dwell Overnight Andrea Sellers MD, FASN   2,000 mL at 12/06/24 2303    vitamin B-12 (CYANOCOBALAMIN) tablet 1,000 mcg  1,000 mcg Oral Daily Dean Hammonds MD   1,000 mcg at 12/06/24 0811    vitamin D3 capsule 5,000 Units  5,000 Units Oral Daily Dean Hammonds MD   5,000 Units at 12/06/24 0811       calcitriol, 0.25 mcg, Oral, Daily  carvedilol,  "12.5 mg, Oral, BID With Meals  enoxaparin, 30 mg, Subcutaneous, Daily  [START ON 12/10/2024] ferrous sulfate, 324 mg, Oral, Weekly  levothyroxine, 25 mcg, Oral, Daily  losartan, 100 mg, Oral, Q24H  melatonin, 5 mg, Oral, Nightly  pantoprazole, 40 mg, Oral, Daily  rosuvastatin, 40 mg, Oral, Nightly  sodium chloride, 10 mL, Intravenous, Q12H  UltraBag/Dianeal PD-2/2.5% Dex (keenan #[id number]), 2,000 mL, Intraperitoneal, 4 Exchanges Daily - Dwell Overnight  vitamin B-12, 1,000 mcg, Oral, Daily  vitamin D3, 5,000 Units, Oral, Daily        IV Meds:        Results Reviewed:   I have personally reviewed the results from the time of this admission to 12/7/2024 08:08 EST     Results from last 7 days   Lab Units 12/06/24 0425 12/05/24  0609 12/04/24  0529 12/03/24  1458   SODIUM mmol/L 134* 143 139 141   POTASSIUM mmol/L 4.6 5.2 4.8 4.8   CHLORIDE mmol/L 101 99 103 103   CO2 mmol/L 24.6 24.8 25.5 26.9   BUN mg/dL 42* 36* 27* 26*   CREATININE mg/dL 5.94* 5.24* 4.66* 4.93*   CALCIUM mg/dL 8.6 9.5 9.1 9.3   BILIRUBIN mg/dL  --   --  0.3 0.4   ALK PHOS U/L  --   --  91 100   ALT (SGPT) U/L  --   --  10 13   AST (SGOT) U/L  --   --  10 16   GLUCOSE mg/dL 113* 136* 95 99       Estimated Creatinine Clearance: 13 mL/min (A) (by C-G formula based on SCr of 5.94 mg/dL (H)).                Results from last 7 days   Lab Units 12/06/24  0425 12/05/24  0609 12/04/24  0529 12/03/24  1458   WBC 10*3/mm3 11.01* 11.78* 9.57 10.14   HEMOGLOBIN g/dL 8.6* 10.1* 10.8* 11.4*   PLATELETS 10*3/mm3 125* 132* 134* 136*             Brief Urine Lab Results  (Last result in the past 365 days)        Color   Clarity   Blood   Leuk Est   Nitrite   Protein   CREAT   Urine HCG        07/17/24 1436 Yellow   Clear   Small   Negative   Negative   300 mg/dL                   No results found for: \"UTPCR\"    Imaging Results (Last 24 Hours)       ** No results found for the last 24 hours. **                Assessment / Plan     ASSESSMENT:    Closed right hip " fracture    Paroxysmal atrial fibrillation    Chronic kidney disease, stage V    End-stage renal disease: Patient with longstanding history of diabetes and hypertension as well as renal cell carcinoma status post left nephrectomy leading to end-stage renal disease.  Patient has been on peritoneal dialysis and has done very well.  Will continue peritoneal dialysis while in the hospital.  Type 2 diabetes: Continue with the sliding scale as per hospitalist service.  Hypertension: Will resume all home medications, he has been under very good control with the medications that he is on.  Anemia: He has responded well to IV iron and not getting CHARLEY currently does not appear to have any issues, likely will drop after surgery.  Hyperparathyroidism: Continue calcitriol for the time being.  Will continue to follow calcium phosphorus and PTH as needed.  Hip fracture: Surgical intervention planned as noted.  Status post right hip gamma nail procedure.  Paroxysmal atrial fibrillation: He has been noted to be in sinus rhythm.     PLAN:  His blood pressure is fairly stable, tolerating peritoneal dialysis with 2.5% solution well.  Continue the same.  Details were discussed with the patient no family in the room.  Patient is very comfortable going home with home PT and OT.  Now he is very comfortable [facility] the [facility].    Details were also discussed with the hospitalist service and or other providers as needed.   Continue with rest of the current treatment plan, and monitor with surveillance labs.  Further recommendations will depend on clinical course of the patient during the current hospitalization.   I have reviewed the copied text to this note, it was edited and the changes made as needed.  It is accurate to the point, when the note was signed today.     Thank you for involving us in the care of Travon Plummer.  Please feel free to call with any questions.    Andrea Sellers MD, FASN  12/07/24  08:08  EST    [facility]  [phone]      Part of this note may be an electronic transcription/translation of spoken language to printed text using the Dragon Dictation System.

## 2024-12-07 NOTE — PLAN OF CARE
Goal Outcome Evaluation:           Progress: no change  Outcome Evaluation: no acute events during shift. pt refusing turning. vss on room air.

## 2024-12-07 NOTE — PLAN OF CARE
Problem: Pain Acute  Goal: Optimal Pain Control and Function  Outcome: Progressing  Intervention: Optimize Psychosocial Wellbeing  Recent Flowsheet Documentation  Taken 12/7/2024 0808 by Elizabeth Beauchamp RN  Diversional Activities:   television   smartphone  Intervention: Develop Pain Management Plan  Recent Flowsheet Documentation  Taken 12/7/2024 0808 by Elizabeth Beauchamp RN  Pain Management Interventions: no interventions per patient request  Intervention: Prevent or Manage Pain  Recent Flowsheet Documentation  Taken 12/7/2024 0808 by Eilzabeth Beauchamp RN  Medication Review/Management: medications reviewed     Problem: Fall Injury Risk  Goal: Absence of Fall and Fall-Related Injury  Outcome: Progressing  Intervention: Identify and Manage Contributors  Recent Flowsheet Documentation  Taken 12/7/2024 0808 by Elizabeth Beauchamp RN  Medication Review/Management: medications reviewed  Intervention: Promote Injury-Free Environment  Recent Flowsheet Documentation  Taken 12/7/2024 1600 by Elizabeth Beauchamp RN  Safety Promotion/Fall Prevention:   safety round/check completed   activity supervised   assistive device/personal items within reach   clutter free environment maintained   fall prevention program maintained   nonskid shoes/slippers when out of bed  Taken 12/7/2024 1400 by Elizabeth Beauchamp RN  Safety Promotion/Fall Prevention:   safety round/check completed   activity supervised   assistive device/personal items within reach   clutter free environment maintained   fall prevention program maintained   nonskid shoes/slippers when out of bed  Taken 12/7/2024 1200 by Elizabeth Beauchamp RN  Safety Promotion/Fall Prevention:   safety round/check completed   activity supervised   assistive device/personal items within reach   clutter free environment maintained   fall prevention program maintained   nonskid shoes/slippers when out of bed  Taken 12/7/2024 1000 by Elizabeth Beauchamp RN  Safety Promotion/Fall  Prevention:   safety round/check completed   activity supervised   assistive device/personal items within reach   clutter free environment maintained   fall prevention program maintained   nonskid shoes/slippers when out of bed  Taken 12/7/2024 0808 by Elizabeth Beauchamp RN  Safety Promotion/Fall Prevention:   safety round/check completed   activity supervised   clutter free environment maintained   assistive device/personal items within reach   fall prevention program maintained   Goal Outcome Evaluation:  Plan of Care Reviewed With: patient        Progress: improving  Outcome Evaluation: VSS. PRN pain medcation given for right hip pain as needed, see MAR. PD comleted as ordered, toerated well. Plan of care to be continued.

## 2024-12-07 NOTE — PLAN OF CARE
Goal Outcome Evaluation:  Plan of Care Reviewed With: patient           Outcome Evaluation: PT treatment completed this date. Pt had been provided oral pain medication prior to PT treatment but his pain remained 10/10. IV push given with pain improving to 8/10 prior to treatment. A pillow was placed under the RLE and pt encouraged to use his UEs to progress RLE to EOB by using the pillow to move the leg. Mod assist provided to move both L and RLE to EOB. PT needed max assist to get his trunk upright into sitting. Once on EOB pt showed improvement in sitting balance and was able to sit on EOB with SBA and pt was able to scoot forward in sitting to get BLE onto floor with CGA. Pt performed 2 sit to stand transfers with max assist each time. Pt had difficulty using BUE to assist with pushing up and UE's remained flexed on walker in standing. Pt encouraged to ext elbows to assist with getting wt off RLE as pain increased to 10/10. Pt unable to take a step with either LE in standing. Pt had limited controll when returning to sit on EOB. Partial range heel slides with RLE with pain limiting and pt was independent with B ankle pumps. With mod assist pt rolled to the L to place pillow under R hip to get wt off buttocks. Pillows positioned between thighs to assist with RLE pain. Cont current PT POC.

## 2024-12-07 NOTE — THERAPY TREATMENT NOTE
Patient Name: Travon Plummer  : 1945    MRN: [id number]                              Today's Date: 2024       Admit Date: 12/3/2024    Visit Dx:     ICD-10-CM ICD-9-CM   1. Closed nondisplaced intertrochanteric fracture of right femur, initial encounter  S72.144A 820.21   2. Closed fracture of right hip, initial encounter  S72.001A 820.8     Patient Active Problem List   Diagnosis    Coronary artery disease    Dyspnea    Cerebrovascular accident    Essential hypertension    Hypercholesterolemia    Tobacco abuse    Gout    Osteoarthritis    GERD (gastroesophageal reflux disease)    Obesity    Prostate cancer    Renal cell carcinoma    Nuclear sclerotic cataract of right eye    Symptomatic anemia    Iron deficiency anemia due to chronic blood loss    Melena    Chronic kidney disease, stage IV (severe)    Upper GI bleed    Absent kidney    Acquired hypothyroidism    Benign hypertensive kidney disease with chronic kidney disease    Dyslipidemia    Elevated prostate specific antigen (PSA)    Paroxysmal atrial fibrillation    Secondary hyperparathyroidism    Vitamin D deficiency    Urinary incontinence    Hematuria    Lower urinary tract symptoms (LUTS)    Chronic kidney disease, stage V    Closed right hip fracture     Past Medical History:   Diagnosis Date    Benign hypertensive CKD, stage 5 chronic kidney disease or end stage renal disease     Broken arm     Carotid stenosis     bilateral    Cerebrovascular accident 10/31/2008    Coronary artery disease     followed by Dr. Ashford; LOV 24; denies chest pain, SOB 24    Dialysis patient     Current 24    Dyspnea     GERD (gastroesophageal reflux disease)     Gout     History of blood transfusion     Hypercholesterolemia     Hypertension 11/10/2015    History of hypertension; hypotensive at the time of office visit on 11/10/2015.    Impaired mobility     Obesity     Osteoarthritis     Paroxysmal atrial fibrillation     History of  paroxysmal atrial fibrillation, data deficit.    Prostate cancer 01/2015    Prostate cancer, diagnosed January of 2015, status post radiation therapy x39 treatments, 07/01/2015 at [facility].    Renal cell carcinoma 2015    Renal cell carcinoma, dx March of 2015, status post left nephrectomy.    Wears dentures     instructed no adhesive DOS     Past Surgical History:   Procedure Laterality Date    CAROTID STENT Left 10/31/2008    PTA/left carotid artery stent, 10/31/2008.     COLONOSCOPY N/A 12/23/2021    Procedure: COLONOSCOPY, polypectomy, clip placement x 1;  Surgeon: Deandra Do MD;  Location: [facility] ENDOSCOPY;  Service: Gastroenterology;  Laterality: N/A;    COLONOSCOPY W/ POLYPECTOMY      CORONARY ANGIOPLASTY WITH STENT PLACEMENT      A 3.5 x 13 mm. Cypher ESTIVEN to RCA expanded with 4 mm balloon.     DIALYSIS FISTULA CREATION N/A     abdominal    ENDOSCOPY N/A 12/22/2021    Procedure: ESOPHAGOGASTRODUODENOSCOPY with biopsy;  Surgeon: Deandra Do MD;  Location: [facility] ENDOSCOPY;  Service: Gastroenterology;  Laterality: N/A;    ENDOSCOPY N/A 02/17/2023    Procedure: ESOPHAGOGASTRODUODENOSCOPY with biopsy;  Surgeon: Georgina Pool MD;  Location: [facility] ENDOSCOPY;  Service: Gastroenterology;  Laterality: N/A;    HIP INTERTROCHANTERIC NAILING Right 12/4/2024    Procedure: HIP INTERTROCHANTERIC NAILING;  Surgeon: Dean Hammonds MD;  Location: [facility] OR;  Service: Orthopedics;  Laterality: Right;    INGUINAL HERNIA REPAIR      NEPHRECTOMY Left 03/19/2015    diagnosed January 2015, status post left radical nephrectomy.     PROSTATE FIDUCIAL MARKER PLACEMENT  2016    received XRT for prostate CA in 2016      General Information       Row Name 12/07/24 1136          Physical Therapy Time and Intention    Document Type therapy note (daily note)  -TW     Mode of Treatment physical therapy  -TW       Row Name 12/07/24 1136          Cognition    Orientation Status (Cognition) oriented x 4  -TW        Row Name 12/07/24 1136          Safety Issues/Impairments Affecting Functional Mobility    Safety Issues Affecting Function (Mobility) safety precaution awareness;safety precautions follow-through/compliance;insight into deficits/self-awareness;friction/shear risk;sequencing abilities  -TW     Impairments Affecting Function (Mobility) balance;endurance/activity tolerance;pain;strength  -TW               User Key  (r) = Recorded By, (t) = Taken By, (c) = Cosigned By      Initials Name Provider Type    TW Glenys Teixeira, MARILEE Physical Therapist                   Mobility       Row Name 12/07/24 1136          Bed Mobility    Bed Mobility supine-sit;sit-supine;rolling left  -TW     Rolling Left [place] (Bed Mobility) moderate assist (50% patient effort);verbal cues  -TW     Supine-Sit [place] (Bed Mobility) maximum assist (25% patient effort);verbal cues;other (see comments)  -TW     Sit-Supine [place] (Bed Mobility) maximum assist (25% patient effort);2 person assist;other (see comments)  -TW     Assistive Device (Bed Mobility) bed rails;head of bed elevated  -TW     Comment, (Bed Mobility) A pillow was placed under pt's RLE and pt used pillow to assist with getting RLE over to EOB (mod assist). Pt needed max assist to get his trunk into an upright sitting position. To return to supine a pillow was placed between pt's knees to assist with alignment and pain control. Pt needed max assist to lift RLE up into the bed. Pt did work on rolling to L with mod assist to shift wt off buttocks.  -TW       Row Name 12/07/24 1136          Bed-Chair Transfer    Bed-Chair [place] (Transfers) unable to assess  -TW     Comment, (Bed-Chair Transfer) Pt was not able to take any steps while standing with FWW  -TW       Row Name 12/07/24 1136          Sit-Stand Transfer    Sit-Stand [place] (Transfers) maximum assist (25% patient effort);nonverbal cues (demo/gesture);verbal cues  -TW     Assistive Device  (Sit-Stand Transfers) walker, front-wheeled  -TW     Comment, (Sit-Stand Transfer) 2 sit to stand transfers performed with pt needing cues not to give up 1/2 way but to cotinue to push until fully upright. Limited UE use on walker and pt remains in a flexed position with BUE flexed. Pt worked on standing upright with FWW and was able to stand x 40 sec and then 1 min with CGA. When returning to sitting, pt is not able to control his descent back to the bed.  -TW       Row Name 12/07/24 1136          Gait/Stairs (Locomotion)    [place] Level (Gait) unable to assess  -TW     Patient was able to Ambulate no, other medical factors prevent ambulation  -TW     Reason Patient was unable to Ambulate Excessive Weakness;Uncontrolled Pain  -TW       Row Name 12/07/24 1136          Mobility    Extremity Weight-bearing Status right lower extremity  -TW     Right Lower Extremity (Weight-bearing Status) weight-bearing as tolerated (WBAT)  -TW               User Key  (r) = Recorded By, (t) = Taken By, (c) = Cosigned By      Initials Name Provider Type    TW Glenys Teixeira, PT Physical Therapist                   Obj/Interventions       Row Name 12/07/24 1136          Balance    Balance Assessment sitting static balance;sitting dynamic balance;standing static balance;standing dynamic balance  -TW     Static Sitting Balance standby assist  -TW     Dynamic Sitting Balance standby assist  -TW     Position, Sitting Balance unsupported  -TW     Static Standing Balance contact guard  -TW     Dynamic Standing Balance moderate assist  -TW     Position/Device Used, Standing Balance supported;walker, rolling  -TW               User Key  (r) = Recorded By, (t) = Taken By, (c) = Cosigned By      Initials Name Provider Type    TW Glenys Teixeira, PT Physical Therapist                   Goals/Plan    No documentation.                  Clinical Impression       Row Name 12/07/24 1136          Pain    Pretreatment Pain Rating 10/10  -TW      Posttreatment Pain Rating 8/10  -TW     Pain Location extremity  -TW     Pain Side/Orientation right;lower  -TW     Pain Management Interventions other (see comments)  -TW     Pre/Posttreatment Pain Comment pt did receive oral pain medication prior to PT session and his pain was 10/10. Pt did provide IV push medication to assist with pain control. At rest pt was 8/10 after medication given. Any movement of RLE increased pain to 10/10  -TW       Row Name 12/07/24 1136          Plan of Care Review    Plan of Care Reviewed With patient  -TW     Outcome Evaluation PT treatment completed this date. Pt had been provided oral pain medication prior to PT treatment but his pain remained 10/10. IV push given with pain improving to 8/10 prior to treatment. A pillow was placed under the RLE and pt encouraged to use his UEs to progress RLE to EOB by using the pillow to move the leg. Mod assist provided to move both L and RLE to EOB. PT needed max assist to get his trunk upright into sitting. Once on EOB pt showed improvement in sitting balance and was able to sit on EOB with SBA and pt was able to scoot forward in sitting to get BLE onto floor with CGA. Pt performed 2 sit to stand transfers with max assist each time. Pt had difficulty using BUE to assist with pushing up and UE's remained flexed on walker in standing. Pt encouraged to ext elbows to assist with getting wt off RLE as pain increased to 10/10. Pt unable to take a step with either LE in standing. Pt had limited controll when returning to sit on EOB. Partial range heel slides with RLE with pain limiting and pt was independent with B ankle pumps. With mod assist pt rolled to the L to place pillow under R hip to get wt off buttocks. Pillows positioned between thighs to assist with RLE pain. Cont current PT POC.  -TW       Row Name 12/07/24 1136          Vital Signs    Pre SpO2 (%) 96  -TW     O2 Delivery Pre Treatment room air  -TW     Pre Patient Position Supine  -TW      Post Patient Position Side Lying  -TW       Row Name 12/07/24 1136          Positioning and Restraints    Pre-Treatment Position in bed  -TW     Post Treatment Position bed  -TW     In Bed side lying left;side rails up x3;exit alarm on;encouraged to call for assist;call light within reach;pillow between legs  -TW               User Key  (r) = Recorded By, (t) = Taken By, (c) = Cosigned By      Initials Name Provider Type     Glenys Teixeira, MARILEE Physical Therapist                   Outcome Measures       Row Name 12/07/24 1136 12/07/24 0808       How much help from another person do you currently need...    Turning from your back to your side while in flat bed without using bedrails? 2  -TW 2  -BW    Moving from lying on back to sitting on the side of a flat bed without bedrails? 2  -TW 2  -BW    Moving to and from a bed to a chair (including a wheelchair)? 1  -TW 2  -BW    Standing up from a chair using your arms (e.g., wheelchair, bedside chair)? 2  -TW 2  -BW    Climbing 3-5 steps with a railing? 1  -TW 1  -BW    To walk in hospital room? 1  -TW 1  -BW    AM-PAC 6 Clicks Score (PT) 9  -TW 10  -BW    Highest Level of Mobility Goal 3 --> Sit at edge of bed  -TW 4 --> Transfer to chair/commode  -BW      Row Name 12/07/24 1136          Functional Assessment    Outcome Measure Options AM-PAC 6 Clicks Basic Mobility (PT)  -TW               User Key  (r) = Recorded By, (t) = Taken By, (c) = Cosigned By      Initials Name Provider Type     Glenys Teixeira, MARILEE Physical Therapist    Elizabeth Tran RN Registered Nurse                                 Physical Therapy Education       Title: PT OT SLP Therapies (In Progress)       Topic: Physical Therapy (In Progress)       Point: Mobility training (Done)       Learning Progress Summary            Patient Acceptance, E,D, VU,DU,NR by  at 12/7/2024 1136    Comment: Pt education on using pillow to assist in moving RLE in bed as well as eduction on body mechanics in  standing.    Acceptance, E,TB, VU by RK at 12/5/2024 1326    Comment: pt educated on the role of PT and his POC                      Point: Home exercise program (Not Started)       Learner Progress:  Not documented in this visit.              Point: Body mechanics (Done)       Learning Progress Summary            Patient Acceptance, E,D, VU,DU,NR by TW at 12/7/2024 1136    Comment: Pt education on using pillow to assist in moving RLE in bed as well as eduction on body mechanics in standing.    Acceptance, E,TB, VU by RK at 12/6/2024 1313    Comment: pt educated on the proper body mechanics to perform a STS with a RW                      Point: Precautions (Not Started)       Learner Progress:  Not documented in this visit.                              User Key       Initials Effective Dates Name Provider Type Discipline     06/16/21 -  Glenys Teixeira, PT Physical Therapist PT    MARILU 09/24/24 -  Anabel Evans PT Student PT Student PT                  PT Recommendation and Plan     Outcome Evaluation: PT treatment completed this date. Pt had been provided oral pain medication prior to PT treatment but his pain remained 10/10. IV push given with pain improving to 8/10 prior to treatment. A pillow was placed under the RLE and pt encouraged to use his UEs to progress RLE to EOB by using the pillow to move the leg. Mod assist provided to move both L and RLE to EOB. PT needed max assist to get his trunk upright into sitting. Once on EOB pt showed improvement in sitting balance and was able to sit on EOB with SBA and pt was able to scoot forward in sitting to get BLE onto floor with CGA. Pt performed 2 sit to stand transfers with max assist each time. Pt had difficulty using BUE to assist with pushing up and UE's remained flexed on walker in standing. Pt encouraged to ext elbows to assist with getting wt off RLE as pain increased to 10/10. Pt unable to take a step with either LE in standing. Pt had limited controll when  returning to sit on EOB. Partial range heel slides with RLE with pain limiting and pt was independent with B ankle pumps. With mod assist pt rolled to the L to place pillow under R hip to get wt off buttocks. Pillows positioned between thighs to assist with RLE pain. Cont current PT POC.     Time Calculation:         PT Charges       Row Name 12/07/24 1136             Time Calculation    Start Time 1048  -TW      Stop Time 1136  -TW      Time Calculation (min) 48 min  -TW      PT Received On 12/07/24  -TW         Timed Charges    90348 - PT Therapeutic Activity Minutes 48  -TW         Total Minutes    Timed Charges Total Minutes 48  -TW       Total Minutes 48  -TW                User Key  (r) = Recorded By, (t) = Taken By, (c) = Cosigned By      Initials Name Provider Type    Glenys Hernandez PT Physical Therapist                  Therapy Charges for Today       Code Description Service Date Service Provider Modifiers Qty    [id number]  PT THERAPEUTIC ACT EA 15 MIN 12/7/2024 Glenys Teixeira PT GP 3            PT G-Codes  Outcome Measure Options: AM-PAC 6 Clicks Basic Mobility (PT)  AM-PAC 6 Clicks Score (PT): 9  AM-PAC 6 Clicks Score (OT): 14       Glenys Teixeira PT  12/7/2024

## 2024-12-08 PROCEDURE — 99232 SBSQ HOSP IP/OBS MODERATE 35: CPT | Performed by: INTERNAL MEDICINE

## 2024-12-08 PROCEDURE — 25010000002 HYDROMORPHONE 1 MG/ML SOLUTION: Performed by: ORTHOPAEDIC SURGERY

## 2024-12-08 PROCEDURE — 97530 THERAPEUTIC ACTIVITIES: CPT

## 2024-12-08 PROCEDURE — 25010000002 MORPHINE PER 10 MG: Performed by: ORTHOPAEDIC SURGERY

## 2024-12-08 PROCEDURE — 25010000002 ENOXAPARIN PER 10 MG: Performed by: ORTHOPAEDIC SURGERY

## 2024-12-08 RX ADMIN — MORPHINE SULFATE 2 MG: 2 INJECTION, SOLUTION INTRAMUSCULAR; INTRAVENOUS at 20:13

## 2024-12-08 RX ADMIN — Medication 10 ML: at 20:13

## 2024-12-08 RX ADMIN — SODIUM CHLORIDE, SODIUM LACTATE, CALCIUM CHLORIDE, MAGNESIUM CHLORIDE AND DEXTROSE 2000 ML: 2.5; 538; 448; 25.7; 5.08 INJECTION, SOLUTION INTRAPERITONEAL at 13:06

## 2024-12-08 RX ADMIN — ENOXAPARIN SODIUM 30 MG: 100 INJECTION SUBCUTANEOUS at 08:18

## 2024-12-08 RX ADMIN — SODIUM CHLORIDE, SODIUM LACTATE, CALCIUM CHLORIDE, MAGNESIUM CHLORIDE AND DEXTROSE 2000 ML: 2.5; 538; 448; 25.7; 5.08 INJECTION, SOLUTION INTRAPERITONEAL at 18:07

## 2024-12-08 RX ADMIN — Medication 5 MG: at 20:13

## 2024-12-08 RX ADMIN — Medication 10 ML: at 08:20

## 2024-12-08 RX ADMIN — LEVOTHYROXINE SODIUM 25 MCG: 0.03 TABLET ORAL at 08:17

## 2024-12-08 RX ADMIN — CARVEDILOL 12.5 MG: 12.5 TABLET, FILM COATED ORAL at 08:17

## 2024-12-08 RX ADMIN — Medication 10 ML: at 00:10

## 2024-12-08 RX ADMIN — POLYETHYLENE GLYCOL 3350 17 G: 17 POWDER, FOR SOLUTION ORAL at 10:01

## 2024-12-08 RX ADMIN — CYANOCOBALAMIN TAB 500 MCG 1000 MCG: 500 TAB at 08:17

## 2024-12-08 RX ADMIN — HYDROMORPHONE HYDROCHLORIDE 0.5 MG: 1 INJECTION, SOLUTION INTRAMUSCULAR; INTRAVENOUS; SUBCUTANEOUS at 10:24

## 2024-12-08 RX ADMIN — LOSARTAN POTASSIUM 100 MG: 50 TABLET, FILM COATED ORAL at 08:17

## 2024-12-08 RX ADMIN — SODIUM CHLORIDE, SODIUM LACTATE, CALCIUM CHLORIDE, MAGNESIUM CHLORIDE AND DEXTROSE 2000 ML: 2.5; 538; 448; 25.7; 5.08 INJECTION, SOLUTION INTRAPERITONEAL at 08:59

## 2024-12-08 RX ADMIN — CARVEDILOL 12.5 MG: 12.5 TABLET, FILM COATED ORAL at 17:51

## 2024-12-08 RX ADMIN — PANTOPRAZOLE SODIUM 40 MG: 40 TABLET, DELAYED RELEASE ORAL at 08:17

## 2024-12-08 RX ADMIN — HYDROCODONE BITARTRATE AND ACETAMINOPHEN 1 TABLET: 7.5; 325 TABLET ORAL at 18:30

## 2024-12-08 RX ADMIN — BISACODYL 5 MG: 5 TABLET, COATED ORAL at 18:30

## 2024-12-08 RX ADMIN — CALCITRIOL 0.25 MCG: 0.25 CAPSULE ORAL at 08:18

## 2024-12-08 RX ADMIN — Medication 5000 UNITS: at 08:17

## 2024-12-08 RX ADMIN — HYDROMORPHONE HYDROCHLORIDE 0.5 MG: 1 INJECTION, SOLUTION INTRAMUSCULAR; INTRAVENOUS; SUBCUTANEOUS at 00:10

## 2024-12-08 RX ADMIN — ROSUVASTATIN CALCIUM 40 MG: 20 TABLET, FILM COATED ORAL at 20:13

## 2024-12-08 RX ADMIN — HYDROCODONE BITARTRATE AND ACETAMINOPHEN 1 TABLET: 7.5; 325 TABLET ORAL at 08:55

## 2024-12-08 RX ADMIN — Medication 1 SPRAY: at 08:55

## 2024-12-08 RX ADMIN — Medication 1 SPRAY: at 18:27

## 2024-12-08 RX ADMIN — SODIUM CHLORIDE, SODIUM LACTATE, CALCIUM CHLORIDE, MAGNESIUM CHLORIDE AND DEXTROSE 2000 ML: 2.5; 538; 448; 25.7; 5.08 INJECTION, SOLUTION INTRAPERITONEAL at 23:30

## 2024-12-08 NOTE — PLAN OF CARE
Goal Outcome Evaluation:           Progress: improving  Outcome Evaluation: VSS on room air.  Oriented x4.  Ordered PRN medication (see MAR) administered for complaint of pain.  Intake and output monitored.  Peritoneal dialysis completed per order.  No acute events noted during shift. Continue with plan of care.

## 2024-12-08 NOTE — PROGRESS NOTES
[facility] Progress Note  [facility]. [place]        Patient Name: Travon Plummer  : 1945  MRN: [id number]   LOS: 5 days    Patient Care Team:  Chilango Erickson MD as PCP - General (Internal Medicine)  Andrea Sellers MD, JODY as Consulting Physician (Nephrology)  Deandra Do MD as Surgeon (General Surgery)  Leonard Ashford MD as Consulting Physician (Cardiology)  Georgina Pool MD as Consulting Physician (Gastroenterology)    Chief Complaint:    Chief Complaint   Patient presents with    Fall     Pt tripped on his wife's O2 tubing onto his right hip. Pt's only complaint is right hip pain.     Hip Pain     Primary Care Physician:  Chilango Erickson MD  Date of admission: 12/3/2024    Subjective     Interval History:   Follow-up ESRD.  Status post fall and hip fracture.  Patient is laying in the bed awake alert and interactive, he said he feels a lot better.  Denies any chest pain or shortness of breath.  Pain is under very good control.  He said he has not been able to get out of bed by himself and needs a lot of assistance.  Awaiting transfer to rehab.  Events noted from last 24 hours.  I reviewed the chart and other providers notes, labs and procedures done since my last note.    Review of Systems:   As noted above.    Objective     Vitals:   Temp:  [98.7 °F (37.1 °C)-99.1 °F (37.3 °C)] 98.7 °F (37.1 °C)  Heart Rate:  [59-70] 59  Resp:  [18] 18  BP: (131-156)/(64-66) 156/66    Intake/Output Summary (Last 24 hours) at 2024 0745  Last data filed at 2024 2352  Gross per 24 hour   Intake 8840 ml   Output 61929 ml   Net -2560 ml       Physical Exam:    General Appearance: alert, oriented x 3, no acute distress   Skin: warm and dry  HEENT: oral mucosa normal, nonicteric sclera  Neck: supple, no JVD  Lungs: CTA  Heart: RRR, normal S1 and S2  Abdomen: obese, soft, nontender, non distended and positive bowel sounds.  Peritoneal  dialysis catheter in place.  : no palpable bladder  Extremities: Trace edema, no cyanosis or clubbing  Neuro: normal speech and mental status     Scheduled Meds:     Current Facility-Administered Medications   Medication Dose Route Frequency Provider Last Rate Last Admin    acetaminophen (TYLENOL) tablet 650 mg  650 mg Oral Q4H PRN Dean Hammonds MD        sennosides-docusate (PERICOLACE) 8.6-50 MG per tablet 2 tablet  2 tablet Oral BID PRN Dean Hammonds MD   2 tablet at 12/07/24 0811    And    polyethylene glycol (MIRALAX) packet 17 g  17 g Oral Daily PRN Dean Hammonds MD        And    bisacodyl (DULCOLAX) EC tablet 5 mg  5 mg Oral Daily PRN Dean Hammonds MD   5 mg at 12/06/24 1104    And    bisacodyl (DULCOLAX) suppository 10 mg  10 mg Rectal Daily PRN Dean Hammonds MD        calcitriol (ROCALTROL) capsule 0.25 mcg  0.25 mcg Oral Daily Dean Hammonds MD   0.25 mcg at 12/07/24 0811    calcium carbonate (TUMS) chewable tablet 500 mg (200 mg elemental)  2 tablet Oral BID PRN Dean Hammonds MD        Calcium Replacement - Follow Nurse / BPA Driven Protocol   Not Applicable PRN Dean Hammonds MD        carvedilol (COREG) tablet 12.5 mg  12.5 mg Oral BID With Meals Dean Hammonds MD   12.5 mg at 12/07/24 1816    Enoxaparin Sodium (LOVENOX) syringe 30 mg  30 mg Subcutaneous Daily Dean Hammonds MD   30 mg at 12/07/24 0811    [START ON 12/10/2024] ferrous sulfate EC tablet 324 mg  324 mg Oral Weekly Dean Hammonds MD        HYDROcodone-acetaminophen (NORCO) 7.5-325 MG per tablet 1 tablet  1 tablet Oral Q4H PRN Dean Hammonds MD   1 tablet at 12/06/24 1714    HYDROcodone-acetaminophen (NORCO) 7.5-325 MG per tablet 2 tablet  2 tablet Oral Q4H PRN Dean Hammonds MD   2 tablet at 12/07/24 0953    HYDROmorphone (DILAUDID) injection 0.5 mg  0.5 mg Intravenous Q2H PRN Dean Hammonds MD   0.5 mg at 12/08/24 0010    levothyroxine (SYNTHROID, LEVOTHROID) tablet 25 mcg  25 mcg Oral Daily Nasra,  Dean CHAMBERS MD   25 mcg at 12/07/24 0811    losartan (COZAAR) tablet 100 mg  100 mg Oral Q24H Dean Hammonds MD   100 mg at 12/07/24 0811    Magnesium Standard Dose Replacement - Follow Nurse / BPA Driven Protocol   Not Applicable Dean Blanc MD        melatonin tablet 5 mg  5 mg Oral Nightly Dean Hammonds MD   5 mg at 12/07/24 2042    Morphine sulfate (PF) injection 2 mg  2 mg Intravenous Q4H PRN Dean Hammonds MD   2 mg at 12/07/24 1051    And    naloxone (NARCAN) injection 0.4 mg  0.4 mg Intravenous Q5 Min PRN Dean Hammonds MD        nitroglycerin (NITROSTAT) SL tablet 0.4 mg  0.4 mg Sublingual Q5 Min PRN Dean Hammonds MD        ondansetron ODT (ZOFRAN-ODT) disintegrating tablet 4 mg  4 mg Oral Q6H PRN Dean Hammonds MD        Or    ondansetron (ZOFRAN) injection 4 mg  4 mg Intravenous Q6H PRN Dean Hammonds MD        ondansetron (ZOFRAN) injection 4 mg  4 mg Intravenous Q6H PRN Dean Hammonds MD        pantoprazole (PROTONIX) EC tablet 40 mg  40 mg Oral Daily Dean Hammonds MD   40 mg at 12/07/24 0811    Phosphorus Replacement - Follow Nurse / BPA Driven Protocol   Not Applicable PRN Dean Hammonds MD        Potassium Replacement - Follow Nurse / BPA Driven Protocol   Not Applicable PRDean Laboy MD        rosuvastatin (CRESTOR) tablet 40 mg  40 mg Oral Nightly Dean Hammonds MD   40 mg at 12/07/24 2042    sodium chloride 0.9 % flush 10 mL  10 mL Intravenous Q12H Dean Hammonds MD   10 mL at 12/07/24 2043    sodium chloride 0.9 % flush 10 mL  10 mL Intravenous PRN Dean Hammonds MD   10 mL at 12/08/24 0010    sodium chloride 0.9 % infusion 40 mL  40 mL Intravenous PRN Dean Hammonds MD        UltraBag/Dianeal PD-2/2.5% Dex (keenan #[id number]) (DIANEAL) 2,000 mL  2,000 mL Intraperitoneal 4 Exchanges Daily - Dwell Overnight Andrea Sellers MD, JODY   2,000 mL at 12/07/24 3779    vitamin B-12 (CYANOCOBALAMIN) tablet 1,000 mcg  1,000 mcg Oral Daily Dean Hammonds MD    1,000 mcg at 12/07/24 0811    vitamin D3 capsule 5,000 Units  5,000 Units Oral Daily Dean Hammonds MD   5,000 Units at 12/07/24 0811       calcitriol, 0.25 mcg, Oral, Daily  carvedilol, 12.5 mg, Oral, BID With Meals  enoxaparin, 30 mg, Subcutaneous, Daily  [START ON 12/10/2024] ferrous sulfate, 324 mg, Oral, Weekly  levothyroxine, 25 mcg, Oral, Daily  losartan, 100 mg, Oral, Q24H  melatonin, 5 mg, Oral, Nightly  pantoprazole, 40 mg, Oral, Daily  rosuvastatin, 40 mg, Oral, Nightly  sodium chloride, 10 mL, Intravenous, Q12H  UltraBag/Dianeal PD-2/2.5% Dex (keenan #[id number]), 2,000 mL, Intraperitoneal, 4 Exchanges Daily - Dwell Overnight  vitamin B-12, 1,000 mcg, Oral, Daily  vitamin D3, 5,000 Units, Oral, Daily        IV Meds:        Results Reviewed:   I have personally reviewed the results from the time of this admission to 12/8/2024 07:45 EST     Results from last 7 days   Lab Units 12/07/24  0918 12/06/24  0425 12/05/24  0609 12/04/24  0529 12/03/24  1458   SODIUM mmol/L 134* 134* 143 139 141   POTASSIUM mmol/L 4.4 4.6 5.2 4.8 4.8   CHLORIDE mmol/L 97* 101 99 103 103   CO2 mmol/L 25.5 24.6 24.8 25.5 26.9   BUN mg/dL 42* 42* 36* 27* 26*   CREATININE mg/dL 6.38* 5.94* 5.24* 4.66* 4.93*   CALCIUM mg/dL 8.8 8.6 9.5 9.1 9.3   BILIRUBIN mg/dL  --   --   --  0.3 0.4   ALK PHOS U/L  --   --   --  91 100   ALT (SGPT) U/L  --   --   --  10 13   AST (SGOT) U/L  --   --   --  10 16   GLUCOSE mg/dL 153* 113* 136* 95 99       Estimated Creatinine Clearance: 12.1 mL/min (A) (by C-G formula based on SCr of 6.38 mg/dL (H)).                Results from last 7 days   Lab Units 12/07/24  0918 12/06/24  0425 12/05/24  0609 12/04/24  0529 12/03/24  1458   WBC 10*3/mm3 9.01 11.01* 11.78* 9.57 10.14   HEMOGLOBIN g/dL 9.3* 8.6* 10.1* 10.8* 11.4*   PLATELETS 10*3/mm3 127* 125* 132* 134* 136*             Brief Urine Lab Results  (Last result in the past 365 days)        Color   Clarity   Blood   Leuk Est   Nitrite   Protein   CREAT   Urine  "HCG        07/17/24 1436 Yellow   Clear   Small   Negative   Negative   300 mg/dL                   No results found for: \"UTPCR\"    Imaging Results (Last 24 Hours)       ** No results found for the last 24 hours. **                Assessment / Plan     ASSESSMENT:    Closed right hip fracture    Paroxysmal atrial fibrillation    Chronic kidney disease, stage V    End-stage renal disease: Patient with longstanding history of diabetes and hypertension as well as renal cell carcinoma status post left nephrectomy leading to end-stage renal disease.  Patient has been on peritoneal dialysis and has done very well.  Will continue peritoneal dialysis while in the hospital.  Type 2 diabetes: Continue with the sliding scale as per hospitalist service.  Hypertension: Will resume all home medications, he has been under very good control with the medications that he is on.  Anemia: He has responded well to IV iron and not getting CHARLEY currently does not appear to have any issues, likely will drop after surgery.  Hyperparathyroidism: Continue calcitriol for the time being.  Will continue to follow calcium phosphorus and PTH as needed.  Hip fracture: Surgical intervention planned as noted.  Status post right hip gamma nail procedure.  Paroxysmal atrial fibrillation: He has been noted to be in sinus rhythm.     PLAN:  His blood pressure is fairly stable, tolerating peritoneal dialysis with 2.5% solution well.  Continue the same.  Details were discussed with the patient no family in the room.  Patient is very comfortable going home with home PT and OT.  Now he is very comfortable going to the [facility] rehab.    Details were also discussed with the hospitalist service and or other providers as needed.   Continue with rest of the current treatment plan, and monitor with surveillance labs.  Further recommendations will depend on clinical course of the patient during the current hospitalization.   I have reviewed the copied text to " this note, it was edited and the changes made as needed.  It is accurate to the point, when the note was signed today.     Thank you for involving us in the care of Travon Plummer.  Please feel free to call with any questions.    Andrea Sellers MD, JODY  12/08/24  07:45 [facility]    [facility]  [phone]  [phone]      Part of this note may be an electronic transcription/translation of spoken language to printed text using the Dragon Dictation System.

## 2024-12-08 NOTE — PLAN OF CARE
Goal Outcome Evaluation:        Problem: Pain Acute  Goal: Optimal Pain Control and Function  Outcome: Progressing  Intervention: Optimize Psychosocial Wellbeing  Recent Flowsheet Documentation  Taken 12/8/2024 0817 by Carrol Abarca LPN  Diversional Activities:   smartphone   television  Intervention: Develop Pain Management Plan  Recent Flowsheet Documentation  Taken 12/8/2024 0817 by Carrol Abarca LPN  Pain Management Interventions:   position adjusted   pillow support provided  Intervention: Prevent or Manage Pain  Recent Flowsheet Documentation  Taken 12/8/2024 1600 by Carrol Abarca LPN  Medication Review/Management: medications reviewed  Taken 12/8/2024 1200 by Carrol Abarca LPN  Medication Review/Management: medications reviewed  Taken 12/8/2024 0817 by Carrol Abarca LPN  Bowel Elimination Promotion: adequate fluid intake promoted  Medication Review/Management: medications reviewed     Problem: Fall Injury Risk  Goal: Absence of Fall and Fall-Related Injury  Outcome: Progressing  Intervention: Identify and Manage Contributors  Recent Flowsheet Documentation  Taken 12/8/2024 1600 by Carrol Abarca LPN  Medication Review/Management: medications reviewed  Taken 12/8/2024 1200 by Carrol Abarca LPN  Medication Review/Management: medications reviewed  Taken 12/8/2024 0817 by Carrol Abarca LPN  Medication Review/Management: medications reviewed  Intervention: Promote Injury-Free Environment  Recent Flowsheet Documentation  Taken 12/8/2024 1600 by Carrol Abarca LPN  Safety Promotion/Fall Prevention:   safety round/check completed   room organization consistent  Taken 12/8/2024 1400 by Carrol Abarca LPN  Safety Promotion/Fall Prevention:   safety round/check completed   room organization consistent  Taken 12/8/2024 1200 by Carrol Abarca LPN  Safety Promotion/Fall Prevention:   safety round/check completed   room organization consistent  Taken 12/8/2024 1000 by  Abarca, Carrol, LPN  Safety Promotion/Fall Prevention:   safety round/check completed   room organization consistent  Taken 12/8/2024 0817 by Carrol Abarca LPN  Safety Promotion/Fall Prevention:   safety round/check completed   room organization consistent     Problem: Hospitalized Older Adult  Goal: Optimal Cognitive Function  Outcome: Progressing  Goal: Effective Bowel Elimination  Outcome: Progressing  Intervention: Promote Effective Bowel Elimination  Recent Flowsheet Documentation  Taken 12/8/2024 0817 by Carrol Abarca LPN  Bowel Elimination Promotion: adequate fluid intake promoted  Goal: Optimal Coping  Outcome: Progressing  Intervention: Promote Psychosocial Wellbeing  Recent Flowsheet Documentation  Taken 12/8/2024 0817 by Carrol Abarca LPN  Family/Support System Care: support provided  Goal: Fluid and Electrolyte Balance  Outcome: Progressing  Goal: Optimal Functional Ability  Outcome: Progressing  Intervention: Promote Functional Ability  Recent Flowsheet Documentation  Taken 12/8/2024 1600 by Carrol Abarca LPN  Activity Management: activity encouraged  Activity Assistance Provided: assistance, 2 people  Taken 12/8/2024 1400 by Carrol Abarca LPN  Activity Management: activity encouraged  Activity Assistance Provided: assistance, 2 people  Taken 12/8/2024 1200 by Carrol Abarca LPN  Activity Management: activity encouraged  Activity Assistance Provided: assistance, 2 people  Taken 12/8/2024 1000 by Carrol Abarca LPN  Activity Management: activity encouraged  Activity Assistance Provided: assistance, 2 people  Taken 12/8/2024 0817 by Carrol Abarca LPN  Activity Management: activity encouraged  Activity Assistance Provided: assistance, 2 people  Goal: Improved Oral Intake  Outcome: Progressing  Goal: Adequate Sleep/Rest  Outcome: Progressing  Goal: Effective Urinary Elimination  Outcome: Progressing     Problem: Adult Inpatient Plan of Care  Goal: Plan of Care  Review  Outcome: Progressing  Goal: Patient-Specific Goal (Individualized)  Outcome: Progressing  Goal: Absence of Hospital-Acquired Illness or Injury  Outcome: Progressing  Intervention: Identify and Manage Fall Risk  Recent Flowsheet Documentation  Taken 12/8/2024 1600 by Carrol Abarca LPN  Safety Promotion/Fall Prevention:   safety round/check completed   room organization consistent  Taken 12/8/2024 1400 by Carrol Abarca LPN  Safety Promotion/Fall Prevention:   safety round/check completed   room organization consistent  Taken 12/8/2024 1200 by Carrol Abarca LPN  Safety Promotion/Fall Prevention:   safety round/check completed   room organization consistent  Taken 12/8/2024 1000 by Carrol Abarca LPN  Safety Promotion/Fall Prevention:   safety round/check completed   room organization consistent  Taken 12/8/2024 0817 by Carrol Abarca LPN  Safety Promotion/Fall Prevention:   safety round/check completed   room organization consistent  Intervention: Prevent Skin Injury  Recent Flowsheet Documentation  Taken 12/8/2024 1400 by Carrol Abarca LPN  Body Position: sitting up in bed  Taken 12/8/2024 1200 by Carrol Abarca LPN  Body Position: position changed independently  Taken 12/8/2024 1000 by Carrol Abarca LPN  Body Position: patient/family refused  Taken 12/8/2024 0817 by Carrol Abarca LPN  Body Position: sitting up in bed  Intervention: Prevent and Manage VTE (Venous Thromboembolism) Risk  Recent Flowsheet Documentation  Taken 12/8/2024 0817 by Carrol Abarca LPN  VTE Prevention/Management: (lovenox) other (see comments)  Goal: Optimal Comfort and Wellbeing  Outcome: Progressing  Intervention: Monitor Pain and Promote Comfort  Recent Flowsheet Documentation  Taken 12/8/2024 0817 by Carrol Abarca LPN  Pain Management Interventions:   position adjusted   pillow support provided  Intervention: Provide Person-Centered Care  Recent Flowsheet Documentation  Taken  12/8/2024 0817 by Carrol Abarca LPN  Trust Relationship/Rapport:   care explained   choices provided   questions answered  Goal: Readiness for Transition of Care  Outcome: Progressing     Problem: Skin Injury Risk Increased  Goal: Skin Health and Integrity  Outcome: Progressing  Intervention: Optimize Skin Protection  Recent Flowsheet Documentation  Taken 12/8/2024 1600 by Carrol Abarca LPN  Activity Management: activity encouraged  Head of Bed (HOB) Positioning: HOB elevated  Taken 12/8/2024 1400 by Carrol Abarca LPN  Activity Management: activity encouraged  Head of Bed (HOB) Positioning: HOB elevated  Taken 12/8/2024 1200 by Carrol Abarca LPN  Activity Management: activity encouraged  Head of Bed (HOB) Positioning: HOB elevated  Taken 12/8/2024 1000 by Carrol Abarca LPN  Activity Management: activity encouraged  Head of Bed (HOB) Positioning: HOB elevated  Taken 12/8/2024 0817 by Carrol Abarca LPN  Activity Management: activity encouraged  Head of Bed (HOB) Positioning: HOB elevated     Problem: Comorbidity Management  Goal: Blood Glucose Level Within Target Range  Outcome: Progressing  Intervention: Monitor and Manage Glycemia  Recent Flowsheet Documentation  Taken 12/8/2024 1600 by Carrol Abarca LPN  Medication Review/Management: medications reviewed  Taken 12/8/2024 1200 by Carrol Abarca LPN  Medication Review/Management: medications reviewed  Taken 12/8/2024 0817 by Carrol Abarca LPN  Medication Review/Management: medications reviewed  Goal: Blood Pressure in Desired Range  Outcome: Progressing  Intervention: Maintain Blood Pressure Management  Recent Flowsheet Documentation  Taken 12/8/2024 1600 by Carrol Abarca LPN  Medication Review/Management: medications reviewed  Taken 12/8/2024 1200 by Carrol Abarca LPN  Medication Review/Management: medications reviewed  Taken 12/8/2024 0817 by Carrol Abarca LPN  Medication Review/Management: medications  reviewed

## 2024-12-08 NOTE — PLAN OF CARE
Goal Outcome Evaluation:  Plan of Care Reviewed With: patient        Progress: declining  Outcome Evaluation: PT treatment completed this date with pt presenting supine in bed with less pain in RLE at rest today, rated 8/10. Pt also able to assist with RLE more to get to EOB to come to sit with mod assist and sat on EOB with SBA. PT was not able to come to stand with FWW with +2 max assist for 2 trials with limited ability of pt to push through either LE. Sera stedy provided and pt still unable to come to stand. Therapist raised bed until pt almost standing against sera stedy and pt able to come to stand and stood x 3 min before he fatigued. Pt needed max asssit to return to supine with pillow place between thighs to support RLE. Assisted heel slide performed with flexion to pt pain tolerance (pain increasing to 10/10). Pt has shown a decline in his abiltiy to push up through BLE to come to stand as compared to previous treatments. Will continue to focus of BLE strengthening to assist with transfer ability.

## 2024-12-08 NOTE — PROGRESS NOTES
[facility]    Name:  Travon Plummer   Age:  79 y.o.  Sex:  male  :  1945  MRN:  [id number]   Visit Number:  [id number]  Admission Date:  12/3/2024  Date Of Service:  24  Primary Care Physician:  Chilango Erickson MD     LOS: 5 days :    Chief Complaint:      weakness    Subjective:    2024: [facility] Sarm[facility] pending working with PT/OT.  Vital stable labs stable. No new complaints.    History of presenting illness:    79-year-old male with past medical history of CKD, CAD, paroxysmal atrial fibrillation, ESRD on HD, hypertension, hyperlipidemia, obesity, renal cell carcinoma,  status post nephrectomy.  Chief complaint fall.  Patient reports he was walking around his house and tripped on his wife's oxygen cord.  Afterwards he felt pain in his right hip.  No loss of consciousness or head trauma suffered.  Was noted to have right intertrochanteric hip fracture in the ED Dr. Poon was notified who will see the patient in consult..  Nephrology has been notified.  Full code.    Pertinent findings: Creatinine 4.93, hemoglobin 11.4, right hip x-ray showing intertrochanteric hip fracture, CT of the cervical spine with degenerative change and osteopenia, CT of the head showing atrophy and a left maxillary polyp, CT of the pelvis showing mildly displaced comminuted right intratrochanteric fracture remote left superior and inferior pubic rami fractures,    ED Medications:    Medications   UltraBag/Dianeal PD-2/1.5% Dex (keenan #[id number]) (DIANEAL) 2,000 mL (has no administration in time range)   morphine injection 4 mg (4 mg Intravenous Given 12/3/24 1501)   ondansetron (ZOFRAN) injection 4 mg (4 mg Intravenous Given 12/3/24 1500)   morphine injection 4 mg (4 mg Intravenous Given 12/3/24 1616)       Edited by: Justin Brown DO at 2024 1113     Review of Systems:     All systems were reviewed and negative except as mentioned in subjective,  "assessment and plan.    Vital Signs:    Temp:  [98.7 °F (37.1 °C)-99.1 °F (37.3 °C)] 98.7 °F (37.1 °C)  Heart Rate:  [59-70] 66  Resp:  [18] 18  BP: (131-165)/(64-82) 165/82    Intake and output:    I/O last 3 completed shifts:  In: 34627 [P.O.:840]  Out: 95940   I/O this shift:  In: 2000   Out: 4000     Physical Examination:    Constitutional: Awake, alert  Eyes: PERRLA, sclerae anicteric, no conjunctival injection  HENT: NCAT, mucous membranes moist  Neck: Supple, no thyromegaly, no lymphadenopathy, trachea midline  Respiratory: Clear to auscultation bilaterally, nonlabored respirations   Cardiovascular: RRR, no murmurs, rubs, or gallops, palpable pedal pulses bilaterally  Gastrointestinal: Positive bowel sounds, soft, nontender, nondistended  Musculoskeletal: No bilateral ankle edema, no clubbing or cyanosis to extremities  Psychiatric: Appropriate affect, cooperative  Neurologic: Oriented x 3, speech clear  Skin: No rashes  Exam stable 12/8/2024  Edited by: Justin Brown,  at 12/8/2024 0741     Laboratory results:    Results from last 7 days   Lab Units 12/07/24  0918 12/06/24  0425 12/05/24  0609 12/04/24  0529 12/03/24  1458   SODIUM mmol/L 134* 134* 143 139 141   POTASSIUM mmol/L 4.4 4.6 5.2 4.8 4.8   CHLORIDE mmol/L 97* 101 99 103 103   CO2 mmol/L 25.5 24.6 24.8 25.5 26.9   BUN mg/dL 42* 42* 36* 27* 26*   CREATININE mg/dL 6.38* 5.94* 5.24* 4.66* 4.93*   CALCIUM mg/dL 8.8 8.6 9.5 9.1 9.3   BILIRUBIN mg/dL  --   --   --  0.3 0.4   ALK PHOS U/L  --   --   --  91 100   ALT (SGPT) U/L  --   --   --  10 13   AST (SGOT) U/L  --   --   --  10 16   GLUCOSE mg/dL 153* 113* 136* 95 99     Results from last 7 days   Lab Units 12/07/24  0918 12/06/24  0425 12/05/24  0609   WBC 10*3/mm3 9.01 11.01* 11.78*   HEMOGLOBIN g/dL 9.3* 8.6* 10.1*   HEMATOCRIT % 28.6* 26.6* 32.2*   PLATELETS 10*3/mm3 127* 125* 132*                 No results for input(s): \"PHART\", \"STX9HUT\", \"PO2ART\", \"FCI9AEL\", \"BASEEXCESS\" in the last " 8760 hours.   I have reviewed the patient's laboratory results.    Radiology results:    No radiology results from the last 24 hrs  I have reviewed the patient's radiology reports.    Medication Review:     I have reviewed the patient's active and prn medications.     Problem List:      Closed right hip fracture    Paroxysmal atrial fibrillation    Chronic kidney disease, stage V      Assessment/Plan:      Closed intertrochanteric right hip fracture  --  Status post right hip gamma nail Dr. Hammonds 12/4/2024.  -- Active Treatments: Hold Bumex, pain control dilaudid and tylenol, continue beta-blocker, hold losartan  -- Pending Results: will need to monitor hemoglobin closely on prophylaxis, EKG      Paroxysmal atrial fibrillation  --Chronic medical problem  -- Active Treatments: Not on anticoagulation due to history of anemia  -- Pending Results: Monitor telemetry      Chronic kidney disease, stage V  -- Chronic medical problem Dr. Sellers following  -- Active Treatments: Continue PD              DVT Prophylaxis: lovenox will need 4 weeks  Code Status: Full code  Diet: Cardiac renal,   Disposition: Anticipate short-term rehab in 1 to 2 days          Edited by: Justin Brown DO at 12/8/2024 1116           Justin Brown DO  12/08/24  11:16 EST    Dictated utilizing Dragon dictation.

## 2024-12-08 NOTE — THERAPY TREATMENT NOTE
Patient Name: Travon Plummer  : 1945    MRN: [id number]                              Today's Date: 2024       Admit Date: 12/3/2024    Visit Dx:     ICD-10-CM ICD-9-CM   1. Closed nondisplaced intertrochanteric fracture of right femur, initial encounter  S72.144A 820.21   2. Closed fracture of right hip, initial encounter  S72.001A 820.8     Patient Active Problem List   Diagnosis    Coronary artery disease    Dyspnea    Cerebrovascular accident    Essential hypertension    Hypercholesterolemia    Tobacco abuse    Gout    Osteoarthritis    GERD (gastroesophageal reflux disease)    Obesity    Prostate cancer    Renal cell carcinoma    Nuclear sclerotic cataract of right eye    Symptomatic anemia    Iron deficiency anemia due to chronic blood loss    Melena    Chronic kidney disease, stage IV (severe)    Upper GI bleed    Absent kidney    Acquired hypothyroidism    Benign hypertensive kidney disease with chronic kidney disease    Dyslipidemia    Elevated prostate specific antigen (PSA)    Paroxysmal atrial fibrillation    Secondary hyperparathyroidism    Vitamin D deficiency    Urinary incontinence    Hematuria    Lower urinary tract symptoms (LUTS)    Chronic kidney disease, stage V    Closed right hip fracture     Past Medical History:   Diagnosis Date    Benign hypertensive CKD, stage 5 chronic kidney disease or end stage renal disease     Broken arm     Carotid stenosis     bilateral    Cerebrovascular accident 10/31/2008    Coronary artery disease     followed by Dr. Ashford; LOV 24; denies chest pain, SOB 24    Dialysis patient     Current 24    Dyspnea     GERD (gastroesophageal reflux disease)     Gout     History of blood transfusion     Hypercholesterolemia     Hypertension 11/10/2015    History of hypertension; hypotensive at the time of office visit on 11/10/2015.    Impaired mobility     Obesity     Osteoarthritis     Paroxysmal atrial fibrillation     History of  paroxysmal atrial fibrillation, data deficit.    Prostate cancer 01/2015    Prostate cancer, diagnosed January of 2015, status post radiation therapy x39 treatments, 07/01/2015 at [facility].    Renal cell carcinoma 2015    Renal cell carcinoma, dx March of 2015, status post left nephrectomy.    Wears dentures     instructed no adhesive DOS     Past Surgical History:   Procedure Laterality Date    CAROTID STENT Left 10/31/2008    PTA/left carotid artery stent, 10/31/2008.     COLONOSCOPY N/A 12/23/2021    Procedure: COLONOSCOPY, polypectomy, clip placement x 1;  Surgeon: Deandra Do MD;  Location: [facility] ENDOSCOPY;  Service: Gastroenterology;  Laterality: N/A;    COLONOSCOPY W/ POLYPECTOMY      CORONARY ANGIOPLASTY WITH STENT PLACEMENT      A 3.5 x 13 mm. Cypher ESTIVEN to RCA expanded with 4 mm balloon.     DIALYSIS FISTULA CREATION N/A     abdominal    ENDOSCOPY N/A 12/22/2021    Procedure: ESOPHAGOGASTRODUODENOSCOPY with biopsy;  Surgeon: Deandra Do MD;  Location: [facility] ENDOSCOPY;  Service: Gastroenterology;  Laterality: N/A;    ENDOSCOPY N/A 02/17/2023    Procedure: ESOPHAGOGASTRODUODENOSCOPY with biopsy;  Surgeon: Georgina Pool MD;  Location: [facility] ENDOSCOPY;  Service: Gastroenterology;  Laterality: N/A;    HIP INTERTROCHANTERIC NAILING Right 12/4/2024    Procedure: HIP INTERTROCHANTERIC NAILING;  Surgeon: Dean Hammonds MD;  Location: [facility] OR;  Service: Orthopedics;  Laterality: Right;    INGUINAL HERNIA REPAIR      NEPHRECTOMY Left 03/19/2015    diagnosed January 2015, status post left radical nephrectomy.     PROSTATE FIDUCIAL MARKER PLACEMENT  2016    received XRT for prostate CA in 2016      General Information       Row Name 12/08/24 1054          Physical Therapy Time and Intention    Document Type therapy note (daily note)  -TW     Mode of Treatment physical therapy  -TW       Row Name 12/08/24 1051          General Information    Existing Precautions/Restrictions fall   -TW     Barriers to Rehab medically complex  -       Row Name 12/08/24 1054          Cognition    Orientation Status (Cognition) oriented x 4  -       Row Name 12/08/24 1054          Safety Issues/Impairments Affecting Functional Mobility    Safety Issues Affecting Function (Mobility) insight into deficits/self-awareness;safety precaution awareness;safety precautions follow-through/compliance;friction/shear risk;sequencing abilities  -TW     Impairments Affecting Function (Mobility) pain;balance;strength;endurance/activity tolerance  -TW               User Key  (r) = Recorded By, (t) = Taken By, (c) = Cosigned By      Initials Name Provider Type    TW Glenys Teixeira, PT Physical Therapist                   Mobility       Row Name 12/08/24 1054          Bed Mobility    Bed Mobility supine-sit;sit-supine  -TW     All Activities, [place] (Bed Mobility) moderate assist (50% patient effort);verbal cues;nonverbal cues (demo/gesture)  -TW     Rolling Left [place] (Bed Mobility) verbal cues;minimum assist (75% patient effort)  -TW     Supine-Sit [place] (Bed Mobility) other (see comments);moderate assist (50% patient effort);verbal cues  extended time needed. Pillow used to assist bring out RLE  -TW     Sit-Supine [place] (Bed Mobility) 2 person assist;maximum assist (25% patient effort);other (see comments)  pillow between legs  -TW     Assistive Device (Bed Mobility) bed rails;head of bed elevated  -       Row Name 12/08/24 1054          Transfers    Comment, (Transfers) Pt came to stand with sera stedy once bed was raised almost so pt was standing. Pt would not be able to come to stand with sera stedy from low seat of recliner so bed to chair transfer not performed.  -       Row Name 12/08/24 1054          Bed-Chair Transfer    Bed-Chair [place] (Transfers) unable to assess  -       Row Name 12/08/24 1054          Sit-Stand Transfer    Sit-Stand [place] (Transfers) maximum assist  (25% patient effort);2 person assist;other (see comments)  -TW     Assistive Device (Sit-Stand Transfers) other (see comments)  sera stedy  -TW       Row Name 12/08/24 1054          Gait/Stairs (Locomotion)    [place] Level (Gait) unable to assess  -TW     Patient was able to Ambulate no, other medical factors prevent ambulation  -TW     Reason Patient was unable to Ambulate Uncontrolled Pain;Excessive Weakness  -TW               User Key  (r) = Recorded By, (t) = Taken By, (c) = Cosigned By      Initials Name Provider Type    Glenys Hernandez PT Physical Therapist                   Obj/Interventions       Row Name 12/08/24 1054          Motor Skills    Therapeutic Exercise other (see comments)  assisted heelslides with pt having increase in R hip pain to 9/10 with the movement.  -       Row Name 12/08/24 1054          Balance    Balance Assessment sitting static balance;sitting dynamic balance;standing static balance  -TW     Static Sitting Balance standby assist  -TW     Dynamic Sitting Balance standby assist  -TW     Position, Sitting Balance unsupported;sitting edge of bed  -TW     Static Standing Balance dependent;other (see comments)  sera stedy  -TW     Dynamic Standing Balance dependent  -TW     Position/Device Used, Standing Balance other (see comments);supported  -TW               User Key  (r) = Recorded By, (t) = Taken By, (c) = Cosigned By      Initials Name Provider Type    Glenys Hernandez PT Physical Therapist                   Goals/Plan    No documentation.                  Clinical Impression       Row Name 12/08/24 1054          Pain    Pretreatment Pain Rating 8/10  -TW     Posttreatment Pain Rating 8/10  -TW     Pain Location extremity  -TW     Pain Side/Orientation right;lower  -TW     Pre/Posttreatment Pain Comment Pt did have increase in pain in RLE to 10/10 with any mobility of RLE.  -       Row Name 12/08/24 1054          Plan of Care Review    Plan of Care Reviewed With patient   -TW     Progress declining  -TW     Outcome Evaluation PT treatment completed this date with pt presenting supine in bed with less pain in RLE at rest today, rated 8/10. Pt also able to assist with RLE more to get to EOB to come to sit with mod assist and sat on EOB with SBA. PT was not able to come to stand with FWW with +2 max assist for 2 trials with limited ability of pt to push through either LE. Sera stedy provided and pt still unable to come to stand. Therapist raised bed until pt almost standing against sera stedy and pt able to come to stand and stood x 3 min before he fatigued. Pt needed max asssit to return to supine with pillow place between thighs to support RLE. Assisted heel slide performed with flexion to pt pain tolerance (pain increasing to 10/10). Pt has shown a decline in his abiltiy to push up through BLE to come to stand as compared to previous treatments. Will continue to focus of BLE strengthening to assist with transfer ability.  -TW               User Key  (r) = Recorded By, (t) = Taken By, (c) = Cosigned By      Initials Name Provider Type    TW Glenys Teixeira, PT Physical Therapist                   Outcome Measures       Row Name 12/08/24 1054 12/08/24 0817       How much help from another person do you currently need...    Turning from your back to your side while in flat bed without using bedrails? 2  -TW 2  -CS    Moving from lying on back to sitting on the side of a flat bed without bedrails? 2  -TW 2  -CS    Moving to and from a bed to a chair (including a wheelchair)? 1  -TW 1  -CS    Standing up from a chair using your arms (e.g., wheelchair, bedside chair)? 2  -TW 2  -CS    Climbing 3-5 steps with a railing? 1  -TW 1  -CS    To walk in hospital room? 1  -TW 1  -CS    AM-PAC 6 Clicks Score (PT) 9  -TW 9  -CS    Highest Level of Mobility Goal 3 --> Sit at edge of bed  -TW 3 --> Sit at edge of bed  -CS      Row Name 12/08/24 1054          Functional Assessment    Outcome Measure  Options AM-PAC 6 Clicks Basic Mobility (PT)  -               User Key  (r) = Recorded By, (t) = Taken By, (c) = Cosigned By      Initials Name Provider Type    TW Glenys Teixeira, PT Physical Therapist    Carrol Navarro LPN Licensed Nurse                                 Physical Therapy Education       Title: PT OT SLP Therapies (In Progress)       Topic: Physical Therapy (In Progress)       Point: Mobility training (Done)       Learning Progress Summary            Patient Acceptance, E,D, DU,VU by  at 12/8/2024 1054    Comment: Pt education on how to pull up to stand with sera stedy.    Acceptance, E,D, VU,DU,NR by  at 12/7/2024 1136    Comment: Pt education on using pillow to assist in moving RLE in bed as well as eduction on body mechanics in standing.    Acceptance, E,TB, VU by  at 12/5/2024 1326    Comment: pt educated on the role of PT and his POC                      Point: Home exercise program (Not Started)       Learner Progress:  Not documented in this visit.              Point: Body mechanics (Done)       Learning Progress Summary            Patient Acceptance, E,D, VU,DU,NR by  at 12/7/2024 1136    Comment: Pt education on using pillow to assist in moving RLE in bed as well as eduction on body mechanics in standing.    Acceptance, E,TB, VU by  at 12/6/2024 1313    Comment: pt educated on the proper body mechanics to perform a STS with a RW                      Point: Precautions (Not Started)       Learner Progress:  Not documented in this visit.                              User Key       Initials Effective Dates Name Provider Type Discipline     06/16/21 -  Glenys Teixeira, PT Physical Therapist PT    RK 09/24/24 -  Anabel Evans PT Student PT Student PT                  PT Recommendation and Plan     Progress: declining  Outcome Evaluation: PT treatment completed this date with pt presenting supine in bed with less pain in RLE at rest today, rated 8/10. Pt also able to assist with  RLE more to get to EOB to come to sit with mod assist and sat on EOB with SBA. PT was not able to come to stand with FWW with +2 max assist for 2 trials with limited ability of pt to push through either LE. Sera stedy provided and pt still unable to come to stand. Therapist raised bed until pt almost standing against sera stedy and pt able to come to stand and stood x 3 min before he fatigued. Pt needed max asssit to return to supine with pillow place between thighs to support RLE. Assisted heel slide performed with flexion to pt pain tolerance (pain increasing to 10/10). Pt has shown a decline in his abiltiy to push up through BLE to come to stand as compared to previous treatments. Will continue to focus of BLE strengthening to assist with transfer ability.     Time Calculation:         PT Charges       Row Name 12/08/24 1054             Time Calculation    Start Time 1018  -TW      Stop Time 1054  -TW      Time Calculation (min) 36 min  -TW      PT Received On 12/08/24  -TW         Timed Charges    34232 - PT Therapeutic Exercise Minutes 8  -TW      28621 - PT Therapeutic Activity Minutes 28  -TW         Total Minutes    Timed Charges Total Minutes 36  -TW       Total Minutes 36  -TW                User Key  (r) = Recorded By, (t) = Taken By, (c) = Cosigned By      Initials Name Provider Type    TW Glenys Teixeira PT Physical Therapist                  Therapy Charges for Today       Code Description Service Date Service Provider Modifiers Qty    93120403657 HC PT THERAPEUTIC ACT EA 15 MIN 12/7/2024 Glenys Teixeira PT GP 3    66248098442 HC PT THERAPEUTIC ACT EA 15 MIN 12/8/2024 Glenys Teixeira PT GP 2            PT G-Codes  Outcome Measure Options: AM-PAC 6 Clicks Basic Mobility (PT)  AM-PAC 6 Clicks Score (PT): 9  AM-PAC 6 Clicks Score (OT): 14       Glenys Teixeira PT  12/8/2024

## 2024-12-09 LAB
ANION GAP SERPL CALCULATED.3IONS-SCNC: 12.6 MMOL/L (ref 5–15)
BUN SERPL-MCNC: 44 MG/DL (ref 8–23)
BUN/CREAT SERPL: 5.9 (ref 7–25)
CALCIUM SPEC-SCNC: 8.3 MG/DL (ref 8.6–10.5)
CHLORIDE SERPL-SCNC: 97 MMOL/L (ref 98–107)
CO2 SERPL-SCNC: 26.4 MMOL/L (ref 22–29)
CREAT SERPL-MCNC: 7.52 MG/DL (ref 0.76–1.27)
DEPRECATED RDW RBC AUTO: 52.3 FL (ref 37–54)
EGFRCR SERPLBLD CKD-EPI 2021: 6.8 ML/MIN/1.73
ERYTHROCYTE [DISTWIDTH] IN BLOOD BY AUTOMATED COUNT: 15.8 % (ref 12.3–15.4)
GLUCOSE SERPL-MCNC: 101 MG/DL (ref 65–99)
HCT VFR BLD AUTO: 26.7 % (ref 37.5–51)
HGB BLD-MCNC: 8.6 G/DL (ref 13–17.7)
MCH RBC QN AUTO: 29.5 PG (ref 26.6–33)
MCHC RBC AUTO-ENTMCNC: 32.2 G/DL (ref 31.5–35.7)
MCV RBC AUTO: 91.4 FL (ref 79–97)
PLATELET # BLD AUTO: 123 10*3/MM3 (ref 140–450)
PMV BLD AUTO: 11.2 FL (ref 6–12)
POTASSIUM SERPL-SCNC: 4.1 MMOL/L (ref 3.5–5.2)
RBC # BLD AUTO: 2.92 10*6/MM3 (ref 4.14–5.8)
SODIUM SERPL-SCNC: 136 MMOL/L (ref 136–145)
WBC NRBC COR # BLD AUTO: 8.19 10*3/MM3 (ref 3.4–10.8)

## 2024-12-09 PROCEDURE — 97110 THERAPEUTIC EXERCISES: CPT

## 2024-12-09 PROCEDURE — 25010000002 ENOXAPARIN PER 10 MG: Performed by: ORTHOPAEDIC SURGERY

## 2024-12-09 PROCEDURE — 97530 THERAPEUTIC ACTIVITIES: CPT

## 2024-12-09 PROCEDURE — 99232 SBSQ HOSP IP/OBS MODERATE 35: CPT | Performed by: INTERNAL MEDICINE

## 2024-12-09 PROCEDURE — 97535 SELF CARE MNGMENT TRAINING: CPT

## 2024-12-09 PROCEDURE — 85027 COMPLETE CBC AUTOMATED: CPT | Performed by: INTERNAL MEDICINE

## 2024-12-09 PROCEDURE — 80048 BASIC METABOLIC PNL TOTAL CA: CPT | Performed by: INTERNAL MEDICINE

## 2024-12-09 RX ORDER — LACTULOSE 10 G/15ML
20 SOLUTION ORAL DAILY
Status: COMPLETED | OUTPATIENT
Start: 2024-12-09 | End: 2024-12-09

## 2024-12-09 RX ORDER — LACTULOSE 10 G/15ML
20 SOLUTION ORAL 2 TIMES DAILY
Status: COMPLETED | OUTPATIENT
Start: 2024-12-09 | End: 2024-12-09

## 2024-12-09 RX ADMIN — CARVEDILOL 12.5 MG: 12.5 TABLET, FILM COATED ORAL at 17:34

## 2024-12-09 RX ADMIN — LEVOTHYROXINE SODIUM 25 MCG: 0.03 TABLET ORAL at 08:44

## 2024-12-09 RX ADMIN — CARVEDILOL 12.5 MG: 12.5 TABLET, FILM COATED ORAL at 08:44

## 2024-12-09 RX ADMIN — SODIUM CHLORIDE, SODIUM LACTATE, CALCIUM CHLORIDE, MAGNESIUM CHLORIDE AND DEXTROSE 2000 ML: 2.5; 538; 448; 25.7; 5.08 INJECTION, SOLUTION INTRAPERITONEAL at 23:19

## 2024-12-09 RX ADMIN — Medication 5000 UNITS: at 08:44

## 2024-12-09 RX ADMIN — HYDROCODONE BITARTRATE AND ACETAMINOPHEN 1 TABLET: 7.5; 325 TABLET ORAL at 12:04

## 2024-12-09 RX ADMIN — Medication 5 MG: at 21:09

## 2024-12-09 RX ADMIN — ROSUVASTATIN CALCIUM 40 MG: 20 TABLET, FILM COATED ORAL at 21:08

## 2024-12-09 RX ADMIN — ENOXAPARIN SODIUM 30 MG: 100 INJECTION SUBCUTANEOUS at 08:44

## 2024-12-09 RX ADMIN — CALCITRIOL 0.25 MCG: 0.25 CAPSULE ORAL at 08:44

## 2024-12-09 RX ADMIN — BISACODYL 10 MG: 10 SUPPOSITORY RECTAL at 12:03

## 2024-12-09 RX ADMIN — SODIUM CHLORIDE, SODIUM LACTATE, CALCIUM CHLORIDE, MAGNESIUM CHLORIDE AND DEXTROSE 2000 ML: 2.5; 538; 448; 25.7; 5.08 INJECTION, SOLUTION INTRAPERITONEAL at 09:36

## 2024-12-09 RX ADMIN — CYANOCOBALAMIN TAB 500 MCG 1000 MCG: 500 TAB at 08:44

## 2024-12-09 RX ADMIN — SODIUM CHLORIDE, SODIUM LACTATE, CALCIUM CHLORIDE, MAGNESIUM CHLORIDE AND DEXTROSE 2000 ML: 2.5; 538; 448; 25.7; 5.08 INJECTION, SOLUTION INTRAPERITONEAL at 12:50

## 2024-12-09 RX ADMIN — SODIUM CHLORIDE, SODIUM LACTATE, CALCIUM CHLORIDE, MAGNESIUM CHLORIDE AND DEXTROSE 2000 ML: 2.5; 538; 448; 25.7; 5.08 INJECTION, SOLUTION INTRAPERITONEAL at 17:45

## 2024-12-09 RX ADMIN — PANTOPRAZOLE SODIUM 40 MG: 40 TABLET, DELAYED RELEASE ORAL at 08:44

## 2024-12-09 RX ADMIN — CALCIUM CARBONATE (ANTACID) CHEW TAB 500 MG 2 TABLET: 500 CHEW TAB at 18:03

## 2024-12-09 RX ADMIN — LACTULOSE 20 G: 20 SOLUTION ORAL at 08:54

## 2024-12-09 RX ADMIN — Medication 10 ML: at 08:45

## 2024-12-09 RX ADMIN — LOSARTAN POTASSIUM 100 MG: 50 TABLET, FILM COATED ORAL at 08:44

## 2024-12-09 RX ADMIN — LACTULOSE 20 G: 20 SOLUTION ORAL at 21:08

## 2024-12-09 NOTE — PROGRESS NOTES
[facility]    Name:  Travon Plummer   Age:  79 y.o.  Sex:  male  :  1945  MRN:  [id number]   Visit Number:  [id number]  Admission Date:  12/3/2024  Date Of Service:  24  Primary Care Physician:  Chilango Erickson MD     LOS: 6 days :    Chief Complaint:      weakness    Subjective:    2024: [facility] approved working with PT/OT.  Vital stable labs stable. No new complaints. No BM as yet. Getting laxatives.      Hospital Course:      79-year-old male with past medical history of CKD, CAD, paroxysmal atrial fibrillation, ESRD on HD, hypertension, hyperlipidemia, obesity, renal cell carcinoma,  status post nephrectomy.  Chief complaint fall.  Patient reports he was walking around his house and tripped on his wife's oxygen cord.  Afterwards he felt pain in his right hip.  No loss of consciousness or head trauma suffered.  Was noted to have right intertrochanteric hip fracture in the ED Dr. Poon was notified who will see the patient in consult..  Nephrology has been notified.  Full code.    Pertinent findings: Creatinine 4.93, hemoglobin 11.4, right hip x-ray showing intertrochanteric hip fracture, CT of the cervical spine with degenerative change and osteopenia, CT of the head showing atrophy and a left maxillary polyp, CT of the pelvis showing mildly displaced comminuted right intratrochanteric fracture remote left superior and inferior pubic rami fractures,    Status post hip fracture and repair.  Rehab pending.  Is approved needs to have a bowel movement.  Edited by: Justin Brown DO at 2024 2413     Review of Systems:     All systems were reviewed and negative except as mentioned in subjective, assessment and plan.    Vital Signs:    Temp:  [97.9 °F (36.6 °C)-98.7 °F (37.1 °C)] 98.1 °F (36.7 °C)  Heart Rate:  [61-74] 74  Resp:  [16-20] 18  BP: (146-160)/(69-82) 158/70    Intake and output:    I/O last 3 completed  "shifts:  In: 8360 [P.O.:360]  Out: 02415   I/O this shift:  In: 8600 [P.O.:600]  Out: 3400     Physical Examination:    Constitutional: Awake, alert  Eyes: PERRLA, sclerae anicteric, no conjunctival injection  HENT: NCAT, mucous membranes moist  Neck: Supple, no thyromegaly, no lymphadenopathy, trachea midline  Respiratory: Clear to auscultation bilaterally, nonlabored respirations   Cardiovascular: RRR, no murmurs, rubs, or gallops, palpable pedal pulses bilaterally  Gastrointestinal: Positive bowel sounds, soft, nontender, nondistended  Musculoskeletal: No bilateral ankle edema, no clubbing or cyanosis to extremities  Psychiatric: Appropriate affect, cooperative  Neurologic: Oriented x 3, speech clear  Skin: No rashes  Exam stable 12/9/2024  Edited by: Justin Brown, DO at 12/9/2024 1457     Laboratory results:    Results from last 7 days   Lab Units 12/09/24 0721 12/07/24 0918 12/06/24  0425 12/05/24  0609 12/04/24  0529 12/03/24  1458   SODIUM mmol/L 136 134* 134*   < > 139 141   POTASSIUM mmol/L 4.1 4.4 4.6   < > 4.8 4.8   CHLORIDE mmol/L 97* 97* 101   < > 103 103   CO2 mmol/L 26.4 25.5 24.6   < > 25.5 26.9   BUN mg/dL 44* 42* 42*   < > 27* 26*   CREATININE mg/dL 7.52* 6.38* 5.94*   < > 4.66* 4.93*   CALCIUM mg/dL 8.3* 8.8 8.6   < > 9.1 9.3   BILIRUBIN mg/dL  --   --   --   --  0.3 0.4   ALK PHOS U/L  --   --   --   --  91 100   ALT (SGPT) U/L  --   --   --   --  10 13   AST (SGOT) U/L  --   --   --   --  10 16   GLUCOSE mg/dL 101* 153* 113*   < > 95 99    < > = values in this interval not displayed.     Results from last 7 days   Lab Units 12/09/24 0721 12/07/24 0918 12/06/24  0425   WBC 10*3/mm3 8.19 9.01 11.01*   HEMOGLOBIN g/dL 8.6* 9.3* 8.6*   HEMATOCRIT % 26.7* 28.6* 26.6*   PLATELETS 10*3/mm3 123* 127* 125*                 No results for input(s): \"PHART\", \"CCB5ZNG\", \"PO2ART\", \"VKN7UVG\", \"BASEEXCESS\" in the last 8760 hours.   I have reviewed the patient's laboratory results.    Radiology " results:    No radiology results from the last 24 hrs  I have reviewed the patient's radiology reports.    Medication Review:     I have reviewed the patient's active and prn medications.     Problem List:      Closed right hip fracture    Paroxysmal atrial fibrillation    Chronic kidney disease, stage V      Assessment/Plan:      Closed intertrochanteric right hip fracture  --  Status post right hip gamma nail Dr. Hammonds 12/4/2024.  -- Active Treatments: Hold Bumex, pain control dilaudid and tylenol, continue beta-blocker, hold losartan  -- Pending Results: will need to monitor hemoglobin closely on prophylaxis  --will need to have a BM prior to DC      Paroxysmal atrial fibrillation  --Chronic medical problem  -- Active Treatments: Not on anticoagulation due to history of anemia  -- Pending Results: Monitor telemetry      Chronic kidney disease, stage V  -- Chronic medical problem Dr. Sellers following  -- Active Treatments: Continue PD              DVT Prophylaxis: lovenox will need 4 weeks  Code Status: Full code  Diet: Cardiac renal,   Disposition: Has a bed at [facility] later today or tomorrow.          Edited by: Justin Brown DO at 12/9/2024 1457           Justin Brown DO  12/09/24  14:57 EST    Dictated utilizing Dragon dictation.

## 2024-12-09 NOTE — THERAPY TREATMENT NOTE
Patient Name: Travon Plummer  : 1945    MRN: [id number]                              Today's Date: 2024       Admit Date: 12/3/2024    Visit Dx:     ICD-10-CM ICD-9-CM   1. Closed nondisplaced intertrochanteric fracture of right femur, initial encounter  S72.144A 820.21   2. Closed fracture of right hip, initial encounter  S72.001A 820.8     Patient Active Problem List   Diagnosis    Coronary artery disease    Dyspnea    Cerebrovascular accident    Essential hypertension    Hypercholesterolemia    Tobacco abuse    Gout    Osteoarthritis    GERD (gastroesophageal reflux disease)    Obesity    Prostate cancer    Renal cell carcinoma    Nuclear sclerotic cataract of right eye    Symptomatic anemia    Iron deficiency anemia due to chronic blood loss    Melena    Chronic kidney disease, stage IV (severe)    Upper GI bleed    Absent kidney    Acquired hypothyroidism    Benign hypertensive kidney disease with chronic kidney disease    Dyslipidemia    Elevated prostate specific antigen (PSA)    Paroxysmal atrial fibrillation    Secondary hyperparathyroidism    Vitamin D deficiency    Urinary incontinence    Hematuria    Lower urinary tract symptoms (LUTS)    Chronic kidney disease, stage V    Closed right hip fracture     Past Medical History:   Diagnosis Date    Benign hypertensive CKD, stage 5 chronic kidney disease or end stage renal disease     Broken arm     Carotid stenosis     bilateral    Cerebrovascular accident 10/31/2008    Coronary artery disease     followed by Dr. Ashford; LOV 24; denies chest pain, SOB 24    Dialysis patient     Current 24    Dyspnea     GERD (gastroesophageal reflux disease)     Gout     History of blood transfusion     Hypercholesterolemia     Hypertension 11/10/2015    History of hypertension; hypotensive at the time of office visit on 11/10/2015.    Impaired mobility     Obesity     Osteoarthritis     Paroxysmal atrial fibrillation     History of  paroxysmal atrial fibrillation, data deficit.    Prostate cancer 01/2015    Prostate cancer, diagnosed January of 2015, status post radiation therapy x39 treatments, 07/01/2015 at [facility].    Renal cell carcinoma 2015    Renal cell carcinoma, dx March of 2015, status post left nephrectomy.    Wears dentures     instructed no adhesive DOS     Past Surgical History:   Procedure Laterality Date    CAROTID STENT Left 10/31/2008    PTA/left carotid artery stent, 10/31/2008.     COLONOSCOPY N/A 12/23/2021    Procedure: COLONOSCOPY, polypectomy, clip placement x 1;  Surgeon: Deandra Do MD;  Location: [facility] ENDOSCOPY;  Service: Gastroenterology;  Laterality: N/A;    COLONOSCOPY W/ POLYPECTOMY      CORONARY ANGIOPLASTY WITH STENT PLACEMENT      A 3.5 x 13 mm. Cypher ESTIVEN to RCA expanded with 4 mm balloon.     DIALYSIS FISTULA CREATION N/A     abdominal    ENDOSCOPY N/A 12/22/2021    Procedure: ESOPHAGOGASTRODUODENOSCOPY with biopsy;  Surgeon: Deandra Do MD;  Location: [facility] ENDOSCOPY;  Service: Gastroenterology;  Laterality: N/A;    ENDOSCOPY N/A 02/17/2023    Procedure: ESOPHAGOGASTRODUODENOSCOPY with biopsy;  Surgeon: Georgina Pool MD;  Location: [facility] ENDOSCOPY;  Service: Gastroenterology;  Laterality: N/A;    HIP INTERTROCHANTERIC NAILING Right 12/4/2024    Procedure: HIP INTERTROCHANTERIC NAILING;  Surgeon: Dean Hammonds MD;  Location: [facility] OR;  Service: Orthopedics;  Laterality: Right;    INGUINAL HERNIA REPAIR      NEPHRECTOMY Left 03/19/2015    diagnosed January 2015, status post left radical nephrectomy.     PROSTATE FIDUCIAL MARKER PLACEMENT  2016    received XRT for prostate CA in 2016      General Information       Row Name 12/09/24 1552          OT Time and Intention    Document Type therapy note (daily note)  -DB     Mode of Treatment occupational therapy  -DB               User Key  (r) = Recorded By, (t) = Taken By, (c) = Cosigned By      Initials Name Provider Type     DB Josette Ocampo OT Occupational Therapist                     Mobility/ADL's       Row Name 12/09/24 1552          Bed Mobility    All Activities, [place] (Bed Mobility) moderate assist (50% patient effort);verbal cues;nonverbal cues (demo/gesture)  -DB     Assistive Device (Bed Mobility) bed rails;head of bed elevated  -DB       Row Name 12/09/24 1552          Transfers    Transfers sit-stand transfer  -DB       Row Name 12/09/24 1552          Sit-Stand Transfer    Sit-Stand [place] (Transfers) moderate assist (50% patient effort);2 person assist;verbal cues  -DB     Assistive Device (Sit-Stand Transfers) walker, front-wheeled  -DB       Row Name 12/09/24 1552          Functional Mobility    Patient was able to Ambulate no, other medical factors prevent ambulation  -DB     Reason Patient was unable to Ambulate Uncontrolled Pain;Excessive Weakness  -DB       Row Name 12/09/24 1552          Mobility    Extremity Weight-bearing Status right lower extremity  -DB     Right Lower Extremity (Weight-bearing Status) weight-bearing as tolerated (WBAT)  -DB       Row Name 12/09/24 1552          Toileting Assessment/Training    [place] Level (Toileting) maximum assist (25% patient effort);change pad/brief  -DB               User Key  (r) = Recorded By, (t) = Taken By, (c) = Cosigned By      Initials Name Provider Type    DB Josette Ocampo OT Occupational Therapist                   Obj/Interventions    No documentation.                  Goals/Plan    No documentation.                  Clinical Impression       Row Name 12/09/24 1554          Pain Assessment    Pain Location hip;extremity  -DB     Pain Side/Orientation right  -DB     Pain Management Interventions exercise or physical activity utilized  -DB     Response to Pain Interventions activity participation with tolerable pain  -DB     Additional Documentation Pain Scale: FACES Pre/Post-Treatment (Group)  -DB       Row Name 12/09/24  1554          Pain Scale: FACES Pre/Post-Treatment    Pain: FACES Scale, Pretreatment 4-->hurts little more  -DB     Posttreatment Pain Rating 2-->hurts little bit  -DB       Row Name 12/09/24 1554          Plan of Care Review    Plan of Care Reviewed With patient  -DB     Progress improving  -DB     Outcome Evaluation OT completed tx this date. Pt supine in bed upon OT arrival. Pt able to complete supine to sit EOB with mod A. Pt able to complete STS with mod A x2 and RW. Pt able to stand to allow for linen change.  Pt reqs x1 seated rest break. Pt complete STS again for mod A x2. Pt participated in weight shifting through BLEs. Pt able to return supine in bed with max A. Pt mod A to perform (L)<>(R) rolling to perform brief change with max A. Pt left side lying left with pillow between knees and needs in reach. OT to continue per POC.  -DB       Row Name 12/09/24 1554          Vital Signs    Pre SpO2 (%) 96  -DB     O2 Delivery Pre Treatment room air  -DB     O2 Delivery Intra Treatment room air  -DB     O2 Delivery Post Treatment room air  -DB     Pre Patient Position Supine  -DB     Intra Patient Position Standing  -DB     Post Patient Position Side Lying  -DB       Row Name 12/09/24 1554          Positioning and Restraints    Pre-Treatment Position in bed  -DB     Post Treatment Position bed  -DB     In Bed side lying left;call light within reach;encouraged to call for assist;exit alarm on;pillow between legs  -DB               User Key  (r) = Recorded By, (t) = Taken By, (c) = Cosigned By      Initials Name Provider Type    DB Josette Ocampo, AFTAB Occupational Therapist                   Outcome Measures       Row Name 12/09/24 1604          How much help from another is currently needed...    Putting on and taking off regular lower body clothing? 2  -DB     Bathing (including washing, rinsing, and drying) 2  -DB     Toileting (which includes using toilet bed pan or urinal) 2  -DB     Putting on and  taking off regular upper body clothing 3  -DB     Taking care of personal grooming (such as brushing teeth) 3  -DB     Eating meals 3  -DB     AM-PAC 6 Clicks Score (OT) 15  -DB       Row Name 12/09/24 0843          How much help from another person do you currently need...    Turning from your back to your side while in flat bed without using bedrails? 2  -HH     Moving from lying on back to sitting on the side of a flat bed without bedrails? 2  -HH     Moving to and from a bed to a chair (including a wheelchair)? 1  -HH     Standing up from a chair using your arms (e.g., wheelchair, bedside chair)? 2  -HH     Climbing 3-5 steps with a railing? 1  -HH     To walk in hospital room? 1  -HH     AM-PAC 6 Clicks Score (PT) 9  -HH     Highest Level of Mobility Goal 3 --> Sit at edge of bed  -HH       Row Name 12/09/24 1604          Functional Assessment    Outcome Measure Options AM-PAC 6 Clicks Daily Activity (OT)  -DB               User Key  (r) = Recorded By, (t) = Taken By, (c) = Cosigned By      Initials Name Provider Type     Thuy Vivar, RN Registered Nurse    Josette Donovan OT Occupational Therapist                    Occupational Therapy Education       Title: PT OT SLP Therapies (In Progress)       Topic: Occupational Therapy (In Progress)       Point: ADL training (Done)       Description:   Instruct learner(s) on proper safety adaptation and remediation techniques during self care or transfers.   Instruct in proper use of assistive devices.                  Learning Progress Summary            Patient Acceptance, E,TB, VU,NR by DB at 12/9/2024 1604    Comment: ADL retraining and bed mobility. Continued education for follow through.    Nonacceptance, E,TB, NR by SP at 12/6/2024 1322    Comment: Pt was educated on LBD techniques but is limited by pain at this time. Cont to educate.    Acceptance, E,TB, VU by SD at 12/5/2024 1340    Comment: OT POC                      Point: Home exercise  program (Not Started)       Description:   Instruct learner(s) on appropriate technique for monitoring, assisting and/or progressing therapeutic exercises/activities.                  Learner Progress:  Not documented in this visit.              Point: Precautions (Done)       Description:   Instruct learner(s) on prescribed precautions during self-care and functional transfers.                  Learning Progress Summary            Patient Acceptance, E,TB, VU,NR by DB at 12/9/2024 1602    Comment: ADL retraining and bed mobility. Continued education for follow through.                      Point: Body mechanics (Not Started)       Description:   Instruct learner(s) on proper positioning and spine alignment during self-care, functional mobility activities and/or exercises.                  Learner Progress:  Not documented in this visit.                              User Key       Initials Effective Dates Name Provider Type Discipline    SD 06/16/21 -  Lynette Contreras, OT Occupational Therapist OT    SP 09/08/22 -  Osiris Mckeon, OT Occupational Therapist OT    DB 07/06/23 -  Josette Ocampo, OT Occupational Therapist OT                  OT Recommendation and Plan     Plan of Care Review  Plan of Care Reviewed With: patient  Progress: improving  Outcome Evaluation: OT completed tx this date. Pt supine in bed upon OT arrival. Pt able to complete supine to sit EOB with mod A. Pt able to complete STS with mod A x2 and RW. Pt able to stand to allow for linen change.  Pt reqs x1 seated rest break. Pt complete STS again for mod A x2. Pt participated in weight shifting through BLEs. Pt able to return supine in bed with max A. Pt mod A to perform (L)<>(R) rolling to perform brief change with max A. Pt left side lying left with pillow between knees and needs in reach. OT to continue per POC.     Time Calculation:         Time Calculation- OT       Row Name 12/09/24 1602             Time Calculation- OT    OT Start Time  1434  -DB      OT Stop Time 1517  -DB      OT Time Calculation (min) 43 min  -DB      OT Received On 12/09/24  -DB      OT Goal Re-Cert Due Date 12/17/24  -DB         Timed Charges    39015 - OT Therapeutic Activity Minutes 25  -DB      42817 - OT Self Care/Mgmt Minutes 18  -DB         Total Minutes    Timed Charges Total Minutes 43  -DB       Total Minutes 43  -DB                User Key  (r) = Recorded By, (t) = Taken By, (c) = Cosigned By      Initials Name Provider Type    DB Josette Ocampo, OT Occupational Therapist                  Therapy Charges for Today       Code Description Service Date Service Provider Modifiers Qty    04892152531 HC OT THERAPEUTIC ACT EA 15 MIN 12/9/2024 Josette Ocampo OT GO 2    64507734063 HC OT SELF CARE/MGMT/TRAIN EA 15 MIN 12/9/2024 Josette Ocampo OT GO 1                 Josette Ocampo OT  12/9/2024

## 2024-12-09 NOTE — PROGRESS NOTES
[facility] Progress Note  [facility]. [place]        Patient Name: Travon Plummer  : 1945  MRN: [id number]   LOS: 6 days    Patient Care Team:  Chilango Erickson MD as PCP - General (Internal Medicine)  Andrea Sellers MD, JODY as Consulting Physician (Nephrology)  Deandra Do MD as Surgeon (General Surgery)  Leonard Ashford MD as Consulting Physician (Cardiology)  Georgina Pool MD as Consulting Physician (Gastroenterology)    Chief Complaint:    Chief Complaint   Patient presents with    Fall     Pt tripped on his wife's O2 tubing onto his right hip. Pt's only complaint is right hip pain.     Hip Pain     Primary Care Physician:  Chilango Erickson MD  Date of admission: 12/3/2024    Subjective     Interval History:   Follow-up ESRD.  Status post fall and hip fracture.  Patient is laying in the bed awake alert and interactive, he said he feels a lot better.  Denies any chest pain or shortness of breath.  Pain is under very good control.  He said he has not been able to get out of bed by himself and needs a lot of assistance.  Awaiting transfer to rehab.  He has not had a bowel movement since admission.  Events noted from last 24 hours.  I reviewed the chart and other providers notes, labs and procedures done since my last note.    Review of Systems:   As noted above.    Objective     Vitals:   Temp:  [97.9 °F (36.6 °C)-98.7 °F (37.1 °C)] 98.1 °F (36.7 °C)  Heart Rate:  [61-66] 61  Resp:  [16-18] 18  BP: (146-165)/(69-82) 150/69    Intake/Output Summary (Last 24 hours) at 2024 0757  Last data filed at 2024 1825  Gross per 24 hour   Intake 6360 ml   Output 58596 ml   Net -6240 ml       Physical Exam:    General Appearance: alert, oriented x 3, no acute distress   Skin: warm and dry  HEENT: oral mucosa normal, nonicteric sclera  Neck: supple, no JVD  Lungs: CTA  Heart: RRR, normal S1 and S2  Abdomen: obese, soft, nontender,  non distended and positive bowel sounds.  Peritoneal dialysis catheter in place.  : no palpable bladder  Extremities: Trace edema, no cyanosis or clubbing  Neuro: normal speech and mental status     Scheduled Meds:     Current Facility-Administered Medications   Medication Dose Route Frequency Provider Last Rate Last Admin    acetaminophen (TYLENOL) tablet 650 mg  650 mg Oral Q4H PRN Dean Hammonds MD        sennosides-docusate (PERICOLACE) 8.6-50 MG per tablet 2 tablet  2 tablet Oral BID PRN Dean Hammonds MD   2 tablet at 12/07/24 0811    And    polyethylene glycol (MIRALAX) packet 17 g  17 g Oral Daily PRN Dean Hammonds MD   17 g at 12/08/24 1001    And    bisacodyl (DULCOLAX) EC tablet 5 mg  5 mg Oral Daily PRN Dean Hammonds MD   5 mg at 12/08/24 1830    And    bisacodyl (DULCOLAX) suppository 10 mg  10 mg Rectal Daily PRN Dean Hammonds MD        calcitriol (ROCALTROL) capsule 0.25 mcg  0.25 mcg Oral Daily Dean Hammonds MD   0.25 mcg at 12/08/24 0818    calcium carbonate (TUMS) chewable tablet 500 mg (200 mg elemental)  2 tablet Oral BID PRN Dean Hammonds MD        Calcium Replacement - Follow Nurse / BPA Driven Protocol   Not Applicable PRN Dean Hammonds MD        carvedilol (COREG) tablet 12.5 mg  12.5 mg Oral BID With Meals Dean Hammonds MD   12.5 mg at 12/08/24 1751    Enoxaparin Sodium (LOVENOX) syringe 30 mg  30 mg Subcutaneous Daily Dean Hammonds MD   30 mg at 12/08/24 0818    [START ON 12/10/2024] ferrous sulfate EC tablet 324 mg  324 mg Oral Weekly Dean Hammonds MD        HYDROcodone-acetaminophen (NORCO) 7.5-325 MG per tablet 1 tablet  1 tablet Oral Q4H PRN Dean Hammonds MD   1 tablet at 12/08/24 1830    HYDROcodone-acetaminophen (NORCO) 7.5-325 MG per tablet 2 tablet  2 tablet Oral Q4H PRN Dean Hammonds MD   2 tablet at 12/07/24 0953    levothyroxine (SYNTHROID, LEVOTHROID) tablet 25 mcg  25 mcg Oral Daily Dean Hammonds MD   25 mcg at 12/08/24 0817    losartan  (COZAAR) tablet 100 mg  100 mg Oral Q24H Dean Hammonds MD   100 mg at 12/08/24 0817    Magnesium Standard Dose Replacement - Follow Nurse / BPA Driven Protocol   Not Applicable PRN Dean Hammonds MD        melatonin tablet 5 mg  5 mg Oral Nightly Dean Hammonds MD   5 mg at 12/08/24 2013    Morphine sulfate (PF) injection 2 mg  2 mg Intravenous Q4H PRN Dean Hammonds MD   2 mg at 12/08/24 2013    And    naloxone (NARCAN) injection 0.4 mg  0.4 mg Intravenous Q5 Min PRN Dean Hammonds MD        nitroglycerin (NITROSTAT) SL tablet 0.4 mg  0.4 mg Sublingual Q5 Min PRN Dean Hammonds MD        ondansetron ODT (ZOFRAN-ODT) disintegrating tablet 4 mg  4 mg Oral Q6H PRN Dean Hammonds MD        Or    ondansetron (ZOFRAN) injection 4 mg  4 mg Intravenous Q6H PRN Dean Hammonds MD        ondansetron (ZOFRAN) injection 4 mg  4 mg Intravenous Q6H PRN Dean Hammonds MD        pantoprazole (PROTONIX) EC tablet 40 mg  40 mg Oral Daily Dean Hammonds MD   40 mg at 12/08/24 0817    phenol (CHLORASEPTIC) 1.4 % liquid 1 spray  1 spray Mouth/Throat Q2H PRN Justin Brown, DO   1 spray at 12/08/24 1827    Phosphorus Replacement - Follow Nurse / BPA Driven Protocol   Not Applicable PRN Dean Hammonds MD        Potassium Replacement - Follow Nurse / BPA Driven Protocol   Not Applicable PRN Dean Hammonds MD        rosuvastatin (CRESTOR) tablet 40 mg  40 mg Oral Nightly Dean Hammonds MD   40 mg at 12/08/24 2013    sodium chloride 0.9 % flush 10 mL  10 mL Intravenous Q12H Dean Hammonds MD   10 mL at 12/08/24 2013    sodium chloride 0.9 % flush 10 mL  10 mL Intravenous PRN Dean Hammonds MD   10 mL at 12/08/24 0010    sodium chloride 0.9 % infusion 40 mL  40 mL Intravenous PRN Dean Hammonds MD        UltraBag/Dianeal PD-2/2.5% Dex (keenan #[id number]) (DIANEAL) 2,000 mL  2,000 mL Intraperitoneal 4 Exchanges Daily - Dwell Overnight Andrea Sellers MD, JODY   2,000 mL at 12/08/24 2330    vitamin B-12  (CYANOCOBALAMIN) tablet 1,000 mcg  1,000 mcg Oral Daily Dean Hammonds MD   1,000 mcg at 12/08/24 0817    vitamin D3 capsule 5,000 Units  5,000 Units Oral Daily Dean Hammonds MD   5,000 Units at 12/08/24 0817       calcitriol, 0.25 mcg, Oral, Daily  carvedilol, 12.5 mg, Oral, BID With Meals  enoxaparin, 30 mg, Subcutaneous, Daily  [START ON 12/10/2024] ferrous sulfate, 324 mg, Oral, Weekly  levothyroxine, 25 mcg, Oral, Daily  losartan, 100 mg, Oral, Q24H  melatonin, 5 mg, Oral, Nightly  pantoprazole, 40 mg, Oral, Daily  rosuvastatin, 40 mg, Oral, Nightly  sodium chloride, 10 mL, Intravenous, Q12H  UltraBag/Dianeal PD-2/2.5% Dex (keenan #[id number]), 2,000 mL, Intraperitoneal, 4 Exchanges Daily - Dwell Overnight  vitamin B-12, 1,000 mcg, Oral, Daily  vitamin D3, 5,000 Units, Oral, Daily        IV Meds:        Results Reviewed:   I have personally reviewed the results from the time of this admission to 12/9/2024 07:57 EST     Results from last 7 days   Lab Units 12/09/24  0721 12/07/24  0918 12/06/24  0425 12/05/24  0609 12/04/24  0529 12/03/24  1458   SODIUM mmol/L 136 134* 134*   < > 139 141   POTASSIUM mmol/L 4.1 4.4 4.6   < > 4.8 4.8   CHLORIDE mmol/L 97* 97* 101   < > 103 103   CO2 mmol/L 26.4 25.5 24.6   < > 25.5 26.9   BUN mg/dL 44* 42* 42*   < > 27* 26*   CREATININE mg/dL 7.52* 6.38* 5.94*   < > 4.66* 4.93*   CALCIUM mg/dL 8.3* 8.8 8.6   < > 9.1 9.3   BILIRUBIN mg/dL  --   --   --   --  0.3 0.4   ALK PHOS U/L  --   --   --   --  91 100   ALT (SGPT) U/L  --   --   --   --  10 13   AST (SGOT) U/L  --   --   --   --  10 16   GLUCOSE mg/dL 101* 153* 113*   < > 95 99    < > = values in this interval not displayed.       Estimated Creatinine Clearance: 10.2 mL/min (A) (by C-G formula based on SCr of 7.52 mg/dL (H)).                Results from last 7 days   Lab Units 12/09/24  0721 12/07/24  0918 12/06/24  0425 12/05/24  0609 12/04/24  0529   WBC 10*3/mm3 8.19 9.01 11.01* 11.78* 9.57   HEMOGLOBIN g/dL 8.6* 9.3*  "8.6* 10.1* 10.8*   PLATELETS 10*3/mm3 123* 127* 125* 132* 134*             Brief Urine Lab Results  (Last result in the past 365 days)        Color   Clarity   Blood   Leuk Est   Nitrite   Protein   CREAT   Urine HCG        07/17/24 1436 Yellow   Clear   Small   Negative   Negative   300 mg/dL                   No results found for: \"UTPCR\"    Imaging Results (Last 24 Hours)       ** No results found for the last 24 hours. **                Assessment / Plan     ASSESSMENT:    Closed right hip fracture    Paroxysmal atrial fibrillation    Chronic kidney disease, stage V    End-stage renal disease: Patient with longstanding history of diabetes and hypertension as well as renal cell carcinoma status post left nephrectomy leading to end-stage renal disease.  Patient has been on peritoneal dialysis and has done very well.  Will continue peritoneal dialysis while in the hospital.  Type 2 diabetes: Continue with the sliding scale as per hospitalist service.  Hypertension: Will resume all home medications, he has been under very good control with the medications that he is on.  Anemia: He has responded well to IV iron and not getting CHARLEY currently does not appear to have any issues, likely will drop after surgery.  Hyperparathyroidism: Continue calcitriol for the time being.  Will continue to follow calcium phosphorus and PTH as needed.  Hip fracture: Surgical intervention planned as noted.  Status post right hip gamma nail procedure.  Paroxysmal atrial fibrillation: He has been noted to be in sinus rhythm.  Constipation: Patient is been getting bowel regimen we will go ahead and start him on lactulose 20 g 1 dose now and if he did not have any bowel movement we will start giving it twice a day.     PLAN:  His blood pressure is fairly stable, tolerating peritoneal dialysis with 2.5% solution well.  Continue the same.  Details were discussed with the patient no family in the room.  Now he is very comfortable going to the " [facility] rehab.  Awaiting bowel movement before he can be transferred to the rehab.  Details were also discussed with the hospitalist service and or other providers as needed.   Continue with rest of the current treatment plan, and monitor with surveillance labs.  Further recommendations will depend on clinical course of the patient during the current hospitalization.   I have reviewed the copied text to this note, it was edited and the changes made as needed.  It is accurate to the point, when the note was signed today.     Thank you for involving us in the care of Travon Plummer.  Please feel free to call with any questions.    Andrea Sellers MD, FASN  12/09/24  07:57 EST    [facility]  [phone]      Part of this note may be an electronic transcription/translation of spoken language to printed text using the Dragon Dictation System.

## 2024-12-09 NOTE — PLAN OF CARE
Problem: Pain Acute  Goal: Optimal Pain Control and Function  Outcome: Progressing  Intervention: Prevent or Manage Pain  Recent Flowsheet Documentation  Taken 12/9/2024 0400 by Osiris Romero RN  Medication Review/Management: medications reviewed  Taken 12/9/2024 0200 by Osiris Romero RN  Medication Review/Management: medications reviewed  Taken 12/9/2024 0000 by Osiris Romero RN  Medication Review/Management: medications reviewed  Taken 12/8/2024 2200 by Osiris Romero RN  Medication Review/Management: medications reviewed  Taken 12/8/2024 2000 by Osiris Romero RN  Medication Review/Management: medications reviewed     Problem: Fall Injury Risk  Goal: Absence of Fall and Fall-Related Injury  Outcome: Progressing  Intervention: Identify and Manage Contributors  Recent Flowsheet Documentation  Taken 12/9/2024 0400 by Osiris Romero RN  Medication Review/Management: medications reviewed  Taken 12/9/2024 0200 by Osiris Romero RN  Medication Review/Management: medications reviewed  Taken 12/9/2024 0000 by Osiris Romero RN  Medication Review/Management: medications reviewed  Taken 12/8/2024 2200 by Osiris Romero RN  Medication Review/Management: medications reviewed  Taken 12/8/2024 2000 by Osiris Romero RN  Medication Review/Management: medications reviewed  Intervention: Promote Injury-Free Environment  Recent Flowsheet Documentation  Taken 12/9/2024 0400 by Osiris Romero RN  Safety Promotion/Fall Prevention:   safety round/check completed   room organization consistent   nonskid shoes/slippers when out of bed   muscle strengthening facilitated   lighting adjusted   fall prevention program maintained   clutter free environment maintained   assistive device/personal items within reach   activity supervised  Taken 12/9/2024 0200 by Osiris Romero RN  Safety Promotion/Fall Prevention:   safety round/check completed   room organization consistent   nonskid shoes/slippers when out of bed   muscle strengthening  facilitated   lighting adjusted   fall prevention program maintained   clutter free environment maintained   assistive device/personal items within reach   activity supervised  Taken 12/9/2024 0000 by Osiris Romero, RN  Safety Promotion/Fall Prevention:   safety round/check completed   room organization consistent   nonskid shoes/slippers when out of bed   muscle strengthening facilitated   lighting adjusted   fall prevention program maintained   clutter free environment maintained   assistive device/personal items within reach   activity supervised  Taken 12/8/2024 2200 by Osiris Romero, RN  Safety Promotion/Fall Prevention:   safety round/check completed   room organization consistent   nonskid shoes/slippers when out of bed   muscle strengthening facilitated   lighting adjusted   fall prevention program maintained   clutter free environment maintained   assistive device/personal items within reach   activity supervised  Taken 12/8/2024 2000 by Osiris Romero, RN  Safety Promotion/Fall Prevention:   safety round/check completed   room organization consistent   nonskid shoes/slippers when out of bed   muscle strengthening facilitated   lighting adjusted   fall prevention program maintained   clutter free environment maintained   assistive device/personal items within reach   activity supervised     Problem: Hospitalized Older Adult  Goal: Optimal Cognitive Function  Outcome: Progressing  Goal: Effective Bowel Elimination  Outcome: Progressing  Goal: Optimal Coping  Outcome: Progressing  Goal: Fluid and Electrolyte Balance  Outcome: Progressing  Goal: Optimal Functional Ability  Outcome: Progressing  Intervention: Promote Functional Ability  Recent Flowsheet Documentation  Taken 12/9/2024 0400 by Osiris Romero, RN  Activity Assistance Provided: assistance, 2 people  Taken 12/9/2024 0200 by Osiris Romero, RN  Activity Assistance Provided: assistance, 2 people  Taken 12/9/2024 0000 by Osiris Romero, RN  Activity  Assistance Provided: assistance, 2 people  Taken 12/8/2024 2200 by Osiris Romero, RN  Activity Assistance Provided: assistance, 2 people  Taken 12/8/2024 2000 by Osiris Romero, RN  Activity Management: activity encouraged  Activity Assistance Provided: assistance, 2 people  Goal: Improved Oral Intake  Outcome: Progressing  Goal: Adequate Sleep/Rest  Outcome: Progressing  Goal: Effective Urinary Elimination  Outcome: Progressing     Problem: Adult Inpatient Plan of Care  Goal: Plan of Care Review  Outcome: Progressing  Goal: Patient-Specific Goal (Individualized)  Outcome: Progressing  Goal: Absence of Hospital-Acquired Illness or Injury  Outcome: Progressing  Intervention: Identify and Manage Fall Risk  Recent Flowsheet Documentation  Taken 12/9/2024 0400 by Osiris Romero, RN  Safety Promotion/Fall Prevention:   safety round/check completed   room organization consistent   nonskid shoes/slippers when out of bed   muscle strengthening facilitated   lighting adjusted   fall prevention program maintained   clutter free environment maintained   assistive device/personal items within reach   activity supervised  Taken 12/9/2024 0200 by Osiris Romero, RN  Safety Promotion/Fall Prevention:   safety round/check completed   room organization consistent   nonskid shoes/slippers when out of bed   muscle strengthening facilitated   lighting adjusted   fall prevention program maintained   clutter free environment maintained   assistive device/personal items within reach   activity supervised  Taken 12/9/2024 0000 by Osiris Romero, RN  Safety Promotion/Fall Prevention:   safety round/check completed   room organization consistent   nonskid shoes/slippers when out of bed   muscle strengthening facilitated   lighting adjusted   fall prevention program maintained   clutter free environment maintained   assistive device/personal items within reach   activity supervised  Taken 12/8/2024 2200 by Osiris Romero, RN  Safety Promotion/Fall  Prevention:   safety round/check completed   room organization consistent   nonskid shoes/slippers when out of bed   muscle strengthening facilitated   lighting adjusted   fall prevention program maintained   clutter free environment maintained   assistive device/personal items within reach   activity supervised  Taken 12/8/2024 2000 by Osiris Romero RN  Safety Promotion/Fall Prevention:   safety round/check completed   room organization consistent   nonskid shoes/slippers when out of bed   muscle strengthening facilitated   lighting adjusted   fall prevention program maintained   clutter free environment maintained   assistive device/personal items within reach   activity supervised  Intervention: Prevent Skin Injury  Recent Flowsheet Documentation  Taken 12/8/2024 2000 by Osiris Romero RN  Skin Protection: incontinence pads utilized  Intervention: Prevent Infection  Recent Flowsheet Documentation  Taken 12/9/2024 0400 by Osiris Romero RN  Infection Prevention:   hand hygiene promoted   environmental surveillance performed   rest/sleep promoted   single patient room provided  Taken 12/9/2024 0200 by Osiris Romero RN  Infection Prevention:   hand hygiene promoted   environmental surveillance performed   rest/sleep promoted   single patient room provided  Taken 12/9/2024 0000 by Osiris Romero RN  Infection Prevention:   hand hygiene promoted   environmental surveillance performed   rest/sleep promoted   single patient room provided  Taken 12/8/2024 2200 by Osiris Romero RN  Infection Prevention:   hand hygiene promoted   environmental surveillance performed   rest/sleep promoted   single patient room provided  Taken 12/8/2024 2000 by Osiris Romero RN  Infection Prevention:   hand hygiene promoted   environmental surveillance performed   rest/sleep promoted   single patient room provided  Goal: Optimal Comfort and Wellbeing  Outcome: Progressing  Goal: Readiness for Transition of Care  Outcome: Progressing      Problem: Skin Injury Risk Increased  Goal: Skin Health and Integrity  Outcome: Progressing  Intervention: Optimize Skin Protection  Recent Flowsheet Documentation  Taken 12/8/2024 2000 by Osiris Romero RN  Activity Management: activity encouraged  Pressure Reduction Techniques: frequent weight shift encouraged  Pressure Reduction Devices: positioning supports utilized  Skin Protection: incontinence pads utilized     Problem: Comorbidity Management  Goal: Blood Glucose Level Within Target Range  Outcome: Progressing  Intervention: Monitor and Manage Glycemia  Recent Flowsheet Documentation  Taken 12/9/2024 0400 by Osiris Romero RN  Medication Review/Management: medications reviewed  Taken 12/9/2024 0200 by Osiris Romero RN  Medication Review/Management: medications reviewed  Taken 12/9/2024 0000 by Osiris Romero RN  Medication Review/Management: medications reviewed  Taken 12/8/2024 2200 by Osiris Romero RN  Medication Review/Management: medications reviewed  Taken 12/8/2024 2000 by Osiris Romero RN  Medication Review/Management: medications reviewed  Goal: Blood Pressure in Desired Range  Outcome: Progressing  Intervention: Maintain Blood Pressure Management  Recent Flowsheet Documentation  Taken 12/9/2024 0400 by Osiris Romero RN  Medication Review/Management: medications reviewed  Taken 12/9/2024 0200 by Osiris Romero RN  Medication Review/Management: medications reviewed  Taken 12/9/2024 0000 by Osiris Romero RN  Medication Review/Management: medications reviewed  Taken 12/8/2024 2200 by Osiris Romero RN  Medication Review/Management: medications reviewed  Taken 12/8/2024 2000 by Osiris Romero RN  Medication Review/Management: medications reviewed   Goal Outcome Evaluation:

## 2024-12-09 NOTE — CASE MANAGEMENT/SOCIAL WORK
Continued Stay Note   Blane     Patient Name: Travon Plummer  MRN: [id number]  Today's Date: 12/9/2024    Admit Date: 12/3/2024    Plan: pt resting in bed; stated md thought he would be going to [facility] today and he felt he was ready; stated family will need to be called and come and get belongings if going; completed imm with pt   Discharge Plan       Row Name 12/09/24 1029       Plan    Plan pt resting in bed; stated md thought he would be going to [facility] today and he felt he was ready; stated family will need to be called and come and get belongings if going; completed imm with pt                   Discharge Codes    No documentation.                       Tangela Barrientos RN

## 2024-12-09 NOTE — PLAN OF CARE
Goal Outcome Evaluation:   Pt VSS on RA. Continued PD exchanges and gave PRN pain meds to work with PT. Pt unable to have a BM after multiple interventions. D/c to [facility] once able to have BM.

## 2024-12-09 NOTE — CASE MANAGEMENT/SOCIAL WORK
Case Management/Social Work    Patient Name:  Travon Plummer  YOB: 1945  MRN: [id number]  Admit Date:  12/3/2024        NAI has been speaking with Aylin leyva to see when pt can admit to facility. NAI following.     11:24 EST Pt can admit to facility today. NAI updated team.     15:52 EST since pt has yet to have BM, pt able to admit to facility tomorrow. Team updated.       Electronically signed by:  JULIO Ashley  12/09/24 11:03 EST

## 2024-12-09 NOTE — PLAN OF CARE
Goal Outcome Evaluation:  Plan of Care Reviewed With: patient        Progress: improving  Outcome Evaluation: Pt  supine in bed and willing to particiapte with treatment. Pt performed bed mobility, transfers, and stengthening. See flowsheet for details. Cont PT per POC progressing to goals as pt tolerates.

## 2024-12-09 NOTE — THERAPY TREATMENT NOTE
Patient Name: Travon Plummer  : 1945    MRN: [id number]                              Today's Date: 2024       Admit Date: 12/3/2024    Visit Dx:     ICD-10-CM ICD-9-CM   1. Closed nondisplaced intertrochanteric fracture of right femur, initial encounter  S72.144A 820.21   2. Closed fracture of right hip, initial encounter  S72.001A 820.8     Patient Active Problem List   Diagnosis    Coronary artery disease    Dyspnea    Cerebrovascular accident    Essential hypertension    Hypercholesterolemia    Tobacco abuse    Gout    Osteoarthritis    GERD (gastroesophageal reflux disease)    Obesity    Prostate cancer    Renal cell carcinoma    Nuclear sclerotic cataract of right eye    Symptomatic anemia    Iron deficiency anemia due to chronic blood loss    Melena    Chronic kidney disease, stage IV (severe)    Upper GI bleed    Absent kidney    Acquired hypothyroidism    Benign hypertensive kidney disease with chronic kidney disease    Dyslipidemia    Elevated prostate specific antigen (PSA)    Paroxysmal atrial fibrillation    Secondary hyperparathyroidism    Vitamin D deficiency    Urinary incontinence    Hematuria    Lower urinary tract symptoms (LUTS)    Chronic kidney disease, stage V    Closed right hip fracture     Past Medical History:   Diagnosis Date    Benign hypertensive CKD, stage 5 chronic kidney disease or end stage renal disease     Broken arm     Carotid stenosis     bilateral    Cerebrovascular accident 10/31/2008    Coronary artery disease     followed by Dr. Ashford; LOV 24; denies chest pain, SOB 24    Dialysis patient     Current 24    Dyspnea     GERD (gastroesophageal reflux disease)     Gout     History of blood transfusion     Hypercholesterolemia     Hypertension 11/10/2015    History of hypertension; hypotensive at the time of office visit on 11/10/2015.    Impaired mobility     Obesity     Osteoarthritis     Paroxysmal atrial fibrillation     History of  paroxysmal atrial fibrillation, data deficit.    Prostate cancer 01/2015    Prostate cancer, diagnosed January of 2015, status post radiation therapy x39 treatments, 07/01/2015 at [facility].    Renal cell carcinoma 2015    Renal cell carcinoma, dx March of 2015, status post left nephrectomy.    Wears dentures     instructed no adhesive DOS     Past Surgical History:   Procedure Laterality Date    CAROTID STENT Left 10/31/2008    PTA/left carotid artery stent, 10/31/2008.     COLONOSCOPY N/A 12/23/2021    Procedure: COLONOSCOPY, polypectomy, clip placement x 1;  Surgeon: Deandra Do MD;  Location: [facility] ENDOSCOPY;  Service: Gastroenterology;  Laterality: N/A;    COLONOSCOPY W/ POLYPECTOMY      CORONARY ANGIOPLASTY WITH STENT PLACEMENT      A 3.5 x 13 mm. Cypher ESTIVEN to RCA expanded with 4 mm balloon.     DIALYSIS FISTULA CREATION N/A     abdominal    ENDOSCOPY N/A 12/22/2021    Procedure: ESOPHAGOGASTRODUODENOSCOPY with biopsy;  Surgeon: Deandra Do MD;  Location: [facility] ENDOSCOPY;  Service: Gastroenterology;  Laterality: N/A;    ENDOSCOPY N/A 02/17/2023    Procedure: ESOPHAGOGASTRODUODENOSCOPY with biopsy;  Surgeon: Georgina Pool MD;  Location: [facility] ENDOSCOPY;  Service: Gastroenterology;  Laterality: N/A;    HIP INTERTROCHANTERIC NAILING Right 12/4/2024    Procedure: HIP INTERTROCHANTERIC NAILING;  Surgeon: Dean Hammonds MD;  Location: [facility] OR;  Service: Orthopedics;  Laterality: Right;    INGUINAL HERNIA REPAIR      NEPHRECTOMY Left 03/19/2015    diagnosed January 2015, status post left radical nephrectomy.     PROSTATE FIDUCIAL MARKER PLACEMENT  2016    received XRT for prostate CA in 2016      General Information       Row Name 12/09/24 1627          Physical Therapy Time and Intention    Document Type therapy note (daily note)  -RM     Mode of Treatment physical therapy  -RM       Row Name 12/09/24 1621          General Information    Patient Profile Reviewed yes  -RM      Existing Precautions/Restrictions fall  -RM       Row Name 12/09/24 1627          Cognition    Orientation Status (Cognition) oriented x 4  -RM       Row Name 12/09/24 1627          Safety Issues/Impairments Affecting Functional Mobility    Impairments Affecting Function (Mobility) pain;balance;strength;endurance/activity tolerance  -RM               User Key  (r) = Recorded By, (t) = Taken By, (c) = Cosigned By      Initials Name Provider Type     Alexey Ann, OVI Physical Therapist Assistant                   Mobility       Row Name 12/09/24 1627          Bed Mobility    All Activities, [place] (Bed Mobility) moderate assist (50% patient effort);verbal cues;nonverbal cues (demo/gesture)  -RM       Row Name 12/09/24 1627          Bed-Chair Transfer    Bed-Chair [place] (Transfers) unable to assess  -RM       Row Name 12/09/24 1627          Sit-Stand Transfer    Sit-Stand [place] (Transfers) moderate assist (50% patient effort);2 person assist;verbal cues  -RM     Assistive Device (Sit-Stand Transfers) walker, front-wheeled  -RM     Comment, (Sit-Stand Transfer) x 2 with standing wt shift x 10 reps with each  -RM       Row Name 12/09/24 1627          Gait/Stairs (Locomotion)    [place] Level (Gait) unable to assess  -RM       Row Name 12/09/24 1627          Mobility    Right Lower Extremity (Weight-bearing Status) weight-bearing as tolerated (WBAT)  -RM               User Key  (r) = Recorded By, (t) = Taken By, (c) = Cosigned By      Initials Name Provider Type    Alexey White, OVI Physical Therapist Assistant                   Obj/Interventions       Row Name 12/09/24 1628          Motor Skills    Therapeutic Exercise hip;ankle  -RM       Row Name 12/09/24 1628          Hip (Therapeutic Exercise)    Hip (Therapeutic Exercise) AAROM (active assistive range of motion)  -RM     Hip AAROM (Therapeutic Exercise) right;flexion;extension;5 repetitions  -RM       Row Name 12/09/24 1628           Ankle (Therapeutic Exercise)    Ankle (Therapeutic Exercise) AAROM (active assistive range of motion)  -RM     Ankle AAROM (Therapeutic Exercise) bilateral;dorsiflexion;10 repetitions  -RM               User Key  (r) = Recorded By, (t) = Taken By, (c) = Cosigned By      Initials Name Provider Type    Alexey White, OVI Physical Therapist Assistant                   Goals/Plan    No documentation.                  Clinical Impression       Row Name 12/09/24 1629          Pain Scale: FACES Pre/Post-Treatment    Pain: FACES Scale, Pretreatment 4-->hurts little more  -RM     Posttreatment Pain Rating 2-->hurts little bit  -RM       Row Name 12/09/24 1629          Plan of Care Review    Plan of Care Reviewed With patient  -RM     Progress improving  -RM     Outcome Evaluation Pt  supine in bed and willing to particiapte with treatment. Pt performed bed mobility, transfers, and stengthening. See flowsheet for details. Cont PT per POC progressing to goals as pt tolerates.  -RM       Row Name 12/09/24 1629          Positioning and Restraints    Pre-Treatment Position in bed  -RM     Post Treatment Position bed  -RM     In Bed side lying left;call light within reach;encouraged to call for assist;exit alarm on;pillow between legs  -RM               User Key  (r) = Recorded By, (t) = Taken By, (c) = Cosigned By      Initials Name Provider Type    Alexey White, OVI Physical Therapist Assistant                   Outcome Measures       Row Name 12/09/24 1632 12/09/24 0843       How much help from another person do you currently need...    Turning from your back to your side while in flat bed without using bedrails? 2  -RM 2  -HH    Moving from lying on back to sitting on the side of a flat bed without bedrails? 2  -RM 2  -HH    Moving to and from a bed to a chair (including a wheelchair)? 1  -RM 1  -HH    Standing up from a chair using your arms (e.g., wheelchair, bedside chair)? 2  -RM 2  -HH    Climbing  3-5 steps with a railing? 1  -RM 1  -HH    To walk in hospital room? 1  -RM 1  -HH    AM-PAC 6 Clicks Score (PT) 9  -RM 9  -HH    Highest Level of Mobility Goal 3 --> Sit at edge of bed  -RM 3 --> Sit at edge of bed  -HH      Row Name 12/09/24 1632 12/09/24 1604       Functional Assessment    Outcome Measure Options AM-PAC 6 Clicks Basic Mobility (PT)  -RM AM-PAC 6 Clicks Daily Activity (OT)  -DB              User Key  (r) = Recorded By, (t) = Taken By, (c) = Cosigned By      Initials Name Provider Type    Alexey White, PTA Physical Therapist Assistant    Thuy Chapman, RN Registered Nurse    Josette Donovan OT Occupational Therapist                                 Physical Therapy Education       Title: PT OT SLP Therapies (In Progress)       Topic: Physical Therapy (In Progress)       Point: Mobility training (Done)       Learning Progress Summary            Patient Acceptance, E,TB,D, VU,NR by  at 12/9/2024 1633    Acceptance, E,D, DU,VU by TW at 12/8/2024 1054    Comment: Pt education on how to pull up to stand with sera stedy.    Acceptance, E,D, VU,DU,NR by TW at 12/7/2024 1136    Comment: Pt education on using pillow to assist in moving RLE in bed as well as eduction on body mechanics in standing.    Acceptance, E,TB, VU by RK at 12/5/2024 1326    Comment: pt educated on the role of PT and his POC                      Point: Home exercise program (Not Started)       Learner Progress:  Not documented in this visit.              Point: Body mechanics (Done)       Learning Progress Summary            Patient Acceptance, E,D, VU,DU,NR by TW at 12/7/2024 1136    Comment: Pt education on using pillow to assist in moving RLE in bed as well as eduction on body mechanics in standing.    Acceptance, E,TB, VU by RK at 12/6/2024 1313    Comment: pt educated on the proper body mechanics to perform a STS with a RW                      Point: Precautions (Not Started)       Learner Progress:  Not  documented in this visit.                              User Key       Initials Effective Dates Name Provider Type Discipline    RM 06/16/21 -  Alexey Ann, OVI Physical Therapist Assistant PT    TW 06/16/21 -  Glenys Teixeira, PT Physical Therapist PT    RK 09/24/24 -  Anabel Evans, PT Student PT Student PT                  PT Recommendation and Plan     Progress: improving  Outcome Evaluation: Pt  supine in bed and willing to particiapte with treatment. Pt performed bed mobility, transfers, and stengthening. See flowsheet for details. Cont PT per POC progressing to goals as pt tolerates.     Time Calculation:         PT Charges       Row Name 12/09/24 1633             Time Calculation    Start Time 1435  -RM      Stop Time 1516  -RM      Time Calculation (min) 41 min  -RM      PT Received On 12/09/24  -RM      PT Goal Re-Cert Due Date 12/15/24  -RM         Time Calculation- PT    Total Timed Code Minutes- PT 41 minute(s)  -RM         Timed Charges    13157 - PT Therapeutic Exercise Minutes 15  -RM      11058 - PT Therapeutic Activity Minutes 26  -RM         Total Minutes    Timed Charges Total Minutes 41  -RM       Total Minutes 41  -RM                User Key  (r) = Recorded By, (t) = Taken By, (c) = Cosigned By      Initials Name Provider Type     Alexey Ann, PTA Physical Therapist Assistant                  Therapy Charges for Today       Code Description Service Date Service Provider Modifiers Qty    19923975930 HC PT THER PROC EA 15 MIN 12/9/2024 Alexey Ann, PTA GP 1    27460988105 HC PT THERAPEUTIC ACT EA 15 MIN 12/9/2024 Alexey Ann, PTA GP 2            PT G-Codes  Outcome Measure Options: AM-PAC 6 Clicks Basic Mobility (PT)  AM-PAC 6 Clicks Score (PT): 9  AM-PAC 6 Clicks Score (OT): 15       Alexey Ann PTA  12/9/2024

## 2024-12-09 NOTE — PLAN OF CARE
Goal Outcome Evaluation:  Plan of Care Reviewed With: patient        Progress: improving  Outcome Evaluation: OT completed tx this date. Pt supine in bed upon OT arrival. Pt able to complete supine to sit EOB with mod A. Pt able to complete STS with mod A x2 and RW. Pt able to stand to allow for linen change.  Pt reqs x1 seated rest break. Pt complete STS again for mod A x2. Pt participated in weight shifting through BLEs. Pt able to return supine in bed with max A. Pt mod A to perform (L)<>(R) rolling to perform brief change with max A. Pt left side lying left with pillow between knees and needs in reach. OT to continue per POC.

## 2024-12-10 ENCOUNTER — APPOINTMENT (OUTPATIENT)
Dept: CT IMAGING | Facility: HOSPITAL | Age: 79
End: 2024-12-10
Payer: MEDICARE

## 2024-12-10 ENCOUNTER — APPOINTMENT (OUTPATIENT)
Dept: GENERAL RADIOLOGY | Facility: HOSPITAL | Age: 79
End: 2024-12-10
Payer: MEDICARE

## 2024-12-10 LAB
ANION GAP SERPL CALCULATED.3IONS-SCNC: 11.6 MMOL/L (ref 5–15)
BUN SERPL-MCNC: 45 MG/DL (ref 8–23)
BUN/CREAT SERPL: 5.6 (ref 7–25)
CALCIUM SPEC-SCNC: 8.9 MG/DL (ref 8.6–10.5)
CHLORIDE SERPL-SCNC: 98 MMOL/L (ref 98–107)
CO2 SERPL-SCNC: 27.4 MMOL/L (ref 22–29)
CREAT SERPL-MCNC: 8.1 MG/DL (ref 0.76–1.27)
DEPRECATED RDW RBC AUTO: 53.1 FL (ref 37–54)
EGFRCR SERPLBLD CKD-EPI 2021: 6.2 ML/MIN/1.73
ERYTHROCYTE [DISTWIDTH] IN BLOOD BY AUTOMATED COUNT: 15.7 % (ref 12.3–15.4)
GLUCOSE SERPL-MCNC: 98 MG/DL (ref 65–99)
HCT VFR BLD AUTO: 26.6 % (ref 37.5–51)
HGB BLD-MCNC: 8.5 G/DL (ref 13–17.7)
MCH RBC QN AUTO: 29.5 PG (ref 26.6–33)
MCHC RBC AUTO-ENTMCNC: 32 G/DL (ref 31.5–35.7)
MCV RBC AUTO: 92.4 FL (ref 79–97)
PHOSPHATE SERPL-MCNC: 5.9 MG/DL (ref 2.5–4.5)
PLATELET # BLD AUTO: 138 10*3/MM3 (ref 140–450)
PMV BLD AUTO: 12.1 FL (ref 6–12)
POTASSIUM SERPL-SCNC: 4.4 MMOL/L (ref 3.5–5.2)
RBC # BLD AUTO: 2.88 10*6/MM3 (ref 4.14–5.8)
SODIUM SERPL-SCNC: 137 MMOL/L (ref 136–145)
WBC NRBC COR # BLD AUTO: 10.21 10*3/MM3 (ref 3.4–10.8)

## 2024-12-10 PROCEDURE — 74176 CT ABD & PELVIS W/O CONTRAST: CPT

## 2024-12-10 PROCEDURE — 97530 THERAPEUTIC ACTIVITIES: CPT

## 2024-12-10 PROCEDURE — 25010000002 ENOXAPARIN PER 10 MG: Performed by: ORTHOPAEDIC SURGERY

## 2024-12-10 PROCEDURE — 99232 SBSQ HOSP IP/OBS MODERATE 35: CPT | Performed by: FAMILY MEDICINE

## 2024-12-10 PROCEDURE — 97535 SELF CARE MNGMENT TRAINING: CPT

## 2024-12-10 PROCEDURE — 80048 BASIC METABOLIC PNL TOTAL CA: CPT | Performed by: INTERNAL MEDICINE

## 2024-12-10 PROCEDURE — 97110 THERAPEUTIC EXERCISES: CPT

## 2024-12-10 PROCEDURE — 74018 RADEX ABDOMEN 1 VIEW: CPT

## 2024-12-10 PROCEDURE — 84100 ASSAY OF PHOSPHORUS: CPT | Performed by: FAMILY MEDICINE

## 2024-12-10 PROCEDURE — 85027 COMPLETE CBC AUTOMATED: CPT | Performed by: INTERNAL MEDICINE

## 2024-12-10 RX ORDER — LACTULOSE 10 G/15ML
300 SOLUTION ORAL ONCE
Status: DISCONTINUED | OUTPATIENT
Start: 2024-12-10 | End: 2024-12-11

## 2024-12-10 RX ORDER — ENOXAPARIN SODIUM 100 MG/ML
30 INJECTION SUBCUTANEOUS DAILY
Start: 2024-12-11 | End: 2025-01-01

## 2024-12-10 RX ORDER — CARVEDILOL 6.25 MG/1
12.5 TABLET ORAL 2 TIMES DAILY WITH MEALS
Start: 2024-12-10

## 2024-12-10 RX ORDER — AMOXICILLIN 250 MG
2 CAPSULE ORAL 2 TIMES DAILY PRN
Start: 2024-12-10

## 2024-12-10 RX ORDER — LACTULOSE 10 G/15ML
20 SOLUTION ORAL 3 TIMES DAILY
Status: DISCONTINUED | OUTPATIENT
Start: 2024-12-10 | End: 2024-12-11 | Stop reason: HOSPADM

## 2024-12-10 RX ORDER — BUMETANIDE 1 MG/1
1 TABLET ORAL DAILY
Status: DISCONTINUED | OUTPATIENT
Start: 2024-12-10 | End: 2024-12-11 | Stop reason: HOSPADM

## 2024-12-10 RX ORDER — POLYETHYLENE GLYCOL 3350 17 G/17G
17 POWDER, FOR SOLUTION ORAL DAILY PRN
Start: 2024-12-10

## 2024-12-10 RX ORDER — HYDROCODONE BITARTRATE AND ACETAMINOPHEN 7.5; 325 MG/1; MG/1
1 TABLET ORAL EVERY 6 HOURS PRN
Start: 2024-12-10 | End: 2024-12-13

## 2024-12-10 RX ADMIN — FERROUS SULFATE TAB EC 324 MG (65 MG FE EQUIVALENT) 324 MG: 324 (65 FE) TABLET DELAYED RESPONSE at 08:55

## 2024-12-10 RX ADMIN — CARVEDILOL 12.5 MG: 12.5 TABLET, FILM COATED ORAL at 18:18

## 2024-12-10 RX ADMIN — ROSUVASTATIN CALCIUM 40 MG: 20 TABLET, FILM COATED ORAL at 21:03

## 2024-12-10 RX ADMIN — ENOXAPARIN SODIUM 30 MG: 100 INJECTION SUBCUTANEOUS at 08:56

## 2024-12-10 RX ADMIN — SODIUM CHLORIDE, SODIUM LACTATE, CALCIUM CHLORIDE, MAGNESIUM CHLORIDE AND DEXTROSE 2000 ML: 2.5; 538; 448; 25.7; 5.08 INJECTION, SOLUTION INTRAPERITONEAL at 18:18

## 2024-12-10 RX ADMIN — LOSARTAN POTASSIUM 100 MG: 50 TABLET, FILM COATED ORAL at 08:55

## 2024-12-10 RX ADMIN — Medication 10 ML: at 21:10

## 2024-12-10 RX ADMIN — HYDROCODONE BITARTRATE AND ACETAMINOPHEN 2 TABLET: 7.5; 325 TABLET ORAL at 21:03

## 2024-12-10 RX ADMIN — LACTULOSE 20 G: 20 SOLUTION ORAL at 21:03

## 2024-12-10 RX ADMIN — Medication 5000 UNITS: at 08:56

## 2024-12-10 RX ADMIN — Medication 5 MG: at 21:03

## 2024-12-10 RX ADMIN — CYANOCOBALAMIN TAB 500 MCG 1000 MCG: 500 TAB at 08:56

## 2024-12-10 RX ADMIN — CALCITRIOL 0.25 MCG: 0.25 CAPSULE ORAL at 08:55

## 2024-12-10 RX ADMIN — LEVOTHYROXINE SODIUM 25 MCG: 0.03 TABLET ORAL at 08:55

## 2024-12-10 RX ADMIN — HYDROCODONE BITARTRATE AND ACETAMINOPHEN 2 TABLET: 7.5; 325 TABLET ORAL at 11:52

## 2024-12-10 RX ADMIN — Medication 10 ML: at 08:56

## 2024-12-10 RX ADMIN — SODIUM CHLORIDE, SODIUM LACTATE, CALCIUM CHLORIDE, MAGNESIUM CHLORIDE AND DEXTROSE 2000 ML: 2.5; 538; 448; 25.7; 5.08 INJECTION, SOLUTION INTRAPERITONEAL at 09:01

## 2024-12-10 RX ADMIN — PANTOPRAZOLE SODIUM 40 MG: 40 TABLET, DELAYED RELEASE ORAL at 08:56

## 2024-12-10 RX ADMIN — CARVEDILOL 12.5 MG: 12.5 TABLET, FILM COATED ORAL at 08:55

## 2024-12-10 RX ADMIN — SODIUM CHLORIDE, SODIUM LACTATE, CALCIUM CHLORIDE, MAGNESIUM CHLORIDE AND DEXTROSE 2000 ML: 2.5; 538; 448; 25.7; 5.08 INJECTION, SOLUTION INTRAPERITONEAL at 13:19

## 2024-12-10 RX ADMIN — LACTULOSE 20 G: 20 SOLUTION ORAL at 09:24

## 2024-12-10 RX ADMIN — LACTULOSE 20 G: 20 SOLUTION ORAL at 16:21

## 2024-12-10 RX ADMIN — BUMETANIDE 1 MG: 1 TABLET ORAL at 18:18

## 2024-12-10 NOTE — CASE MANAGEMENT/SOCIAL WORK
KARENP; [facility]. Pt has not yet had a BM. Further interventions in progress.CM continues to follow for any needs.

## 2024-12-10 NOTE — PLAN OF CARE
Goal Outcome Evaluation:  Plan of Care Reviewed With: patient        Progress: improving  Outcome Evaluation: OT tx completed. Patient is supine in bed, reports R hip pain 4/10. Patient moved to EOB with mod A, sitting EOB performed UBB/D with min A, LBD with max A. Patient performed sit to stand mod A x 2, walked 3' to BSC mod A x 2, required max A for brief change and perineal hygiene, moved to chair and then back to bed for abdominal xray. Patient left with other staff, call bell within reach. Continue OT POC    Anticipated Discharge Disposition (OT): inpatient rehabilitation facility

## 2024-12-10 NOTE — PLAN OF CARE
Goal Outcome Evaluation:  Plan of Care Reviewed With: patient        Progress: improving  Outcome Evaluation: Pt supine in bed and willing to participate with treatmnt. Pt performed bed mobility transfers and limitesd gait distance. Pt tolerating increased activity this treatment evident by increased activity tolerance as well as patient able to take steps this treatment.  Pt tranferred to BSC and from BSC back to bed for Radiology . See flowsheet for details. Cont PT per POC progressing to goals as pt tolerates.

## 2024-12-10 NOTE — THERAPY TREATMENT NOTE
Patient Name: Travon Plummer  : 1945    MRN: [id number]                              Today's Date: 12/10/2024       Admit Date: 12/3/2024    Visit Dx:     ICD-10-CM ICD-9-CM   1. Closed nondisplaced intertrochanteric fracture of right femur, initial encounter  S72.144A 820.21   2. Closed fracture of right hip, initial encounter  S72.001A 820.8     Patient Active Problem List   Diagnosis    Coronary artery disease    Dyspnea    Cerebrovascular accident    Essential hypertension    Hypercholesterolemia    Tobacco abuse    Gout    Osteoarthritis    GERD (gastroesophageal reflux disease)    Obesity    Prostate cancer    Renal cell carcinoma    Nuclear sclerotic cataract of right eye    Symptomatic anemia    Iron deficiency anemia due to chronic blood loss    Melena    Chronic kidney disease, stage IV (severe)    Upper GI bleed    Absent kidney    Acquired hypothyroidism    Benign hypertensive kidney disease with chronic kidney disease    Dyslipidemia    Elevated prostate specific antigen (PSA)    Paroxysmal atrial fibrillation    Secondary hyperparathyroidism    Vitamin D deficiency    Urinary incontinence    Hematuria    Lower urinary tract symptoms (LUTS)    Chronic kidney disease, stage V    Closed right hip fracture     Past Medical History:   Diagnosis Date    Benign hypertensive CKD, stage 5 chronic kidney disease or end stage renal disease     Broken arm     Carotid stenosis     bilateral    Cerebrovascular accident 10/31/2008    Coronary artery disease     followed by Dr. Ashford; LOV 24; denies chest pain, SOB 24    Dialysis patient     Current 24    Dyspnea     GERD (gastroesophageal reflux disease)     Gout     History of blood transfusion     Hypercholesterolemia     Hypertension 11/10/2015    History of hypertension; hypotensive at the time of office visit on 11/10/2015.    Impaired mobility     Obesity     Osteoarthritis     Paroxysmal atrial fibrillation     History of  paroxysmal atrial fibrillation, data deficit.    Prostate cancer 01/2015    Prostate cancer, diagnosed January of 2015, status post radiation therapy x39 treatments, 07/01/2015 at [facility].    Renal cell carcinoma 2015    Renal cell carcinoma, dx March of 2015, status post left nephrectomy.    Wears dentures     instructed no adhesive DOS     Past Surgical History:   Procedure Laterality Date    CAROTID STENT Left 10/31/2008    PTA/left carotid artery stent, 10/31/2008.     COLONOSCOPY N/A 12/23/2021    Procedure: COLONOSCOPY, polypectomy, clip placement x 1;  Surgeon: Deandra Do MD;  Location: [facility] ENDOSCOPY;  Service: Gastroenterology;  Laterality: N/A;    COLONOSCOPY W/ POLYPECTOMY      CORONARY ANGIOPLASTY WITH STENT PLACEMENT      A 3.5 x 13 mm. Cypher ESTIVEN to RCA expanded with 4 mm balloon.     DIALYSIS FISTULA CREATION N/A     abdominal    ENDOSCOPY N/A 12/22/2021    Procedure: ESOPHAGOGASTRODUODENOSCOPY with biopsy;  Surgeon: Deandra Do MD;  Location: [facility] ENDOSCOPY;  Service: Gastroenterology;  Laterality: N/A;    ENDOSCOPY N/A 02/17/2023    Procedure: ESOPHAGOGASTRODUODENOSCOPY with biopsy;  Surgeon: Georgina Pool MD;  Location: [facility] ENDOSCOPY;  Service: Gastroenterology;  Laterality: N/A;    HIP INTERTROCHANTERIC NAILING Right 12/4/2024    Procedure: HIP INTERTROCHANTERIC NAILING;  Surgeon: Dean Hammonds MD;  Location: [facility] OR;  Service: Orthopedics;  Laterality: Right;    INGUINAL HERNIA REPAIR      NEPHRECTOMY Left 03/19/2015    diagnosed January 2015, status post left radical nephrectomy.     PROSTATE FIDUCIAL MARKER PLACEMENT  2016    received XRT for prostate CA in 2016      General Information       Row Name 12/10/24 1317          OT Time and Intention    Subjective Information no complaints  -SD     Document Type therapy note (daily note)  -SD     Mode of Treatment occupational therapy  -SD     Patient Effort good  -SD     Symptoms Noted During/After  Treatment fatigue  -SD       Row Name 12/10/24 1317          General Information    Patient Profile Reviewed yes  -SD               User Key  (r) = Recorded By, (t) = Taken By, (c) = Cosigned By      Initials Name Provider Type    Lynette Pemberton OT Occupational Therapist                     Mobility/ADL's       Row Name 12/10/24 1318          Bed Mobility    Bed Mobility supine-sit;sit-supine  -SD     Supine-Sit [place] (Bed Mobility) minimum assist (75% patient effort);moderate assist (50% patient effort)  -SD     Sit-Supine [place] (Bed Mobility) moderate assist (50% patient effort);2 person assist  -SD     Assistive Device (Bed Mobility) bed rails;head of bed elevated;repositioning sheet  -SD       Row Name 12/10/24 1318          Transfers    Transfers sit-stand transfer;toilet transfer;bed-chair transfer  -SD       Row Name 12/10/24 1318          Sit-Stand Transfer    Sit-Stand [place] (Transfers) moderate assist (50% patient effort);2 person assist  -SD     Assistive Device (Sit-Stand Transfers) walker, front-wheeled  -SD       Row Name 12/10/24 1318          Toilet Transfer    Type (Toilet Transfer) sit-stand;stand-sit  -SD     [place] Level (Toilet Transfer) moderate assist (50% patient effort);2 person assist  -SD     Assistive Device (Toilet Transfer) walker, front-wheeled  -SD       Row Name 12/10/24 1318          Functional Mobility    Functional Mobility- Ind. Level moderate assist (50% patient effort);2 person assist required  -SD     Functional Mobility- Device walker, front-wheeled  -SD     Functional Mobility-Distance (Feet) 3  4, +3  -SD     Functional Mobility- Safety Issues balance decreased during turns;sequencing ability decreased;step length decreased;weight-shifting ability decreased  -SD       Row Name 12/10/24 1318          Bathing Assessment/Intervention    [place] Level (Bathing) upper body;upper extremities;minimum assist (75% patient effort)  -SD      Position (Bathing) edge of bed sitting  -SD       Row Name 12/10/24 1318          Upper Body Dressing Assessment/Training    [place] Level (Upper Body Dressing) don;minimum assist (75% patient effort)  -SD     Position (Upper Body Dressing) edge of bed sitting  -SD       Row Name 12/10/24 1318          Lower Body Dressing Assessment/Training    [place] Level (Lower Body Dressing) don;pants/bottoms;shoes/slippers;socks;maximum assist (25% patient effort)  -SD     Position (Lower Body Dressing) edge of bed sitting  -SD       Row Name 12/10/24 1318          Toileting Assessment/Training    [place] Level (Toileting) adjust/manage clothing;perform perineal hygiene;maximum assist (25% patient effort)  -SD     Assistive Devices (Toileting) commode, bedside without drop arms  -SD               User Key  (r) = Recorded By, (t) = Taken By, (c) = Cosigned By      Initials Name Provider Type    SD Lynette Contreras OT Occupational Therapist                   Obj/Interventions    No documentation.                  Goals/Plan    No documentation.                  Clinical Impression       Row Name 12/10/24 1320          Pain Assessment    Pretreatment Pain Rating 4/10  -SD     Posttreatment Pain Rating 6/10  -SD     Pain Location hip  -SD     Pain Side/Orientation right  -SD     Pain Management Interventions exercise or physical activity utilized  -SD     Response to Pain Interventions activity participation with increased pain  -SD       Row Name 12/10/24 1320          Plan of Care Review    Plan of Care Reviewed With patient  -SD     Progress improving  -SD     Outcome Evaluation OT tx completed. Patient is supine in bed, reports R hip pain 4/10. Patient moved to EOB with mod A, sitting EOB performed UBB/D with min A, LBD with max A. Patient performed sit to stand mod A x 2, walked 3' to [facility] mod A x 2, required max A for brief change and perineal hygiene, moved to chair and then back to bed for abdominal xray.  Patient left with other staff, call bell within reach. Continue OT POC  -SD       Row Name 12/10/24 1320          Vital Signs    O2 Delivery Pre Treatment room air  -SD     O2 Delivery Intra Treatment room air  -SD     O2 Delivery Post Treatment room air  -SD       Row Name 12/10/24 1320          Positioning and Restraints    Pre-Treatment Position in bed  -SD     Post Treatment Position bed  -SD     In Bed supine;call light within reach;encouraged to call for assist  -SD               User Key  (r) = Recorded By, (t) = Taken By, (c) = Cosigned By      Initials Name Provider Type    Lynette Pemberton OT Occupational Therapist                   Outcome Measures       Row Name 12/10/24 1324          How much help from another is currently needed...    Putting on and taking off regular lower body clothing? 2  -SD     Bathing (including washing, rinsing, and drying) 2  -SD     Toileting (which includes using toilet bed pan or urinal) 2  -SD     Putting on and taking off regular upper body clothing 3  -SD     Taking care of personal grooming (such as brushing teeth) 3  -SD     Eating meals 3  -SD     AM-PAC 6 Clicks Score (OT) 15  -SD       Row Name 12/10/24 1138 12/10/24 0800       How much help from another person do you currently need...    Turning from your back to your side while in flat bed without using bedrails? 2  -RM 2  -SC    Moving from lying on back to sitting on the side of a flat bed without bedrails? 2  -RM 2  -SC    Moving to and from a bed to a chair (including a wheelchair)? 2  -RM 2  -SC    Standing up from a chair using your arms (e.g., wheelchair, bedside chair)? 2  -RM 2  -SC    Climbing 3-5 steps with a railing? 1  -RM 1  -SC    To walk in hospital room? 2  -RM 2  -SC    AM-PAC 6 Clicks Score (PT) 11  -RM 11  -SC    Highest Level of Mobility Goal 4 --> Transfer to chair/commode  -RM 4 --> Transfer to chair/commode  -SC      Row Name 12/10/24 1324 12/10/24 1138       Functional Assessment     Outcome Measure Options AM-PAC 6 Clicks Daily Activity (OT)  -SD AM-PAC 6 Clicks Basic Mobility (PT)  -RM              User Key  (r) = Recorded By, (t) = Taken By, (c) = Cosigned By      Initials Name Provider Type    Alexey White, PTA Physical Therapist Assistant    Lynette Pemberton, OT Occupational Therapist    Frida Escobedo, RN Registered Nurse                    Occupational Therapy Education       Title: PT OT SLP Therapies (In Progress)       Topic: Occupational Therapy (In Progress)       Point: ADL training (Done)       Description:   Instruct learner(s) on proper safety adaptation and remediation techniques during self care or transfers.   Instruct in proper use of assistive devices.                  Learning Progress Summary            Patient Acceptance, E,TB, VU by SD at 12/10/2024 1324    Comment: Safety during toilet tf    Acceptance, E,TB, VU,NR by DB at 12/9/2024 1604    Comment: ADL retraining and bed mobility. Continued education for follow through.    Nonacceptance, E,TB, NR by SP at 12/6/2024 1322    Comment: Pt was educated on LBD techniques but is limited by pain at this time. Cont to educate.    Acceptance, E,TB, VU by SD at 12/5/2024 1340    Comment: OT POC                      Point: Home exercise program (Not Started)       Description:   Instruct learner(s) on appropriate technique for monitoring, assisting and/or progressing therapeutic exercises/activities.                  Learner Progress:  Not documented in this visit.              Point: Precautions (Done)       Description:   Instruct learner(s) on prescribed precautions during self-care and functional transfers.                  Learning Progress Summary            Patient Acceptance, E,TB, VU,NR by DB at 12/9/2024 1604    Comment: ADL retraining and bed mobility. Continued education for follow through.                      Point: Body mechanics (Not Started)       Description:   Instruct learner(s) on proper  positioning and spine alignment during self-care, functional mobility activities and/or exercises.                  Learner Progress:  Not documented in this visit.                              User Key       Initials Effective Dates Name Provider Type Discipline    SD 06/16/21 -  Lynette Contreras, OT Occupational Therapist OT    SP 09/08/22 -  Osiris Mckeon OT Occupational Therapist OT    DB 07/06/23 -  Josette Ocampo OT Occupational Therapist OT                  OT Recommendation and Plan  Planned Therapy Interventions (OT): activity tolerance training, adaptive equipment training, BADL retraining, patient/caregiver education/training, ROM/therapeutic exercise, strengthening exercise, transfer/mobility retraining  Therapy Frequency (OT): 5 times/wk (Mon-Fri)  Plan of Care Review  Plan of Care Reviewed With: patient  Progress: improving  Outcome Evaluation: OT tx completed. Patient is supine in bed, reports R hip pain 4/10. Patient moved to EOB with mod A, sitting EOB performed UBB/D with min A, LBD with max A. Patient performed sit to stand mod A x 2, walked 3' to BSC mod A x 2, required max A for brief change and perineal hygiene, moved to chair and then back to bed for abdominal xray. Patient left with other staff, call bell within reach. Continue OT POC     Time Calculation:   Evaluation Complexity (OT)  Review Occupational Profile/Medical/Therapy History Complexity: expanded/moderate complexity  Assessment, Occupational Performance/Identification of Deficit Complexity: 3-5 performance deficits  Clinical Decision Making Complexity (OT): detailed assessment/moderate complexity  Overall Complexity of Evaluation (OT): moderate complexity     Time Calculation- OT       Row Name 12/10/24 1325             Time Calculation- OT    OT Start Time 1041  -SD      OT Stop Time 1123  -SD      OT Time Calculation (min) 42 min  -SD      OT Received On 12/10/24  -SD      OT Goal Re-Cert Due Date 12/17/24  -SD          Timed Charges    48064 - OT Therapeutic Activity Minutes 15  -SD      27877 - OT Self Care/Mgmt Minutes 27  -SD         Total Minutes    Timed Charges Total Minutes 42  -SD       Total Minutes 42  -SD                User Key  (r) = Recorded By, (t) = Taken By, (c) = Cosigned By      Initials Name Provider Type    Lynette Pemberton OT Occupational Therapist                  Therapy Charges for Today       Code Description Service Date Service Provider Modifiers Qty    [id number]  OT THERAPEUTIC ACT EA 15 MIN 12/10/2024 Lynette Contreras OT GO 1    [id number] HC OT SELF CARE/MGMT/TRAIN EA 15 MIN 12/10/2024 Lynette Contreras OT GO 2                 Lynette Contreras OT  12/10/2024

## 2024-12-10 NOTE — THERAPY TREATMENT NOTE
Patient Name: Travon Plummer  : 1945    MRN: [id number]                              Today's Date: 12/10/2024       Admit Date: 12/3/2024    Visit Dx:     ICD-10-CM ICD-9-CM   1. Closed nondisplaced intertrochanteric fracture of right femur, initial encounter  S72.144A 820.21   2. Closed fracture of right hip, initial encounter  S72.001A 820.8     Patient Active Problem List   Diagnosis    Coronary artery disease    Dyspnea    Cerebrovascular accident    Essential hypertension    Hypercholesterolemia    Tobacco abuse    Gout    Osteoarthritis    GERD (gastroesophageal reflux disease)    Obesity    Prostate cancer    Renal cell carcinoma    Nuclear sclerotic cataract of right eye    Symptomatic anemia    Iron deficiency anemia due to chronic blood loss    Melena    Chronic kidney disease, stage IV (severe)    Upper GI bleed    Absent kidney    Acquired hypothyroidism    Benign hypertensive kidney disease with chronic kidney disease    Dyslipidemia    Elevated prostate specific antigen (PSA)    Paroxysmal atrial fibrillation    Secondary hyperparathyroidism    Vitamin D deficiency    Urinary incontinence    Hematuria    Lower urinary tract symptoms (LUTS)    Chronic kidney disease, stage V    Closed right hip fracture     Past Medical History:   Diagnosis Date    Benign hypertensive CKD, stage 5 chronic kidney disease or end stage renal disease     Broken arm     Carotid stenosis     bilateral    Cerebrovascular accident 10/31/2008    Coronary artery disease     followed by Dr. Ashford; LOV 24; denies chest pain, SOB 24    Dialysis patient     Current 24    Dyspnea     GERD (gastroesophageal reflux disease)     Gout     History of blood transfusion     Hypercholesterolemia     Hypertension 11/10/2015    History of hypertension; hypotensive at the time of office visit on 11/10/2015.    Impaired mobility     Obesity     Osteoarthritis     Paroxysmal atrial fibrillation     History of  paroxysmal atrial fibrillation, data deficit.    Prostate cancer 01/2015    Prostate cancer, diagnosed January of 2015, status post radiation therapy x39 treatments, 07/01/2015 at [facility].    Renal cell carcinoma 2015    Renal cell carcinoma, dx March of 2015, status post left nephrectomy.    Wears dentures     instructed no adhesive DOS     Past Surgical History:   Procedure Laterality Date    CAROTID STENT Left 10/31/2008    PTA/left carotid artery stent, 10/31/2008.     COLONOSCOPY N/A 12/23/2021    Procedure: COLONOSCOPY, polypectomy, clip placement x 1;  Surgeon: Deandra Do MD;  Location: [facility] ENDOSCOPY;  Service: Gastroenterology;  Laterality: N/A;    COLONOSCOPY W/ POLYPECTOMY      CORONARY ANGIOPLASTY WITH STENT PLACEMENT      A 3.5 x 13 mm. Cypher ESTIVEN to RCA expanded with 4 mm balloon.     DIALYSIS FISTULA CREATION N/A     abdominal    ENDOSCOPY N/A 12/22/2021    Procedure: ESOPHAGOGASTRODUODENOSCOPY with biopsy;  Surgeon: Deandra Do MD;  Location: [facility] ENDOSCOPY;  Service: Gastroenterology;  Laterality: N/A;    ENDOSCOPY N/A 02/17/2023    Procedure: ESOPHAGOGASTRODUODENOSCOPY with biopsy;  Surgeon: Georgina Pool MD;  Location: [facility] ENDOSCOPY;  Service: Gastroenterology;  Laterality: N/A;    HIP INTERTROCHANTERIC NAILING Right 12/4/2024    Procedure: HIP INTERTROCHANTERIC NAILING;  Surgeon: Dean Hammonds MD;  Location: [facility] OR;  Service: Orthopedics;  Laterality: Right;    INGUINAL HERNIA REPAIR      NEPHRECTOMY Left 03/19/2015    diagnosed January 2015, status post left radical nephrectomy.     PROSTATE FIDUCIAL MARKER PLACEMENT  2016    received XRT for prostate CA in 2016      General Information       Row Name 12/10/24 4321          Physical Therapy Time and Intention    Document Type therapy note (daily note)  -RM     Mode of Treatment physical therapy  -RM       Row Name 12/10/24 1136          General Information    Patient Profile Reviewed yes  -RM      Existing Precautions/Restrictions fall  -RM       Row Name 12/10/24 1130          Cognition    Orientation Status (Cognition) oriented x 4  -RM       Row Name 12/10/24 1130          Safety Issues/Impairments Affecting Functional Mobility    Safety Issues Affecting Function (Mobility) positioning of assistive device;safety precaution awareness;safety precautions follow-through/compliance;sequencing abilities  -RM     Impairments Affecting Function (Mobility) pain;balance;strength;endurance/activity tolerance  -RM               User Key  (r) = Recorded By, (t) = Taken By, (c) = Cosigned By      Initials Name Provider Type    RM Alexey Ann, PTA Physical Therapist Assistant                   Mobility       Row Name 12/10/24 1131          Bed Mobility    Bed Mobility supine-sit;sit-supine  -RM     Supine-Sit [place] (Bed Mobility) minimum assist (75% patient effort);moderate assist (50% patient effort);verbal cues  -RM     Sit-Supine [place] (Bed Mobility) moderate assist (50% patient effort);2 person assist  -RM     Assistive Device (Bed Mobility) bed rails;head of bed elevated  -RM       Row Name 12/10/24 1131          Sit-Stand Transfer    Sit-Stand [place] (Transfers) moderate assist (50% patient effort);2 person assist;verbal cues  -RM     Assistive Device (Sit-Stand Transfers) walker, front-wheeled  -RM       Row Name 12/10/24 1131          Gait/Stairs (Locomotion)    [place] Level (Gait) moderate assist (50% patient effort);2 person assist  -RM     Assistive Device (Gait) walker, front-wheeled  -RM     Distance in Feet (Gait) 4  -RM     Deviations/Abnormal Patterns (Gait) festinating/shuffling  -RM     Left Sided Gait Deviations knee buckling, left side  -RM     Right Sided Gait Deviations weight shift ability decreased;knee buckling, right side;heel strike decreased  -RM       Row Name 12/10/24 1131          Mobility    Right Lower Extremity (Weight-bearing Status) weight-bearing as  tolerated (WBAT)  -RM               User Key  (r) = Recorded By, (t) = Taken By, (c) = Cosigned By      Initials Name Provider Type    Alexey White, OVI Physical Therapist Assistant                   Obj/Interventions    No documentation.                  Goals/Plan    No documentation.                  Clinical Impression       Row Name 12/10/24 1132          Pain    Pretreatment Pain Rating 4/10  -RM     Posttreatment Pain Rating 6/10  -RM     Pain Location hip  -RM     Pain Side/Orientation right  -RM       Row Name 12/10/24 1132          Plan of Care Review    Plan of Care Reviewed With patient  -RM     Progress improving  -RM     Outcome Evaluation Pt supine in bed and willing to participate with treatmnt. Pt performed bed mobility transfers and limitesd gait distance. Pt tolerating increased activity this treatment evident by increased activity tolerance as well as patient able to take steps this treatment.  Pt tranferred to BSC and from BSC back to bed for Radiology . See flowsheet for details. Cont PT per POC progressing to goals as pt tolerates.  -RM       Row Name 12/10/24 1132          Positioning and Restraints    Pre-Treatment Position in bed  -RM     Post Treatment Position bed  -RM               User Key  (r) = Recorded By, (t) = Taken By, (c) = Cosigned By      Initials Name Provider Type    Alexey White, OVI Physical Therapist Assistant                   Outcome Measures       Row Name 12/10/24 1138          How much help from another person do you currently need...    Turning from your back to your side while in flat bed without using bedrails? 2  -RM     Moving from lying on back to sitting on the side of a flat bed without bedrails? 2  -RM     Moving to and from a bed to a chair (including a wheelchair)? 2  -RM     Standing up from a chair using your arms (e.g., wheelchair, bedside chair)? 2  -RM     Climbing 3-5 steps with a railing? 1  -RM     To walk in hospital room? 2  -RM      AM-PAC 6 Clicks Score (PT) 11  -RM     Highest Level of Mobility Goal 4 --> Transfer to chair/commode  -       Row Name 12/10/24 1138          Functional Assessment    Outcome Measure Options AM-PAC 6 Clicks Basic Mobility (PT)  -RM               User Key  (r) = Recorded By, (t) = Taken By, (c) = Cosigned By      Initials Name Provider Type     Alexey Ann, PTA Physical Therapist Assistant                                 Physical Therapy Education       Title: PT OT SLP Therapies (In Progress)       Topic: Physical Therapy (In Progress)       Point: Mobility training (Done)       Learning Progress Summary            Patient Acceptance, E,TB,D, VU,NR by  at 12/10/2024 1138    Acceptance, E,TB,D, VU,NR by  at 12/9/2024 1633    Acceptance, E,D, DU,VU by TW at 12/8/2024 1054    Comment: Pt education on how to pull up to stand with sera stedy.    Acceptance, E,D, VU,DU,NR by TW at 12/7/2024 1136    Comment: Pt education on using pillow to assist in moving RLE in bed as well as eduction on body mechanics in standing.    Acceptance, E,TB, VU by RK at 12/5/2024 1326    Comment: pt educated on the role of PT and his POC                      Point: Home exercise program (Not Started)       Learner Progress:  Not documented in this visit.              Point: Body mechanics (Done)       Learning Progress Summary            Patient Acceptance, E,D, VU,DU,NR by TW at 12/7/2024 1136    Comment: Pt education on using pillow to assist in moving RLE in bed as well as eduction on body mechanics in standing.    Acceptance, E,TB, VU by RK at 12/6/2024 1313    Comment: pt educated on the proper body mechanics to perform a STS with a RW                      Point: Precautions (Not Started)       Learner Progress:  Not documented in this visit.                              User Key       Initials Effective Dates Name Provider Type Discipline     06/16/21 -  Alexey Ann, OVI Physical Therapist Assistant PT    TW  06/16/21 -  Glenys Teixeira, PT Physical Therapist PT    RK 09/24/24 -  Anabel Evans, MARILEE Student PT Student PT                  PT Recommendation and Plan     Progress: improving  Outcome Evaluation: Pt supine in bed and willing to participate with treatmnt. Pt performed bed mobility transfers and limitesd gait distance. Pt tolerating increased activity this treatment evident by increased activity tolerance as well as patient able to take steps this treatment.  Pt tranferred to BSC and from BSC back to bed for Radiology . See flowsheet for details. Cont PT per POC progressing to goals as pt tolerates.     Time Calculation:         PT Charges       Row Name 12/10/24 1139             Time Calculation    Start Time 1040  -RM      Stop Time 1119  -RM      Time Calculation (min) 39 min  -RM      PT Received On 12/10/24  -RM      PT Goal Re-Cert Due Date 12/15/24  -RM         Time Calculation- PT    Total Timed Code Minutes- PT 39 minute(s)  -RM                User Key  (r) = Recorded By, (t) = Taken By, (c) = Cosigned By      Initials Name Provider Type    Alexey White, OVI Physical Therapist Assistant                  Therapy Charges for Today       Code Description Service Date Service Provider Modifiers Qty    10214789598 HC PT THER PROC EA 15 MIN 12/9/2024 Alexey Ann, PTA GP 1    70473219148 HC PT THERAPEUTIC ACT EA 15 MIN 12/9/2024 Alexey Ann, PTA GP 2    99889179840 HC PT THER PROC EA 15 MIN 12/10/2024 Alexey Ann, PTA GP 1    13743902572 HC PT THERAPEUTIC ACT EA 15 MIN 12/10/2024 Alexey Ann, PTA GP 2            PT G-Codes  Outcome Measure Options: AM-PAC 6 Clicks Basic Mobility (PT)  AM-PAC 6 Clicks Score (PT): 11  AM-PAC 6 Clicks Score (OT): 15       Alexey Ann PTA  12/10/2024

## 2024-12-10 NOTE — PROGRESS NOTES
Dietitian Assessment    Patient Name: Travon Plummer  YOB: 1945  MRN: [id number]  Admission date: 12/3/2024    Comment:        Clinical Nutrition Assessment      Reason for Assessment LOS   H&P  Past Medical History:   Diagnosis Date    Benign hypertensive CKD, stage 5 chronic kidney disease or end stage renal disease     Broken arm     Carotid stenosis     bilateral    Cerebrovascular accident 10/31/2008    Coronary artery disease     followed by Dr. Ashford; LOV 7/9/24; denies chest pain, SOB 8/5/24    Dialysis patient 2024    Current 11/13/24    Dyspnea     GERD (gastroesophageal reflux disease)     Gout     History of blood transfusion     Hypercholesterolemia     Hypertension 11/10/2015    History of hypertension; hypotensive at the time of office visit on 11/10/2015.    Impaired mobility     Obesity     Osteoarthritis     Paroxysmal atrial fibrillation     History of paroxysmal atrial fibrillation, data deficit.    Prostate cancer 01/2015    Prostate cancer, diagnosed January of 2015, status post radiation therapy x39 treatments, 07/01/2015 at [facility].    Renal cell carcinoma 2015    Renal cell carcinoma, dx March of 2015, status post left nephrectomy.    Wears dentures     instructed no adhesive DOS       Past Surgical History:   Procedure Laterality Date    CAROTID STENT Left 10/31/2008    PTA/left carotid artery stent, 10/31/2008.     COLONOSCOPY N/A 12/23/2021    Procedure: COLONOSCOPY, polypectomy, clip placement x 1;  Surgeon: Deandra Do MD;  Location: [facility] ENDOSCOPY;  Service: Gastroenterology;  Laterality: N/A;    COLONOSCOPY W/ POLYPECTOMY      CORONARY ANGIOPLASTY WITH STENT PLACEMENT      A 3.5 x 13 mm. Cypher ESTIVEN to RCA expanded with 4 mm balloon.     DIALYSIS FISTULA CREATION N/A     abdominal    ENDOSCOPY N/A 12/22/2021    Procedure: ESOPHAGOGASTRODUODENOSCOPY with biopsy;  Surgeon: Deandra Do MD;  Location: [facility] ENDOSCOPY;  Service:  "Gastroenterology;  Laterality: N/A;    ENDOSCOPY N/A 02/17/2023    Procedure: ESOPHAGOGASTRODUODENOSCOPY with biopsy;  Surgeon: Georgina Pool MD;  Location: [facility] ENDOSCOPY;  Service: Gastroenterology;  Laterality: N/A;    HIP INTERTROCHANTERIC NAILING Right 12/4/2024    Procedure: HIP INTERTROCHANTERIC NAILING;  Surgeon: Dean Hammonds MD;  Location: [facility] OR;  Service: Orthopedics;  Laterality: Right;    INGUINAL HERNIA REPAIR      NEPHRECTOMY Left 03/19/2015    diagnosed January 2015, status post left radical nephrectomy.     PROSTATE FIDUCIAL MARKER PLACEMENT  2016    received XRT for prostate CA in 2016            Current Problems        Encounter Information        Trending Narrative     12/10: Pt screened for LOS. PMH ESRD on PD. Average PO intake 47% x 10 meals. RD will order ONS d/t increased protein needs r/t ESRD on PD.      Anthropometrics        Current Height, Weight Height: 188 cm (74\")  Weight: 90.8 kg (200 lb 2.8 oz) (12/03/24 1739)   Trending Weight Hx     This admission:              PTA:     Wt Readings from Last 30 Encounters:   12/03/24 1739 90.8 kg (200 lb 2.8 oz)   12/03/24 1345 93.4 kg (205 lb 14.6 oz)   11/13/24 1557 93.4 kg (206 lb)   08/30/24 1049 94.3 kg (208 lb)   08/13/24 1028 94.3 kg (208 lb)   08/02/24 0914 94.3 kg (208 lb)   07/17/24 1423 93 kg (205 lb)   07/09/24 1102 91.2 kg (201 lb)   07/09/24 1310 93 kg (205 lb)   07/09/24 1257 93 kg (205 lb)   05/13/24 1329 91.2 kg (201 lb)   12/12/23 1000 93 kg (205 lb)   08/02/23 1330 89.8 kg (198 lb)   08/02/23 1334 89.8 kg (198 lb)   08/01/23 1056 92.1 kg (203 lb)   05/11/23 1651 92.1 kg (203 lb)   03/21/23 1518 97.1 kg (214 lb)   02/18/23 0538 98.2 kg (216 lb 7.9 oz)   02/17/23 0522 98.7 kg (217 lb 9.5 oz)   02/16/23 1749 98.7 kg (217 lb 9.5 oz)   02/16/23 1400 92.1 kg (203 lb)   02/16/23 1344 104 kg (230 lb)   12/12/22 1408 92.5 kg (204 lb)   07/26/22 1317 92.8 kg (204 lb 9.6 oz)   07/26/22 1256 92.8 kg (204 lb 9.6 oz) "   02/23/22 1300 96.6 kg (213 lb)   01/25/22 1628 93.9 kg (207 lb)   01/25/22 1607 93.4 kg (206 lb)   01/13/22 1051 93.8 kg (206 lb 12.8 oz)   12/23/21 0651 92.2 kg (203 lb 4.2 oz)   12/21/21 1733 92.2 kg (203 lb 4.2 oz)   12/21/21 1125 93.1 kg (205 lb 3.2 oz)   10/20/21 1329 92.4 kg (203 lb 12.8 oz)   07/15/21 1003 97.5 kg (215 lb)   06/15/21 0808 99.8 kg (220 lb)   06/15/21 0955 97.1 kg (214 lb)   03/01/21 1040 100 kg (220 lb 8 oz)      BMI kg/m2 Body mass index is 25.7 kg/m².     Labs        Pertinent Labs     Results from last 7 days   Lab Units 12/10/24  0728 12/09/24  0721 12/07/24  0918 12/05/24  0609 12/04/24  0529 12/03/24  1458   SODIUM mmol/L 137 136 134*   < > 139 141   POTASSIUM mmol/L 4.4 4.1 4.4   < > 4.8 4.8   CHLORIDE mmol/L 98 97* 97*   < > 103 103   CO2 mmol/L 27.4 26.4 25.5   < > 25.5 26.9   BUN mg/dL 45* 44* 42*   < > 27* 26*   CREATININE mg/dL 8.10* 7.52* 6.38*   < > 4.66* 4.93*   CALCIUM mg/dL 8.9 8.3* 8.8   < > 9.1 9.3   BILIRUBIN mg/dL  --   --   --   --  0.3 0.4   ALK PHOS U/L  --   --   --   --  91 100   ALT (SGPT) U/L  --   --   --   --  10 13   AST (SGOT) U/L  --   --   --   --  10 16   GLUCOSE mg/dL 98 101* 153*   < > 95 99    < > = values in this interval not displayed.       Results from last 7 days   Lab Units 12/10/24  0728   HEMOGLOBIN g/dL 8.5*   HEMATOCRIT % 26.6*       Lab Results   Component Value Date    HGBA1C 5.2 09/05/2024            Medications       Scheduled Medications calcitriol, 0.25 mcg, Oral, Daily  carvedilol, 12.5 mg, Oral, BID With Meals  enoxaparin, 30 mg, Subcutaneous, Daily  ferrous sulfate, 324 mg, Oral, Weekly  lactulose, 300 mL, Rectal, Once  levothyroxine, 25 mcg, Oral, Daily  losartan, 100 mg, Oral, Q24H  melatonin, 5 mg, Oral, Nightly  pantoprazole, 40 mg, Oral, Daily  rosuvastatin, 40 mg, Oral, Nightly  sodium chloride, 10 mL, Intravenous, Q12H  UltraBag/Dianeal PD-2/2.5% Dex (keenan #[id number]), 2,000 mL, Intraperitoneal, 4 Exchanges Daily - Dwell  Overnight  vitamin B-12, 1,000 mcg, Oral, Daily  vitamin D3, 5,000 Units, Oral, Daily        Infusions       PRN Medications   acetaminophen    senna-docusate sodium **AND** polyethylene glycol **AND** bisacodyl **AND** bisacodyl    calcium carbonate    Calcium Replacement - Follow Nurse / BPA Driven Protocol    HYDROcodone-acetaminophen    Magnesium Standard Dose Replacement - Follow Nurse / BPA Driven Protocol    [] Morphine **AND** naloxone    nitroglycerin    ondansetron ODT **OR** ondansetron    ondansetron    phenol    Phosphorus Replacement - Follow Nurse / BPA Driven Protocol    Potassium Replacement - Follow Nurse / BPA Driven Protocol    sodium chloride    sodium chloride     Physical Findings        Trending Physical   Appearance, NFPE    --  Edema  None noted     Bowel Function LBM: 12/3     Tubes Peripheral IV, PD cath     Chewing/Swallowing WNL     Skin WNL       Estimated/Assessed Needs       Energy Requirements    EST Needs, Method, Wt used 8165-4913 kcal (30-35 kcal/kg CBW)       Protein Requirements    EST Needs, Method, Wt used 108-118 g pro (1.2-1.3 g pro/kg CBW)       Fluid Requirements     Estimated Needs (mL/day) 8659-7813 mL       Current Nutrition Orders & Evaluation of Intake       Oral Nutrition     Food Allergies    Current PO Diet Diet: Cardiac, Renal; Healthy Heart (2-3 Na+); Low Potassium, Low Phosphorus; Texture: Regular (IDDSI 7); Fluid Consistency: Thin (IDDSI 0)   Supplement    PO Evaluation     Trending % PO Intake 12/10: 47% x 10 meals     Enteral Nutrition    Enteral Route    Order, Modulars, Flushes    Residual/Tolerance    TF Observation         Parenteral Nutrition     TPN Route    Total # Days on TPN    TPN Order, Lipid Details    MVI & Trace Element Freq    TPN Observation       Nutrition Diagnosis         Nutrition Dx Problem 1 Predicted suboptimal intake r/t ESRD on PD AEB average PO intake 47% x 10 meals.       Nutrition Dx Problem 2        Intervention Goal          Intervention Goal(s) Maintain CBW  PO intake meet >50% of estimated needs  Adhere to ONS     Nutrition Intervention        RD Action Will order Novasource renal daily and phosphorus lab      Nutrition Prescription          Diet Prescription HH, Renal, low K+, low phosphorus    Supplement Prescription Novasource Renal daily      Enteral Prescription        TPN Prescription      Monitor/Evaluation        Monitor Per protocol, I&O, PO intake, Supplement intake, Pertinent labs, Weight, Skin status, GI status, Symptoms, POC/GOC, Swallow function, Hemodynamic stability     RD to f/up    Electronically signed by:  Osiris Sky RD  12/10/24 09:09 EST

## 2024-12-10 NOTE — PLAN OF CARE
Problem: Adult Inpatient Plan of Care  Goal: Plan of Care Review  Outcome: Progressing  Flowsheets  Taken 12/10/2024 1320 by Lynette Contreras OT  Progress: improving  Taken 12/10/2024 1132 by Alexey Ann PTA  Plan of Care Reviewed With: patient   Goal Outcome Evaluation:                      VSS, room air. PD completed as ordered. Possible discharge to [facility] in the AM. No acute events to report, will continue to monitor.

## 2024-12-10 NOTE — PLAN OF CARE
Problem: Pain Acute  Goal: Optimal Pain Control and Function  Outcome: Progressing  Intervention: Prevent or Manage Pain  Recent Flowsheet Documentation  Taken 12/10/2024 0600 by Osiris Romero RN  Medication Review/Management: medications reviewed  Taken 12/10/2024 0200 by Osiris Romero RN  Medication Review/Management: medications reviewed  Taken 12/10/2024 0000 by Osiris Romero RN  Medication Review/Management: medications reviewed  Taken 12/9/2024 2200 by Osiris Romero RN  Medication Review/Management: medications reviewed  Taken 12/9/2024 2000 by Osiris Romero RN  Medication Review/Management: medications reviewed     Problem: Fall Injury Risk  Goal: Absence of Fall and Fall-Related Injury  Outcome: Progressing  Intervention: Identify and Manage Contributors  Recent Flowsheet Documentation  Taken 12/10/2024 0600 by Osiris Romero RN  Medication Review/Management: medications reviewed  Taken 12/10/2024 0200 by Osiris Romero RN  Medication Review/Management: medications reviewed  Taken 12/10/2024 0000 by Osiris Romero RN  Medication Review/Management: medications reviewed  Taken 12/9/2024 2200 by Osiris Romero RN  Medication Review/Management: medications reviewed  Taken 12/9/2024 2000 by Osiris Romero RN  Medication Review/Management: medications reviewed  Intervention: Promote Injury-Free Environment  Recent Flowsheet Documentation  Taken 12/10/2024 0600 by Osiris Romero RN  Safety Promotion/Fall Prevention:   safety round/check completed   room organization consistent   nonskid shoes/slippers when out of bed   muscle strengthening facilitated   lighting adjusted   fall prevention program maintained   clutter free environment maintained   assistive device/personal items within reach   activity supervised  Taken 12/10/2024 0400 by Osiris Romero RN  Safety Promotion/Fall Prevention:   safety round/check completed   room organization consistent   nonskid shoes/slippers when out of bed   lighting  adjusted   clutter free environment maintained   assistive device/personal items within reach   activity supervised  Taken 12/10/2024 0200 by Osiris Romero RN  Safety Promotion/Fall Prevention:   safety round/check completed   room organization consistent   nonskid shoes/slippers when out of bed   muscle strengthening facilitated   lighting adjusted   fall prevention program maintained   clutter free environment maintained   assistive device/personal items within reach   activity supervised  Taken 12/10/2024 0000 by Osiris Romero RN  Safety Promotion/Fall Prevention:   safety round/check completed   room organization consistent   nonskid shoes/slippers when out of bed   muscle strengthening facilitated   lighting adjusted   fall prevention program maintained   clutter free environment maintained   assistive device/personal items within reach   activity supervised  Taken 12/9/2024 2200 by Osiris Romero RN  Safety Promotion/Fall Prevention:   safety round/check completed   room organization consistent   nonskid shoes/slippers when out of bed   muscle strengthening facilitated   lighting adjusted   fall prevention program maintained   clutter free environment maintained   assistive device/personal items within reach   activity supervised  Taken 12/9/2024 2000 by Osiris Rmoero, RN  Safety Promotion/Fall Prevention:   safety round/check completed   room organization consistent   nonskid shoes/slippers when out of bed   muscle strengthening facilitated   lighting adjusted   fall prevention program maintained   clutter free environment maintained   assistive device/personal items within reach   activity supervised     Problem: Hospitalized Older Adult  Goal: Optimal Cognitive Function  Outcome: Progressing  Goal: Effective Bowel Elimination  Outcome: Progressing  Goal: Optimal Coping  Outcome: Progressing  Goal: Fluid and Electrolyte Balance  Outcome: Progressing  Goal: Optimal Functional Ability  Outcome:  Progressing  Intervention: Promote Functional Ability  Recent Flowsheet Documentation  Taken 12/10/2024 0600 by Osiris Romero RN  Activity Assistance Provided: assistance, 2 people  Taken 12/10/2024 0400 by Osiris Romero RN  Activity Assistance Provided: assistance, stand-by  Taken 12/10/2024 0200 by Osiris Romero RN  Activity Assistance Provided: assistance, 2 people  Taken 12/10/2024 0000 by Osiris Romero RN  Activity Assistance Provided: assistance, 2 people  Taken 12/9/2024 2200 by Osiris Romero RN  Activity Assistance Provided: assistance, 2 people  Taken 12/9/2024 2000 by Osiris Romero RN  Activity Assistance Provided: assistance, 2 people  Goal: Improved Oral Intake  Outcome: Progressing  Goal: Adequate Sleep/Rest  Outcome: Progressing  Goal: Effective Urinary Elimination  Outcome: Progressing     Problem: Adult Inpatient Plan of Care  Goal: Plan of Care Review  Outcome: Progressing  Goal: Patient-Specific Goal (Individualized)  Outcome: Progressing  Goal: Absence of Hospital-Acquired Illness or Injury  Outcome: Progressing  Intervention: Identify and Manage Fall Risk  Recent Flowsheet Documentation  Taken 12/10/2024 0600 by Osiris Romero RN  Safety Promotion/Fall Prevention:   safety round/check completed   room organization consistent   nonskid shoes/slippers when out of bed   muscle strengthening facilitated   lighting adjusted   fall prevention program maintained   clutter free environment maintained   assistive device/personal items within reach   activity supervised  Taken 12/10/2024 0400 by Osiris Romero RN  Safety Promotion/Fall Prevention:   safety round/check completed   room organization consistent   nonskid shoes/slippers when out of bed   lighting adjusted   clutter free environment maintained   assistive device/personal items within reach   activity supervised  Taken 12/10/2024 0200 by Osiris Romero RN  Safety Promotion/Fall Prevention:   safety round/check completed   room organization  consistent   nonskid shoes/slippers when out of bed   muscle strengthening facilitated   lighting adjusted   fall prevention program maintained   clutter free environment maintained   assistive device/personal items within reach   activity supervised  Taken 12/10/2024 0000 by Osiris Romero RN  Safety Promotion/Fall Prevention:   safety round/check completed   room organization consistent   nonskid shoes/slippers when out of bed   muscle strengthening facilitated   lighting adjusted   fall prevention program maintained   clutter free environment maintained   assistive device/personal items within reach   activity supervised  Taken 12/9/2024 2200 by Osiris Romero RN  Safety Promotion/Fall Prevention:   safety round/check completed   room organization consistent   nonskid shoes/slippers when out of bed   muscle strengthening facilitated   lighting adjusted   fall prevention program maintained   clutter free environment maintained   assistive device/personal items within reach   activity supervised  Taken 12/9/2024 2000 by Osiris Romero RN  Safety Promotion/Fall Prevention:   safety round/check completed   room organization consistent   nonskid shoes/slippers when out of bed   muscle strengthening facilitated   lighting adjusted   fall prevention program maintained   clutter free environment maintained   assistive device/personal items within reach   activity supervised  Intervention: Prevent Skin Injury  Recent Flowsheet Documentation  Taken 12/10/2024 0400 by Osiris Romero RN  Body Position: position changed independently  Taken 12/9/2024 2000 by Osiris Romero RN  Skin Protection: incontinence pads utilized  Intervention: Prevent Infection  Recent Flowsheet Documentation  Taken 12/10/2024 0600 by Osiris Romero, RN  Infection Prevention:   hand hygiene promoted   environmental surveillance performed   rest/sleep promoted   single patient room provided  Taken 12/10/2024 0400 by Osiris Romero, MIROSLAVA  Infection  Prevention:   hand hygiene promoted   rest/sleep promoted   single patient room provided   environmental surveillance performed  Taken 12/10/2024 0200 by Osiris Romero RN  Infection Prevention:   hand hygiene promoted   environmental surveillance performed   rest/sleep promoted   single patient room provided  Taken 12/10/2024 0000 by Osiris Romero RN  Infection Prevention:   hand hygiene promoted   environmental surveillance performed   rest/sleep promoted   single patient room provided  Taken 12/9/2024 2200 by Osiris Romero RN  Infection Prevention:   hand hygiene promoted   environmental surveillance performed   rest/sleep promoted   single patient room provided  Taken 12/9/2024 2000 by Osiris Romero RN  Infection Prevention:   hand hygiene promoted   environmental surveillance performed   rest/sleep promoted   single patient room provided  Goal: Optimal Comfort and Wellbeing  Outcome: Progressing  Goal: Readiness for Transition of Care  Outcome: Progressing     Problem: Skin Injury Risk Increased  Goal: Skin Health and Integrity  Outcome: Progressing  Intervention: Optimize Skin Protection  Recent Flowsheet Documentation  Taken 12/9/2024 2000 by Osiris Romero RN  Pressure Reduction Techniques: frequent weight shift encouraged  Pressure Reduction Devices: positioning supports utilized  Skin Protection: incontinence pads utilized     Problem: Comorbidity Management  Goal: Blood Glucose Level Within Target Range  Outcome: Progressing  Intervention: Monitor and Manage Glycemia  Recent Flowsheet Documentation  Taken 12/10/2024 0600 by Osiris Romero RN  Medication Review/Management: medications reviewed  Taken 12/10/2024 0200 by Osiris Romero RN  Medication Review/Management: medications reviewed  Taken 12/10/2024 0000 by Osiris Romero RN  Medication Review/Management: medications reviewed  Taken 12/9/2024 2200 by Osiris Romero RN  Medication Review/Management: medications reviewed  Taken 12/9/2024 2000 by  Osiris Romero, RN  Medication Review/Management: medications reviewed  Goal: Blood Pressure in Desired Range  Outcome: Progressing  Intervention: Maintain Blood Pressure Management  Recent Flowsheet Documentation  Taken 12/10/2024 0600 by Osiris Romero RN  Medication Review/Management: medications reviewed  Taken 12/10/2024 0200 by Osiris Romero RN  Medication Review/Management: medications reviewed  Taken 12/10/2024 0000 by Osiris Romero, RN  Medication Review/Management: medications reviewed  Taken 12/9/2024 2200 by Osiris Rmoero RN  Medication Review/Management: medications reviewed  Taken 12/9/2024 2000 by Osiris Romero RN  Medication Review/Management: medications reviewed   Goal Outcome Evaluation:

## 2024-12-10 NOTE — DISCHARGE SUMMARY
[facility]      Name:  Travon Plummer   Age:  79 y.o.  Sex:  male  :  1945  MRN:  [id number]   Visit Number:  [id number]    Admission Date:  12/3/2024  Date of Discharge:  12/10/2024  Primary Care Physician:  Chilango Erickson MD    Important issues to note:    Discharged to [facility] for rehab post hip fracture  Continue on peritoneal dialysis as nephrology orders  Continue on bowel regimen  Monitor blood pressure upon discharge  Follow-up with orthopedics, Dr. Hammonds in 2 weeks    Discharge Diagnoses:     Closed right hip fracture    Paroxysmal atrial fibrillation    Chronic kidney disease, stage V    Problem List:     Active Hospital Problems    Diagnosis  POA    **Closed right hip fracture [S72.001A]  Yes    Chronic kidney disease, stage V [N18.5]  Yes    Paroxysmal atrial fibrillation [I48.0]  Yes      Resolved Hospital Problems   No resolved problems to display.     Presenting Problem:    Chief Complaint   Patient presents with    Fall     Pt tripped on his wife's O2 tubing onto his right hip. Pt's only complaint is right hip pain.     Hip Pain      Consults:     Consulting Physician(s)         Provider   Role Specialty     Andrea Sellers MD, JODY      Consulting Physician Nephrology     Dean Hammonds MD      Consulting Physician Orthopedic Surgery     Justin Brown DO      Consulting Physician Hospitalist          Procedures Performed:    Procedure(s):  HIP INTERTROCHANTERIC NAILING    History of presenting illness/Hospital Course:    76-year-old with a history of end-stage renal disease, CAD, atrial fibrillation, hypertension, hyperlipidemia, prior nephrectomy, who presented from home after sustaining mechanical fall onto the right side of his leg and hip area.    Upon workup the emergency room the patient was overall hemodynamically stable with a creatinine of 4.93 hemoglobin of 11.4.  Imaging was concerning for an  intertrochanteric hip fracture on the right.  CT scan of the cervical spine unremarkable for fracture.  CT scan of the head unremarkable.  CT scan of the pelvis showing the mildly displaced comminuted right intertrochanteric fracture.  There is also remote appearing left superior and inferior pubic ramus fractures.    The patient was seen by orthopedics, Dr. Hammonds, and underwent open reduction internal fixation of the right proximal femur on 12//2024.  He has been continued on peritoneal dialysis while in the hospital with Dr. Sellers's orders.  He has been slowly improving, but is not back to his baseline and after evaluation by PT and OT recommendations for short-term rehab.  He is agreeable to this.  Case management had worked on placement, has been arranged for [facility] in [place].  Apparently was having some issues with constipation postoperatively, however patient has had a couple liquid stools today after enemas were given, and KUB shows no obstruction with only mild stool burden.  He has been passing gas, his appetite is slowly improving, have no issues with transfer to rehab facility today.  Will be arranged for EMS transfer.  Needs follow-up with orthopedics in 2 weeks.    Addendum: Patient's discharge placed on hold pending CT scan to rule out any obstructive process.  Will likely be able to be discharged tomorrow to [facility].    Vital Signs:    Temp:  [98 °F (36.7 °C)-98.9 °F (37.2 °C)] 98 °F (36.7 °C)  Heart Rate:  [65-73] 65  Resp:  [16-18] 16  BP: (113-165)/(65-87) 113/65    Physical Exam:    General Appearance:  Alert and cooperative.  NAD   Head:  Atraumatic and normocephalic.   Eyes: Conjunctivae and sclerae normal, no icterus. No pallor.   Ears:  Ears with no abnormalities noted.   Throat: No oral lesions, no thrush, oral mucosa moist.   Neck: Supple, trachea midline, no thyromegaly.   Back:   No kyphoscoliosis present. No tenderness to palpation.   Lungs:   Breath sounds heard  bilaterally equally.  No crackles or wheezing. No Pleural rub or bronchial breathing.   Heart:  Normal S1 and S2, no murmur, no gallop, no rub. No JVD.   Abdomen:   Normal bowel sounds, no masses, no organomegaly. Soft, nontender, nondistended, no rebound tenderness.  PD catheter   Extremities: Supple, no edema, no cyanosis, no clubbing.  Bandage overlying right hip is dry and intact   Pulses: Pulses palpable bilaterally.   Skin: No bleeding or rash.   Neurologic: Alert and oriented x 3. No facial asymmetry. Moves all four limbs. No tremors.     Pertinent Lab Results:     Results from last 7 days   Lab Units 12/10/24  0728 12/09/24  0721 12/07/24  0918 12/05/24  0609 12/04/24  0529 12/03/24  1458   SODIUM mmol/L 137 136 134*   < > 139 141   POTASSIUM mmol/L 4.4 4.1 4.4   < > 4.8 4.8   CHLORIDE mmol/L 98 97* 97*   < > 103 103   CO2 mmol/L 27.4 26.4 25.5   < > 25.5 26.9   BUN mg/dL 45* 44* 42*   < > 27* 26*   CREATININE mg/dL 8.10* 7.52* 6.38*   < > 4.66* 4.93*   CALCIUM mg/dL 8.9 8.3* 8.8   < > 9.1 9.3   BILIRUBIN mg/dL  --   --   --   --  0.3 0.4   ALK PHOS U/L  --   --   --   --  91 100   ALT (SGPT) U/L  --   --   --   --  10 13   AST (SGOT) U/L  --   --   --   --  10 16   GLUCOSE mg/dL 98 101* 153*   < > 95 99    < > = values in this interval not displayed.     Results from last 7 days   Lab Units 12/10/24  0728 12/09/24 0721 12/07/24 0918   WBC 10*3/mm3 10.21 8.19 9.01   HEMOGLOBIN g/dL 8.5* 8.6* 9.3*   HEMATOCRIT % 26.6* 26.7* 28.6*   PLATELETS 10*3/mm3 138* 123* 127*                                   Pertinent Radiology Results:    Imaging Results (All)       Procedure Component Value Units Date/Time    XR Abdomen KUB [[id number]] Collected: 12/10/24 1140     Updated: 12/10/24 1143    Narrative:      PROCEDURE: XR ABDOMEN KUB-     HISTORY: No BM 1 week; S72.144A-Nondisplaced intertrochanteric fracture  of right femur, initial encounter for closed fracture; S72.001A-Fracture  of unspecified part of neck of  right femur, initial encounter for closed  fracture     COMPARISON: None.     FINDINGS: Exam is underpenetrated. An AP view of the abdomen and pelvis  demonstrates a nonspecific bowel gas pattern with no evidence of  obstruction. There is a mild stool burden. No radiopaque stones are seen  overlying the renal shadows. There are fiducial markers in the region of  the prostate. There is fixation hardware in the visualized proximal  right femur. Peritoneal catheter is coiled in the region of the left  lower quadrant.       Impression:      Peritoneal dialysis catheter in the left lower quadrant.     Mild stool burden.                       Images were reviewed, interpreted, and dictated by Dr. Alice Castillo MD  Transcribed by Julia Vasquez PA-C.     This report was signed and finalized on 12/10/2024 11:41 AM by Alice Castillo MD.       FL C Arm During Surgery [[id number]] Resulted: 12/04/24 1819     Updated: 12/04/24 1819    Narrative:      This procedure was auto-finalized with no dictation required.    XR Hip With or Without Pelvis 2 - 3 View Right [[id number]] Collected: 12/04/24 0854     Updated: 12/04/24 0858    Narrative:         PROCEDURE: XR HIP W OR WO PELVIS 2-3 VIEW RIGHT-     HISTORY: Further characterization of known intertrochanteric fracture     COMPARISON: CT performed earlier the same day.     FINDINGS:  A 2 view exam demonstrates a mildly displaced mildly  comminuted right intratrochanteric fracture. There is diffuse  osteopenia. There are remote left inferior and superior pubic rami  fractures. Fiducial markers are seen in the region of the prostate.  There are degenerative changes of the SI joints bilaterally           Impression:      Right intertrochanteric fracture as seen on recent CT.                          Images were reviewed, interpreted, and dictated by Dr. Alice Castillo MD  Transcribed by Julia Vasquez PA-C.     This report was signed and finalized on 12/4/2024 8:56 AM by Alice Castillo  MD.       CT Pelvis Without Contrast [[id number]] Collected: 12/03/24 1519     Updated: 12/03/24 1526    Narrative:      PROCEDURE: CT PELVIS WO CONTRAST-     HISTORY: Right hip pain, status post fall, eval hip fracture     COMPARISON: None.     PROCEDURE: Axial images were obtained from the iliac crest to the pubic  symphysis by computed tomography. This study was performed with  techniques to keep radiation doses as low as reasonably achievable,  (ALARA). Individualized dose reduction techniques using automated  exposure control or adjustment of mA and/or kV according to the patient  size were employed.     FINDINGS:     PELVIS: The appendix is not identified. The urinary bladder is  unremarkable. There is no significant fluid or adenopathy. The  visualized portions of the GI tract is nonobstructed. Diffuse osteopenia  identified. There is a remote left mid superior pubic ramus fracture  which has undergone nonunion. There is a remote left inferior pubic  ramus fracture with at least partial nonunion. There is a comminuted,  mildly displaced right intertrochanteric fracture. Degenerative change  of the SI joints noted. Fiducial markers in the prostate noted. There is  diverticulosis without evidence of diverticulitis.       Impression:      Mildly displaced comminuted right intertrochanteric  fracture.     Remote left superior and inferior pubic rami fractures as described.           CTDI: 14.49 mGy  DLP:755.66 mGy.cm     This report was signed and finalized on 12/3/2024 3:24 PM by Alice Castillo MD.       CT Cervical Spine Without Contrast [[id number]] Collected: 12/03/24 1508     Updated: 12/03/24 1521    Narrative:      PROCEDURE: CT CERVICAL SPINE WO CONTRAST-     HISTORY: Trauma, eval C-spine fracture     COMPARISON: None.     PROCEDURE: Axial images were obtained from the skull base to the  thoracic inlet by computed tomography. 3 D reconstruction images were  performed. This study was performed with techniques  to keep radiation  doses as low as reasonably achievable, (ALARA). Individualized dose  reduction techniques using automated exposure control or adjustment of  mA and/or kV according to the patient size were employed.     FINDINGS: There is no acute fracture. Minimal anterolisthesis C6 noted  on C7. Diffuse osteopenia identified.. There are levels of spinal  stenosis and/or neuroforaminal narrowing secondary to degenerative  change but not secondary to acute bony abnormality. There is moderate to  severe disc space narrowing at all levels with small osteophytes.  Diffuse osteopenia identified. Posterior left C2 there is an 11 mm  nonspecific lytic lesion. Anteriorly in the C5 vertebral body there is a  smaller similar 5 mm lesion. No prior study document stability. The  facets are normally aligned. Bilateral carotid artery calcifications  noted. Left carotid stent identified. Motion artifact noted on multiple  images. Limited images of the lung apices are unremarkable.       Impression:      No acute fracture.     Multilevel cervical degenerative change.     Diffuse osteopenia.     Nonspecific lytic lesions as described may be age/osteopenia related.        CTDI: 14.49 mGy  DLP:755.66 mGy.cm        This report was signed and finalized on 12/3/2024 3:19 PM by Alice Castillo MD.       CT Head Without Contrast [[id number]] Collected: 12/03/24 1513     Updated: 12/03/24 1519    Narrative:      PROCEDURE: CT HEAD WO CONTRAST-     HISTORY: Trauma, eval intracranial hemorrhage     COMPARISON: None.     TECHNIQUE: Multiple axial CT images were performed from the foramen  magnum to the vertex. Individualized dose reduction techniques using  automated exposure control or adjustment of mA and/or kV according to  the patient size were employed.     FINDINGS: There is moderate, age-appropriate generalized cerebral  atrophy. The ventricles are enlarged. There is no evidence of edema or  hemorrhage.  No masses are identified. No  extra-axial fluid is seen. The  paranasal sinuses demonstrate a left maxillary polyp or mucous retention  cyst. Remaining paranasal sinuses and mastoids are clear. Mild small  vessel ischemic disease identified.       Impression:      Atrophy  without acute process.     Left maxillary polyp or mucous retention cyst.        CTDI: 14.49 mGy  DLP:755.66 mGy.cm     This report was signed and finalized on 12/3/2024 3:16 PM by Alice Castillo MD.               Echo:    Results for orders placed during the hospital encounter of 02/28/22    Adult Transthoracic Echo Complete W/ Cont if Necessary Per Protocol    Interpretation Summary  · Calculated left ventricular EF = 62%  · Sigmoid-shaped ventricular septum is present.  · Left ventricular diastolic function is consistent with (grade I) impaired relaxation.  · Mild mitral annular calcification is present. Mild to moderate mitral valve regurgitation is present  · The aortic valve exhibits sclerosis  · Mild tricuspid valve regurgitation is present    Condition on Discharge:      Stable.    Code status during the hospital stay:    Code Status and Medical Interventions: CPR (Attempt to Resuscitate); Full Support   Ordered at: 12/03/24 1729     Code Status (Patient has no pulse and is not breathing):    CPR (Attempt to Resuscitate)     Medical Interventions (Patient has pulse or is breathing):    Full Support     Discharge Disposition:    Rehab Facility or Unit (DC - External)    Discharge Medications:       Discharge Medications        New Medications        Instructions Start Date   Enoxaparin Sodium 30 MG/0.3ML solution prefilled syringe syringe  Commonly known as: LOVENOX   30 mg, Subcutaneous, Daily   Start Date: December 11, 2024     HYDROcodone-acetaminophen 7.5-325 MG per tablet  Commonly known as: Norco   1 tablet, Oral, Every 6 Hours PRN      polyethylene glycol 17 g packet  Commonly known as: MIRALAX   17 g, Oral, Daily PRN      sennosides-docusate 8.6-50 MG per  tablet  Commonly known as: PERICOLACE   2 tablets, Oral, 2 Times Daily PRN             Continue These Medications        Instructions Start Date   aspirin 81 MG EC tablet   81 mg, Oral, Daily      bumetanide 1 MG tablet  Commonly known as: BUMEX   1 mg, Daily      calcitriol 0.25 MCG capsule  Commonly known as: ROCALTROL   0.25 mcg, Daily      carvedilol 6.25 MG tablet  Commonly known as: COREG   12.5 mg, Oral, 2 Times Daily With Meals      ferrous sulfate 325 (65 FE) MG tablet   325 mg, Weekly      levothyroxine 25 MCG tablet  Commonly known as: SYNTHROID, LEVOTHROID   25 mcg, Daily      losartan 100 MG tablet  Commonly known as: COZAAR   100 mg, Daily      pantoprazole 20 MG EC tablet  Commonly known as: PROTONIX   20 mg, Oral, Daily      rosuvastatin 40 MG tablet  Commonly known as: CRESTOR   40 mg, Oral, Nightly      VITAMIN B-12 PO   1,000 mcg, Daily      Vitamin D 50 MCG (2000 UT) capsule   2,000 Units, Daily             Stop These Medications      amLODIPine 5 MG tablet  Commonly known as: NORVASC     fluorouracil 5 % cream  Commonly known as: EFUDEX     MIRCERA IJ     potassium chloride 10 MEQ CR capsule  Commonly known as: MICRO-K     sodium bicarbonate 650 MG tablet     triamcinolone 0.1 % ointment  Commonly known as: KENALOG     VENOFER IV            Discharge Diet:   Renal    Activity at Discharge:     As tolerated  Follow-up Appointments:     Contact information for follow-up providers       Dean Hammonds MD Follow up in 2 day(s).    Specialty: Orthopedic Surgery  Contact information:  [address] [address] [phone]                       Contact information for after-discharge care       Destination       [facility] .    Service: Inpatient Rehabilitation  Contact information:  [address] [facility] [address] [phone]                     Home Medical Care       [facility] .    Service: Home Health  Services  Contact information:  [address]  [facility] [address] [phone]                                 Future Appointments   Date Time Provider Department Center   9/16/2025  1:30 PM Leonard Ashford MD [facility]   11/13/2025  3:30 PM Georgina Pool MD [facility]     Test Results Pending at Discharge:    Pending Results       None                 Vijaya Colon DO  12/10/24  13:55 EST    Time: I spent 24 minutes on this discharge activity which included: face-to-face encounter with the patient, reviewing the data in the system, coordination of the care with the nursing staff as well as consultants, documentation, and entering orders.     Dictated utilizing Dragon dictation.

## 2024-12-10 NOTE — PROGRESS NOTES
[facility] Progress Note  [facility]. [place]        Patient Name: Travon Plummer  : 1945  MRN: [id number]   LOS: 7 days    Patient Care Team:  Chilango Erickson MD as PCP - General (Internal Medicine)  Andrea Sellers MD, JODY as Consulting Physician (Nephrology)  Deandra Do MD as Surgeon (General Surgery)  Leonard Ashford MD as Consulting Physician (Cardiology)  Georgina Pool MD as Consulting Physician (Gastroenterology)    Chief Complaint:    Chief Complaint   Patient presents with    Fall     Pt tripped on his wife's O2 tubing onto his right hip. Pt's only complaint is right hip pain.     Hip Pain     Primary Care Physician:  Chilango Erickson MD  Date of admission: 12/3/2024    Subjective     Interval History:   Follow-up ESRD.  Status post fall and hip fracture.  Patient is laying in the bed awake alert and interactive, he said he feels a lot better.  Denies any chest pain or shortness of breath.  Pain is under very good control.  He said he has not been able to get out of bed by himself and needs a lot of assistance.  Awaiting transfer to rehab.  He has not had a bowel movement since admission.  Events noted from last 24 hours.  I reviewed the chart and other providers notes, labs and procedures done since my last note.    Review of Systems:   As noted above.    Objective     Vitals:   Temp:  [98.1 °F (36.7 °C)-98.9 °F (37.2 °C)] 98.9 °F (37.2 °C)  Heart Rate:  [61-74] 67  Resp:  [18-20] 18  BP: (127-158)/(66-74) 127/66    Intake/Output Summary (Last 24 hours) at 12/10/2024 0623  Last data filed at 2024 2315  Gross per 24 hour   Intake 46559 ml   Output 7000 ml   Net 9710 ml       Physical Exam:    General Appearance: alert, oriented x 3, no acute distress   Skin: warm and dry  HEENT: oral mucosa normal, nonicteric sclera  Neck: supple, no JVD  Lungs: CTA  Heart: RRR, normal S1 and S2  Abdomen: obese, soft, nontender,  non distended and positive bowel sounds.  Peritoneal dialysis catheter in place.  : no palpable bladder  Extremities: Trace edema, no cyanosis or clubbing  Neuro: normal speech and mental status     Scheduled Meds:     Current Facility-Administered Medications   Medication Dose Route Frequency Provider Last Rate Last Admin    acetaminophen (TYLENOL) tablet 650 mg  650 mg Oral Q4H PRN Dean Hammonds MD        sennosides-docusate (PERICOLACE) 8.6-50 MG per tablet 2 tablet  2 tablet Oral BID PRN Dean Hammonds MD   2 tablet at 12/07/24 0811    And    polyethylene glycol (MIRALAX) packet 17 g  17 g Oral Daily PRN Dean Hammonds MD   17 g at 12/08/24 1001    And    bisacodyl (DULCOLAX) EC tablet 5 mg  5 mg Oral Daily PRN Dean Hammonds MD   5 mg at 12/08/24 1830    And    bisacodyl (DULCOLAX) suppository 10 mg  10 mg Rectal Daily PRN Dean Hammonds MD   10 mg at 12/09/24 1203    calcitriol (ROCALTROL) capsule 0.25 mcg  0.25 mcg Oral Daily Dean Hammonds MD   0.25 mcg at 12/09/24 0844    calcium carbonate (TUMS) chewable tablet 500 mg (200 mg elemental)  2 tablet Oral BID PRN Dean Hammonds MD   2 tablet at 12/09/24 1803    Calcium Replacement - Follow Nurse / BPA Driven Protocol   Not Applicable PRN Dean Hammonds MD        carvedilol (COREG) tablet 12.5 mg  12.5 mg Oral BID With Meals Dean Hammonds MD   12.5 mg at 12/09/24 1734    Enoxaparin Sodium (LOVENOX) syringe 30 mg  30 mg Subcutaneous Daily Dean Hammonds MD   30 mg at 12/09/24 0844    ferrous sulfate EC tablet 324 mg  324 mg Oral Weekly Dean Hammonds MD        HYDROcodone-acetaminophen (NORCO) 7.5-325 MG per tablet 2 tablet  2 tablet Oral Q4H PRN Dean Hammonds MD   2 tablet at 12/07/24 0953    levothyroxine (SYNTHROID, LEVOTHROID) tablet 25 mcg  25 mcg Oral Daily Dean Hammonds MD   25 mcg at 12/09/24 0844    losartan (COZAAR) tablet 100 mg  100 mg Oral Q24H Dean Hammonds MD   100 mg at 12/09/24 0844    Magnesium Standard Dose  Replacement - Follow Nurse / BPA Driven Protocol   Not Applicable PRN Dean Hammonds MD        melatonin tablet 5 mg  5 mg Oral Nightly Dean Hammonds MD   5 mg at 12/09/24 2109    naloxone (NARCAN) injection 0.4 mg  0.4 mg Intravenous Q5 Min PRN Dean Hammonds MD        nitroglycerin (NITROSTAT) SL tablet 0.4 mg  0.4 mg Sublingual Q5 Min PRN Dean Hammonds MD        ondansetron ODT (ZOFRAN-ODT) disintegrating tablet 4 mg  4 mg Oral Q6H PRN Dean Hammonds MD        Or    ondansetron (ZOFRAN) injection 4 mg  4 mg Intravenous Q6H PRN Dean Hammonds MD        ondansetron (ZOFRAN) injection 4 mg  4 mg Intravenous Q6H PRN Dean Hammonds MD        pantoprazole (PROTONIX) EC tablet 40 mg  40 mg Oral Daily Dean Hammonds MD   40 mg at 12/09/24 0844    phenol (CHLORASEPTIC) 1.4 % liquid 1 spray  1 spray Mouth/Throat Q2H PRN Justin Brown DO   1 spray at 12/08/24 1827    Phosphorus Replacement - Follow Nurse / BPA Driven Protocol   Not Applicable PRN Dean Hammonds MD        Potassium Replacement - Follow Nurse / BPA Driven Protocol   Not Applicable PRDean Laboy MD        rosuvastatin (CRESTOR) tablet 40 mg  40 mg Oral Nightly Dean Hammonds MD   40 mg at 12/09/24 2108    sodium chloride 0.9 % flush 10 mL  10 mL Intravenous Q12H Dean Hammonds MD   10 mL at 12/09/24 0845    sodium chloride 0.9 % flush 10 mL  10 mL Intravenous PRN Dean Hammonds MD   10 mL at 12/08/24 0010    sodium chloride 0.9 % infusion 40 mL  40 mL Intravenous PRN Dean Hammonds MD        UltraBag/Dianeal PD-2/2.5% Dex (keenan #[id number]) (DIANEAL) 2,000 mL  2,000 mL Intraperitoneal 4 Exchanges Daily - Dwell Overnight Andrea Sellers MD, FASN   2,000 mL at 12/09/24 2319    vitamin B-12 (CYANOCOBALAMIN) tablet 1,000 mcg  1,000 mcg Oral Daily Dean Hammonds MD   1,000 mcg at 12/09/24 0844    vitamin D3 capsule 5,000 Units  5,000 Units Oral Daily Dean Hammonds MD   5,000 Units at 12/09/24 0844       calcitriol, 0.25  mcg, Oral, Daily  carvedilol, 12.5 mg, Oral, BID With Meals  enoxaparin, 30 mg, Subcutaneous, Daily  ferrous sulfate, 324 mg, Oral, Weekly  levothyroxine, 25 mcg, Oral, Daily  losartan, 100 mg, Oral, Q24H  melatonin, 5 mg, Oral, Nightly  pantoprazole, 40 mg, Oral, Daily  rosuvastatin, 40 mg, Oral, Nightly  sodium chloride, 10 mL, Intravenous, Q12H  UltraBag/Dianeal PD-2/2.5% Dex (keenan #[id number]), 2,000 mL, Intraperitoneal, 4 Exchanges Daily - Dwell Overnight  vitamin B-12, 1,000 mcg, Oral, Daily  vitamin D3, 5,000 Units, Oral, Daily        IV Meds:        Results Reviewed:   I have personally reviewed the results from the time of this admission to 12/10/2024 06:23 EST     Results from last 7 days   Lab Units 12/09/24  0721 12/07/24  0918 12/06/24  0425 12/05/24  0609 12/04/24  0529 12/03/24  1458   SODIUM mmol/L 136 134* 134*   < > 139 141   POTASSIUM mmol/L 4.1 4.4 4.6   < > 4.8 4.8   CHLORIDE mmol/L 97* 97* 101   < > 103 103   CO2 mmol/L 26.4 25.5 24.6   < > 25.5 26.9   BUN mg/dL 44* 42* 42*   < > 27* 26*   CREATININE mg/dL 7.52* 6.38* 5.94*   < > 4.66* 4.93*   CALCIUM mg/dL 8.3* 8.8 8.6   < > 9.1 9.3   BILIRUBIN mg/dL  --   --   --   --  0.3 0.4   ALK PHOS U/L  --   --   --   --  91 100   ALT (SGPT) U/L  --   --   --   --  10 13   AST (SGOT) U/L  --   --   --   --  10 16   GLUCOSE mg/dL 101* 153* 113*   < > 95 99    < > = values in this interval not displayed.       Estimated Creatinine Clearance: 10.2 mL/min (A) (by C-G formula based on SCr of 7.52 mg/dL (H)).                Results from last 7 days   Lab Units 12/09/24  0721 12/07/24  0918 12/06/24  0425 12/05/24  0609 12/04/24  0529   WBC 10*3/mm3 8.19 9.01 11.01* 11.78* 9.57   HEMOGLOBIN g/dL 8.6* 9.3* 8.6* 10.1* 10.8*   PLATELETS 10*3/mm3 123* 127* 125* 132* 134*             Brief Urine Lab Results  (Last result in the past 365 days)        Color   Clarity   Blood   Leuk Est   Nitrite   Protein   CREAT   Urine HCG        07/17/24 1436 Yellow   Clear    "Small   Negative   Negative   300 mg/dL                   No results found for: \"UTPCR\"    Imaging Results (Last 24 Hours)       ** No results found for the last 24 hours. **                Assessment / Plan     ASSESSMENT:    Closed right hip fracture    Paroxysmal atrial fibrillation    Chronic kidney disease, stage V    End-stage renal disease: Patient with longstanding history of diabetes and hypertension as well as renal cell carcinoma status post left nephrectomy leading to end-stage renal disease.  Patient has been on peritoneal dialysis and has done very well.  Will continue peritoneal dialysis while in the hospital.  Type 2 diabetes: Continue with the sliding scale as per hospitalist service.  Hypertension: Will resume all home medications, he has been under very good control with the medications that he is on.  Anemia: He has responded well to IV iron and not getting CHARLEY currently does not appear to have any issues, likely will drop after surgery.  Hyperparathyroidism: Continue calcitriol for the time being.  Will continue to follow calcium phosphorus and PTH as needed.  Hip fracture: Surgical intervention planned as noted.  Status post right hip gamma nail procedure.  Paroxysmal atrial fibrillation: He has been noted to be in sinus rhythm.  Constipation: Patient is been getting bowel regimen we will go ahead and start him on lactulose 20 g 1 dose now and if he did not have any bowel movement we will start giving it twice a day.     PLAN:  His blood pressure is fairly stable, tolerating peritoneal dialysis with 2.5% solution well.  Continue the same.  Details were discussed with the patient no family in the room.  Now he is very comfortable going to the [facility] rehab.  Awaiting bowel movement before he can be transferred to the rehab.  I have started him on lactulose still not having any bowel movement we will plan on getting an enema today.  Details were also discussed with the hospitalist service " and or other providers as needed.   Continue with rest of the current treatment plan, and monitor with surveillance labs.  Further recommendations will depend on clinical course of the patient during the current hospitalization.   I have reviewed the copied text to this note, it was edited and the changes made as needed.  It is accurate to the point, when the note was signed today.     Thank you for involving us in the care of Travon Plummer.  Please feel free to call with any questions.    Andrea Sellers MD, NICOLEN  12/10/24  06:23 [facility]    [facility]  [phone]      Part of this note may be an electronic transcription/translation of spoken language to printed text using the Dragon Dictation System.

## 2024-12-11 VITALS
HEIGHT: 74 IN | HEART RATE: 65 BPM | RESPIRATION RATE: 16 BRPM | SYSTOLIC BLOOD PRESSURE: 154 MMHG | TEMPERATURE: 98 F | DIASTOLIC BLOOD PRESSURE: 64 MMHG | WEIGHT: 200.18 LBS | BODY MASS INDEX: 25.69 KG/M2 | OXYGEN SATURATION: 96 %

## 2024-12-11 LAB
ALBUMIN SERPL-MCNC: 2.7 G/DL (ref 3.5–5.2)
ALBUMIN/GLOB SERPL: 1 G/DL
ALP SERPL-CCNC: 92 U/L (ref 39–117)
ALT SERPL W P-5'-P-CCNC: <5 U/L (ref 1–41)
ANION GAP SERPL CALCULATED.3IONS-SCNC: 13.5 MMOL/L (ref 5–15)
AST SERPL-CCNC: 24 U/L (ref 1–40)
BILIRUB SERPL-MCNC: 0.3 MG/DL (ref 0–1.2)
BUN SERPL-MCNC: 47 MG/DL (ref 8–23)
BUN/CREAT SERPL: 5.9 (ref 7–25)
CALCIUM SPEC-SCNC: 8.8 MG/DL (ref 8.6–10.5)
CHLORIDE SERPL-SCNC: 95 MMOL/L (ref 98–107)
CO2 SERPL-SCNC: 25.5 MMOL/L (ref 22–29)
CREAT SERPL-MCNC: 7.9 MG/DL (ref 0.76–1.27)
EGFRCR SERPLBLD CKD-EPI 2021: 6.4 ML/MIN/1.73
GLOBULIN UR ELPH-MCNC: 2.8 GM/DL
GLUCOSE SERPL-MCNC: 91 MG/DL (ref 65–99)
HCT VFR BLD AUTO: 26.8 % (ref 37.5–51)
HGB BLD-MCNC: 8.6 G/DL (ref 13–17.7)
POTASSIUM SERPL-SCNC: 4 MMOL/L (ref 3.5–5.2)
PROT SERPL-MCNC: 5.5 G/DL (ref 6–8.5)
SODIUM SERPL-SCNC: 134 MMOL/L (ref 136–145)

## 2024-12-11 PROCEDURE — 85018 HEMOGLOBIN: CPT | Performed by: FAMILY MEDICINE

## 2024-12-11 PROCEDURE — 25010000002 ENOXAPARIN PER 10 MG: Performed by: ORTHOPAEDIC SURGERY

## 2024-12-11 PROCEDURE — 80053 COMPREHEN METABOLIC PANEL: CPT | Performed by: FAMILY MEDICINE

## 2024-12-11 PROCEDURE — 99238 HOSP IP/OBS DSCHRG MGMT 30/<: CPT | Performed by: FAMILY MEDICINE

## 2024-12-11 PROCEDURE — 85014 HEMATOCRIT: CPT | Performed by: FAMILY MEDICINE

## 2024-12-11 RX ORDER — LACTULOSE 10 G/15ML
10 SOLUTION ORAL 2 TIMES DAILY
Start: 2024-12-11 | End: 2024-12-18

## 2024-12-11 RX ADMIN — LEVOTHYROXINE SODIUM 25 MCG: 0.03 TABLET ORAL at 08:43

## 2024-12-11 RX ADMIN — CYANOCOBALAMIN TAB 500 MCG 1000 MCG: 500 TAB at 08:42

## 2024-12-11 RX ADMIN — PANTOPRAZOLE SODIUM 40 MG: 40 TABLET, DELAYED RELEASE ORAL at 08:43

## 2024-12-11 RX ADMIN — SODIUM CHLORIDE, SODIUM LACTATE, CALCIUM CHLORIDE, MAGNESIUM CHLORIDE AND DEXTROSE 2000 ML: 2.5; 538; 448; 25.7; 5.08 INJECTION, SOLUTION INTRAPERITONEAL at 09:19

## 2024-12-11 RX ADMIN — Medication 5000 UNITS: at 08:43

## 2024-12-11 RX ADMIN — ENOXAPARIN SODIUM 30 MG: 100 INJECTION SUBCUTANEOUS at 08:43

## 2024-12-11 RX ADMIN — CARVEDILOL 12.5 MG: 12.5 TABLET, FILM COATED ORAL at 08:43

## 2024-12-11 RX ADMIN — Medication 10 ML: at 09:40

## 2024-12-11 RX ADMIN — CALCITRIOL 0.25 MCG: 0.25 CAPSULE ORAL at 08:43

## 2024-12-11 RX ADMIN — SENNOSIDES AND DOCUSATE SODIUM 2 TABLET: 50; 8.6 TABLET ORAL at 08:43

## 2024-12-11 RX ADMIN — LACTULOSE 20 G: 20 SOLUTION ORAL at 08:43

## 2024-12-11 RX ADMIN — HYDROCODONE BITARTRATE AND ACETAMINOPHEN 2 TABLET: 7.5; 325 TABLET ORAL at 10:06

## 2024-12-11 RX ADMIN — BUMETANIDE 1 MG: 1 TABLET ORAL at 08:43

## 2024-12-11 RX ADMIN — SODIUM CHLORIDE, SODIUM LACTATE, CALCIUM CHLORIDE, MAGNESIUM CHLORIDE AND DEXTROSE 2000 ML: 2.5; 538; 448; 25.7; 5.08 INJECTION, SOLUTION INTRAPERITONEAL at 00:26

## 2024-12-11 RX ADMIN — LOSARTAN POTASSIUM 100 MG: 50 TABLET, FILM COATED ORAL at 08:43

## 2024-12-11 NOTE — DISCHARGE SUMMARY
[facility]      Name:  Travon Plummer   Age:  79 y.o.  Sex:  male  :  1945  MRN:  [id number]   Visit Number:  [id number]    Admission Date:  12/3/2024  Date of Discharge:  2024  Primary Care Physician:  Chilango Erickson MD    Important issues to note:    Discharged to [facility] for rehab post hip fracture  Continue on peritoneal dialysis as nephrology orders  Continue on bowel regimen  Monitor blood pressure upon discharge  Follow-up with orthopedics, Dr. Hammonds in 2 weeks    Discharge Diagnoses:     Closed right hip fracture    Paroxysmal atrial fibrillation    Chronic kidney disease, stage V    Problem List:     Active Hospital Problems    Diagnosis  POA    **Closed right hip fracture [S72.001A]  Yes    Chronic kidney disease, stage V [N18.5]  Yes    Paroxysmal atrial fibrillation [I48.0]  Yes      Resolved Hospital Problems   No resolved problems to display.     Presenting Problem:    Chief Complaint   Patient presents with    Fall     Pt tripped on his wife's O2 tubing onto his right hip. Pt's only complaint is right hip pain.     Hip Pain      Consults:     Consulting Physician(s)         Provider   Role Specialty     Andrea Sellers MD, JODY      Consulting Physician Nephrology     Dean Hammonds MD      Consulting Physician Orthopedic Surgery     Justin Brown DO      Consulting Physician Hospitalist          Procedures Performed:    Procedure(s):  HIP INTERTROCHANTERIC NAILING    History of presenting illness/Hospital Course:    76-year-old with a history of end-stage renal disease, CAD, atrial fibrillation, hypertension, hyperlipidemia, prior nephrectomy, who presented from home after sustaining mechanical fall onto the right side of his leg and hip area.    Upon workup the emergency room the patient was overall hemodynamically stable with a creatinine of 4.93 hemoglobin of 11.4.  Imaging was concerning for an  intertrochanteric hip fracture on the right.  CT scan of the cervical spine unremarkable for fracture.  CT scan of the head unremarkable.  CT scan of the pelvis showing the mildly displaced comminuted right intertrochanteric fracture.  There is also remote appearing left superior and inferior pubic ramus fractures.    The patient was seen by orthopedics, Dr. Hammonds, and underwent open reduction internal fixation of the right proximal femur on 12//2024.  He has been continued on peritoneal dialysis while in the hospital with Dr. Sellers's orders.  He has been slowly improving, but is not back to his baseline and after evaluation by PT and OT recommendations for short-term rehab.  He is agreeable to this.  Case management had worked on placement, has been arranged for [facility] in [place].  Apparently was having some issues with constipation postoperatively, however patient has had a couple liquid stools today after enemas were given, and KUB shows no obstruction with only mild stool burden.  He has been passing gas, his appetite is slowly improving, have no issues with transfer to rehab facility today.  Will be arranged for EMS transfer.  Needs follow-up with orthopedics in 2 weeks.    Of note, patient did have a CT scan and pelvis prior to transfer which demonstrated overall stable findings.  He does have known cholelithiasis but no evidence of cholecystitis or biliary obstruction.  Nephrology also notes fluid in abdomen likely related to the peritoneal dialysis and recommended starting him back on his Bumex.  Otherwise patient is stable for discharge to rehab today    Vital Signs:    Temp:  [97.9 °F (36.6 °C)-98.7 °F (37.1 °C)] 98 °F (36.7 °C)  Heart Rate:  [62-66] 65  Resp:  [14-18] 16  BP: (113-154)/(64-77) 154/64    Physical Exam:    General Appearance:  Alert and cooperative.  NAD   Head:  Atraumatic and normocephalic.   Eyes: Conjunctivae and sclerae normal, no icterus. No pallor.   Ears:  Ears with no  abnormalities noted.   Throat: No oral lesions, no thrush, oral mucosa moist.   Neck: Supple, trachea midline, no thyromegaly.   Back:   No kyphoscoliosis present. No tenderness to palpation.   Lungs:   Breath sounds heard bilaterally equally.  No crackles or wheezing. No Pleural rub or bronchial breathing.   Heart:  Normal S1 and S2, no murmur, no gallop, no rub. No JVD.   Abdomen:   Normal bowel sounds, no masses, no organomegaly. Soft, nontender, nondistended, no rebound tenderness.  PD catheter   Extremities: Supple, no edema, no cyanosis, no clubbing.  Bandage overlying right hip is dry and intact   Pulses: Pulses palpable bilaterally.   Skin: No bleeding or rash.   Neurologic: Alert and oriented x 3. No facial asymmetry. Moves all four limbs. No tremors.     Pertinent Lab Results:     Results from last 7 days   Lab Units 12/10/24  0728 12/09/24  0721 12/07/24  0918   SODIUM mmol/L 137 136 134*   POTASSIUM mmol/L 4.4 4.1 4.4   CHLORIDE mmol/L 98 97* 97*   CO2 mmol/L 27.4 26.4 25.5   BUN mg/dL 45* 44* 42*   CREATININE mg/dL 8.10* 7.52* 6.38*   CALCIUM mg/dL 8.9 8.3* 8.8   GLUCOSE mg/dL 98 101* 153*     Results from last 7 days   Lab Units 12/10/24  0728 12/09/24  0721 12/07/24  0918   WBC 10*3/mm3 10.21 8.19 9.01   HEMOGLOBIN g/dL 8.5* 8.6* 9.3*   HEMATOCRIT % 26.6* 26.7* 28.6*   PLATELETS 10*3/mm3 138* 123* 127*                                   Pertinent Radiology Results:    Imaging Results (All)       Procedure Component Value Units Date/Time    XR Abdomen KUB [[id number]] Collected: 12/10/24 1140     Updated: 12/10/24 1143    Narrative:      PROCEDURE: XR ABDOMEN KUB-     HISTORY: No BM 1 week; S72.144A-Nondisplaced intertrochanteric fracture  of right femur, initial encounter for closed fracture; S72.001A-Fracture  of unspecified part of neck of right femur, initial encounter for closed  fracture     COMPARISON: None.     FINDINGS: Exam is underpenetrated. An AP view of the abdomen and pelvis  demonstrates  a nonspecific bowel gas pattern with no evidence of  obstruction. There is a mild stool burden. No radiopaque stones are seen  overlying the renal shadows. There are fiducial markers in the region of  the prostate. There is fixation hardware in the visualized proximal  right femur. Peritoneal catheter is coiled in the region of the left  lower quadrant.       Impression:      Peritoneal dialysis catheter in the left lower quadrant.     Mild stool burden.                       Images were reviewed, interpreted, and dictated by Dr. Alice Castillo MD  Transcribed by Julia Vasquez PA-C.     This report was signed and finalized on 12/10/2024 11:41 AM by Alice Castillo MD.       FL C Arm During Surgery [[id number]] Resulted: 12/04/24 1819     Updated: 12/04/24 1819    Narrative:      This procedure was auto-finalized with no dictation required.    XR Hip With or Without Pelvis 2 - 3 View Right [[id number]] Collected: 12/04/24 0854     Updated: 12/04/24 0858    Narrative:         PROCEDURE: XR HIP W OR WO PELVIS 2-3 VIEW RIGHT-     HISTORY: Further characterization of known intertrochanteric fracture     COMPARISON: CT performed earlier the same day.     FINDINGS:  A 2 view exam demonstrates a mildly displaced mildly  comminuted right intratrochanteric fracture. There is diffuse  osteopenia. There are remote left inferior and superior pubic rami  fractures. Fiducial markers are seen in the region of the prostate.  There are degenerative changes of the SI joints bilaterally           Impression:      Right intertrochanteric fracture as seen on recent CT.                          Images were reviewed, interpreted, and dictated by Dr. Alice Castillo MD  Transcribed by Julia Vasquez PA-C.     This report was signed and finalized on 12/4/2024 8:56 AM by Alice Castillo MD.       CT Pelvis Without Contrast [[id number]] Collected: 12/03/24 1519     Updated: 12/03/24 1526    Narrative:      PROCEDURE: CT PELVIS WO CONTRAST-      HISTORY: Right hip pain, status post fall, eval hip fracture     COMPARISON: None.     PROCEDURE: Axial images were obtained from the iliac crest to the pubic  symphysis by computed tomography. This study was performed with  techniques to keep radiation doses as low as reasonably achievable,  (ALARA). Individualized dose reduction techniques using automated  exposure control or adjustment of mA and/or kV according to the patient  size were employed.     FINDINGS:     PELVIS: The appendix is not identified. The urinary bladder is  unremarkable. There is no significant fluid or adenopathy. The  visualized portions of the GI tract is nonobstructed. Diffuse osteopenia  identified. There is a remote left mid superior pubic ramus fracture  which has undergone nonunion. There is a remote left inferior pubic  ramus fracture with at least partial nonunion. There is a comminuted,  mildly displaced right intertrochanteric fracture. Degenerative change  of the SI joints noted. Fiducial markers in the prostate noted. There is  diverticulosis without evidence of diverticulitis.       Impression:      Mildly displaced comminuted right intertrochanteric  fracture.     Remote left superior and inferior pubic rami fractures as described.           CTDI: 14.49 mGy  DLP:755.66 mGy.cm     This report was signed and finalized on 12/3/2024 3:24 PM by Alice Castillo MD.       CT Cervical Spine Without Contrast [[id number]] Collected: 12/03/24 1508     Updated: 12/03/24 1521    Narrative:      PROCEDURE: CT CERVICAL SPINE WO CONTRAST-     HISTORY: Trauma, eval C-spine fracture     COMPARISON: None.     PROCEDURE: Axial images were obtained from the skull base to the  thoracic inlet by computed tomography. 3 D reconstruction images were  performed. This study was performed with techniques to keep radiation  doses as low as reasonably achievable, (ALARA). Individualized dose  reduction techniques using automated exposure control or adjustment  of  mA and/or kV according to the patient size were employed.     FINDINGS: There is no acute fracture. Minimal anterolisthesis C6 noted  on C7. Diffuse osteopenia identified.. There are levels of spinal  stenosis and/or neuroforaminal narrowing secondary to degenerative  change but not secondary to acute bony abnormality. There is moderate to  severe disc space narrowing at all levels with small osteophytes.  Diffuse osteopenia identified. Posterior left C2 there is an 11 mm  nonspecific lytic lesion. Anteriorly in the C5 vertebral body there is a  smaller similar 5 mm lesion. No prior study document stability. The  facets are normally aligned. Bilateral carotid artery calcifications  noted. Left carotid stent identified. Motion artifact noted on multiple  images. Limited images of the lung apices are unremarkable.       Impression:      No acute fracture.     Multilevel cervical degenerative change.     Diffuse osteopenia.     Nonspecific lytic lesions as described may be age/osteopenia related.        CTDI: 14.49 mGy  DLP:755.66 mGy.cm        This report was signed and finalized on 12/3/2024 3:19 PM by Alice Castillo MD.       CT Head Without Contrast [[id number]] Collected: 12/03/24 1513     Updated: 12/03/24 1519    Narrative:      PROCEDURE: CT HEAD WO CONTRAST-     HISTORY: Trauma, eval intracranial hemorrhage     COMPARISON: None.     TECHNIQUE: Multiple axial CT images were performed from the foramen  magnum to the vertex. Individualized dose reduction techniques using  automated exposure control or adjustment of mA and/or kV according to  the patient size were employed.     FINDINGS: There is moderate, age-appropriate generalized cerebral  atrophy. The ventricles are enlarged. There is no evidence of edema or  hemorrhage.  No masses are identified. No extra-axial fluid is seen. The  paranasal sinuses demonstrate a left maxillary polyp or mucous retention  cyst. Remaining paranasal sinuses and mastoids are  clear. Mild small  vessel ischemic disease identified.       Impression:      Atrophy  without acute process.     Left maxillary polyp or mucous retention cyst.        CTDI: 14.49 mGy  DLP:755.66 mGy.cm     This report was signed and finalized on 12/3/2024 3:16 PM by Alice Castillo MD.               Echo:    Results for orders placed during the hospital encounter of 02/28/22    Adult Transthoracic Echo Complete W/ Cont if Necessary Per Protocol    Interpretation Summary  · Calculated left ventricular EF = 62%  · Sigmoid-shaped ventricular septum is present.  · Left ventricular diastolic function is consistent with (grade I) impaired relaxation.  · Mild mitral annular calcification is present. Mild to moderate mitral valve regurgitation is present  · The aortic valve exhibits sclerosis  · Mild tricuspid valve regurgitation is present    Condition on Discharge:      Stable.    Code status during the hospital stay:    Code Status and Medical Interventions: CPR (Attempt to Resuscitate); Full Support   Ordered at: 12/03/24 1729     Code Status (Patient has no pulse and is not breathing):    CPR (Attempt to Resuscitate)     Medical Interventions (Patient has pulse or is breathing):    Full Support     Discharge Disposition:    Rehab Facility or Unit (DC - External)    Discharge Medications:       Discharge Medications        New Medications        Instructions Start Date   Enoxaparin Sodium 30 MG/0.3ML solution prefilled syringe syringe  Commonly known as: LOVENOX   30 mg, Subcutaneous, Daily      HYDROcodone-acetaminophen 7.5-325 MG per tablet  Commonly known as: Norco   1 tablet, Oral, Every 6 Hours PRN      lactulose 10 GM/15ML solution  Commonly known as: CHRONULAC   10 g, Oral, 2 Times Daily      polyethylene glycol 17 g packet  Commonly known as: MIRALAX   17 g, Oral, Daily PRN      sennosides-docusate 8.6-50 MG per tablet  Commonly known as: PERICOLACE   2 tablets, Oral, 2 Times Daily PRN             Continue These  Medications        Instructions Start Date   aspirin 81 MG EC tablet   81 mg, Oral, Daily      bumetanide 1 MG tablet  Commonly known as: BUMEX   1 mg, Daily      calcitriol 0.25 MCG capsule  Commonly known as: ROCALTROL   0.25 mcg, Daily      carvedilol 6.25 MG tablet  Commonly known as: COREG   12.5 mg, Oral, 2 Times Daily With Meals      ferrous sulfate 325 (65 FE) MG tablet   325 mg, Weekly      levothyroxine 25 MCG tablet  Commonly known as: SYNTHROID, LEVOTHROID   25 mcg, Daily      losartan 100 MG tablet  Commonly known as: COZAAR   100 mg, Daily      pantoprazole 20 MG EC tablet  Commonly known as: PROTONIX   20 mg, Oral, Daily      rosuvastatin 40 MG tablet  Commonly known as: CRESTOR   40 mg, Oral, Nightly      VITAMIN B-12 PO   1,000 mcg, Daily      Vitamin D 50 MCG (2000 UT) capsule   2,000 Units, Daily             Stop These Medications      amLODIPine 5 MG tablet  Commonly known as: NORVASC     fluorouracil 5 % cream  Commonly known as: EFUDEX     MIRCERA IJ     potassium chloride 10 MEQ CR capsule  Commonly known as: MICRO-K     sodium bicarbonate 650 MG tablet     triamcinolone 0.1 % ointment  Commonly known as: KENALOG     VENOFER IV            Discharge Diet:   Renal    Activity at Discharge:     As tolerated  Follow-up Appointments:     Contact information for follow-up providers       Dean Hammonds MD Follow up in 2 day(s).    Specialty: Orthopedic Surgery  Contact information:  [address]  [address]  [facility] [address]  [phone]               Chilango Erickson MD .    Specialty: Internal Medicine  Contact information:  [address]  [address]  [facility] [address]  [phone]                       Contact information for after-discharge care       Destination       [facility] .    Service: Inpatient Rehabilitation  Contact information:  [address] [facility] [address] [phone]                     Home Medical Care       [facility] .    Service: Home Health Services  Contact information:  [address]  [facility] [address] [phone]                                 Future Appointments   Date Time Provider Department Center   9/16/2025  1:30 PM Leonard Ashford MD [facility]   11/13/2025  3:30 PM Georgina Pool MD [facility]     Test Results Pending at Discharge:    Pending Results       Procedure [Order ID] Specimen - Date/Time    Comprehensive Metabolic Panel [[id number]] Collected: 12/11/24 0851    Specimen: Blood     Hemoglobin & Hematocrit, Blood [[id number]] Collected: 12/11/24 0851    Specimen: Blood                  Vijaya Colon DO  12/11/24  10:17 EST    Time: I spent 24 minutes on this discharge activity which included: face-to-face encounter with the patient, reviewing the data in the system, coordination of the care with the nursing staff as well as consultants, documentation, and entering orders.     Dictated utilizing Dragon dictation.

## 2024-12-11 NOTE — CASE MANAGEMENT/SOCIAL WORK
Case Management Discharge Note      Final Note: Pt discharged to [facility] via [facility].    Provided Post Acute Provider List?: N/A    Selected Continued Care - Discharged on 12/11/2024 Admission date: 12/3/2024 - Discharge disposition: Rehab Facility or Unit (DC - External)      Destination Coordination complete.      Service Provider Services Address Phone Fax Patient Preferred    [facility] [address] [facility] [address] [phone] --              Durable Medical Equipment    No services have been selected for the patient.                Dialysis/Infusion    No services have been selected for the patient.                Home Medical Care       Service Provider Services Address Phone Fax Patient Preferred    [facility] [address] [facility] [address] [phone] --              Therapy    No services have been selected for the patient.                Community Resources    No services have been selected for the patient.                Community & DME    No services have been selected for the patient.                    Transportation Services  Ambulance: [facility] Status: Accepted    Final Discharge Disposition Code: 03 - skilled nursing facility (SNF)

## 2024-12-30 ENCOUNTER — HOME HEALTH ADMISSION (OUTPATIENT)
Dept: HOME HEALTH SERVICES | Facility: HOME HEALTHCARE | Age: 79
End: 2024-12-30
Payer: MEDICARE

## 2024-12-30 ENCOUNTER — TRANSCRIBE ORDERS (OUTPATIENT)
Dept: HOME HEALTH SERVICES | Facility: HOME HEALTHCARE | Age: 79
End: 2024-12-30
Payer: MEDICARE

## 2024-12-30 DIAGNOSIS — S72.141A CLOSED DISPLACED INTERTROCHANTERIC FRACTURE OF RIGHT FEMUR, INITIAL ENCOUNTER: Primary | ICD-10-CM

## 2024-12-31 ENCOUNTER — PREP FOR SURGERY (OUTPATIENT)
Dept: OTHER | Facility: HOSPITAL | Age: 79
End: 2024-12-31
Payer: MEDICARE

## 2024-12-31 ENCOUNTER — HOME CARE VISIT (OUTPATIENT)
Dept: HOME HEALTH SERVICES | Facility: HOME HEALTHCARE | Age: 79
End: 2024-12-31
Payer: MEDICARE

## 2024-12-31 DIAGNOSIS — Z99.2 END STAGE RENAL FAILURE ON DIALYSIS: Primary | ICD-10-CM

## 2024-12-31 DIAGNOSIS — N18.6 END STAGE RENAL FAILURE ON DIALYSIS: Primary | ICD-10-CM

## 2024-12-31 PROCEDURE — G0299 HHS/HOSPICE OF RN EA 15 MIN: HCPCS

## 2024-12-31 NOTE — Clinical Note
"SOC Note: Patient admitted to [facility] this date. Patient is s/p ORIF d/t right femur fracture s/p fall.    Home Health ordered for: disciplines SN/PT/OT/SLP. SNV frequency 1w6.    Reason for Hosp/Primary Dx/Co-morbidities: Patient is a 79 y.o. male with a PMH of hypothyroid, CKD IV (on peritoneal dialysis), CVA, CAD, dyslipidemia, HTN, GERD, gout, HLD, iron deficiency anemia, cataract right eye, obesity, OA, a-fib, prostate CA, renal cell carcinoma, upper GI bleed, urinary incontinence, and Vit D deficiency. Patient presented to [facility] ED on 12/3/24 - 12/10/24 s/p fall. Patient tripped over his wife's oxygen tubing. Fall resulted in a right hip fracture and patient underwent ORIF. Patient was transferred to [facility] and d/c'd home with home health services.    Focus of Care: Education r/t post-op precautions/complications, medication education, pain management, home safety and fall prevention, prevention of skin breakdown.    Patient's goal(s):\"figure out what happened.\"    Current Functional status/mobility/DME: Patient is max assist of two.    HB status/Living Arrangements: Patient lives with spouse in a single story home. His daughter and son live next door.    Skin Integrity/wound status: Incision right hip. Skin otherwise CDI. Decreased skin turgor noted.    Code Status: CPR.    Fall Risk/Safety concerns: High fall risk.    Educated on Emergency Plan, steps to take prior to going to the ER and when to Call Home Health First:  Yes.     Medication issues/Concerns:Patient and caregiver have a complete list of medications; however, medications where changed while in rehab and are unsure if they should continue with current medication regimen. Staff member from dialysis present in home and will fax correct medication list to [facility] once speaking with nephrologist.    Additional Problems/Concerns: No.    SDOH Barriers (i.e. caregiver concerns, social isolation, transportation, food insecurity, environment, income etc.)/Need " for MSW: No.

## 2025-01-01 VITALS
DIASTOLIC BLOOD PRESSURE: 82 MMHG | WEIGHT: 200 LBS | HEART RATE: 66 BPM | HEIGHT: 74 IN | SYSTOLIC BLOOD PRESSURE: 150 MMHG | BODY MASS INDEX: 25.67 KG/M2 | OXYGEN SATURATION: 93 % | RESPIRATION RATE: 20 BRPM | TEMPERATURE: 97.2 F

## 2025-01-02 NOTE — HOME HEALTH
"SOC Note: Patient admitted to [facility] this date. Patient is s/p ORIF d/t right femur fracture s/p fall.    Home Health ordered for: disciplines SN/PT/OT/SLP. SNV frequency 1w6.    Reason for Hosp/Primary Dx/Co-morbidities: Patient is a 79 y.o. male with a PMH of hypothyroid, CKD IV (on peritoneal dialysis), CVA, CAD, dyslipidemia, HTN, GERD, gout, HLD, iron deficiency anemia, cataract right eye, obesity, OA, a-fib, prostate CA, renal cell carcinoma, upper GI bleed, urinary incontinence, and Vit D deficiency. Patient presented to [facility] ED on 12/3/24 - 12/10/24 s/p fall. Patient tripped over his wife's oxygen tubing. Fall resulted in a right hip fracture and patient underwent ORIF. Patient was transferred to [facility] and d/c'd home with home health services.    Focus of Care: Education r/t post-op precautions/complications, medication education, pain management, home safety and fall prevention, prevention of skin breakdown.    Patient's goal(s):\"figure out what happened.\"    Current Functional status/mobility/DME: Patient is max assist of two.    HB status/Living Arrangements: Patient lives with spouse in a single story home. His daughter and son live next door.    Skin Integrity/wound status: Incision right hip. Skin otherwise CDI. Decreased skin turgor noted.    Code Status: CPR.    Fall Risk/Safety concerns: High fall risk.    Educated on Emergency Plan, steps to take prior to going to the ER and when to Call Home Health First:  Yes.     Medication issues/Concerns:Patient and caregiver have a complete list of medications; however, medications where changed while in rehab and are unsure if they should continue with current medication regimen. Staff member from dialysis present in home and will fax correct medication list to [facility] once speaking with nephrologist.    Additional Problems/Concerns: No.    SDOH Barriers (i.e. caregiver concerns, social isolation, transportation, food insecurity, environment, income etc.)/Need " for MSW: No.

## 2025-01-03 ENCOUNTER — HOSPITAL ENCOUNTER (OUTPATIENT)
Facility: HOSPITAL | Age: 80
Setting detail: HOSPITAL OUTPATIENT SURGERY
Discharge: HOME | End: 2025-01-03
Attending: SURGERY | Admitting: SURGERY
Payer: MEDICARE

## 2025-01-03 ENCOUNTER — APPOINTMENT (OUTPATIENT)
Dept: GENERAL RADIOLOGY | Facility: HOSPITAL | Age: 80
End: 2025-01-03
Payer: MEDICARE

## 2025-01-03 ENCOUNTER — ANESTHESIA EVENT (OUTPATIENT)
Dept: PERIOP | Facility: HOSPITAL | Age: 80
End: 2025-01-03
Payer: MEDICARE

## 2025-01-03 ENCOUNTER — ANESTHESIA (OUTPATIENT)
Dept: PERIOP | Facility: HOSPITAL | Age: 80
End: 2025-01-03
Payer: MEDICARE

## 2025-01-03 VITALS
WEIGHT: 208 LBS | BODY MASS INDEX: 25.33 KG/M2 | HEIGHT: 76 IN | OXYGEN SATURATION: 97 % | HEART RATE: 60 BPM | TEMPERATURE: 98.9 F | SYSTOLIC BLOOD PRESSURE: 131 MMHG | RESPIRATION RATE: 18 BRPM | DIASTOLIC BLOOD PRESSURE: 75 MMHG

## 2025-01-03 DIAGNOSIS — N18.6 END STAGE RENAL FAILURE ON DIALYSIS: ICD-10-CM

## 2025-01-03 DIAGNOSIS — Z99.2 END STAGE RENAL FAILURE ON DIALYSIS: ICD-10-CM

## 2025-01-03 PROCEDURE — 25010000002 CEFAZOLIN PER 500 MG: Performed by: SURGERY

## 2025-01-03 PROCEDURE — 25010000002 BUPIVACAINE (PF) 0.5 % SOLUTION: Performed by: SURGERY

## 2025-01-03 PROCEDURE — 25010000002 ONDANSETRON PER 1 MG: Performed by: NURSE ANESTHETIST, CERTIFIED REGISTERED

## 2025-01-03 PROCEDURE — 71045 X-RAY EXAM CHEST 1 VIEW: CPT

## 2025-01-03 PROCEDURE — 25010000002 PROPOFOL 10 MG/ML EMULSION: Performed by: NURSE ANESTHETIST, CERTIFIED REGISTERED

## 2025-01-03 PROCEDURE — 25010000002 HEPARIN (PORCINE) PER 1000 UNITS: Performed by: SURGERY

## 2025-01-03 PROCEDURE — S0260 H&P FOR SURGERY: HCPCS | Performed by: SURGERY

## 2025-01-03 PROCEDURE — C1750 CATH, HEMODIALYSIS,LONG-TERM: HCPCS | Performed by: SURGERY

## 2025-01-03 PROCEDURE — 36561 INSERT TUNNELED CV CATH: CPT | Performed by: SURGERY

## 2025-01-03 PROCEDURE — 76000 FLUOROSCOPY <1 HR PHYS/QHP: CPT

## 2025-01-03 PROCEDURE — 77001 FLUOROGUIDE FOR VEIN DEVICE: CPT | Performed by: SURGERY

## 2025-01-03 PROCEDURE — 25010000002 LIDOCAINE 1 % SOLUTION: Performed by: SURGERY

## 2025-01-03 PROCEDURE — C1894 INTRO/SHEATH, NON-LASER: HCPCS | Performed by: SURGERY

## 2025-01-03 RX ORDER — HEPARIN SODIUM 1000 [USP'U]/ML
INJECTION, SOLUTION INTRAVENOUS; SUBCUTANEOUS AS NEEDED
Status: DISCONTINUED | OUTPATIENT
Start: 2025-01-03 | End: 2025-01-03 | Stop reason: HOSPADM

## 2025-01-03 RX ORDER — BUPIVACAINE HYDROCHLORIDE 5 MG/ML
INJECTION, SOLUTION EPIDURAL; INTRACAUDAL AS NEEDED
Status: DISCONTINUED | OUTPATIENT
Start: 2025-01-03 | End: 2025-01-03 | Stop reason: HOSPADM

## 2025-01-03 RX ORDER — LIDOCAINE HYDROCHLORIDE 10 MG/ML
INJECTION, SOLUTION INFILTRATION; PERINEURAL AS NEEDED
Status: DISCONTINUED | OUTPATIENT
Start: 2025-01-03 | End: 2025-01-03 | Stop reason: HOSPADM

## 2025-01-03 RX ORDER — ONDANSETRON 2 MG/ML
INJECTION INTRAMUSCULAR; INTRAVENOUS AS NEEDED
Status: DISCONTINUED | OUTPATIENT
Start: 2025-01-03 | End: 2025-01-03 | Stop reason: SURG

## 2025-01-03 RX ORDER — PROPOFOL 10 MG/ML
VIAL (ML) INTRAVENOUS AS NEEDED
Status: DISCONTINUED | OUTPATIENT
Start: 2025-01-03 | End: 2025-01-03 | Stop reason: SURG

## 2025-01-03 RX ADMIN — SODIUM CHLORIDE 2000 MG: 9 INJECTION, SOLUTION INTRAVENOUS at 14:38

## 2025-01-03 RX ADMIN — PROPOFOL 50 MCG/KG/MIN: 10 INJECTION, EMULSION INTRAVENOUS at 14:39

## 2025-01-03 RX ADMIN — ONDANSETRON 4 MG: 2 INJECTION INTRAMUSCULAR; INTRAVENOUS at 14:48

## 2025-01-03 RX ADMIN — PROPOFOL 40 MG: 10 INJECTION, EMULSION INTRAVENOUS at 14:38

## 2025-01-03 NOTE — H&P
[facility]   HISTORY AND PHYSICAL      Name:  Travon Plummer   Age:  80 y.o.  Sex:  male  :  1945  MRN:  [id number]   Visit Number:  [id number]  Admission Date:  1/3/2025  Date Of Service:  25  Primary Care Physician:  Chilango Erickson MD    Chief Complaint:     I need a dialysis catheter.    History Of Presenting Illness:      Patient presents for tunneled dialysis catheter placement as he cannot proceed with peritoneal dialysis temporarily due to recent hip surgery.    Review Of Systems:     General ROS: Generalized weakness  Psychological ROS: Denies any hallucinations and delusions.  Respiratory ROS: Denies cough or shortness of breath.   Cardiovascular ROS: Denies chest pain or palpitations. No history of exertional chest pain.   Gastrointestinal ROS: Denies nausea and vomiting. Denies any abdominal pain. No diarrhea.   Genito-Urinary ROS: Denies dysuria or hematuria.  Neurological ROS: Denies any focal weakness. No loss of consciousness. Denies any numbness.   Dermatological ROS: Denies any redness or pruritis.     Past Medical History:    Past Medical History:   Diagnosis Date    Benign hypertensive CKD, stage 5 chronic kidney disease or end stage renal disease     Broken arm     Carotid stenosis     bilateral    Cerebrovascular accident 10/31/2008    Coronary artery disease     followed by Dr. Ashford; LOV 24; denies chest pain, SOB 24    Dialysis patient     Current 24    Dyspnea     Elevated cholesterol     GERD (gastroesophageal reflux disease)     Gout     History of blood transfusion     Hypercholesterolemia     Hypertension 11/10/2015    History of hypertension; hypotensive at the time of office visit on 11/10/2015.    Impaired mobility     Obesity     Osteoarthritis     Paroxysmal atrial fibrillation     History of paroxysmal atrial fibrillation, data deficit.    Prostate cancer 2015    Prostate cancer, diagnosed   , status post radiation therapy x39 treatments, 2015 at [facility].    Renal cell carcinoma     Renal cell carcinoma, dx 2015, status post left nephrectomy.    Wears dentures     instructed no adhesive DOS       Past Surgical history:    Past Surgical History:   Procedure Laterality Date    CAROTID STENT Left 10/31/2008    PTA/left carotid artery stent, 10/31/2008.     COLONOSCOPY N/A 2021    Procedure: COLONOSCOPY, polypectomy, clip placement x 1;  Surgeon: Deandra Do MD;  Location: [facility] ENDOSCOPY;  Service: Gastroenterology;  Laterality: N/A;    COLONOSCOPY W/ POLYPECTOMY      CORONARY ANGIOPLASTY WITH STENT PLACEMENT      A 3.5 x 13 mm. Cypher ESTIVEN to RCA expanded with 4 mm balloon.     DIALYSIS FISTULA CREATION N/A     abdominal    ENDOSCOPY N/A 2021    Procedure: ESOPHAGOGASTRODUODENOSCOPY with biopsy;  Surgeon: Deandra Do MD;  Location: [facility] ENDOSCOPY;  Service: Gastroenterology;  Laterality: N/A;    ENDOSCOPY N/A 2023    Procedure: ESOPHAGOGASTRODUODENOSCOPY with biopsy;  Surgeon: Georgina Pool MD;  Location: [facility] ENDOSCOPY;  Service: Gastroenterology;  Laterality: N/A;    HIP INTERTROCHANTERIC NAILING Right 2024    Procedure: HIP INTERTROCHANTERIC NAILING;  Surgeon: Dean Hammonds MD;  Location: [facility] OR;  Service: Orthopedics;  Laterality: Right;    INGUINAL HERNIA REPAIR      NEPHRECTOMY Left 2015    diagnosed 2015, status post left radical nephrectomy.     PROSTATE FIDUCIAL MARKER PLACEMENT      received XRT for prostate CA in 2016       Social History:    Social History     Socioeconomic History    Marital status:    Tobacco Use    Smoking status: Former     Current packs/day: 0.00     Average packs/day: 1 pack/day for 51.0 years (51.0 ttl pk-yrs)     Types: Cigarettes     Start date:      Quit date:      Years since quittin.0     Passive exposure: Past    Smokeless tobacco: Former      Types: Chew   Vaping Use    Vaping status: Never Used   Substance and Sexual Activity    Alcohol use: Not Currently     Comment: rare    Drug use: No    Sexual activity: Defer       Family History:    Family History   Problem Relation Age of Onset    No Known Problems Mother     No Known Problems Father     Colon cancer Neg Hx        Allergies:      Allopurinol and Lipitor [atorvastatin]    Home Medications:    Prior to Admission Medications       Prescriptions Last Dose Informant Patient Reported? Taking?    bumetanide (BUMEX) 1 MG tablet 1/2/2025  Yes Yes    Take 1 tablet by mouth Daily. Indications: Edema    calcitriol (ROCALTROL) 0.25 MCG capsule 1/2/2025  Yes Yes    Take 1 capsule by mouth Daily. Indications: unknown    carvedilol (COREG) 6.25 MG tablet 1/2/2025  No Yes    Take 2 tablets by mouth 2 (Two) Times a Day With Meals.    Cholecalciferol (Vitamin D) 50 MCG (2000 UT) capsule 1/2/2025 Self Yes Yes    Take 1 capsule by mouth Daily. Indications: Vitamin D Deficiency    Cyanocobalamin (VITAMIN B-12 PO) 1/2/2025 Self Yes Yes    Take 1,000 mcg by mouth Daily. Indications: Supplement    ferrous sulfate 325 (65 FE) MG tablet 1/2/2025 Self Yes Yes    Take 1 tablet by mouth 1 (One) Time Per Week. Sunday    Indications: Anemia From Inadequate Iron in the Body    levothyroxine (SYNTHROID, LEVOTHROID) 25 MCG tablet 1/2/2025 Self Yes Yes    Take 1 tablet by mouth Daily. LEVOXYL  Indications: Underactive Thyroid    losartan (COZAAR) 100 MG tablet 1/2/2025  Yes Yes    Take 1 tablet by mouth Daily. Indications: High Blood Pressure    pantoprazole (PROTONIX) 20 MG EC tablet 1/2/2025  No Yes    Take 1 tablet by mouth Daily.    polyethylene glycol (MIRALAX) 17 g packet Past Month  No Yes    Take 17 g by mouth Daily As Needed (Use if senna-docusate is ineffective).    rosuvastatin (CRESTOR) 40 MG tablet 1/2/2025  No Yes    Take 1 tablet by mouth Every Night.    sennosides-docusate (PERICOLACE) 8.6-50 MG per tablet Past  Month  No Yes    Take 2 tablets by mouth 2 (Two) Times a Day As Needed for Constipation.    aspirin 81 MG EC tablet   No No    Take 1 tablet by mouth Daily.               ED Medications:    Medications   ceFAZolin 2000 mg IVPB in 100 mL NS (VTB) (has no administration in time range)       Vital Signs:    Temp:  [97.2 °F (36.2 °C)] 97.2 °F (36.2 °C)  Heart Rate:  [58] 58  Resp:  [16] 16  BP: (151)/(75) 151/75        01/02/25  0844   Weight: 94.3 kg (208 lb)       Body mass index is 25.32 kg/m².    Physical Exam:      General Appearance:  Alert and cooperative, not in any acute distress.   Head:  Atraumatic and normocephalic, without obvious abnormality.   Eyes:          PERRLA, conjunctivae and sclerae normal, no Icterus. No pallor. Extra-occular movements are within normal limits.   Ears:  Ears appear intact with no abnormalities noted.   Respiratory/Lungs:   Clear to auscultation   Cardiovascular/Heart:  Normal S1 and S2,    Musculoskeletal/ Extremities:   Moves all extremities well   Skin: No bleeding, bruising or rash, no induration   Psychiatric : Alert and oriented ×3.  No depression or anxiety    Neurologic: Cranial nerves 2 - 12 grossly intact, sensation intact, Motor power is normal and equal bilaterally.       EKG:          Labs:    Lab Results (last 24 hours)       ** No results found for the last 24 hours. **            Radiology:    Imaging Results (Last 72 Hours)       ** No results found for the last 72 hours. **            Assessment:    Renal failure    Plan:     I recommend a tunneled dialysis catheter.  He understands the procedure as well as the risks of bleeding, infection as well as pneumothorax and he and his daughter understand these and at this time wish to proceed    Bijan Noel MD  01/03/25  13:39 EST

## 2025-01-03 NOTE — ANESTHESIA POSTPROCEDURE EVALUATION
Patient: Travon Plummer    Procedure Summary       Date: 01/03/25 Room / Location: [facility] OR  /  [facility]    Anesthesia Start: 1428 Anesthesia Stop: 1506    Procedure: HEMODIALYSIS CATHETER INSERTION Diagnosis:       End stage renal failure on dialysis      (End stage renal failure on dialysis [N18.6, Z99.2])    Surgeons: Bijan Noel MD Provider: Israel Brumfield CRNA    Anesthesia Type: MAC ASA Status: 4            Anesthesia Type: MAC    Vitals  Vitals Value Taken Time   /75 01/03/25 1555   Temp 98.9 °F (37.2 °C) 01/03/25 1555   Pulse 60 01/03/25 1555   Resp 18 01/03/25 1555   SpO2 97 % 01/03/25 1555           Post Anesthesia Care and Evaluation    Patient location during evaluation: PHASE II  Patient participation: complete - patient participated  Level of consciousness: awake and alert  Pain management: adequate    Airway patency: patent  Anesthetic complications: No anesthetic complications  PONV Status: none  Cardiovascular status: acceptable  Respiratory status: acceptable  Hydration status: acceptable  No anesthesia care post op

## 2025-01-03 NOTE — OP NOTE
PATIENT:    Travon Plummer    DATE OF SURGERY:  1/3/2025    PHYSICIAN:    Bijan Noel MD    REFERRING PHYSICIAN:  Chilango Erickson MD     YOB: 1945    PREOPERATIVE DIAGNOSIS:  End-stage renal failure    POSTOPERATIVE DIAGNOSIS:  End-stage renal failure    PROCEDURE:  Tunneled dialysis catheter placement with fluoroscopy    EBL: Less than 30 mL    Specimen: None     Complications: None    ANESTHESIA:  MAC    OPERATIVE PROCEDURE:  The patient was taken to the operating room, placed in the supine position.  Anesthesia had been delivered.  The neck and chest were prepped and draped in the usual fashion.  The right internal jugular vein was accessed using a large-bore needle using ultrasound guidance.  Through this guidewire is placed and the rest of this procedure was performed under fluoroscopic guidance.  Over the guidewire the introducer sheath was then advanced and appropriately positioned.  Through the sheath the catheter was then advanced and appropriately positioned again using fluoroscopic guidance into the distal superior vena cava.  Through a lateral stab wound the catheter was then tunneled and the Y connector was attached and both ports aspirated and flushed easily.  A final flush of  of heparin was then instilled into the catheter.  Initial incision was closed with interrupted simple nylon closure.  Dressings were applied.  There was no significant bleeding and hemostasis Well-controlled.  There were no intraoperative complications      The patient was stable at this point in time and subsequently transferred back to the recovery room in stable condition.       Bijan Noel MD  1/3/2025  15:27 EST

## 2025-01-03 NOTE — ANESTHESIA PREPROCEDURE EVALUATION
Anesthesia Evaluation     Patient summary reviewed and Nursing notes reviewed   no history of anesthetic complications:   NPO Solid Status: > 8 hours  NPO Liquid Status: > 2 hours           Airway   Mallampati: II  TM distance: >3 FB  Neck ROM: full  Possible difficult intubation and Difficult intubation highly probable  Dental      Pulmonary    (+) a smoker Former, COPD,shortness of breath, sleep apnea, decreased breath sounds  Cardiovascular     PT is on anticoagulation therapy  Patient on routine beta blocker    (+) hypertension, CAD, dysrhythmias Atrial Fib, MANZO, PVD, hyperlipidemia,  carotid artery disease carotid bilateral      Neuro/Psych  (+) CVA  GI/Hepatic/Renal/Endo    (+) obesity, morbid obesity, GERD, GI bleeding , renal disease- CRI, thyroid problem hypothyroidism    Musculoskeletal     Abdominal   (+) obese   Substance History      OB/GYN          Other   arthritis,   history of cancer    ROS/Med Hx Other: Labs reviewed  8/26 bun 47 crt 7.9               Anesthesia Plan    ASA 4     MAC     (Risks and benefits discussed including risk of aspiration, recall and dental damage. All patient questions answered.    Will continue with plan of care.)  intravenous induction     Anesthetic plan, risks, benefits, and alternatives have been provided, discussed and informed consent has been obtained with: patient.  Pre-procedure education provided    CODE STATUS:

## 2025-01-04 ENCOUNTER — HOSPITAL ENCOUNTER (INPATIENT)
Facility: HOSPITAL | Age: 80
LOS: 5 days | Discharge: NURSING, REHAB OR LONG-TERM CARE | DRG: 371 | End: 2025-01-09
Attending: EMERGENCY MEDICINE | Admitting: INTERNAL MEDICINE
Payer: MEDICARE

## 2025-01-04 ENCOUNTER — APPOINTMENT (OUTPATIENT)
Dept: GENERAL RADIOLOGY | Facility: HOSPITAL | Age: 80
DRG: 371 | End: 2025-01-04
Payer: MEDICARE

## 2025-01-04 DIAGNOSIS — R19.7 INTRACTABLE DIARRHEA: Primary | ICD-10-CM

## 2025-01-04 DIAGNOSIS — Z99.2 DIALYSIS PATIENT: ICD-10-CM

## 2025-01-04 LAB
ADV 40+41 DNA STL QL NAA+NON-PROBE: NOT DETECTED
ALBUMIN SERPL-MCNC: 2.6 G/DL (ref 3.5–5.2)
ALBUMIN/GLOB SERPL: 1 G/DL
ALP SERPL-CCNC: 93 U/L (ref 39–117)
ALT SERPL W P-5'-P-CCNC: <5 U/L (ref 1–41)
ANION GAP SERPL CALCULATED.3IONS-SCNC: 14.9 MMOL/L (ref 5–15)
ANISOCYTOSIS BLD QL: NORMAL
AST SERPL-CCNC: 23 U/L (ref 1–40)
ASTRO TYP 1-8 RNA STL QL NAA+NON-PROBE: NOT DETECTED
BASOPHILS # BLD AUTO: 0.09 10*3/MM3 (ref 0–0.2)
BASOPHILS NFR BLD AUTO: 1.2 % (ref 0–1.5)
BILIRUB SERPL-MCNC: 0.4 MG/DL (ref 0–1.2)
BUN SERPL-MCNC: 53 MG/DL (ref 8–23)
BUN/CREAT SERPL: 4.5 (ref 7–25)
C CAYETANENSIS DNA STL QL NAA+NON-PROBE: NOT DETECTED
C COLI+JEJ+UPSA DNA STL QL NAA+NON-PROBE: NOT DETECTED
C DIFF GDH + TOXINS A+B STL QL IA.RAPID: NEGATIVE
C DIFF GDH + TOXINS A+B STL QL IA.RAPID: POSITIVE
CALCIUM SPEC-SCNC: 8.1 MG/DL (ref 8.6–10.5)
CHLORIDE SERPL-SCNC: 100 MMOL/L (ref 98–107)
CO2 SERPL-SCNC: 27.1 MMOL/L (ref 22–29)
CREAT SERPL-MCNC: 11.87 MG/DL (ref 0.76–1.27)
CRYPTOSP DNA STL QL NAA+NON-PROBE: NOT DETECTED
D-LACTATE SERPL-SCNC: 0.7 MMOL/L (ref 0.5–2)
DEPRECATED RDW RBC AUTO: 67.2 FL (ref 37–54)
E HISTOLYT DNA STL QL NAA+NON-PROBE: NOT DETECTED
EAEC PAA PLAS AGGR+AATA ST NAA+NON-PRB: NOT DETECTED
EC STX1+STX2 GENES STL QL NAA+NON-PROBE: NOT DETECTED
EGFRCR SERPLBLD CKD-EPI 2021: 3.9 ML/MIN/1.73
EOSINOPHIL # BLD AUTO: 1.36 10*3/MM3 (ref 0–0.4)
EOSINOPHIL NFR BLD AUTO: 18.3 % (ref 0.3–6.2)
EPEC EAE GENE STL QL NAA+NON-PROBE: NOT DETECTED
ERYTHROCYTE [DISTWIDTH] IN BLOOD BY AUTOMATED COUNT: 18.7 % (ref 12.3–15.4)
ETEC LTA+ST1A+ST1B TOX ST NAA+NON-PROBE: NOT DETECTED
FLUAV RNA RESP QL NAA+PROBE: NOT DETECTED
FLUBV RNA RESP QL NAA+PROBE: NOT DETECTED
G LAMBLIA DNA STL QL NAA+NON-PROBE: NOT DETECTED
GLOBULIN UR ELPH-MCNC: 2.5 GM/DL
GLUCOSE SERPL-MCNC: 100 MG/DL (ref 65–99)
HCT VFR BLD AUTO: 33.8 % (ref 37.5–51)
HGB BLD-MCNC: 10.1 G/DL (ref 13–17.7)
HOLD SPECIMEN: NORMAL
HOLD SPECIMEN: NORMAL
HYPOCHROMIA BLD QL: NORMAL
IMM GRANULOCYTES # BLD AUTO: 0.02 10*3/MM3 (ref 0–0.05)
IMM GRANULOCYTES NFR BLD AUTO: 0.3 % (ref 0–0.5)
LIPASE SERPL-CCNC: 36 U/L (ref 13–60)
LYMPHOCYTES # BLD AUTO: 0.53 10*3/MM3 (ref 0.7–3.1)
LYMPHOCYTES NFR BLD AUTO: 7.1 % (ref 19.6–45.3)
MCH RBC QN AUTO: 29.2 PG (ref 26.6–33)
MCHC RBC AUTO-ENTMCNC: 29.9 G/DL (ref 31.5–35.7)
MCV RBC AUTO: 97.7 FL (ref 79–97)
MONOCYTES # BLD AUTO: 0.56 10*3/MM3 (ref 0.1–0.9)
MONOCYTES NFR BLD AUTO: 7.5 % (ref 5–12)
NEUTROPHILS NFR BLD AUTO: 4.87 10*3/MM3 (ref 1.7–7)
NEUTROPHILS NFR BLD AUTO: 65.6 % (ref 42.7–76)
NOROVIRUS GI+II RNA STL QL NAA+NON-PROBE: NOT DETECTED
NRBC BLD AUTO-RTO: 0 /100 WBC (ref 0–0.2)
P SHIGELLOIDES DNA STL QL NAA+NON-PROBE: NOT DETECTED
PLAT MORPH BLD: NORMAL
PLATELET # BLD AUTO: 165 10*3/MM3 (ref 140–450)
PMV BLD AUTO: 11 FL (ref 6–12)
POIKILOCYTOSIS BLD QL SMEAR: NORMAL
POTASSIUM SERPL-SCNC: 3.2 MMOL/L (ref 3.5–5.2)
PROT SERPL-MCNC: 5.1 G/DL (ref 6–8.5)
RBC # BLD AUTO: 3.46 10*6/MM3 (ref 4.14–5.8)
RVA RNA STL QL NAA+NON-PROBE: NOT DETECTED
S ENT+BONG DNA STL QL NAA+NON-PROBE: NOT DETECTED
SAPO I+II+IV+V RNA STL QL NAA+NON-PROBE: NOT DETECTED
SARS-COV-2 RNA RESP QL NAA+PROBE: NOT DETECTED
SHIGELLA SP+EIEC IPAH ST NAA+NON-PROBE: NOT DETECTED
SODIUM SERPL-SCNC: 142 MMOL/L (ref 136–145)
V CHOL+PARA+VUL DNA STL QL NAA+NON-PROBE: NOT DETECTED
V CHOLERAE DNA STL QL NAA+NON-PROBE: NOT DETECTED
WBC MORPH BLD: NORMAL
WBC NRBC COR # BLD AUTO: 7.43 10*3/MM3 (ref 3.4–10.8)
WHOLE BLOOD HOLD COAG: NORMAL
WHOLE BLOOD HOLD SPECIMEN: NORMAL
Y ENTEROCOL DNA STL QL NAA+NON-PROBE: NOT DETECTED

## 2025-01-04 PROCEDURE — 87507 IADNA-DNA/RNA PROBE TQ 12-25: CPT | Performed by: EMERGENCY MEDICINE

## 2025-01-04 PROCEDURE — 87449 NOS EACH ORGANISM AG IA: CPT | Performed by: EMERGENCY MEDICINE

## 2025-01-04 PROCEDURE — 25010000002 HEPARIN (PORCINE) PER 1000 UNITS: Performed by: INTERNAL MEDICINE

## 2025-01-04 PROCEDURE — 73502 X-RAY EXAM HIP UNI 2-3 VIEWS: CPT

## 2025-01-04 PROCEDURE — 99285 EMERGENCY DEPT VISIT HI MDM: CPT | Performed by: EMERGENCY MEDICINE

## 2025-01-04 PROCEDURE — 80053 COMPREHEN METABOLIC PANEL: CPT | Performed by: EMERGENCY MEDICINE

## 2025-01-04 PROCEDURE — 99222 1ST HOSP IP/OBS MODERATE 55: CPT | Performed by: INTERNAL MEDICINE

## 2025-01-04 PROCEDURE — 85025 COMPLETE CBC W/AUTO DIFF WBC: CPT | Performed by: EMERGENCY MEDICINE

## 2025-01-04 PROCEDURE — 83605 ASSAY OF LACTIC ACID: CPT | Performed by: EMERGENCY MEDICINE

## 2025-01-04 PROCEDURE — 83690 ASSAY OF LIPASE: CPT | Performed by: EMERGENCY MEDICINE

## 2025-01-04 PROCEDURE — 85007 BL SMEAR W/DIFF WBC COUNT: CPT | Performed by: EMERGENCY MEDICINE

## 2025-01-04 PROCEDURE — 87636 SARSCOV2 & INF A&B AMP PRB: CPT | Performed by: EMERGENCY MEDICINE

## 2025-01-04 PROCEDURE — 87324 CLOSTRIDIUM AG IA: CPT | Performed by: EMERGENCY MEDICINE

## 2025-01-04 RX ORDER — BISACODYL 5 MG/1
5 TABLET, DELAYED RELEASE ORAL DAILY PRN
Status: DISCONTINUED | OUTPATIENT
Start: 2025-01-04 | End: 2025-01-09 | Stop reason: HOSPADM

## 2025-01-04 RX ORDER — ACETAMINOPHEN 650 MG/1
650 SUPPOSITORY RECTAL EVERY 4 HOURS PRN
Status: DISCONTINUED | OUTPATIENT
Start: 2025-01-04 | End: 2025-01-09 | Stop reason: HOSPADM

## 2025-01-04 RX ORDER — SODIUM CHLORIDE 0.9 % (FLUSH) 0.9 %
10 SYRINGE (ML) INJECTION AS NEEDED
Status: DISCONTINUED | OUTPATIENT
Start: 2025-01-04 | End: 2025-01-09 | Stop reason: HOSPADM

## 2025-01-04 RX ORDER — ASPIRIN 81 MG/1
81 TABLET ORAL DAILY
Status: DISCONTINUED | OUTPATIENT
Start: 2025-01-05 | End: 2025-01-09 | Stop reason: HOSPADM

## 2025-01-04 RX ORDER — CALCITRIOL 0.25 UG/1
0.25 CAPSULE, LIQUID FILLED ORAL DAILY
Status: DISCONTINUED | OUTPATIENT
Start: 2025-01-05 | End: 2025-01-09 | Stop reason: HOSPADM

## 2025-01-04 RX ORDER — SODIUM CHLORIDE 0.9 % (FLUSH) 0.9 %
10 SYRINGE (ML) INJECTION EVERY 12 HOURS SCHEDULED
Status: DISCONTINUED | OUTPATIENT
Start: 2025-01-04 | End: 2025-01-09 | Stop reason: HOSPADM

## 2025-01-04 RX ORDER — NITROGLYCERIN 0.4 MG/1
0.4 TABLET SUBLINGUAL
Status: DISCONTINUED | OUTPATIENT
Start: 2025-01-04 | End: 2025-01-09 | Stop reason: HOSPADM

## 2025-01-04 RX ORDER — BISACODYL 10 MG
10 SUPPOSITORY, RECTAL RECTAL DAILY PRN
Status: DISCONTINUED | OUTPATIENT
Start: 2025-01-04 | End: 2025-01-09 | Stop reason: HOSPADM

## 2025-01-04 RX ORDER — ACETAMINOPHEN 160 MG/5ML
650 SOLUTION ORAL EVERY 4 HOURS PRN
Status: DISCONTINUED | OUTPATIENT
Start: 2025-01-04 | End: 2025-01-09 | Stop reason: HOSPADM

## 2025-01-04 RX ORDER — SODIUM CHLORIDE 9 MG/ML
100 INJECTION, SOLUTION INTRAVENOUS ONCE
Status: COMPLETED | OUTPATIENT
Start: 2025-01-04 | End: 2025-01-04

## 2025-01-04 RX ORDER — SODIUM CHLORIDE 9 MG/ML
40 INJECTION, SOLUTION INTRAVENOUS AS NEEDED
Status: DISCONTINUED | OUTPATIENT
Start: 2025-01-04 | End: 2025-01-09 | Stop reason: HOSPADM

## 2025-01-04 RX ORDER — ACETAMINOPHEN 325 MG/1
650 TABLET ORAL EVERY 4 HOURS PRN
Status: DISCONTINUED | OUTPATIENT
Start: 2025-01-04 | End: 2025-01-09 | Stop reason: HOSPADM

## 2025-01-04 RX ORDER — VANCOMYCIN HYDROCHLORIDE 125 MG/1
125 CAPSULE ORAL EVERY 6 HOURS SCHEDULED
Status: DISCONTINUED | OUTPATIENT
Start: 2025-01-04 | End: 2025-01-09 | Stop reason: HOSPADM

## 2025-01-04 RX ORDER — AMOXICILLIN 250 MG
2 CAPSULE ORAL 2 TIMES DAILY PRN
Status: DISCONTINUED | OUTPATIENT
Start: 2025-01-04 | End: 2025-01-09 | Stop reason: HOSPADM

## 2025-01-04 RX ORDER — ONDANSETRON 2 MG/ML
4 INJECTION INTRAMUSCULAR; INTRAVENOUS EVERY 6 HOURS PRN
Status: DISCONTINUED | OUTPATIENT
Start: 2025-01-04 | End: 2025-01-09 | Stop reason: HOSPADM

## 2025-01-04 RX ORDER — POLYETHYLENE GLYCOL 3350 17 G/17G
17 POWDER, FOR SOLUTION ORAL DAILY PRN
Status: DISCONTINUED | OUTPATIENT
Start: 2025-01-04 | End: 2025-01-09 | Stop reason: HOSPADM

## 2025-01-04 RX ORDER — LEVOTHYROXINE SODIUM 25 UG/1
25 TABLET ORAL
Status: DISCONTINUED | OUTPATIENT
Start: 2025-01-05 | End: 2025-01-09 | Stop reason: HOSPADM

## 2025-01-04 RX ORDER — HEPARIN SODIUM 5000 [USP'U]/ML
5000 INJECTION, SOLUTION INTRAVENOUS; SUBCUTANEOUS EVERY 12 HOURS SCHEDULED
Status: DISCONTINUED | OUTPATIENT
Start: 2025-01-04 | End: 2025-01-09 | Stop reason: HOSPADM

## 2025-01-04 RX ORDER — PANTOPRAZOLE SODIUM 40 MG/1
40 TABLET, DELAYED RELEASE ORAL EVERY OTHER DAY
Status: DISCONTINUED | OUTPATIENT
Start: 2025-01-05 | End: 2025-01-09 | Stop reason: HOSPADM

## 2025-01-04 RX ORDER — ROSUVASTATIN CALCIUM 20 MG/1
40 TABLET, COATED ORAL NIGHTLY
Status: DISCONTINUED | OUTPATIENT
Start: 2025-01-04 | End: 2025-01-09 | Stop reason: HOSPADM

## 2025-01-04 RX ADMIN — VANCOMYCIN HYDROCHLORIDE 125 MG: 125 CAPSULE ORAL at 17:20

## 2025-01-04 RX ADMIN — HEPARIN SODIUM 5000 UNITS: 5000 INJECTION INTRAVENOUS; SUBCUTANEOUS at 21:19

## 2025-01-04 RX ADMIN — ROSUVASTATIN CALCIUM 40 MG: 20 TABLET, FILM COATED ORAL at 21:19

## 2025-01-04 RX ADMIN — Medication 10 ML: at 21:19

## 2025-01-04 RX ADMIN — Medication 5 MG: at 21:19

## 2025-01-04 RX ADMIN — VANCOMYCIN HYDROCHLORIDE 125 MG: 125 CAPSULE ORAL at 23:29

## 2025-01-04 RX ADMIN — SODIUM CHLORIDE 100 ML: 9 INJECTION, SOLUTION INTRAVENOUS at 10:23

## 2025-01-04 NOTE — ED PROVIDER NOTES
[facility] 3  Emergency Department Encounter  Emergency Medicine Physician Note     Pt Name:Travon Plummer  MRN: [id number]  Birthdate 1945  Date of evaluation: 1/4/2025  PCP:  Chilango Erickson MD  Note Started: 10:00 AM EST      CHIEF COMPLAINT       Chief Complaint   Patient presents with    Diarrhea       HISTORY OF PRESENT ILLNESS  (Location/Symptom, Timing/Onset, Context/Setting, Quality, Duration, Modifying Factors, Severity.)      Travon Plummer is a 80 y.o. male who presents with diarrhea.  Patient describes 5 days of diarrhea and was sent here by his nursing home for concerns of C. difficile.  Patient denies any pain, supposed to get dialysis today but did not want to go as he was actively having severe diarrhea.  Patient recently had dialysis catheter placed yesterday.  Patient denies any fevers chills nausea or vomiting.  Patient denies any abdominal pain.  Patient denies any lightheadedness and dizziness.  Patient does describe his mouth is a little dry.  Patient denies any shortness of breath.    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 Ashford; LOV 7/9/24; denies chest pain, SOB 8/5/24    Dialysis patient 2024    Current 11/13/24    Dyspnea     Elevated cholesterol     GERD (gastroesophageal reflux disease)     Gout     History of blood transfusion     Hypercholesterolemia     Hypertension 11/10/2015    History of hypertension; hypotensive at the time of office visit on 11/10/2015.    Impaired mobility     Obesity     Osteoarthritis     Paroxysmal atrial fibrillation     History of paroxysmal atrial fibrillation, data deficit.    Prostate cancer 01/2015    Prostate cancer,  diagnosed January of 2015, status post radiation therapy x39 treatments, 07/01/2015 at [facility].    Renal cell carcinoma 2015    Renal cell carcinoma, dx March of 2015, status post left nephrectomy.    Wears dentures     instructed no adhesive DOS     No additional pertinent       Past Surgical History:   Procedure Laterality Date    CAROTID STENT Left 10/31/2008    PTA/left carotid artery stent, 10/31/2008.     COLONOSCOPY N/A 12/23/2021    Procedure: COLONOSCOPY, polypectomy, clip placement x 1;  Surgeon: Deandra Do MD;  Location: [facility] ENDOSCOPY;  Service: Gastroenterology;  Laterality: N/A;    COLONOSCOPY W/ POLYPECTOMY      CORONARY ANGIOPLASTY WITH STENT PLACEMENT      A 3.5 x 13 mm. Cypher ESTIVEN to RCA expanded with 4 mm balloon.     DIALYSIS FISTULA CREATION N/A     abdominal    ENDOSCOPY N/A 12/22/2021    Procedure: ESOPHAGOGASTRODUODENOSCOPY with biopsy;  Surgeon: Deandra Do MD;  Location: [facility] ENDOSCOPY;  Service: Gastroenterology;  Laterality: N/A;    ENDOSCOPY N/A 02/17/2023    Procedure: ESOPHAGOGASTRODUODENOSCOPY with biopsy;  Surgeon: Georgina Pool MD;  Location: [facility] ENDOSCOPY;  Service: Gastroenterology;  Laterality: N/A;    HIP FRACTURE SURGERY      HIP INTERTROCHANTERIC NAILING Right 12/04/2024    Procedure: HIP INTERTROCHANTERIC NAILING;  Surgeon: Dean Hammonds MD;  Location: [facility] OR;  Service: Orthopedics;  Laterality: Right;    INGUINAL HERNIA REPAIR      NEPHRECTOMY Left 03/19/2015    diagnosed January 2015, status post left radical nephrectomy.     PROSTATE FIDUCIAL MARKER PLACEMENT  2016    received XRT for prostate CA in 2016     No additional pertinent   =    Social History     Socioeconomic History    Marital status:    Tobacco Use    Smoking status: Former     Current packs/day: 0.00     Average packs/day: 1 pack/day for 51.0 years (51.0 ttl pk-yrs)     Types: Cigarettes     Start date: 1957     Quit date: 2008     Years since quitting:  17.0     Passive exposure: Past    Smokeless tobacco: Former     Types: Chew   Vaping Use    Vaping status: Never Used   Substance and Sexual Activity    Alcohol use: Not Currently     Comment: rare    Drug use: No    Sexual activity: Defer       Family History   Problem Relation Age of Onset    No Known Problems Mother     No Known Problems Father     Colon cancer Neg Hx        Allergies:  Allopurinol and Lipitor [atorvastatin]    Home Medications:  Prior to Admission medications    Medication Sig Start Date End Date Taking? Authorizing Provider   aspirin 81 MG EC tablet Take 1 tablet by mouth Daily. 2/25/23   Arash Balbuena MD   bumetanide (BUMEX) 1 MG tablet Take 1 tablet by mouth Daily. Indications: Edema 12/31/24   Karin Vale MD   calcitriol (ROCALTROL) 0.25 MCG capsule Take 1 capsule by mouth Daily. Indications: unknown 12/31/24   Karin Vale MD   carvedilol (COREG) 6.25 MG tablet Take 2 tablets by mouth 2 (Two) Times a Day With Meals. 12/10/24   Vijaya Colon DO   Cholecalciferol (Vitamin D) 50 MCG (2000 UT) capsule Take 1 capsule by mouth Daily. Indications: Vitamin D Deficiency 12/31/24   Karin Vale MD   Cyanocobalamin (VITAMIN B-12 PO) Take 1,000 mcg by mouth Daily. Indications: Supplement 12/31/24   Karin Vale MD   ferrous sulfate 325 (65 FE) MG tablet Take 1 tablet by mouth 1 (One) Time Per Week. Sunday    Indications: Anemia From Inadequate Iron in the Body 12/31/24   Karin Vale MD   levothyroxine (SYNTHROID, LEVOTHROID) 25 MCG tablet Take 1 tablet by mouth Daily. LEVOXYL  Indications: Underactive Thyroid 3/2/17   Karin Vale MD   losartan (COZAAR) 100 MG tablet Take 1 tablet by mouth Daily. Indications: High Blood Pressure 12/31/24   Karin Vale MD   pantoprazole (PROTONIX) 20 MG EC tablet Take 1 tablet by mouth Daily. 11/13/24   Georgina Pool MD   polyethylene glycol (MIRALAX) 17 g packet Take 17 g by mouth  "Daily As Needed (Use if senna-docusate is ineffective). 12/10/24   Vijaya Colon DO   rosuvastatin (CRESTOR) 40 MG tablet Take 1 tablet by mouth Every Night. 11/29/23   Leoanrd Ashford MD   sennosides-docusate (PERICOLACE) 8.6-50 MG per tablet Take 2 tablets by mouth 2 (Two) Times a Day As Needed for Constipation. 12/10/24   Vijaya Colon DO         REVIEW OF SYSTEMS       Review of Systems   Constitutional:  Negative for chills and fever.   Respiratory:  Negative for chest tightness and shortness of breath.    Cardiovascular:  Negative for chest pain.   Gastrointestinal:  Positive for diarrhea. Negative for abdominal pain, nausea and vomiting.   Genitourinary:  Negative for flank pain.   Neurological:  Negative for dizziness and light-headedness.       PHYSICAL EXAM      INITIAL VITALS:   /78 (BP Location: Right arm, Patient Position: Lying)   Pulse 55   Temp 97.2 °F (36.2 °C) (Axillary)   Resp 18   Ht 190.5 cm (75\")   Wt 99.8 kg (220 lb)   SpO2 97%   BMI 27.50 kg/m²     Physical Exam  Constitutional:       Appearance: Normal appearance.   HENT:      Head: Normocephalic and atraumatic.      Mouth/Throat:      Mouth: Mucous membranes are dry.      Comments: Dry mucous membranes  Eyes:      Extraocular Movements: Extraocular movements intact.   Cardiovascular:      Rate and Rhythm: Normal rate and regular rhythm.   Pulmonary:      Effort: Pulmonary effort is normal.      Breath sounds: Normal breath sounds. No rhonchi.   Abdominal:      General: Abdomen is flat.      Palpations: Abdomen is soft.      Tenderness: There is no abdominal tenderness.   Musculoskeletal:      Right lower leg: No edema.      Left lower leg: No edema.   Skin:     General: Skin is warm and dry.   Neurological:      General: No focal deficit present.      Mental Status: He is alert and oriented to person, place, and time.   Psychiatric:         Mood and Affect: Mood normal.         Behavior: Behavior " normal.      Comments: Patient with depressed affect, describes irritation with hemodialysis and prolonged course           DDX/DIAGNOSTIC RESULTS / EMERGENCY DEPARTMENT COURSE / MDM     Differential Diagnosis included but not limited: Norovirus, C. difficile, dehydration, missed dialysis and electrolyte abnormalities enterocolitis.     Diagnoses Considered but Do Not Suspect: Severe sepsis and severe dehydration at this time.    Decision Rules/Scores utilized: N/A     Tests considered but not ordered and why:  N/A     MIPS: N/A     Code Status Discussion:  Not Discussed    Additional Patient Education Provided: None     Medical Decision Making    Medical Decision Making  patient is an 80-year-old male with extensive history.  Patient's had diarrhea for the last 5 days.  Patient recently had dialysis catheter placed yesterday, was getting peritoneal dialysis.  Patient with history of intra-abdominal mass and radiation management.  Patient describes diarrhea mixed with stool, foul-smelling in nature.  Working to send culture for evaluation of C. difficile and norovirus as norovirus has been predominant in the community recently.  Patient denies any abdominal pain, fevers chills nausea or vomiting.  Low concern for bowel obstruction, worsening redness, or infectious process as patient has no leukocytosis and afebrile.  Patient was scheduled to get dialysis today, discussed this case with nephrology who stated that they would like to give him dialysis today.  Patient with worsening creatinine.  Patient admitted for further management of dialysis, dehydration and diarrhea management.  Stool sent for studies.    Problems Addressed:  Dialysis patient: complicated acute illness or injury  Intractable diarrhea: complicated acute illness or injury    Amount and/or Complexity of Data Reviewed  External Data Reviewed: labs and notes.  Labs: ordered. Decision-making details documented in ED Course.  Radiology: ordered and  independent interpretation performed.     Details: Personally evaluated x-ray imaging, no signs of new fracture, post hip replacement    Risk  Prescription drug management.  Decision regarding hospitalization.        See ED COURSE for additional MDM statements    EKG  None Performed     All EKG's are interpreted by the Emergency Department Physician who either signs or Co-signs this chart in the absence of a cardiologist.    Additional Scores                   EMERGENCY DEPARTMENT COURSE:    ED Course as of 01/04/25 1338   Sat Jan 04, 2025   1109 Hemoglobin(!): 10.1  Hemoglobin improved from previous evaluation.  Patient with no major leukocytosis.  Patient does have elevated creatinine, does obtain dialysis was unable to get dialysis today. [CR]   1132 Patient's last dialysis was Thursday, patient's case discussed with nephrology. [CR]   1227 Family discussed at the time patient was admitted having to the floor that they requested hip x-ray as patient had worsening complaints of left hip pain.  X-ray ordered.  Patient noted to have surgical procedure done and hip replacement done a month ago. [CR]      ED Course User Index  [CR] Spenser Renae, DO       PROCEDURES:  None Performed   Procedures    DATA FOR LAB AND RADIOLOGY TESTS ORDERED BELOW ARE REVIEWED BY THE ED CLINICIAN:    RADIOLOGY: All x-rays, CT, MRI, and formal ultrasound images (except ED bedside ultrasound) are read by the radiologist, see reports below, unless otherwise noted in MDM or here.  Reports below are reviewed by myself.  XR Hip With or Without Pelvis 2 - 3 View Right   Final Result   Healing right intertrochanteric fracture status post ORIF;   no new fracture seen.                       This report was signed and finalized on 1/4/2025 12:54 PM by Alice Castillo MD.              LABS: Lab orders shown below, the results are reviewed by myself, and all abnormals are listed below.  Labs Reviewed   COMPREHENSIVE METABOLIC PANEL -  Abnormal; Notable for the following components:       Result Value    Glucose 100 (*)     BUN 53 (*)     Creatinine 11.87 (*)     Potassium 3.2 (*)     Calcium 8.1 (*)     Total Protein 5.1 (*)     Albumin 2.6 (*)     BUN/Creatinine Ratio 4.5 (*)     eGFR 3.9 (*)     All other components within normal limits    Narrative:     GFR Categories in Chronic Kidney Disease (CKD)      GFR Category          GFR (mL/min/1.73)    Interpretation  G1                     90 or greater         Normal or high (1)  G2                      60-89                Mild decrease (1)  G3a                   45-59                Mild to moderate decrease  G3b                   30-44                Moderate to severe decrease  G4                    15-29                Severe decrease  G5                    14 or less           Kidney failure          (1)In the absence of evidence of kidney disease, neither GFR category G1 or G2 fulfill the criteria for CKD.    eGFR calculation 2021 CKD-EPI creatinine equation, which does not include race as a factor   CBC WITH AUTO DIFFERENTIAL - Abnormal; Notable for the following components:    RBC 3.46 (*)     Hemoglobin 10.1 (*)     Hematocrit 33.8 (*)     MCV 97.7 (*)     MCHC 29.9 (*)     RDW 18.7 (*)     RDW-SD 67.2 (*)     Lymphocyte % 7.1 (*)     Eosinophil % 18.3 (*)     Lymphocytes, Absolute 0.53 (*)     Eosinophils, Absolute 1.36 (*)     All other components within normal limits   COVID-19 AND FLU A/B, NP SWAB IN TRANSPORT MEDIA 1 HR TAT - Normal    Narrative:     Fact sheet for providers: https://www.fda.gov/media/840424/download    Fact sheet for patients: https://www.fda.gov/media/993532/download    Test performed by PCR.   LIPASE - Normal   LACTIC ACID, PLASMA - Normal   GASTROINTESTINAL PANEL, PCR (PREFERRED) DOES NOT INCLUDE CDIFF   CLOSTRIDIOIDES DIFFICILE TOXIN    Narrative:     The following orders were created for panel order Clostridioides difficile Toxin - Stool, Per  Rectum.  Procedure                               Abnormality         Status                     ---------                               -----------         ------                     Clostridioides difficile...[[id number]]                      In process                   Please view results for these tests on the individual orders.   CLOSTRIDIOIDES DIFFICILE EIA   RAINBOW DRAW    Narrative:     The following orders were created for panel order [place] Draw.  Procedure                               Abnormality         Status                     ---------                               -----------         ------                     Green Top (Gel)[[id number]]                                  Final result               Lavender Top[[id number]]                                     Final result               Gold Top - SST[[id number]]                                   Final result               Light Blue Top[[id number]]                                   Final result                 Please view results for these tests on the individual orders.   SCAN SLIDE   URINALYSIS W/ MICROSCOPIC IF INDICATED (NO CULTURE)   CBC AND DIFFERENTIAL    Narrative:     The following orders were created for panel order CBC & Differential.  Procedure                               Abnormality         Status                     ---------                               -----------         ------                     CBC Auto Differential[[id number]]        Abnormal            Final result               Scan Slide[[id number]]                                       Final result                 Please view results for these tests on the individual orders.   GREEN TOP   LAVENDER TOP   GOLD TOP - SST   LIGHT BLUE TOP       Vitals Reviewed:    Vitals:    01/04/25 1001 01/04/25 1200 01/04/25 1250   BP: 172/83 143/71 164/78   BP Location:   Right arm   Patient Position:   Lying   Pulse: 58 55 55   Resp: 18  18   Temp: 98.4 °F (36.9 °C)  97.2 °F (36.2 °C)  "  TempSrc:   Axillary   SpO2: 94% 92% 97%   Weight: 99.8 kg (220 lb)     Height: 190.5 cm (75\")         MEDICATIONS GIVEN TO PATIENT THIS ENCOUNTER:  Medications   sodium chloride 0.9 % flush 10 mL (has no administration in time range)   sodium chloride 0.9 % infusion 100 mL (0 mL Intravenous Stopped 1/4/25 1205)       CONSULTS:  IP CONSULT TO NEPHROLOGY    CRITICAL CARE:  There was significant risk of life threatening deterioration of patient's condition requiring my direct management. Critical care time 0 minutes, excluding any documented procedures.    FINAL IMPRESSION      1. Intractable diarrhea    2. Dialysis patient          DISPOSITION / PLAN     ED Disposition       ED Disposition   Decision to Admit    Condition   --    Comment   Level of Care: Telemetry [5]   Diagnosis: Intractable diarrhea [[id number]]   Certification: I Certify That Inpatient Hospital Services Are Medically Necessary For Greater Than 2 Midnights                 PATIENT REFERRED TO:  No follow-up provider specified.    DISCHARGE MEDICATIONS:     Medication List        ASK your doctor about these medications      aspirin 81 MG EC tablet  Take 1 tablet by mouth Daily.     bumetanide 1 MG tablet  Commonly known as: BUMEX     calcitriol 0.25 MCG capsule  Commonly known as: ROCALTROL     carvedilol 6.25 MG tablet  Commonly known as: COREG  Take 2 tablets by mouth 2 (Two) Times a Day With Meals.     ferrous sulfate 325 (65 FE) MG tablet     levothyroxine 25 MCG tablet  Commonly known as: SYNTHROID, LEVOTHROID     losartan 100 MG tablet  Commonly known as: COZAAR     pantoprazole 20 MG EC tablet  Commonly known as: PROTONIX  Take 1 tablet by mouth Daily.     polyethylene glycol 17 g packet  Commonly known as: MIRALAX  Take 17 g by mouth Daily As Needed (Use if senna-docusate is ineffective).     rosuvastatin 40 MG tablet  Commonly known as: CRESTOR  Take 1 tablet by mouth Every Night.     sennosides-docusate 8.6-50 MG per tablet  Commonly known " as: PERICOLACE  Take 2 tablets by mouth 2 (Two) Times a Day As Needed for Constipation.     VITAMIN B-12 PO     Vitamin D 50 MCG (2000 UT) capsule              Electronically signed by Spenser Renae DO, 01/04/25, 10:00 AM EST.    Emergency Medicine Physician  [facility]  (Please note that portions of thisnote were completed with a voice recognition program.  Efforts were made to edit the dictations but occasionally words are mis-transcribed.)       Spenser Renae DO  01/04/25 1642

## 2025-01-04 NOTE — ED NOTES
[facility]ed [facility] to speak to Dr. Sellers per DO Batool. Stated that Dr. Borden would be the doctor we would speak to. Call transferred at this time.

## 2025-01-04 NOTE — PLAN OF CARE
Goal Outcome Evaluation:           Progress: no change  Outcome Evaluation: came to floor from ER , had been at home and had days of diarrhea, unable to stop, having small amounts jelly consistancy stools now.

## 2025-01-04 NOTE — H&P
[facility]   HISTORY AND PHYSICAL      Name:  Travon Plummer   Age:  80 y.o.  Sex:  male  :  1945  MRN:  [id number]   Visit Number:  [id number]  Admission Date:  2025  Date Of Service:  25  Primary Care Physician:  Chilango Erickson MD    Chief Complaint:     Weakness    History Of Presenting Illness:      Patient is a chronically ill 80-year-old male with history significant for hypertension, CKD stage V on peritoneal dialysis, and recent right hip fracture status post ORIF who presents from home with family due to intractable diarrhea progressing over the last 5 days.  Patient reports he was even having diarrhea in his sleep.  He was recently discharged from [facility] short-term rehab post ORIF.  Patient reports that he is also having issues walking however family states that they believe this is a motivation issue as patient was able to use his walker and ambulate throughout the house the other day.  Patient denies abdominal pain, fever, chest pain, shortness of breath or cough.    ED summary: Patient afebrile, hemodynamically stable, nonhypoxic on room air.  Potassium 3.2, creatinine 11.87 and BUN 53.  Lactate within normal limits.  CBC unremarkable except for baseline anemia.  C. difficile toxin negative, EIA positive.  Remainder of GI PCR panel negative.  COVID and flu negative.  X-ray of the hip shows healing right intratrochanteric fracture status post ORIF.  No new fracture.  Family concerned about progressive weakness.  Inability to care for self at home.  Hospitalist asked to admit.    Review Of Systems:    All systems were reviewed and negative except as mentioned in history of presenting illness, assessment and plan.    Past Medical History: Patient  has a past medical history of Benign hypertensive CKD, stage 5 chronic kidney disease or end stage renal disease, Broken arm, Carotid stenosis, Cerebrovascular accident (10/31/2008), Coronary  artery disease, Dialysis patient (2024), Dyspnea, Elevated cholesterol, GERD (gastroesophageal reflux disease), Gout, History of blood transfusion, Hypercholesterolemia, Hypertension (11/10/2015), Impaired mobility, Obesity, Osteoarthritis, Paroxysmal atrial fibrillation, Prostate cancer (01/2015), Renal cell carcinoma (2015), and Wears dentures.    Past Surgical History: Patient  has a past surgical history that includes Inguinal hernia repair; Colonoscopy w/ polypectomy; Coronary angioplasty with stent; Carotid stent (Left, 10/31/2008); Nephrectomy (Left, 03/19/2015); Prostate Fiducial Marker Placement (2016); Esophagogastroduodenoscopy (N/A, 12/22/2021); Colonoscopy (N/A, 12/23/2021); Esophagogastroduodenoscopy (N/A, 02/17/2023); Dialysis fistula creation (N/A); Hip Intertrochanteric Nailing (Right, 12/04/2024); and Hip fracture surgery.    Social History: Patient  reports that he quit smoking about 17 years ago. His smoking use included cigarettes. He started smoking about 68 years ago. He has a 51 pack-year smoking history. He has been exposed to tobacco smoke. He has quit using smokeless tobacco.  His smokeless tobacco use included chew. He reports that he does not currently use alcohol. He reports that he does not use drugs.    Family History:  Patient's family history has been reviewed and found to be noncontributory.     Allergies:      Allopurinol and Lipitor [atorvastatin]    Home Medications:    Prior to Admission Medications       Prescriptions Last Dose Informant Patient Reported? Taking?    aspirin 81 MG EC tablet   No No    Take 1 tablet by mouth Daily.    bumetanide (BUMEX) 1 MG tablet   Yes No    Take 1 tablet by mouth Daily. Indications: Edema    calcitriol (ROCALTROL) 0.25 MCG capsule   Yes No    Take 1 capsule by mouth Daily. Indications: unknown    carvedilol (COREG) 6.25 MG tablet   No No    Take 2 tablets by mouth 2 (Two) Times a Day With Meals.    Cholecalciferol (Vitamin D) 50 MCG (2000 UT)  "capsule  Self Yes No    Take 1 capsule by mouth Daily. Indications: Vitamin D Deficiency    Cyanocobalamin (VITAMIN B-12 PO)  Self Yes No    Take 1,000 mcg by mouth Daily. Indications: Supplement    ferrous sulfate 325 (65 FE) MG tablet  Self Yes No    Take 1 tablet by mouth 1 (One) Time Per Week. Sunday    Indications: Anemia From Inadequate Iron in the Body    levothyroxine (SYNTHROID, LEVOTHROID) 25 MCG tablet  Self Yes No    Take 1 tablet by mouth Daily. LEVOXYL  Indications: Underactive Thyroid    losartan (COZAAR) 100 MG tablet   Yes No    Take 1 tablet by mouth Daily. Indications: High Blood Pressure    pantoprazole (PROTONIX) 20 MG EC tablet   No No    Take 1 tablet by mouth Daily.    polyethylene glycol (MIRALAX) 17 g packet   No No    Take 17 g by mouth Daily As Needed (Use if senna-docusate is ineffective).    rosuvastatin (CRESTOR) 40 MG tablet   No No    Take 1 tablet by mouth Every Night.    sennosides-docusate (PERICOLACE) 8.6-50 MG per tablet   No No    Take 2 tablets by mouth 2 (Two) Times a Day As Needed for Constipation.          ED Medications:    Medications   sodium chloride 0.9 % flush 10 mL (has no administration in time range)   sodium chloride 0.9 % infusion 100 mL (0 mL Intravenous Stopped 1/4/25 1205)     Vital Signs:  Temp:  [98.4 °F (36.9 °C)] 98.4 °F (36.9 °C)  Heart Rate:  [55-58] 55  Resp:  [18] 18  BP: (143-172)/(71-83) 143/71        01/04/25  1001   Weight: 99.8 kg (220 lb)     Body mass index is 27.5 kg/m².    Physical Exam:     Most recent vital Signs: /71   Pulse 55   Temp 98.4 °F (36.9 °C)   Resp 18   Ht 190.5 cm (75\")   Wt 99.8 kg (220 lb)   SpO2 92%   BMI 27.50 kg/m²     Physical Exam  Vitals reviewed.   Constitutional:       General: He is not in acute distress.     Appearance: He is normal weight.      Comments: Chronically ill appearing   HENT:      Head: Normocephalic and atraumatic.      Right Ear: External ear normal.      Left Ear: External ear normal.     " " Mouth/Throat:      Mouth: Mucous membranes are moist.      Pharynx: Oropharynx is clear.   Eyes:      Extraocular Movements: Extraocular movements intact.      Conjunctiva/sclera: Conjunctivae normal.   Cardiovascular:      Rate and Rhythm: Normal rate and regular rhythm.   Pulmonary:      Effort: Pulmonary effort is normal.      Breath sounds: Normal breath sounds.   Abdominal:      General: Bowel sounds are normal. There is no distension.      Palpations: Abdomen is soft.      Tenderness: There is no abdominal tenderness.      Comments: Peritoneal dialysis catheter present; tunneled dialysis catheter also present   Musculoskeletal:      Right lower leg: No edema.      Left lower leg: No edema.   Skin:     General: Skin is warm and dry.   Neurological:      General: No focal deficit present.      Mental Status: He is alert and oriented to person, place, and time.         Laboratory data:    I have reviewed the labs done in the emergency room.    Results from last 7 days   Lab Units 01/04/25  1005   SODIUM mmol/L 142   POTASSIUM mmol/L 3.2*   CHLORIDE mmol/L 100   CO2 mmol/L 27.1   BUN mg/dL 53*   CREATININE mg/dL 11.87*   CALCIUM mg/dL 8.1*   BILIRUBIN mg/dL 0.4   ALK PHOS U/L 93   ALT (SGPT) U/L <5   AST (SGOT) U/L 23   GLUCOSE mg/dL 100*     Results from last 7 days   Lab Units 01/04/25  1005   WBC 10*3/mm3 7.43   HEMOGLOBIN g/dL 10.1*   HEMATOCRIT % 33.8*   PLATELETS 10*3/mm3 165                     Results from last 7 days   Lab Units 01/04/25  1005   LIPASE U/L 36               Invalid input(s): \"USDES\", \"NITRITITE\", \"BACT\", \"EP\"    Pain Management Panel           No data to display                EKG:          Radiology:    XR Chest 1 View    Result Date: 1/3/2025  TWO VIEW CHEST  HISTORY: Right IJ dialysis catheter placement.  COMPARISON: 2/16/2023.  FINDINGS: Interval placement of right IJ catheter with tip terminating in the region of the right atrium. The aorta is tortuous. The cardiac silhouette is " normal in size. The mediastinum is unremarkable. There is stable blunting of the left costophrenic angle. The lungs are otherwise. There is no pneumothorax. The osseous structures are unremarkable.      Interval placement of right IJ dialysis catheter. No pneumothorax.      Images were reviewed, interpreted, and dictated by Dr. Alice Castillo MD Transcribed by Jori Allen PA-C.  This report was signed and finalized on 1/3/2025 3:59 PM by Alice Castillo MD.      FL C Arm During Surgery    Result Date: 1/3/2025  This procedure was auto-finalized with no dictation required.     Assessment:    C. difficile infection POA  MARY on CKD/ESRD  Hypokalemia  Hypertension  Hyperlipidemia  CAD  Generalized weakness  Impaired Mobility and ADLs    Plan:    C. difficile infection   Given risk factor of multiple hospitalizations and recent short-term rehab stay, we will go ahead and treat as active C. difficile infection despite negative toxin.  Oral vancomycin  C. difficile precaution  Avoid antidiarrheal agents  MARY on CKD stage V/ESRD  Patient had been on peritoneal dialysis.  Patient had tunneled dialysis catheter placed by Dr. Noel 1/3/2025.  Nephrology consulted for inpatient hemodialysis  Hypokalemia  Secondary to diarrhea and GI losses  Replete per protocol  Generalized weakness  Impaired Mobility and ADLs  Family interested in long-term care placement versus short-term rehab    Risk Assessment: High  DVT Prophylaxis: Heparin subq  Code Status: Full  Diet: Renal             Manuel Maya DO  01/04/25  12:33 EST    Dictated utilizing Dragon dictation.

## 2025-01-04 NOTE — CASE MANAGEMENT/SOCIAL WORK
Discharge Planning Assessment   [facility]     Patient Name: Travon Plummer  MRN: [id number]  Today's Date: 1/4/2025    Admit Date: 1/4/2025    Plan: Retrun Home with Home Health if He is able to get out of bed with minimal assistance   Discharge Needs Assessment       Row Name 01/04/25 1619       Living Environment    People in Home spouse    Current Living Arrangements home    In the past 12 months has the electric, gas, oil, or water company threatened to shut off services in your home? No    Primary Care Provided by spouse/significant other;child(gina)    Provides Primary Care For no one, unable/limited ability to care for self    Family Caregiver if Needed child(gina), adult;spouse    Quality of Family Relationships helpful    Able to Return to Prior Arrangements --  depends on ability to get out of bed       Resource/Environmental Concerns    Resource/Environmental Concerns none    Transportation Concerns none       Transportation Needs    In the past 12 months, has lack of transportation kept you from medical appointments or from getting medications? no    In the past 12 months, has lack of transportation kept you from meetings, work, or from getting things needed for daily living? No       Food Insecurity    Within the past 12 months, you worried that your food would run out before you got the money to buy more. Never true    Within the past 12 months, the food you bought just didn't last and you didn't have money to get more. Never true       Transition Planning    Patient/Family Anticipates Transition to home with help/services       Discharge Needs Assessment    Readmission Within the Last 30 Days previous discharge plan unsuccessful    Current Outpatient/Agency/Support Group homecare agency    Equipment Currently Used at Home hospital bed;walker, standard;walker, rolling  over the bed trapeze    Concerns to be Addressed discharge planning    Do you want help finding or keeping work or a job? I do  not need or want help    Do you want help with school or training? For example, starting or completing job training or getting a high school diploma, GED or equivalent No    Anticipated Changes Related to Illness none                   Discharge Plan       Row Name 01/04/25 1622       Plan    Plan Return Home with Home Health if He is able to get out of bed with minimal assistance    Roadmap to Recovery Yes    Patient/Family in Agreement with Plan yes    Plan Comments Spoke to pt  his Wife on speaker phone and Son at bedside ,Confirmed address ,phone number and primary care provider as being correct on face sheet .He was in rehab at [facility] on [facility] Has not  started PT/OT .SP was order but they feel he does not need that .He has a Hospital bed son ordered a Trapeze bar in hopes  that would  assist in him getting out of bed Once he is able to Amb with Walker to the bathroom .. Plan depends on his ability to get out of bed  with Minimal assistance Lives with wife is unable to lift Children work Full time Case Management to follow .On peritoneal Dialysis  initially  at home Hemodialysis catheter was placed 1/3 and he was to start Hemodialysis at [facility] 1/4 son to transport Wife was working on getting help with future transportation                   Continued Care and Services - Admitted Since 1/4/2025    No active coordination exists for this encounter.       Selected Continued Care - Prior Encounters Includes continued care and service providers with selected services from prior encounters from 10/6/2024 to 1/4/2025      Discharged on 12/11/2024 Admission date: 12/3/2024 - Discharge disposition: Skilled Nursing Facility (DC - External)      Destination       Service Provider Services Address Phone Fax Patient Preferred    [facility] [address] [facility] [address] [phone] --              Home  Medical Care       Service Provider Services Address Phone Fax Patient Preferred    [facility] [address] [facility] [address] [phone] --                             Demographic Summary       Row Name 01/04/25 1617       General Information    Admission Type inpatient    Arrived From emergency department    Referral Source admission list    Reason for Consult discharge planning    Preferred Language English                   Functional Status       Row Name 01/04/25 1618       Functional Status    Usual Activity Tolerance poor    Current Activity Tolerance poor       Physical Activity    On average, how many days per week do you engage in moderate to strenuous exercise (like a brisk walk)? 0 days    On average, how many minutes do you engage in exercise at this level? 0 min    Number of minutes of exercise per week 0       Functional Status, IADL    Medications completely dependent    Meal Preparation completely dependent    Housekeeping completely dependent    Laundry completely dependent    Shopping completely dependent    If for any reason you need help with day-to-day activities such as bathing, preparing meals, shopping, managing finances, etc., do you get the help you need? I don't need any help  per wife they need alot  more help       Mental Status    General Appearance WDL WDL                   Psychosocial    No documentation.                  Abuse/Neglect    No documentation.                  Legal    No documentation.                  Substance Abuse    No documentation.                  Patient Forms    No documentation.                     Marcela Montenegro RN

## 2025-01-05 LAB
ANION GAP SERPL CALCULATED.3IONS-SCNC: 17.1 MMOL/L (ref 5–15)
ANISOCYTOSIS BLD QL: NORMAL
BASOPHILS # BLD AUTO: 0.1 10*3/MM3 (ref 0–0.2)
BASOPHILS NFR BLD AUTO: 1.3 % (ref 0–1.5)
BUN SERPL-MCNC: 57 MG/DL (ref 8–23)
BUN/CREAT SERPL: 4.4 (ref 7–25)
CALCIUM SPEC-SCNC: 8 MG/DL (ref 8.6–10.5)
CHLORIDE SERPL-SCNC: 103 MMOL/L (ref 98–107)
CO2 SERPL-SCNC: 23.9 MMOL/L (ref 22–29)
CREAT SERPL-MCNC: 12.82 MG/DL (ref 0.76–1.27)
DEPRECATED RDW RBC AUTO: 65.1 FL (ref 37–54)
EGFRCR SERPLBLD CKD-EPI 2021: 3.6 ML/MIN/1.73
ELLIPTOCYTES BLD QL SMEAR: NORMAL
EOSINOPHIL # BLD AUTO: 1.52 10*3/MM3 (ref 0–0.4)
EOSINOPHIL NFR BLD AUTO: 19.6 % (ref 0.3–6.2)
ERYTHROCYTE [DISTWIDTH] IN BLOOD BY AUTOMATED COUNT: 18.1 % (ref 12.3–15.4)
GLUCOSE SERPL-MCNC: 89 MG/DL (ref 65–99)
HCT VFR BLD AUTO: 32.8 % (ref 37.5–51)
HGB BLD-MCNC: 9.7 G/DL (ref 13–17.7)
HYPOCHROMIA BLD QL: NORMAL
IMM GRANULOCYTES # BLD AUTO: 0.02 10*3/MM3 (ref 0–0.05)
IMM GRANULOCYTES NFR BLD AUTO: 0.3 % (ref 0–0.5)
LYMPHOCYTES # BLD AUTO: 0.62 10*3/MM3 (ref 0.7–3.1)
LYMPHOCYTES NFR BLD AUTO: 8 % (ref 19.6–45.3)
MCH RBC QN AUTO: 28.7 PG (ref 26.6–33)
MCHC RBC AUTO-ENTMCNC: 29.6 G/DL (ref 31.5–35.7)
MCV RBC AUTO: 97 FL (ref 79–97)
MONOCYTES # BLD AUTO: 0.56 10*3/MM3 (ref 0.1–0.9)
MONOCYTES NFR BLD AUTO: 7.2 % (ref 5–12)
NEUTROPHILS NFR BLD AUTO: 4.92 10*3/MM3 (ref 1.7–7)
NEUTROPHILS NFR BLD AUTO: 63.6 % (ref 42.7–76)
NRBC BLD AUTO-RTO: 0 /100 WBC (ref 0–0.2)
PLATELET # BLD AUTO: 151 10*3/MM3 (ref 140–450)
PMV BLD AUTO: 11.5 FL (ref 6–12)
POIKILOCYTOSIS BLD QL SMEAR: NORMAL
POTASSIUM SERPL-SCNC: 3 MMOL/L (ref 3.5–5.2)
RBC # BLD AUTO: 3.38 10*6/MM3 (ref 4.14–5.8)
SMALL PLATELETS BLD QL SMEAR: ADEQUATE
SODIUM SERPL-SCNC: 144 MMOL/L (ref 136–145)
WBC MORPH BLD: NORMAL
WBC NRBC COR # BLD AUTO: 7.74 10*3/MM3 (ref 3.4–10.8)

## 2025-01-05 PROCEDURE — 80074 ACUTE HEPATITIS PANEL: CPT | Performed by: INTERNAL MEDICINE

## 2025-01-05 PROCEDURE — 85007 BL SMEAR W/DIFF WBC COUNT: CPT | Performed by: INTERNAL MEDICINE

## 2025-01-05 PROCEDURE — 85025 COMPLETE CBC W/AUTO DIFF WBC: CPT | Performed by: INTERNAL MEDICINE

## 2025-01-05 PROCEDURE — 99232 SBSQ HOSP IP/OBS MODERATE 35: CPT | Performed by: INTERNAL MEDICINE

## 2025-01-05 PROCEDURE — 80048 BASIC METABOLIC PNL TOTAL CA: CPT | Performed by: INTERNAL MEDICINE

## 2025-01-05 PROCEDURE — 97162 PT EVAL MOD COMPLEX 30 MIN: CPT

## 2025-01-05 PROCEDURE — 25010000002 HEPARIN (PORCINE) PER 1000 UNITS: Performed by: INTERNAL MEDICINE

## 2025-01-05 RX ORDER — POTASSIUM CHLORIDE 1.5 G/1.58G
40 POWDER, FOR SOLUTION ORAL EVERY 6 HOURS
Status: COMPLETED | OUTPATIENT
Start: 2025-01-05 | End: 2025-01-05

## 2025-01-05 RX ORDER — SEVELAMER HYDROCHLORIDE 800 MG/1
800 TABLET, FILM COATED ORAL
Status: DISCONTINUED | OUTPATIENT
Start: 2025-01-05 | End: 2025-01-07

## 2025-01-05 RX ADMIN — ASPIRIN 81 MG: 81 TABLET, COATED ORAL at 08:34

## 2025-01-05 RX ADMIN — ROSUVASTATIN CALCIUM 40 MG: 20 TABLET, FILM COATED ORAL at 21:37

## 2025-01-05 RX ADMIN — CALCITRIOL CAPSULES 0.25 MCG 0.25 MCG: 0.25 CAPSULE ORAL at 08:34

## 2025-01-05 RX ADMIN — VANCOMYCIN HYDROCHLORIDE 125 MG: 125 CAPSULE ORAL at 18:12

## 2025-01-05 RX ADMIN — HEPARIN SODIUM 5000 UNITS: 5000 INJECTION INTRAVENOUS; SUBCUTANEOUS at 08:34

## 2025-01-05 RX ADMIN — PANTOPRAZOLE SODIUM 40 MG: 40 TABLET, DELAYED RELEASE ORAL at 08:34

## 2025-01-05 RX ADMIN — SEVELAMER HYDROCHLORIDE 800 MG: 800 TABLET, FILM COATED ORAL at 12:40

## 2025-01-05 RX ADMIN — POTASSIUM CHLORIDE 40 MEQ: 1.5 POWDER, FOR SOLUTION ORAL at 12:40

## 2025-01-05 RX ADMIN — LEVOTHYROXINE SODIUM 25 MCG: 0.03 TABLET ORAL at 05:37

## 2025-01-05 RX ADMIN — Medication 10 ML: at 08:34

## 2025-01-05 RX ADMIN — SEVELAMER HYDROCHLORIDE 800 MG: 800 TABLET, FILM COATED ORAL at 16:50

## 2025-01-05 RX ADMIN — VANCOMYCIN HYDROCHLORIDE 125 MG: 125 CAPSULE ORAL at 12:40

## 2025-01-05 RX ADMIN — Medication 10 ML: at 21:37

## 2025-01-05 RX ADMIN — VANCOMYCIN HYDROCHLORIDE 125 MG: 125 CAPSULE ORAL at 05:37

## 2025-01-05 RX ADMIN — HEPARIN SODIUM 5000 UNITS: 5000 INJECTION INTRAVENOUS; SUBCUTANEOUS at 21:36

## 2025-01-05 RX ADMIN — POTASSIUM CHLORIDE 40 MEQ: 1.5 POWDER, FOR SOLUTION ORAL at 16:51

## 2025-01-05 NOTE — PLAN OF CARE
Goal Outcome Evaluation:      VSS. No acute changes this shift.

## 2025-01-05 NOTE — PROGRESS NOTES
[facility]OSPITALIST    PROGRESS NOTE    Name:  Travon Plummer   Age:  80 y.o.  Sex:  male  :  1945  MRN:  [id number]   Visit Number:  [id number]  Admission Date:  2025  Date Of Service:  25  Primary Care Physician:  Chilango Erickson MD     LOS: 1 day :    Chief Complaint:      diarrhea    Subjective:    2025: Admitting provider documentation reviewed. Vitals stable. Still having diarrhea.    History Of Presenting Illness:       Patient is a chronically ill 80-year-old male with history significant for hypertension, CKD stage V on peritoneal dialysis, and recent right hip fracture status post ORIF who presents from home with family due to intractable diarrhea progressing over the last 5 days.  Patient reports he was even having diarrhea in his sleep.  He was recently discharged from [facility] short-term rehab post ORIF.  Patient reports that he is also having issues walking however family states that they believe this is a motivation issue as patient was able to use his walker and ambulate throughout the house the other day.  Patient denies abdominal pain, fever, chest pain, shortness of breath or cough.     ED summary: Patient afebrile, hemodynamically stable, nonhypoxic on room air.  Potassium 3.2, creatinine 11.87 and BUN 53.  Lactate within normal limits.  CBC unremarkable except for baseline anemia.  C. difficile toxin negative, EIA positive.  Remainder of GI PCR panel negative.  COVID and flu negative.  X-ray of the hip shows healing right intratrochanteric fracture status post ORIF.  No new fracture.  Family concerned about progressive weakness.  Inability to care for self at home.  Hospitalist asked to admit.  Edited by: Justin Brown DO at 2025 1144     Review of Systems:     All systems were reviewed and negative except as mentioned in subjective, assessment and plan.    Vital Signs:    Temp:  [98 °F (36.7 °C)-98.5 °F (36.9 °C)] 98.1 °F  "(36.7 °C)  Heart Rate:  [55-63] 60  Resp:  [18] 18  BP: (137-158)/(71-85) 147/85    Intake and output:    I/O last 3 completed shifts:  In: 220 [P.O.:120; I.V.:100]  Out: -   I/O this shift:  In: 240 [P.O.:240]  Out: -     Physical Examination:    Constitutional: No acute distress, awake, alert, ill-appearing  HENT: NCAT, mucous membranes moist  Respiratory: Clear to auscultation bilaterally, respiratory effort normal   Cardiovascular: RRR, no murmurs, rubs, or gallops  Gastrointestinal: Positive bowel sounds, soft, nontender, nondistended, PD catheter and tunneled catheter present  Musculoskeletal: No bilateral ankle edema  Psychiatric: Appropriate affect, cooperative  Neurologic: Oriented x 3, speech clear  Skin: No rashes  Edited by: Justni Brown DO at 1/5/2025 0757     Laboratory results:    Results from last 7 days   Lab Units 01/05/25  0549 01/04/25  1005   SODIUM mmol/L 144 142   POTASSIUM mmol/L 3.0* 3.2*   CHLORIDE mmol/L 103 100   CO2 mmol/L 23.9 27.1   BUN mg/dL 57* 53*   CREATININE mg/dL 12.82* 11.87*   CALCIUM mg/dL 8.0* 8.1*   BILIRUBIN mg/dL  --  0.4   ALK PHOS U/L  --  93   ALT (SGPT) U/L  --  <5   AST (SGOT) U/L  --  23   GLUCOSE mg/dL 89 100*     Results from last 7 days   Lab Units 01/05/25  0549 01/04/25  1005   WBC 10*3/mm3 7.74 7.43   HEMOGLOBIN g/dL 9.7* 10.1*   HEMATOCRIT % 32.8* 33.8*   PLATELETS 10*3/mm3 151 165                 No results for input(s): \"PHART\", \"DBT6RBH\", \"PO2ART\", \"RCY8MCH\", \"BASEEXCESS\" in the last 8760 hours.   I have reviewed the patient's laboratory results.    Radiology results:    XR Hip With or Without Pelvis 2 - 3 View Right    Result Date: 1/4/2025  PROCEDURE: XR HIP W OR WO PELVIS 2-3 VIEW RIGHT-  HISTORY: eval for fx, pain  COMPARISON: Compared CT pelvis 12/3/2024.  FINDINGS:  A two view exam demonstrates mildly displaced right intertrochanteric fracture as seen on prior CT. Patient is undergone ORIF since the prior exam with stable alignment of the " fracture fragments. Fracture line is still visualized superiorly and laterally and not well seen inferiorly and medially consistent with interval healing. Diffuse osteopenia identified.. Dislocation. The joint spaces appear unremarkable. No soft tissue abnormality is seen. 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 Peritoneal dialysis catheter is noted coiled in the left side of the pelvis.      Impression: Healing right intertrochanteric fracture status post ORIF; no new fracture seen.      This report was signed and finalized on 1/4/2025 12:54 PM by Alice Castillo MD.      XR Chest 1 View    Result Date: 1/3/2025  TWO VIEW CHEST  HISTORY: Right IJ dialysis catheter placement.  COMPARISON: 2/16/2023.  FINDINGS: Interval placement of right IJ catheter with tip terminating in the region of the right atrium. The aorta is tortuous. The cardiac silhouette is normal in size. The mediastinum is unremarkable. There is stable blunting of the left costophrenic angle. The lungs are otherwise. There is no pneumothorax. The osseous structures are unremarkable.      Impression: Interval placement of right IJ dialysis catheter. No pneumothorax.      Images were reviewed, interpreted, and dictated by Dr. Alice Castillo MD Transcribed by Jori Allen PA-C.  This report was signed and finalized on 1/3/2025 3:59 PM by Alice Castillo MD.      FL C Arm During Surgery    Result Date: 1/3/2025  This procedure was auto-finalized with no dictation required.   I have reviewed the patient's radiology reports.    Medication Review:     I have reviewed the patient's active and prn medications.     Problem List:      Intractable diarrhea      Assessment/Plan:    C. difficile infection POA  MARY on CKD/ESRD  Hypokalemia  Hypertension  Hyperlipidemia  CAD  Generalized weakness  Impaired Mobility and ADLs     Plan:     C. difficile infection    Given risk factor of multiple hospitalizations and recent short-term rehab stay, we will go ahead and treat as active C. difficile infection despite negative toxin.  Oral vancomycin  C. difficile precaution  Avoid antidiarrheal agents  MARY on CKD stage V/ESRD  Patient had been on peritoneal dialysis.  Patient had tunneled dialysis catheter placed by Dr. Noel 1/3/2025.  Nephrology consulted for inpatient hemodialysis  Hypokalemia  Secondary to diarrhea and GI losses  Replete per protocol  Generalized weakness  Impaired Mobility and ADLs  Family interested in long-term care placement versus short-term rehab       DVT Prophylaxis: Heparin subq  Code Status: Full  Diet: Renal  Disposition: Placement STR versus LTC in 2 to 3 days, with HD/PD  Edited by: Justin Brown DO at 1/5/2025 0757           Justin Brown DO  01/05/25  12:56 EST    Dictated utilizing Dragon dictation.

## 2025-01-05 NOTE — CONSULTS
[facility] Consult Note      Patient Name: Travon Plummer  : 1945  MRN: [id number]  Primary Care Physician:  Chilango Erickson MD  Referring Physician: Manuel Maya DO  Date of admission: 2025    Subjective     Reason for Consult:   ESRD past medical history of hypertension, peripheral vascular disease with carotid involvement, cerebrovascular accident, coronary artery disease,    HPI:   Travon Plummer is a 80 y.o. male gastroesophageal flux disease, hyperuricemia with gout, hypertension, obesity, history of prostate cancer in , history of renal cell carcinoma status post left nephrectomy chronic kidney disease that progressed to end-stage renal disease initially attempted peritoneal dialysis cath placement was switched to hemodialysis.  Status post right tunneled hemodialysis catheter history of recent fall.  With right hip ORIF  and discharged to rehab.  Patient presents for.    Onset of loose bowel movements fatigue and weakness    Patient unfortunately is a poor historian unable to provide significant information.    Review of Systems:   14 point review of systems is otherwise negative except for mentioned above on HPI    Personal History     Past Medical History:   Diagnosis Date    Benign hypertensive CKD, stage 5 chronic kidney disease or end stage renal disease     Broken arm     Carotid stenosis     bilateral    Cerebrovascular accident 10/31/2008    Coronary artery disease     followed by Dr. Ashford; LOV 24; denies chest pain, SOB 24    Dialysis patient     Current 24    Dyspnea     Elevated cholesterol     GERD (gastroesophageal reflux disease)     Gout     History of blood transfusion     Hypercholesterolemia     Hypertension 11/10/2015    History of hypertension; hypotensive at the time of office visit on 11/10/2015.    Impaired mobility     Obesity     Osteoarthritis     Paroxysmal atrial fibrillation     History of  paroxysmal atrial fibrillation, data deficit.    Prostate cancer 01/2015    Prostate cancer, diagnosed January of 2015, status post radiation therapy x39 treatments, 07/01/2015 at [facility].    Renal cell carcinoma 2015    Renal cell carcinoma, dx March of 2015, status post left nephrectomy.    Wears dentures     instructed no adhesive DOS       Past Surgical History:   Procedure Laterality Date    CAROTID STENT Left 10/31/2008    PTA/left carotid artery stent, 10/31/2008.     COLONOSCOPY N/A 12/23/2021    Procedure: COLONOSCOPY, polypectomy, clip placement x 1;  Surgeon: Deandra Do MD;  Location: [facility] ENDOSCOPY;  Service: Gastroenterology;  Laterality: N/A;    COLONOSCOPY W/ POLYPECTOMY      CORONARY ANGIOPLASTY WITH STENT PLACEMENT      A 3.5 x 13 mm. Cypher ESTIVEN to RCA expanded with 4 mm balloon.     DIALYSIS FISTULA CREATION N/A     abdominal    ENDOSCOPY N/A 12/22/2021    Procedure: ESOPHAGOGASTRODUODENOSCOPY with biopsy;  Surgeon: Deandra Do MD;  Location: [facility] ENDOSCOPY;  Service: Gastroenterology;  Laterality: N/A;    ENDOSCOPY N/A 02/17/2023    Procedure: ESOPHAGOGASTRODUODENOSCOPY with biopsy;  Surgeon: Georgina Pool MD;  Location: [facility] ENDOSCOPY;  Service: Gastroenterology;  Laterality: N/A;    HIP FRACTURE SURGERY      HIP INTERTROCHANTERIC NAILING Right 12/04/2024    Procedure: HIP INTERTROCHANTERIC NAILING;  Surgeon: Dean Hammonds MD;  Location: [facility] OR;  Service: Orthopedics;  Laterality: Right;    INGUINAL HERNIA REPAIR      NEPHRECTOMY Left 03/19/2015    diagnosed January 2015, status post left radical nephrectomy.     PROSTATE FIDUCIAL MARKER PLACEMENT  2016    received XRT for prostate CA in 2016       Family History: family history includes No Known Problems in his father and mother.    Social History:  reports that he quit smoking about 17 years ago. His smoking use included cigarettes. He started smoking about 68 years ago. He has a 51 pack-year  smoking history. He has been exposed to tobacco smoke. He has quit using smokeless tobacco.  His smokeless tobacco use included chew. He reports that he does not currently use alcohol. He reports that he does not use drugs.    Home Medications:  Prior to Admission medications    Medication Sig Start Date End Date Taking? Authorizing Provider   aspirin 81 MG EC tablet Take 1 tablet by mouth Daily. 2/25/23   Arash Balbuena MD   bumetanide (BUMEX) 1 MG tablet Take 1 tablet by mouth Daily. Indications: Edema 12/31/24   Karin Vale MD   calcitriol (ROCALTROL) 0.25 MCG capsule Take 1 capsule by mouth Daily. Indications: unknown 12/31/24   Karin Vale MD   carvedilol (COREG) 6.25 MG tablet Take 2 tablets by mouth 2 (Two) Times a Day With Meals. 12/10/24   Vijaya Colon DO   Cholecalciferol (Vitamin D) 50 MCG (2000 UT) capsule Take 1 capsule by mouth Daily. Indications: Vitamin D Deficiency 12/31/24   Karin Vale MD   Cyanocobalamin (VITAMIN B-12 PO) Take 1,000 mcg by mouth Daily. Indications: Supplement 12/31/24   Karin Vale MD   ferrous sulfate 325 (65 FE) MG tablet Take 1 tablet by mouth 1 (One) Time Per Week. Sunday    Indications: Anemia From Inadequate Iron in the Body 12/31/24   Karin Vale MD   levothyroxine (SYNTHROID, LEVOTHROID) 25 MCG tablet Take 1 tablet by mouth Daily. LEVOXYL  Indications: Underactive Thyroid 3/2/17   Karin Vale MD   losartan (COZAAR) 100 MG tablet Take 1 tablet by mouth Daily. Indications: High Blood Pressure 12/31/24   Karin Vale MD   pantoprazole (PROTONIX) 20 MG EC tablet Take 1 tablet by mouth Daily. 11/13/24   Georgina Pool MD   polyethylene glycol (MIRALAX) 17 g packet Take 17 g by mouth Daily As Needed (Use if senna-docusate is ineffective). 12/10/24   Vijaya Colon DO   rosuvastatin (CRESTOR) 40 MG tablet Take 1 tablet by mouth Every Night. 11/29/23   Leonard Ashford MD    sennosides-docusate (PERICOLACE) 8.6-50 MG per tablet Take 2 tablets by mouth 2 (Two) Times a Day As Needed for Constipation. 12/10/24   Vijaya Colon DO       Allergies:  Allergies   Allergen Reactions    Allopurinol Urinary Retention    Lipitor [Atorvastatin] Myalgia       Objective     Vitals:   Temp:  [97.2 °F (36.2 °C)-98.5 °F (36.9 °C)] 98.1 °F (36.7 °C)  Heart Rate:  [55-63] 56  Resp:  [18] 18  BP: (137-172)/(71-85) 147/85    Intake/Output Summary (Last 24 hours) at 1/5/2025 0847  Last data filed at 1/4/2025 1500  Gross per 24 hour   Intake 220 ml   Output --   Net 220 ml       Physical Exam:   Constitutional: pleasantly confused   HEENT: Sclera anicteric, no conjunctival injection  Neck: Supple, no thyromegaly, no lymphadenopathy, trachea at midline, no JVD. RIJ TDC in place  Respiratory: Clear to auscultation bilaterally, nonlabored respiration  Cardiovascular: RRR, no murmurs,    Gastrointestinal: Positive bowel sounds, abdomen is soft, nontender and nondistended. PD catheter in place   : No palpable bladder  Musculoskeletal: No edema, no clubbing or cyanosis. R hip w decreased ROM   Neurologic: Oriented x2      Scheduled Meds:     aspirin, 81 mg, Oral, Daily  calcitriol, 0.25 mcg, Oral, Daily  heparin (porcine), 5,000 Units, Subcutaneous, Q12H  levothyroxine, 25 mcg, Oral, Q AM  pantoprazole, 40 mg, Oral, Every Other Day  rosuvastatin, 40 mg, Oral, Nightly  sodium chloride, 10 mL, Intravenous, Q12H  vancomycin, 125 mg, Oral, Q6H      IV Meds:   Pharmacy Consult,         Results Reviewed:   I have personally reviewed the results from the time of this admission to 1/5/2025 08:47 EST     Results from last 7 days   Lab Units 01/05/25  0549 01/04/25  1005   WBC 10*3/mm3 7.74 7.43   HEMOGLOBIN g/dL 9.7* 10.1*   HEMATOCRIT % 32.8* 33.8*   PLATELETS 10*3/mm3 151 165       Lab Results   Component Value Date    GLUCOSE 89 01/05/2025    CALCIUM 8.0 (L) 01/05/2025     01/05/2025    K 3.0 (L)  01/05/2025    CO2 23.9 01/05/2025     01/05/2025    BUN 57 (H) 01/05/2025    CREATININE 12.82 (H) 01/05/2025    EGFRIFAFRI 23 (L) 02/21/2022    EGFRIFNONA 19 (L) 02/21/2022    BCR 4.4 (L) 01/05/2025    ANIONGAP 17.1 (H) 01/05/2025      Lab Results   Component Value Date    MG 1.9 07/23/2024    PHOS 5.9 (H) 12/10/2024    ALBUMIN 2.6 (L) 01/04/2025           Assessment / Plan       Intractable diarrhea      ASSESSMENT:    -End Stage Renal Disease ( ESRD ) on peritoneal dialysis .  Patient was switched to intermittent hemodialysis status post right tunneled hemodialysis catheter after recent hip replacement requiring rehab.unfortunately unclear if his  dialysis regular schedule .Continue to arrange hemodialysis treatment during patient  admission. Continue renal diet     - Hypokalemia on KCL replacement     -Chronic normocytic anemia. On Epogen protocol. 10.000 SC TIW  We will continue to follow H&H closely      -Hyperphosphatemia. Continue binders  / renvela with meals, Continue renal diet. We will follow phosphorus to make further adjustments    -Secondary hyperparathyrodism . On calcitriol protocol . will follow closely     - History of right femur fracture s/p ORIF , requiring rehab  ( placed PD to HD temporary for rehab placement )     - Colitis , ruling out C diff on vancomycin , as per primary team     PLAN:  -No acute indication for hemodialysis today , CO2, potassium and volume status stable   - will arrange for HD tomorrow , orders placed    - Order KCL 40 mEq  x 2 doses , will obtain mag levels   - Continue CHARLEY and RENVELA   -Continue surveillance labs     Thank you for involving us in the care of Travon Plummer.  Please feel free to call with any questions.    Pritesh Borden MD  01/05/25  08:47 [facility]    [facility]  [phone]      Please note that portions of this note were completed with a voice recognition program.

## 2025-01-05 NOTE — THERAPY EVALUATION
Patient Name: Travon Plummer  : 1945    MRN: [id number]                              Today's Date: 2025       Admit Date: 2025    Visit Dx:     ICD-10-CM ICD-9-CM   1. Intractable diarrhea  R19.7 787.91   2. Dialysis patient  Z99.2 V45.11     Patient Active Problem List   Diagnosis    Coronary artery disease    Dyspnea    Cerebrovascular accident    Essential hypertension    Hypercholesterolemia    Tobacco abuse    Gout    Osteoarthritis    GERD (gastroesophageal reflux disease)    Obesity    Prostate cancer    Renal cell carcinoma    Nuclear sclerotic cataract of right eye    Symptomatic anemia    Iron deficiency anemia due to chronic blood loss    Melena    Chronic kidney disease, stage IV (severe)    Upper GI bleed    Absent kidney    Acquired hypothyroidism    Benign hypertensive kidney disease with chronic kidney disease    Dyslipidemia    Elevated prostate specific antigen (PSA)    Paroxysmal atrial fibrillation    Secondary hyperparathyroidism    Vitamin D deficiency    Urinary incontinence    Hematuria    Lower urinary tract symptoms (LUTS)    Chronic kidney disease, stage V    Closed right hip fracture    End stage renal failure on dialysis    Intractable diarrhea     Past Medical History:   Diagnosis Date    Benign hypertensive CKD, stage 5 chronic kidney disease or end stage renal disease     Broken arm     Carotid stenosis     bilateral    Cerebrovascular accident 10/31/2008    Coronary artery disease     followed by Dr. Ashford; LOV 24; denies chest pain, SOB 24    Dialysis patient     Current 24    Dyspnea     Elevated cholesterol     GERD (gastroesophageal reflux disease)     Gout     History of blood transfusion     Hypercholesterolemia     Hypertension 11/10/2015    History of hypertension; hypotensive at the time of office visit on 11/10/2015.    Impaired mobility     Obesity     Osteoarthritis     Paroxysmal atrial fibrillation     History of paroxysmal  atrial fibrillation, data deficit.    Prostate cancer 01/2015    Prostate cancer, diagnosed January of 2015, status post radiation therapy x39 treatments, 07/01/2015 at [facility].    Renal cell carcinoma 2015    Renal cell carcinoma, dx March of 2015, status post left nephrectomy.    Wears dentures     instructed no adhesive DOS     Past Surgical History:   Procedure Laterality Date    CAROTID STENT Left 10/31/2008    PTA/left carotid artery stent, 10/31/2008.     COLONOSCOPY N/A 12/23/2021    Procedure: COLONOSCOPY, polypectomy, clip placement x 1;  Surgeon: Deandra Do MD;  Location: [facility] ENDOSCOPY;  Service: Gastroenterology;  Laterality: N/A;    COLONOSCOPY W/ POLYPECTOMY      CORONARY ANGIOPLASTY WITH STENT PLACEMENT      A 3.5 x 13 mm. Cypher ESTIVEN to RCA expanded with 4 mm balloon.     DIALYSIS FISTULA CREATION N/A     abdominal    ENDOSCOPY N/A 12/22/2021    Procedure: ESOPHAGOGASTRODUODENOSCOPY with biopsy;  Surgeon: Deandra Do MD;  Location: [facility] ENDOSCOPY;  Service: Gastroenterology;  Laterality: N/A;    ENDOSCOPY N/A 02/17/2023    Procedure: ESOPHAGOGASTRODUODENOSCOPY with biopsy;  Surgeon: Georgina Pool MD;  Location: [facility] ENDOSCOPY;  Service: Gastroenterology;  Laterality: N/A;    HIP FRACTURE SURGERY      HIP INTERTROCHANTERIC NAILING Right 12/04/2024    Procedure: HIP INTERTROCHANTERIC NAILING;  Surgeon: Dean Hammonds MD;  Location: [facility] OR;  Service: Orthopedics;  Laterality: Right;    INGUINAL HERNIA REPAIR      NEPHRECTOMY Left 03/19/2015    diagnosed January 2015, status post left radical nephrectomy.     PROSTATE FIDUCIAL MARKER PLACEMENT  2016    received XRT for prostate CA in 2016      General Information       Row Name 01/05/25 1235          Physical Therapy Time and Intention    Document Type evaluation  -     Mode of Treatment physical therapy  -       Row Name 01/05/25 1235          General Information    Patient Profile Reviewed yes  -MERI      Prior Level of Function ADL's;mod assist:;max assist:;all household mobility  -     Existing Precautions/Restrictions fall  -     Barriers to Rehab medically complex;previous functional deficit  -       Row Name 01/05/25 1235          Living Environment    People in Home spouse  -       Row Name 01/05/25 1235          Home Main Entrance    Number of Stairs, Main Entrance one  -     Stair Railings, Main Entrance none  -       Row Name 01/05/25 1235          Stairs Within Home, Primary    Number of Stairs, Within Home, Primary none  -       Row Name 01/05/25 1235          Cognition    Orientation Status (Cognition) oriented x 4  -       Row Name 01/05/25 1235          Safety Issues/Impairments Affecting Functional Mobility    Safety Issues Affecting Function (Mobility) ability to follow commands;awareness of need for assistance;insight into deficits/self-awareness;safety precaution awareness;safety precautions follow-through/compliance  -     Impairments Affecting Function (Mobility) endurance/activity tolerance;coordination;motor planning;pain;range of motion (ROM);strength  -               User Key  (r) = Recorded By, (t) = Taken By, (c) = Cosigned By      Initials Name Provider Type    Manuel Jackson, PT Physical Therapist                   Mobility       Row Name 01/05/25 1238          Bed Mobility    Bed Mobility rolling right;rolling left;supine-sit  -     Rolling Left [place] (Bed Mobility) moderate assist (50% patient effort);maximum assist (25% patient effort)  -     Rolling Right [place] (Bed Mobility) moderate assist (50% patient effort);maximum assist (25% patient effort)  -     Supine-Sit [place] (Bed Mobility) unable to assess  -     Comment, (Bed Mobility) attempted to sit EOB, pt unable  -       Row Name 01/05/25 1238          Gait/Stairs (Locomotion)    Patient was able to Ambulate no, other medical factors prevent ambulation  -     Reason Patient was  unable to Ambulate Excessive Weakness;Other (Comment)  diarrhea  -               User Key  (r) = Recorded By, (t) = Taken By, (c) = Cosigned By      Initials Name Provider Type    Manuel Jackson PT Physical Therapist                   Obj/Interventions       Row Name 01/05/25 1241          Range of Motion Comprehensive    General Range of Motion lower extremity range of motion deficits identified  -     Comment, General Range of Motion decreased ROM in R LE due to recent hip ORIF  -       Row Name 01/05/25 1241          Strength Comprehensive (MMT)    General Manual Muscle Testing (MMT) Assessment lower extremity strength deficits identified  -               User Key  (r) = Recorded By, (t) = Taken By, (c) = Cosigned By      Initials Name Provider Type    Manuel Jackson PT Physical Therapist                   Goals/Plan       Row Name 01/05/25 1246          Bed Mobility Goal 1 (PT)    Activity/Assistive Device (Bed Mobility Goal 1, PT) bed mobility activities, all  -     [place] Level/Cues Needed (Bed Mobility Goal 1, PT) minimum assist (75% or more patient effort)  -     Time Frame (Bed Mobility Goal 1, PT) long term goal (LTG);10 days  -     Progress/Outcomes (Bed Mobility Goal 1, PT) new goal  -       Row Name 01/05/25 1246          Transfer Goal 1 (PT)    Activity/Assistive Device (Transfer Goal 1, PT) sit-to-stand/stand-to-sit  -     [place] Level/Cues Needed (Transfer Goal 1, PT) moderate assist (50-74% patient effort)  -     Time Frame (Transfer Goal 1, PT) long term goal (LTG);10 days  -     Progress/Outcome (Transfer Goal 1, PT) new goal  -       Row Name 01/05/25 1246          Gait Training Goal 1 (PT)    Activity/Assistive Device (Gait Training Goal 1, PT) gait (walking locomotion)  -     [place] Level (Gait Training Goal 1, PT) minimum assist (75% or more patient effort)  -     Distance (Gait Training Goal 1, PT) 10 ft  -     Time Frame (Gait Training  Goal 1, PT) long term goal (LTG);10 days  -     Progress/Outcome (Gait Training Goal 1, PT) new goal  -       Row Name 01/05/25 1246          Patient Education Goal (PT)    Activity (Patient Education Goal, PT) Pt to participate in B LE ther ex at least 3x per week  -     [place]/Cues/Accuracy (Memory Goal 2, PT) demonstrates adequately  -     Time Frame (Patient Education Goal, PT) long term goal (LTG);10 days  -     Progress/Outcome (Patient Education Goal, PT) new goal  -       Row Name 01/05/25 1246          Therapy Assessment/Plan (PT)    Planned Therapy Interventions (PT) balance training;bed mobility training;gait training;neuromuscular re-education;home exercise program;patient/family education;ROM (range of motion);strengthening;transfer training  -               User Key  (r) = Recorded By, (t) = Taken By, (c) = Cosigned By      Initials Name Provider Type     Manuel Irwin, PT Physical Therapist                   Clinical Impression       Row Name 01/05/25 1241          Pain    Pretreatment Pain Rating 0/10 - no pain  -     Posttreatment Pain Rating 5/10  -     Pain Location extremity  -     Pain Side/Orientation right;lower  -     Pain Management Interventions exercise or physical activity utilized;activity modification encouraged  -     Response to Pain Interventions activity participation with increased pain;functional ability unchanged  -       Row Name 01/05/25 1241          Plan of Care Review    Plan of Care Reviewed With patient  -     Progress no change  -     Outcome Evaluation pt participated in PT evaluation this date. Pt reports varying assistance needed prior to admission. Pt poor historian, however reports that he has one step to enter his home. He lives with his wife. Pt reports that his family will not let him get out of the bed. Pt primarily stays in the bed and is changed by his wife or daughter. Pt reports that he needed to be changed and required  mod-max A to roll B. Pt dependent for hygiene. Pt returned to supine and reports that he was too tired for further mobility and was currently having BM again. pt left with all needs met, call bell in reach. Pt is expected to benefit from skilled PT during this inpatient stay to increase functional mobility and IND. Pt would further benefit from STR upon dc to increase strength.  -       Row Name 01/05/25 1241          Therapy Assessment/Plan (PT)    Patient/Family Therapy Goals Statement (PT) pt would like to return home, adament about not going to STR.  -     Rehab Potential (PT) good  -     Criteria for Skilled Interventions Met (PT) yes;skilled treatment is necessary  -     Therapy Frequency (PT) 5 times/wk  -     Predicted Duration of Therapy Intervention (PT) 2 weeks  -       Row Name 01/05/25 1241          Vital Signs    O2 Delivery Pre Treatment room air  -     O2 Delivery Intra Treatment room air  -     O2 Delivery Post Treatment room air  -MK     Pre Patient Position Supine  -MK     Intra Patient Position Side Lying  -     Post Patient Position Supine  -       Row Name 01/05/25 1241          Positioning and Restraints    Pre-Treatment Position in bed  -MK     Post Treatment Position bed  -MK     In Bed notified nsg;call light within reach;encouraged to call for assist;exit alarm on  -               User Key  (r) = Recorded By, (t) = Taken By, (c) = Cosigned By      Initials Name Provider Type    Manuel Jackson, PT Physical Therapist                   Outcome Measures       Row Name 01/05/25 5521          How much help from another person do you currently need...    Turning from your back to your side while in flat bed without using bedrails? 2  -MK     Moving from lying on back to sitting on the side of a flat bed without bedrails? 2  -MK     Moving to and from a bed to a chair (including a wheelchair)? 2  -MK     Standing up from a chair using your arms (e.g., wheelchair, bedside  chair)? 2  -MK     Climbing 3-5 steps with a railing? 1  -MK     To walk in hospital room? 1  -MK     AM-PAC 6 Clicks Score (PT) 10  -MK               User Key  (r) = Recorded By, (t) = Taken By, (c) = Cosigned By      Initials Name Provider Type    MK Manuel Irwin, MARILEE Physical Therapist                                 Physical Therapy Education       Title: PT OT SLP Therapies (In Progress)       Topic: Physical Therapy (In Progress)       Point: Mobility training (Done)       Learning Progress Summary            Patient Acceptance, E,D, DU,VU by  at 1/5/2025 1247                      Point: Home exercise program (Not Started)       Learner Progress:  Not documented in this visit.              Point: Body mechanics (Done)       Learning Progress Summary            Patient Acceptance, E,D, DU,VU by  at 1/5/2025 1247                      Point: Precautions (Not Started)       Learner Progress:  Not documented in this visit.                              User Key       Initials Effective Dates Name Provider Type Discipline     06/13/23 -  Manuel Irwin PT Physical Therapist PT                  PT Recommendation and Plan  Planned Therapy Interventions (PT): balance training, bed mobility training, gait training, neuromuscular re-education, home exercise program, patient/family education, ROM (range of motion), strengthening, transfer training  Progress: no change  Outcome Evaluation: pt participated in PT evaluation this date. Pt reports varying assistance needed prior to admission. Pt poor historian, however reports that he has one step to enter his home. He lives with his wife. Pt reports that his family will not let him get out of the bed. Pt primarily stays in the bed and is changed by his wife or daughter. Pt reports that he needed to be changed and required mod-max A to roll B. Pt dependent for hygiene. Pt returned to supine and reports that he was too tired for further mobility and was currently having BM  again. pt left with all needs met, call bell in reach. Pt is expected to benefit from skilled PT during this inpatient stay to increase functional mobility and IND. Pt would further benefit from STR upon dc to increase strength.     Time Calculation:   PT Evaluation Complexity  History, PT Evaluation Complexity: 3 or more personal factors and/or comorbidities  Examination of Body Systems (PT Eval Complexity): total of 3 or more elements  Clinical Presentation (PT Evaluation Complexity): evolving  Clinical Decision Making (PT Evaluation Complexity): moderate complexity  Overall Complexity (PT Evaluation Complexity): moderate complexity     PT Charges       Row Name 01/05/25 1032             Time Calculation    Start Time 1032  -MK      PT Received On 01/05/25  -MK      PT Goal Re-Cert Due Date 01/15/25  -MK                User Key  (r) = Recorded By, (t) = Taken By, (c) = Cosigned By      Initials Name Provider Type    Manuel Jackson, PT Physical Therapist                  Therapy Charges for Today       Code Description Service Date Service Provider Modifiers Qty    [id number] HC-PT EVAL MOD COMPLEXITY 5 1/5/2025 Manuel Irwin, PT  1            PT G-Codes  AM-PAC 6 Clicks Score (PT): 10  PT Discharge Summary  Anticipated Discharge Disposition (PT): inpatient rehabilitation facility    Manuel Irwin PT  1/5/2025

## 2025-01-05 NOTE — PLAN OF CARE
Goal Outcome Evaluation:  Plan of Care Reviewed With: patient        Progress: no change  Outcome Evaluation: pt participated in PT evaluation this date. Pt reports varying assistance needed prior to admission. Pt poor historian, however reports that he has one step to enter his home. He lives with his wife. Pt reports that his family will not let him get out of the bed. Pt primarily stays in the bed and is changed by his wife or daughter. Pt reports that he needed to be changed and required mod-max A to roll B. Pt dependent for hygiene. Pt returned to supine and reports that he was too tired for further mobility and was currently having BM again. pt left with all needs met, call bell in reach. Pt is expected to benefit from skilled PT during this inpatient stay to increase functional mobility and IND. Pt would further benefit from STR upon dc to increase strength.    Anticipated Discharge Disposition (PT): inpatient rehabilitation facility

## 2025-01-06 ENCOUNTER — HOME CARE VISIT (OUTPATIENT)
Dept: HOME HEALTH SERVICES | Facility: HOME HEALTHCARE | Age: 80
End: 2025-01-06
Payer: MEDICARE

## 2025-01-06 ENCOUNTER — APPOINTMENT (OUTPATIENT)
Dept: NEPHROLOGY | Facility: HOSPITAL | Age: 80
DRG: 371 | End: 2025-01-06
Payer: MEDICARE

## 2025-01-06 LAB
ALBUMIN SERPL-MCNC: 2.3 G/DL (ref 3.5–5.2)
ANION GAP SERPL CALCULATED.3IONS-SCNC: 19.5 MMOL/L (ref 5–15)
BUN SERPL-MCNC: 57 MG/DL (ref 8–23)
BUN/CREAT SERPL: 4.4 (ref 7–25)
CALCIUM SPEC-SCNC: 8.4 MG/DL (ref 8.6–10.5)
CHLORIDE SERPL-SCNC: 102 MMOL/L (ref 98–107)
CO2 SERPL-SCNC: 22.5 MMOL/L (ref 22–29)
CREAT SERPL-MCNC: 12.97 MG/DL (ref 0.76–1.27)
DEPRECATED RDW RBC AUTO: 62.5 FL (ref 37–54)
EGFRCR SERPLBLD CKD-EPI 2021: 3.5 ML/MIN/1.73
ERYTHROCYTE [DISTWIDTH] IN BLOOD BY AUTOMATED COUNT: 17.8 % (ref 12.3–15.4)
GLUCOSE SERPL-MCNC: 89 MG/DL (ref 65–99)
HCT VFR BLD AUTO: 33.1 % (ref 37.5–51)
HGB BLD-MCNC: 9.8 G/DL (ref 13–17.7)
MAGNESIUM SERPL-MCNC: 1.7 MG/DL (ref 1.6–2.4)
MCH RBC QN AUTO: 28.4 PG (ref 26.6–33)
MCHC RBC AUTO-ENTMCNC: 29.6 G/DL (ref 31.5–35.7)
MCV RBC AUTO: 95.9 FL (ref 79–97)
PHOSPHATE SERPL-MCNC: 8.3 MG/DL (ref 2.5–4.5)
PLATELET # BLD AUTO: 149 10*3/MM3 (ref 140–450)
PMV BLD AUTO: 11.7 FL (ref 6–12)
POTASSIUM SERPL-SCNC: 3.1 MMOL/L (ref 3.5–5.2)
RBC # BLD AUTO: 3.45 10*6/MM3 (ref 4.14–5.8)
SODIUM SERPL-SCNC: 144 MMOL/L (ref 136–145)
WBC NRBC COR # BLD AUTO: 8.25 10*3/MM3 (ref 3.4–10.8)

## 2025-01-06 PROCEDURE — 97530 THERAPEUTIC ACTIVITIES: CPT

## 2025-01-06 PROCEDURE — 85027 COMPLETE CBC AUTOMATED: CPT | Performed by: INTERNAL MEDICINE

## 2025-01-06 PROCEDURE — 97116 GAIT TRAINING THERAPY: CPT

## 2025-01-06 PROCEDURE — 99232 SBSQ HOSP IP/OBS MODERATE 35: CPT | Performed by: INTERNAL MEDICINE

## 2025-01-06 PROCEDURE — 25010000002 HEPARIN (PORCINE) PER 1000 UNITS: Performed by: INTERNAL MEDICINE

## 2025-01-06 PROCEDURE — 5A1D70Z PERFORMANCE OF URINARY FILTRATION, INTERMITTENT, LESS THAN 6 HOURS PER DAY: ICD-10-PCS | Performed by: INTERNAL MEDICINE

## 2025-01-06 PROCEDURE — 83735 ASSAY OF MAGNESIUM: CPT | Performed by: INTERNAL MEDICINE

## 2025-01-06 PROCEDURE — 97166 OT EVAL MOD COMPLEX 45 MIN: CPT

## 2025-01-06 PROCEDURE — 80069 RENAL FUNCTION PANEL: CPT | Performed by: INTERNAL MEDICINE

## 2025-01-06 RX ORDER — HEPARIN SODIUM 1000 [USP'U]/ML
1000 INJECTION, SOLUTION INTRAVENOUS; SUBCUTANEOUS AS NEEDED
Status: DISCONTINUED | OUTPATIENT
Start: 2025-01-06 | End: 2025-01-09 | Stop reason: HOSPADM

## 2025-01-06 RX ORDER — BUSPIRONE HYDROCHLORIDE 5 MG/1
5 TABLET ORAL EVERY 12 HOURS SCHEDULED
Status: DISCONTINUED | OUTPATIENT
Start: 2025-01-06 | End: 2025-01-09 | Stop reason: HOSPADM

## 2025-01-06 RX ORDER — POTASSIUM CHLORIDE 750 MG/1
40 CAPSULE, EXTENDED RELEASE ORAL
Status: COMPLETED | OUTPATIENT
Start: 2025-01-06 | End: 2025-01-06

## 2025-01-06 RX ORDER — LOSARTAN POTASSIUM 50 MG/1
50 TABLET ORAL NIGHTLY
Status: DISCONTINUED | OUTPATIENT
Start: 2025-01-06 | End: 2025-01-07

## 2025-01-06 RX ADMIN — POTASSIUM CHLORIDE 40 MEQ: 750 CAPSULE, EXTENDED RELEASE ORAL at 23:04

## 2025-01-06 RX ADMIN — SEVELAMER HYDROCHLORIDE 800 MG: 800 TABLET, FILM COATED ORAL at 09:30

## 2025-01-06 RX ADMIN — HEPARIN SODIUM 1000 UNITS: 1000 INJECTION, SOLUTION INTRAVENOUS; SUBCUTANEOUS at 16:50

## 2025-01-06 RX ADMIN — VANCOMYCIN HYDROCHLORIDE 125 MG: 125 CAPSULE ORAL at 18:10

## 2025-01-06 RX ADMIN — Medication 10 ML: at 09:30

## 2025-01-06 RX ADMIN — VANCOMYCIN HYDROCHLORIDE 125 MG: 125 CAPSULE ORAL at 12:03

## 2025-01-06 RX ADMIN — ACETAMINOPHEN 650 MG: 325 TABLET, FILM COATED ORAL at 14:52

## 2025-01-06 RX ADMIN — ASPIRIN 81 MG: 81 TABLET, COATED ORAL at 09:30

## 2025-01-06 RX ADMIN — CALCITRIOL CAPSULES 0.25 MCG 0.25 MCG: 0.25 CAPSULE ORAL at 09:30

## 2025-01-06 RX ADMIN — Medication 10 ML: at 23:02

## 2025-01-06 RX ADMIN — POTASSIUM CHLORIDE 40 MEQ: 750 CAPSULE, EXTENDED RELEASE ORAL at 18:49

## 2025-01-06 RX ADMIN — HEPARIN SODIUM 5000 UNITS: 5000 INJECTION INTRAVENOUS; SUBCUTANEOUS at 23:04

## 2025-01-06 RX ADMIN — LEVOTHYROXINE SODIUM 25 MCG: 0.03 TABLET ORAL at 06:42

## 2025-01-06 RX ADMIN — SEVELAMER HYDROCHLORIDE 800 MG: 800 TABLET, FILM COATED ORAL at 18:10

## 2025-01-06 RX ADMIN — VANCOMYCIN HYDROCHLORIDE 125 MG: 125 CAPSULE ORAL at 00:38

## 2025-01-06 RX ADMIN — BUSPIRONE HYDROCHLORIDE 5 MG: 5 TABLET ORAL at 23:04

## 2025-01-06 RX ADMIN — LOSARTAN POTASSIUM 50 MG: 50 TABLET, FILM COATED ORAL at 23:04

## 2025-01-06 RX ADMIN — ROSUVASTATIN CALCIUM 40 MG: 20 TABLET, FILM COATED ORAL at 23:03

## 2025-01-06 RX ADMIN — VANCOMYCIN HYDROCHLORIDE 125 MG: 125 CAPSULE ORAL at 06:42

## 2025-01-06 RX ADMIN — VANCOMYCIN HYDROCHLORIDE 125 MG: 125 CAPSULE ORAL at 23:17

## 2025-01-06 NOTE — THERAPY TREATMENT NOTE
Patient Name: Travon Plummer  : 1945    MRN: [id number]                              Today's Date: 2025       Admit Date: 2025    Visit Dx:     ICD-10-CM ICD-9-CM   1. Intractable diarrhea  R19.7 787.91   2. Dialysis patient  Z99.2 V45.11     Patient Active Problem List   Diagnosis    Coronary artery disease    Dyspnea    Cerebrovascular accident    Essential hypertension    Hypercholesterolemia    Tobacco abuse    Gout    Osteoarthritis    GERD (gastroesophageal reflux disease)    Obesity    Prostate cancer    Renal cell carcinoma    Nuclear sclerotic cataract of right eye    Symptomatic anemia    Iron deficiency anemia due to chronic blood loss    Melena    Chronic kidney disease, stage IV (severe)    Upper GI bleed    Absent kidney    Acquired hypothyroidism    Benign hypertensive kidney disease with chronic kidney disease    Dyslipidemia    Elevated prostate specific antigen (PSA)    Paroxysmal atrial fibrillation    Secondary hyperparathyroidism    Vitamin D deficiency    Urinary incontinence    Hematuria    Lower urinary tract symptoms (LUTS)    Chronic kidney disease, stage V    Closed right hip fracture    End stage renal failure on dialysis    Intractable diarrhea     Past Medical History:   Diagnosis Date    Benign hypertensive CKD, stage 5 chronic kidney disease or end stage renal disease     Broken arm     Carotid stenosis     bilateral    Cerebrovascular accident 10/31/2008    Coronary artery disease     followed by Dr. Ashford; LOV 24; denies chest pain, SOB 24    Dialysis patient     Current 24    Dyspnea     Elevated cholesterol     GERD (gastroesophageal reflux disease)     Gout     History of blood transfusion     Hypercholesterolemia     Hypertension 11/10/2015    History of hypertension; hypotensive at the time of office visit on 11/10/2015.    Impaired mobility     Obesity     Osteoarthritis     Paroxysmal atrial fibrillation     History of paroxysmal  atrial fibrillation, data deficit.    Prostate cancer 01/2015    Prostate cancer, diagnosed January of 2015, status post radiation therapy x39 treatments, 07/01/2015 [facility].    Renal cell carcinoma 2015    Renal cell carcinoma, dx March of 2015, status post left nephrectomy.    Wears dentures     instructed no adhesive DOS     Past Surgical History:   Procedure Laterality Date    CAROTID STENT Left 10/31/2008    PTA/left carotid artery stent, 10/31/2008.     COLONOSCOPY N/A 12/23/2021    Procedure: COLONOSCOPY, polypectomy, clip placement x 1;  Surgeon: Deandra Do MD;  Location: [facility] ENDOSCOPY;  Service: Gastroenterology;  Laterality: N/A;    COLONOSCOPY W/ POLYPECTOMY      CORONARY ANGIOPLASTY WITH STENT PLACEMENT      A 3.5 x 13 mm. Cypher ESTIVEN to RCA expanded with 4 mm balloon.     DIALYSIS FISTULA CREATION N/A     abdominal    ENDOSCOPY N/A 12/22/2021    Procedure: ESOPHAGOGASTRODUODENOSCOPY with biopsy;  Surgeon: Deandra Do MD;  Location: [facility] ENDOSCOPY;  Service: Gastroenterology;  Laterality: N/A;    ENDOSCOPY N/A 02/17/2023    Procedure: ESOPHAGOGASTRODUODENOSCOPY with biopsy;  Surgeon: Georgina Pool MD;  Location: [facility] ENDOSCOPY;  Service: Gastroenterology;  Laterality: N/A;    HIP FRACTURE SURGERY      HIP INTERTROCHANTERIC NAILING Right 12/04/2024    Procedure: HIP INTERTROCHANTERIC NAILING;  Surgeon: Dean Hammonds MD;  Location: [facility] OR;  Service: Orthopedics;  Laterality: Right;    INGUINAL HERNIA REPAIR      INSERTION HEMODIALYSIS CATHETER N/A 1/3/2025    Procedure: HEMODIALYSIS CATHETER INSERTION;  Surgeon: Bijan Noel MD;  Location: [facility] OR;  Service: General;  Laterality: N/A;    NEPHRECTOMY Left 03/19/2015    diagnosed January 2015, status post left radical nephrectomy.     PROSTATE FIDUCIAL MARKER PLACEMENT  2016    received XRT for prostate CA in 2016      General Information       Row Name 01/06/25 1110          Physical Therapy Time and  Intention    Document Type therapy note (daily note)  Simultaneous filing. User may be unaware of other data.  -RM       Row Name 01/06/25 1110          General Information    Patient Profile Reviewed yes  Simultaneous filing. User may be unaware of other data.  -RM     Existing Precautions/Restrictions fall  -RM       Row Name 01/06/25 1110          Cognition    Orientation Status (Cognition) oriented x 4  -RM       Row Name 01/06/25 1110          Safety Issues/Impairments Affecting Functional Mobility    Impairments Affecting Function (Mobility) endurance/activity tolerance;coordination;motor planning;pain;range of motion (ROM);strength  -RM               User Key  (r) = Recorded By, (t) = Taken By, (c) = Cosigned By      Initials Name Provider Type    RM Alexey Ann, OVI Physical Therapist Assistant                   Mobility       Row Name 01/06/25 1111          Bed Mobility    Supine-Sit [place] (Bed Mobility) minimum assist (75% patient effort);verbal cues;nonverbal cues (demo/gesture)  -RM       Row Name 01/06/25 1111          Sit-Stand Transfer    Sit-Stand [place] (Transfers) minimum assist (75% patient effort);moderate assist (50% patient effort);2 person assist  -RM     Assistive Device (Sit-Stand Transfers) walker, front-wheeled  -RM       Row Name 01/06/25 1111          Gait/Stairs (Locomotion)    [place] Level (Gait) minimum assist (75% patient effort);2 person assist  -RM     Assistive Device (Gait) walker, front-wheeled  -RM     Patient was able to Ambulate yes  -RM     Distance in Feet (Gait) 16  -RM     Deviations/Abnormal Patterns (Gait) festinating/shuffling;antalgic;base of support, narrow  -RM     Bilateral Gait Deviations forward flexed posture;knee buckling bilaterally  -RM               User Key  (r) = Recorded By, (t) = Taken By, (c) = Cosigned By      Initials Name Provider Type    Alexey White, OVI Physical Therapist Assistant                    Obj/Interventions    No documentation.                  Goals/Plan    No documentation.                  Clinical Impression       Row Name 01/06/25 1113          Pain    Pretreatment Pain Rating 0/10 - no pain  -RM     Posttreatment Pain Rating 0/10 - no pain  -RM       Row Name 01/06/25 1113          Plan of Care Review    Plan of Care Reviewed With patient  -RM     Progress improving  -RM     Outcome Evaluation Pt supine in bed and willing to particiapte with treatment.  Pt performed bed mobility, transfers and gait this treatment.  Pt with improved mobility tolerating increased acivity with decreased assistance. See flowsheet for details. Cont PT per POC progressing to goals as pt tolerates.  -RM       Row Name 01/06/25 1113          Positioning and Restraints    Pre-Treatment Position in bed  -RM     Post Treatment Position chair  -RM     In Chair reclined;call light within reach;encouraged to call for assist;exit alarm on;notified nsg  -RM               User Key  (r) = Recorded By, (t) = Taken By, (c) = Cosigned By      Initials Name Provider Type    RM Alexey Ann, PTA Physical Therapist Assistant                   Outcome Measures       Row Name 01/06/25 1121 01/06/25 1000       How much help from another person do you currently need...    Turning from your back to your side while in flat bed without using bedrails? 3  -RM 2  -KK    Moving from lying on back to sitting on the side of a flat bed without bedrails? 3  -RM 2  -KK    Moving to and from a bed to a chair (including a wheelchair)? 2  -RM 2  -KK    Standing up from a chair using your arms (e.g., wheelchair, bedside chair)? 2  -RM 2  -KK    Climbing 3-5 steps with a railing? 1  -RM 1  -KK    To walk in hospital room? 2  -RM 1  -KK    AM-PAC 6 Clicks Score (PT) 13  -RM 10  -KK    Highest Level of Mobility Goal 4 --> Transfer to chair/commode  -RM 4 --> Transfer to chair/commode  -KK      Row Name 01/06/25 0800          How much help from another  person do you currently need...    Turning from your back to your side while in flat bed without using bedrails? 2  -KK     Moving from lying on back to sitting on the side of a flat bed without bedrails? 2  -KK     Moving to and from a bed to a chair (including a wheelchair)? 2  -KK     Standing up from a chair using your arms (e.g., wheelchair, bedside chair)? 2  -KK     Climbing 3-5 steps with a railing? 1  -KK     To walk in hospital room? 1  -KK     AM-PAC 6 Clicks Score (PT) 10  -KK     Highest Level of Mobility Goal 4 --> Transfer to chair/commode  -KK       Row Name 01/06/25 1121          Functional Assessment    Outcome Measure Options AM-PAC 6 Clicks Basic Mobility (PT)  -               User Key  (r) = Recorded By, (t) = Taken By, (c) = Cosigned By      Initials Name Provider Type     Alexey Ann, OVI Physical Therapist Assistant    Rosa Johnston RN Registered Nurse                                 Physical Therapy Education       Title: PT OT SLP Therapies (In Progress)       Topic: Physical Therapy (In Progress)       Point: Mobility training (Done)       Learning Progress Summary            Patient Acceptance, E,TB, VU by  at 1/6/2025 1121    Acceptance, E,D, DU,VU by  at 1/5/2025 1247                      Point: Home exercise program (Not Started)       Learner Progress:  Not documented in this visit.              Point: Body mechanics (Done)       Learning Progress Summary            Patient Acceptance, E,D, DU,VU by  at 1/5/2025 1247                      Point: Precautions (Not Started)       Learner Progress:  Not documented in this visit.                              User Key       Initials Effective Dates Name Provider Type Discipline     06/16/21 -  Alexey Ann, PTA Physical Therapist Assistant PT     06/13/23 -  Manuel Irwin PT Physical Therapist PT                  PT Recommendation and Plan     Progress: improving  Outcome Evaluation: Pt supine in bed and  willing to particiapte with treatment.  Pt performed bed mobility, transfers and gait this treatment.  Pt with improved mobility tolerating increased acivity with decreased assistance. See flowsheet for details. Cont PT per POC progressing to goals as pt tolerates.     Time Calculation:         PT Charges       Row Name 01/06/25 1122             Time Calculation    Start Time 1036  -RM      Stop Time 1122  -RM      Time Calculation (min) 46 min  -RM      PT Received On 01/06/25  -RM      PT Goal Re-Cert Due Date 01/15/25  -RM         Time Calculation- PT    Total Timed Code Minutes- PT 46 minute(s)  -RM         Timed Charges    98971 - Gait Training Minutes  15  -RM      95217 - PT Therapeutic Activity Minutes 31  -RM         Total Minutes    Timed Charges Total Minutes 46  -RM       Total Minutes 46  -RM                User Key  (r) = Recorded By, (t) = Taken By, (c) = Cosigned By      Initials Name Provider Type    Alexey White, OVI Physical Therapist Assistant                  Therapy Charges for Today       Code Description Service Date Service Provider Modifiers Qty    73916360289 HC GAIT TRAINING EA 15 MIN 1/6/2025 Alexey Ann, PTA GP 1    73885241476 HC PT THERAPEUTIC ACT EA 15 MIN 1/6/2025 Alexey Ann, PTA GP 2            PT G-Codes  Outcome Measure Options: AM-PAC 6 Clicks Basic Mobility (PT)  AM-PAC 6 Clicks Score (PT): 13       Alexey Ann PTA  1/6/2025

## 2025-01-06 NOTE — DISCHARGE PLACEMENT REQUEST
"Travon Abdallake (80 y.o. Male)    STR   Date of Birth   1945    Social Security Number       Address   [address] [place] [id number]    Home Phone   [phone]    MRN   [id number]       [facility]    Marital Status                               Admission Date   1/4/25    Admission Type   Emergency    Admitting Provider   Manuel Maya DO    Attending Provider   Justin Brown DO    Department, Room/Bed   [facility] TELEMETRY 3, 329/1       Discharge Date       Discharge Disposition       Discharge Destination                                 Attending Provider: Justin Brown DO    Allergies: Allopurinol, Lipitor [Atorvastatin]    Isolation: Spore   Infection: C.difficile (rule out) (01/04/25), C.difficile (01/04/25)   Code Status: CPR    Ht: 190.5 cm (75\")   Wt: 94.9 kg (209 lb 3.5 oz)    Admission Cmt: None   Principal Problem: Intractable diarrhea [R19.7]                   Active Insurance as of 1/4/2025       Primary Coverage       Payor Plan Insurance Group Employer/Plan Group    MEDICARE MEDICARE A & B        Payor Plan Address Payor Plan Phone Number Payor Plan Fax Number Effective Dates    [address] [phone]  1/1/2010 - None Entered    [facility]ly [facility] [id number]         Subscriber Name Subscriber Birth Date Member ID       TRAVON ABDALLA 1945 [id number]               Secondary Coverage       Payor Plan Insurance Group Employer/Plan Group    [employer] [place] [employer] [place]        Payor Plan Address Payor Plan Phone Number Payor Plan Fax Number Effective Dates    [address] [employer] [address] [place] [phone]  3/1/2012 - None Entered    [place] [address]         Subscriber Name Subscriber Birth Date Member ID       TRAVON ABDALLA 1945 [id number]                     Emergency Contacts        (Rel.) Home Phone Work Phone Mobile Phone    Miley Abdalla (Spouse) [phone] -- [phone]    audelia abdalla " (Daughter) -- -- [phone]    Mary Duong (Grandchild) -- -- [phone]                 History & Physical        Manuel Maya DO at [facility]            [facility]   [facility]ORY AND [facility]YSICAL      Name:  Travon Plummer   Age:  80 y.o.  Sex:  male  :  1945  MRN:  [id number]   Visit Number:  [id number]  Admission Date:  2025  Date Of Service:  25  Primary Care Physician:  Chilango Erickson MD    Chief Complaint:     Weakness    History Of Presenting Illness:      Patient is a chronically ill 80-year-old male with history significant for hypertension, CKD stage V on peritoneal dialysis, and recent right hip fracture status post ORIF who presents from home with family due to intractable diarrhea progressing over the last 5 days.  Patient reports he was even having diarrhea in his sleep.  He was recently discharged from [facility] short-term rehab post ORIF.  Patient reports that he is also having issues walking however family states that they believe this is a motivation issue as patient was able to use his walker and ambulate throughout the house the other day.  Patient denies abdominal pain, fever, chest pain, shortness of breath or cough.    ED summary: Patient afebrile, hemodynamically stable, nonhypoxic on room air.  Potassium 3.2, creatinine 11.87 and BUN 53.  Lactate within normal limits.  CBC unremarkable except for baseline anemia.  C. difficile toxin negative, EIA positive.  Remainder of GI PCR panel negative.  COVID and flu negative.  X-ray of the hip shows healing right intratrochanteric fracture status post ORIF.  No new fracture.  Family concerned about progressive weakness.  Inability to care for self at home.  Hospitalist asked to admit.    Review Of Systems:    All systems were reviewed and negative except as mentioned in history of presenting illness, assessment and plan.    Past Medical History: Patient  has a past medical history of  Benign hypertensive CKD, stage 5 chronic kidney disease or end stage renal disease, Broken arm, Carotid stenosis, Cerebrovascular accident (10/31/2008), Coronary artery disease, Dialysis patient (2024), Dyspnea, Elevated cholesterol, GERD (gastroesophageal reflux disease), Gout, History of blood transfusion, Hypercholesterolemia, Hypertension (11/10/2015), Impaired mobility, Obesity, Osteoarthritis, Paroxysmal atrial fibrillation, Prostate cancer (01/2015), Renal cell carcinoma (2015), and Wears dentures.    Past Surgical History: Patient  has a past surgical history that includes Inguinal hernia repair; Colonoscopy w/ polypectomy; Coronary angioplasty with stent; Carotid stent (Left, 10/31/2008); Nephrectomy (Left, 03/19/2015); Prostate Fiducial Marker Placement (2016); Esophagogastroduodenoscopy (N/A, 12/22/2021); Colonoscopy (N/A, 12/23/2021); Esophagogastroduodenoscopy (N/A, 02/17/2023); Dialysis fistula creation (N/A); Hip Intertrochanteric Nailing (Right, 12/04/2024); and Hip fracture surgery.    Social History: Patient  reports that he quit smoking about 17 years ago. His smoking use included cigarettes. He started smoking about 68 years ago. He has a 51 pack-year smoking history. He has been exposed to tobacco smoke. He has quit using smokeless tobacco.  His smokeless tobacco use included chew. He reports that he does not currently use alcohol. He reports that he does not use drugs.    Family History:  Patient's family history has been reviewed and found to be noncontributory.     Allergies:      Allopurinol and Lipitor [atorvastatin]    Home Medications:    Prior to Admission Medications       Prescriptions Last Dose Informant Patient Reported? Taking?    aspirin 81 MG EC tablet   No No    Take 1 tablet by mouth Daily.    bumetanide (BUMEX) 1 MG tablet   Yes No    Take 1 tablet by mouth Daily. Indications: Edema    calcitriol (ROCALTROL) 0.25 MCG capsule   Yes No    Take 1 capsule by mouth Daily.  "Indications: unknown    carvedilol (COREG) 6.25 MG tablet   No No    Take 2 tablets by mouth 2 (Two) Times a Day With Meals.    Cholecalciferol (Vitamin D) 50 MCG (2000 UT) capsule  Self Yes No    Take 1 capsule by mouth Daily. Indications: Vitamin D Deficiency    Cyanocobalamin (VITAMIN B-12 PO)  Self Yes No    Take 1,000 mcg by mouth Daily. Indications: Supplement    ferrous sulfate 325 (65 FE) MG tablet  Self Yes No    Take 1 tablet by mouth 1 (One) Time Per Week. Sunday    Indications: Anemia From Inadequate Iron in the Body    levothyroxine (SYNTHROID, LEVOTHROID) 25 MCG tablet  Self Yes No    Take 1 tablet by mouth Daily. LEVOXYL  Indications: Underactive Thyroid    losartan (COZAAR) 100 MG tablet   Yes No    Take 1 tablet by mouth Daily. Indications: High Blood Pressure    pantoprazole (PROTONIX) 20 MG EC tablet   No No    Take 1 tablet by mouth Daily.    polyethylene glycol (MIRALAX) 17 g packet   No No    Take 17 g by mouth Daily As Needed (Use if senna-docusate is ineffective).    rosuvastatin (CRESTOR) 40 MG tablet   No No    Take 1 tablet by mouth Every Night.    sennosides-docusate (PERICOLACE) 8.6-50 MG per tablet   No No    Take 2 tablets by mouth 2 (Two) Times a Day As Needed for Constipation.          ED Medications:    Medications   sodium chloride 0.9 % flush 10 mL (has no administration in time range)   sodium chloride 0.9 % infusion 100 mL (0 mL Intravenous Stopped 1/4/25 1205)     Vital Signs:  Temp:  [98.4 °F (36.9 °C)] 98.4 °F (36.9 °C)  Heart Rate:  [55-58] 55  Resp:  [18] 18  BP: (143-172)/(71-83) 143/71        01/04/25  1001   Weight: 99.8 kg (220 lb)     Body mass index is 27.5 kg/m².    Physical Exam:     Most recent vital Signs: /71   Pulse 55   Temp 98.4 °F (36.9 °C)   Resp 18   Ht 190.5 cm (75\")   Wt 99.8 kg (220 lb)   SpO2 92%   BMI 27.50 kg/m²     Physical Exam  Vitals reviewed.   Constitutional:       General: He is not in acute distress.     Appearance: He is normal " "weight.      Comments: Chronically ill appearing   HENT:      Head: Normocephalic and atraumatic.      Right Ear: External ear normal.      Left Ear: External ear normal.      Mouth/Throat:      Mouth: Mucous membranes are moist.      Pharynx: Oropharynx is clear.   Eyes:      Extraocular Movements: Extraocular movements intact.      Conjunctiva/sclera: Conjunctivae normal.   Cardiovascular:      Rate and Rhythm: Normal rate and regular rhythm.   Pulmonary:      Effort: Pulmonary effort is normal.      Breath sounds: Normal breath sounds.   Abdominal:      General: Bowel sounds are normal. There is no distension.      Palpations: Abdomen is soft.      Tenderness: There is no abdominal tenderness.      Comments: Peritoneal dialysis catheter present; tunneled dialysis catheter also present   Musculoskeletal:      Right lower leg: No edema.      Left lower leg: No edema.   Skin:     General: Skin is warm and dry.   Neurological:      General: No focal deficit present.      Mental Status: He is alert and oriented to person, place, and time.         Laboratory data:    I have reviewed the labs done in the emergency room.    Results from last 7 days   Lab Units 01/04/25  1005   SODIUM mmol/L 142   POTASSIUM mmol/L 3.2*   CHLORIDE mmol/L 100   CO2 mmol/L 27.1   BUN mg/dL 53*   CREATININE mg/dL 11.87*   CALCIUM mg/dL 8.1*   BILIRUBIN mg/dL 0.4   ALK PHOS U/L 93   ALT (SGPT) U/L <5   AST (SGOT) U/L 23   GLUCOSE mg/dL 100*     Results from last 7 days   Lab Units 01/04/25  1005   WBC 10*3/mm3 7.43   HEMOGLOBIN g/dL 10.1*   HEMATOCRIT % 33.8*   PLATELETS 10*3/mm3 165                     Results from last 7 days   Lab Units 01/04/25  1005   LIPASE U/L 36               Invalid input(s): \"USDES\", \"NITRITITE\", \"BACT\", \"EP\"    Pain Management Panel           No data to display                EKG:          Radiology:    XR Chest 1 View    Result Date: 1/3/2025  TWO VIEW CHEST  HISTORY: Right IJ dialysis catheter placement.  " COMPARISON: 2/16/2023.  FINDINGS: Interval placement of right IJ catheter with tip terminating in the region of the right atrium. The aorta is tortuous. The cardiac silhouette is normal in size. The mediastinum is unremarkable. There is stable blunting of the left costophrenic angle. The lungs are otherwise. There is no pneumothorax. The osseous structures are unremarkable.      Interval placement of right IJ dialysis catheter. No pneumothorax.      Images were reviewed, interpreted, and dictated by Dr. Alice Castillo MD Transcribed by Jori Allen PA-C.  This report was signed and finalized on 1/3/2025 3:59 PM by Alice Castillo MD.      FL C Arm During Surgery    Result Date: 1/3/2025  This procedure was auto-finalized with no dictation required.     Assessment:    C. difficile infection POA  MARY on CKD/ESRD  Hypokalemia  Hypertension  Hyperlipidemia  CAD  Generalized weakness  Impaired Mobility and ADLs    Plan:    C. difficile infection   Given risk factor of multiple hospitalizations and recent short-term rehab stay, we will go ahead and treat as active C. difficile infection despite negative toxin.  Oral vancomycin  C. difficile precaution  Avoid antidiarrheal agents  MARY on CKD stage V/ESRD  Patient had been on peritoneal dialysis.  Patient had tunneled dialysis catheter placed by Dr. Noel 1/3/2025.  Nephrology consulted for inpatient hemodialysis  Hypokalemia  Secondary to diarrhea and GI losses  Replete per protocol  Generalized weakness  Impaired Mobility and ADLs  Family interested in long-term care placement versus short-term rehab    Risk Assessment: High  DVT Prophylaxis: Heparin subq  Code Status: Full  Diet: Renal             Manuel Maya DO  01/04/25  12:33 EST    Dictated utilizing Dragon dictation.      Electronically signed by Manuel Maya DO at 01/04/25 1601          Physician Progress Notes (all)        Justin Brown DO at 01/05/25 1256              [facility]  HOSPITALIST    PROGRESS NOTE    Name:  Travon Plummer   Age:  80 y.o.  Sex:  male  :  1945  MRN:  [id number]   Visit Number:  [id number]  Admission Date:  2025  Date Of Service:  25  Primary Care Physician:  Chilango Erickson MD     LOS: 1 day :    Chief Complaint:      diarrhea    Subjective:    2025: Admitting provider documentation reviewed. Vitals stable. Still having diarrhea.    History Of Presenting Illness:       Patient is a chronically ill 80-year-old male with history significant for hypertension, CKD stage V on peritoneal dialysis, and recent right hip fracture status post ORIF who presents from home with family due to intractable diarrhea progressing over the last 5 days.  Patient reports he was even having diarrhea in his sleep.  He was recently discharged from [facility] short-term rehab post ORIF.  Patient reports that he is also having issues walking however family states that they believe this is a motivation issue as patient was able to use his walker and ambulate throughout the house the other day.  Patient denies abdominal pain, fever, chest pain, shortness of breath or cough.     ED summary: Patient afebrile, hemodynamically stable, nonhypoxic on room air.  Potassium 3.2, creatinine 11.87 and BUN 53.  Lactate within normal limits.  CBC unremarkable except for baseline anemia.  C. difficile toxin negative, EIA positive.  Remainder of GI PCR panel negative.  COVID and flu negative.  X-ray of the hip shows healing right intratrochanteric fracture status post ORIF.  No new fracture.  Family concerned about progressive weakness.  Inability to care for self at home.  Hospitalist asked to admit.  Edited by: Justin Brown DO at 2025 1144     Review of Systems:     All systems were reviewed and negative except as mentioned in subjective, assessment and plan.    Vital Signs:    Temp:  [98 °F (36.7 °C)-98.5 °F (36.9 °C)] 98.1 °F (36.7 °C)  Heart Rate:   "[55-63] 60  Resp:  [18] 18  BP: (137-158)/(71-85) 147/85    Intake and output:    I/O last 3 completed shifts:  In: 220 [P.O.:120; I.V.:100]  Out: -   I/O this shift:  In: 240 [P.O.:240]  Out: -     Physical Examination:    Constitutional: No acute distress, awake, alert, ill-appearing  HENT: NCAT, mucous membranes moist  Respiratory: Clear to auscultation bilaterally, respiratory effort normal   Cardiovascular: RRR, no murmurs, rubs, or gallops  Gastrointestinal: Positive bowel sounds, soft, nontender, nondistended, PD catheter and tunneled catheter present  Musculoskeletal: No bilateral ankle edema  Psychiatric: Appropriate affect, cooperative  Neurologic: Oriented x 3, speech clear  Skin: No rashes  Edited by: Justin Brown DO at 1/5/2025 0757     Laboratory results:    Results from last 7 days   Lab Units 01/05/25  0549 01/04/25  1005   SODIUM mmol/L 144 142   POTASSIUM mmol/L 3.0* 3.2*   CHLORIDE mmol/L 103 100   CO2 mmol/L 23.9 27.1   BUN mg/dL 57* 53*   CREATININE mg/dL 12.82* 11.87*   CALCIUM mg/dL 8.0* 8.1*   BILIRUBIN mg/dL  --  0.4   ALK PHOS U/L  --  93   ALT (SGPT) U/L  --  <5   AST (SGOT) U/L  --  23   GLUCOSE mg/dL 89 100*     Results from last 7 days   Lab Units 01/05/25  0549 01/04/25  1005   WBC 10*3/mm3 7.74 7.43   HEMOGLOBIN g/dL 9.7* 10.1*   HEMATOCRIT % 32.8* 33.8*   PLATELETS 10*3/mm3 151 165                 No results for input(s): \"PHART\", \"HGT3LQQ\", \"PO2ART\", \"DKW5RUN\", \"BASEEXCESS\" in the last 8760 hours.   I have reviewed the patient's laboratory results.    Radiology results:    XR Hip With or Without Pelvis 2 - 3 View Right    Result Date: 1/4/2025  PROCEDURE: XR HIP W OR WO PELVIS 2-3 VIEW RIGHT-  HISTORY: eval for fx, pain  COMPARISON: Compared CT pelvis 12/3/2024.  FINDINGS:  A two view exam demonstrates mildly displaced right intertrochanteric fracture as seen on prior CT. Patient is undergone ORIF since the prior exam with stable alignment of the fracture fragments. Fracture " line is still visualized superiorly and laterally and not well seen inferiorly and medially consistent with interval healing. Diffuse osteopenia identified.. Dislocation. The joint spaces appear unremarkable. No soft tissue abnormality is seen. 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 Peritoneal dialysis catheter is noted coiled in the left side of the pelvis.      Impression: Healing right intertrochanteric fracture status post ORIF; no new fracture seen.      This report was signed and finalized on 1/4/2025 12:54 PM by Alice Castillo MD.      XR Chest 1 View    Result Date: 1/3/2025  TWO VIEW CHEST  HISTORY: Right IJ dialysis catheter placement.  COMPARISON: 2/16/2023.  FINDINGS: Interval placement of right IJ catheter with tip terminating in the region of the right atrium. The aorta is tortuous. The cardiac silhouette is normal in size. The mediastinum is unremarkable. There is stable blunting of the left costophrenic angle. The lungs are otherwise. There is no pneumothorax. The osseous structures are unremarkable.      Impression: Interval placement of right IJ dialysis catheter. No pneumothorax.      Images were reviewed, interpreted, and dictated by Dr. Alice Castillo MD Transcribed by Jori Allen PA-C.  This report was signed and finalized on 1/3/2025 3:59 PM by Alice Castillo MD.      FL C Arm During Surgery    Result Date: 1/3/2025  This procedure was auto-finalized with no dictation required.   I have reviewed the patient's radiology reports.    Medication Review:     I have reviewed the patient's active and prn medications.     Problem List:      Intractable diarrhea      Assessment/Plan:    C. difficile infection POA  MARY on CKD/ESRD  Hypokalemia  Hypertension  Hyperlipidemia  CAD  Generalized weakness  Impaired Mobility and ADLs     Plan:     C. difficile infection   Given risk factor of multiple  hospitalizations and recent short-term rehab stay, we will go ahead and treat as active C. difficile infection despite negative toxin.  Oral vancomycin  C. difficile precaution  Avoid antidiarrheal agents  MARY on CKD stage V/ESRD  Patient had been on peritoneal dialysis.  Patient had tunneled dialysis catheter placed by Dr. Noel 1/3/2025.  Nephrology consulted for inpatient hemodialysis  Hypokalemia  Secondary to diarrhea and GI losses  Replete per protocol  Generalized weakness  Impaired Mobility and ADLs  Family interested in long-term care placement versus short-term rehab       DVT Prophylaxis: Heparin subq  Code Status: Full  Diet: Renal  Disposition: Placement STR versus LTC in 2 to 3 days, with HD/PD  Edited by: Justin Brown DO at 1/5/2025 0757           Justin Brown DO  01/05/25  12:56 EST    Dictated utilizing Dragon dictation.        Electronically signed by Justin Brown DO at 01/05/25 1258          Physical Therapy Notes (all)        Manuel Irwin PT at 01/05/25 1248  Version 1 of 1         Goal Outcome Evaluation:  Plan of Care Reviewed With: patient        Progress: no change  Outcome Evaluation: pt participated in PT evaluation this date. Pt reports varying assistance needed prior to admission. Pt poor historian, however reports that he has one step to enter his home. He lives with his wife. Pt reports that his family will not let him get out of the bed. Pt primarily stays in the bed and is changed by his wife or daughter. Pt reports that he needed to be changed and required mod-max A to roll B. Pt dependent for hygiene. Pt returned to supine and reports that he was too tired for further mobility and was currently having BM again. pt left with all needs met, call bell in reach. Pt is expected to benefit from skilled PT during this inpatient stay to increase functional mobility and IND. Pt would further benefit from STR upon dc to increase strength.    Anticipated Discharge  Disposition (PT): inpatient rehabilitation facility                          Electronically signed by Manuel Irwin, PT at 25 1248       Manuel Irwin, PT at 25 1249  Version 1 of 1         Patient Name: Travon Plummer  : 1945    MRN: [id number]                              Today's Date: 2025       Admit Date: 2025    Visit Dx:     ICD-10-CM ICD-9-CM   1. Intractable diarrhea  R19.7 787.91   2. Dialysis patient  Z99.2 V45.11     Patient Active Problem List   Diagnosis    Coronary artery disease    Dyspnea    Cerebrovascular accident    Essential hypertension    Hypercholesterolemia    Tobacco abuse    Gout    Osteoarthritis    GERD (gastroesophageal reflux disease)    Obesity    Prostate cancer    Renal cell carcinoma    Nuclear sclerotic cataract of right eye    Symptomatic anemia    Iron deficiency anemia due to chronic blood loss    Melena    Chronic kidney disease, stage IV (severe)    Upper GI bleed    Absent kidney    Acquired hypothyroidism    Benign hypertensive kidney disease with chronic kidney disease    Dyslipidemia    Elevated prostate specific antigen (PSA)    Paroxysmal atrial fibrillation    Secondary hyperparathyroidism    Vitamin D deficiency    Urinary incontinence    Hematuria    Lower urinary tract symptoms (LUTS)    Chronic kidney disease, stage V    Closed right hip fracture    End stage renal failure on dialysis    Intractable diarrhea     Past Medical History:   Diagnosis Date    Benign hypertensive CKD, stage 5 chronic kidney disease or end stage renal disease     Broken arm     Carotid stenosis     bilateral    Cerebrovascular accident 10/31/2008    Coronary artery disease     followed by Dr. Ashford; LOV 24; denies chest pain, SOB 24    Dialysis patient     Current 24    Dyspnea     Elevated cholesterol     GERD (gastroesophageal reflux disease)     Gout     History of blood transfusion     Hypercholesterolemia     Hypertension  11/10/2015    History of hypertension; hypotensive at the time of office visit on 11/10/2015.    Impaired mobility     Obesity     Osteoarthritis     Paroxysmal atrial fibrillation     History of paroxysmal atrial fibrillation, data deficit.    Prostate cancer 01/2015    Prostate cancer, diagnosed January of 2015, status post radiation therapy x39 treatments, 07/01/2015 at [facility].    Renal cell carcinoma 2015    Renal cell carcinoma, dx March of 2015, status post left nephrectomy.    Wears dentures     instructed no adhesive DOS     Past Surgical History:   Procedure Laterality Date    CAROTID STENT Left 10/31/2008    PTA/left carotid artery stent, 10/31/2008.     COLONOSCOPY N/A 12/23/2021    Procedure: COLONOSCOPY, polypectomy, clip placement x 1;  Surgeon: Deandra Do MD;  Location: [facility] ENDOSCOPY;  Service: Gastroenterology;  Laterality: N/A;    COLONOSCOPY W/ POLYPECTOMY      CORONARY ANGIOPLASTY WITH STENT PLACEMENT      A 3.5 x 13 mm. Cypher ESTIVEN to RCA expanded with 4 mm balloon.     DIALYSIS FISTULA CREATION N/A     abdominal    ENDOSCOPY N/A 12/22/2021    Procedure: ESOPHAGOGASTRODUODENOSCOPY with biopsy;  Surgeon: Deandra Do MD;  Location: [facility] ENDOSCOPY;  Service: Gastroenterology;  Laterality: N/A;    ENDOSCOPY N/A 02/17/2023    Procedure: ESOPHAGOGASTRODUODENOSCOPY with biopsy;  Surgeon: Georgina Pool MD;  Location: [facility] ENDOSCOPY;  Service: Gastroenterology;  Laterality: N/A;    HIP FRACTURE SURGERY      HIP INTERTROCHANTERIC NAILING Right 12/04/2024    Procedure: HIP INTERTROCHANTERIC NAILING;  Surgeon: Dean Hammonds MD;  Location: [facility] OR;  Service: Orthopedics;  Laterality: Right;    INGUINAL HERNIA REPAIR      NEPHRECTOMY Left 03/19/2015    diagnosed January 2015, status post left radical nephrectomy.     PROSTATE FIDUCIAL MARKER PLACEMENT  2016    received XRT for prostate CA in 2016      General Information       Row Name 01/05/25 9662           Physical Therapy Time and Intention    Document Type evaluation  -     Mode of Treatment physical therapy  -       Row Name 01/05/25 1235          General Information    Patient Profile Reviewed yes  -     Prior Level of Function ADL's;mod assist:;max assist:;all household mobility  -     Existing Precautions/Restrictions fall  -     Barriers to Rehab medically complex;previous functional deficit  -       Row Name 01/05/25 1235          Living Environment    People in Home spouse  -       Row Name 01/05/25 1235          Home Main Entrance    Number of Stairs, Main Entrance one  -     Stair Railings, Main Entrance none  -       Row Name 01/05/25 1235          Stairs Within Home, Primary    Number of Stairs, Within Home, Primary none  -       Row Name 01/05/25 1235          Cognition    Orientation Status (Cognition) oriented x 4  -       Row Name 01/05/25 1235          Safety Issues/Impairments Affecting Functional Mobility    Safety Issues Affecting Function (Mobility) ability to follow commands;awareness of need for assistance;insight into deficits/self-awareness;safety precaution awareness;safety precautions follow-through/compliance  -     Impairments Affecting Function (Mobility) endurance/activity tolerance;coordination;motor planning;pain;range of motion (ROM);strength  -               User Key  (r) = Recorded By, (t) = Taken By, (c) = Cosigned By      Initials Name Provider Type     Manuel Irwin, PT Physical Therapist                   Mobility       Row Name 01/05/25 1238          Bed Mobility    Bed Mobility rolling right;rolling left;supine-sit  -     Rolling Left [place] (Bed Mobility) moderate assist (50% patient effort);maximum assist (25% patient effort)  -     Rolling Right [place] (Bed Mobility) moderate assist (50% patient effort);maximum assist (25% patient effort)  -     Supine-Sit [place] (Bed Mobility) unable to assess  -     Comment, (Bed  Mobility) attempted to sit EOB, pt unable  -       Row Name 01/05/25 1238          Gait/Stairs (Locomotion)    Patient was able to Ambulate no, other medical factors prevent ambulation  -     Reason Patient was unable to Ambulate Excessive Weakness;Other (Comment)  diarrhea  -               User Key  (r) = Recorded By, (t) = Taken By, (c) = Cosigned By      Initials Name Provider Type     Manuel Irwin, PT Physical Therapist                   Obj/Interventions       Row Name 01/05/25 1241          Range of Motion Comprehensive    General Range of Motion lower extremity range of motion deficits identified  -     Comment, General Range of Motion decreased ROM in R LE due to recent hip ORIF  -       Row Name 01/05/25 1241          Strength Comprehensive (MMT)    General Manual Muscle Testing (MMT) Assessment lower extremity strength deficits identified  -               User Key  (r) = Recorded By, (t) = Taken By, (c) = Cosigned By      Initials Name Provider Type     Manuel Irwin, PT Physical Therapist                   Goals/Plan       Row Name 01/05/25 1246          Bed Mobility Goal 1 (PT)    Activity/Assistive Device (Bed Mobility Goal 1, PT) bed mobility activities, all  -     [place] Level/Cues Needed (Bed Mobility Goal 1, PT) minimum assist (75% or more patient effort)  -     Time Frame (Bed Mobility Goal 1, PT) long term goal (LTG);10 days  -     Progress/Outcomes (Bed Mobility Goal 1, PT) new goal  -       Row Name 01/05/25 1246          Transfer Goal 1 (PT)    Activity/Assistive Device (Transfer Goal 1, PT) sit-to-stand/stand-to-sit  -     [place] Level/Cues Needed (Transfer Goal 1, PT) moderate assist (50-74% patient effort)  -     Time Frame (Transfer Goal 1, PT) long term goal (LTG);10 days  -     Progress/Outcome (Transfer Goal 1, PT) new goal  -       Row Name 01/05/25 1246          Gait Training Goal 1 (PT)    Activity/Assistive Device (Gait Training Goal 1,  PT) gait (walking locomotion)  -     [place] Level (Gait Training Goal 1, PT) minimum assist (75% or more patient effort)  -     Distance (Gait Training Goal 1, PT) 10 ft  -     Time Frame (Gait Training Goal 1, PT) long term goal (LTG);10 days  -     Progress/Outcome (Gait Training Goal 1, PT) new goal  -       Row Name 01/05/25 1246          Patient Education Goal (PT)    Activity (Patient Education Goal, PT) Pt to participate in B LE ther ex at least 3x per week  -     [place]/Cues/Accuracy (Memory Goal 2, PT) demonstrates adequately  -MK     Time Frame (Patient Education Goal, PT) long term goal (LTG);10 days  -MK     Progress/Outcome (Patient Education Goal, PT) new goal  -       Row Name 01/05/25 1246          Therapy Assessment/Plan (PT)    Planned Therapy Interventions (PT) balance training;bed mobility training;gait training;neuromuscular re-education;home exercise program;patient/family education;ROM (range of motion);strengthening;transfer training  -               User Key  (r) = Recorded By, (t) = Taken By, (c) = Cosigned By      Initials Name Provider Type    Manuel Jackson, PT Physical Therapist                   Clinical Impression       Row Name 01/05/25 1241          Pain    Pretreatment Pain Rating 0/10 - no pain  -     Posttreatment Pain Rating 5/10  -     Pain Location extremity  -     Pain Side/Orientation right;lower  -MK     Pain Management Interventions exercise or physical activity utilized;activity modification encouraged  -     Response to Pain Interventions activity participation with increased pain;functional ability unchanged  -       Row Name 01/05/25 1241          Plan of Care Review    Plan of Care Reviewed With patient  -     Progress no change  -     Outcome Evaluation pt participated in PT evaluation this date. Pt reports varying assistance needed prior to admission. Pt poor historian, however reports that he has one step to enter his  home. He lives with his wife. Pt reports that his family will not let him get out of the bed. Pt primarily stays in the bed and is changed by his wife or daughter. Pt reports that he needed to be changed and required mod-max A to roll B. Pt dependent for hygiene. Pt returned to supine and reports that he was too tired for further mobility and was currently having BM again. pt left with all needs met, call bell in reach. Pt is expected to benefit from skilled PT during this inpatient stay to increase functional mobility and IND. Pt would further benefit from STR upon dc to increase strength.  -       Row Name 01/05/25 1241          Therapy Assessment/Plan (PT)    Patient/Family Therapy Goals Statement (PT) pt would like to return home, adament about not going to STR.  -     Rehab Potential (PT) good  -     Criteria for Skilled Interventions Met (PT) yes;skilled treatment is necessary  -     Therapy Frequency (PT) 5 times/wk  -     Predicted Duration of Therapy Intervention (PT) 2 weeks  -       Row Name 01/05/25 1241          Vital Signs    O2 Delivery Pre Treatment room air  -     O2 Delivery Intra Treatment room air  -     O2 Delivery Post Treatment room air  -     Pre Patient Position Supine  -     Intra Patient Position Side Lying  -     Post Patient Position Supine  -       Row Name 01/05/25 1241          Positioning and Restraints    Pre-Treatment Position in bed  -     Post Treatment Position bed  -MK     In Bed notified nsg;call light within reach;encouraged to call for assist;exit alarm on  -               User Key  (r) = Recorded By, (t) = Taken By, (c) = Cosigned By      Initials Name Provider Type    Manuel Jackson, PT Physical Therapist                   Outcome Measures       Row Name 01/05/25 1247          How much help from another person do you currently need...    Turning from your back to your side while in flat bed without using bedrails? 2  -     Moving from lying  on back to sitting on the side of a flat bed without bedrails? 2  -MK     Moving to and from a bed to a chair (including a wheelchair)? 2  -MK     Standing up from a chair using your arms (e.g., wheelchair, bedside chair)? 2  -MK     Climbing 3-5 steps with a railing? 1  -MK     To walk in hospital room? 1  -MK     AM-PAC 6 Clicks Score (PT) 10  -MK               User Key  (r) = Recorded By, (t) = Taken By, (c) = Cosigned By      Initials Name Provider Type    Manuel Jackson, MARILEE Physical Therapist                                 Physical Therapy Education       Title: PT OT SLP Therapies (In Progress)       Topic: Physical Therapy (In Progress)       Point: Mobility training (Done)       Learning Progress Summary            Patient Acceptance, E,D, DU,VU by  at 1/5/2025 1247                      Point: Home exercise program (Not Started)       Learner Progress:  Not documented in this visit.              Point: Body mechanics (Done)       Learning Progress Summary            Patient Acceptance, E,D, DU,VU by  at 1/5/2025 1247                      Point: Precautions (Not Started)       Learner Progress:  Not documented in this visit.                              User Key       Initials Effective Dates Name Provider Type Discipline     06/13/23 -  Manuel Irwin, MARILEE Physical Therapist PT                  PT Recommendation and Plan  Planned Therapy Interventions (PT): balance training, bed mobility training, gait training, neuromuscular re-education, home exercise program, patient/family education, ROM (range of motion), strengthening, transfer training  Progress: no change  Outcome Evaluation: pt participated in PT evaluation this date. Pt reports varying assistance needed prior to admission. Pt poor historian, however reports that he has one step to enter his home. He lives with his wife. Pt reports that his family will not let him get out of the bed. Pt primarily stays in the bed and is changed by his wife or  daughter. Pt reports that he needed to be changed and required mod-max A to roll B. Pt dependent for hygiene. Pt returned to supine and reports that he was too tired for further mobility and was currently having BM again. pt left with all needs met, call bell in reach. Pt is expected to benefit from skilled PT during this inpatient stay to increase functional mobility and IND. Pt would further benefit from STR upon dc to increase strength.     Time Calculation:   PT Evaluation Complexity  History, PT Evaluation Complexity: 3 or more personal factors and/or comorbidities  Examination of Body Systems (PT Eval Complexity): total of 3 or more elements  Clinical Presentation (PT Evaluation Complexity): evolving  Clinical Decision Making (PT Evaluation Complexity): moderate complexity  Overall Complexity (PT Evaluation Complexity): moderate complexity     PT Charges       Row Name 01/05/25 1032             Time Calculation    Start Time 1032  -MK      PT Received On 01/05/25  -MK      PT Goal Re-Cert Due Date 01/15/25  -MK                User Key  (r) = Recorded By, (t) = Taken By, (c) = Cosigned By      Initials Name Provider Type    Manuel Jackson, PT Physical Therapist                  Therapy Charges for Today       Code Description Service Date Service Provider Modifiers Qty    05957253292 HC-PT EVAL MOD COMPLEXITY 5 1/5/2025 Manuel Irwin, PT  1            PT G-Codes  AM-PAC 6 Clicks Score (PT): 10  PT Discharge Summary  Anticipated Discharge Disposition (PT): inpatient rehabilitation facility    Manuel Irwin PT  1/5/2025      Electronically signed by Manuel Irwin PT at 01/05/25 1249       Occupational Therapy Notes (all)    No notes exist for this encounter.

## 2025-01-06 NOTE — PLAN OF CARE
Goal Outcome Evaluation:  Plan of Care Reviewed With: patient        Progress: improving  Outcome Evaluation: Pt supine in bed and willing to particiapte with treatment.  Pt performed bed mobility, transfers and gait this treatment.  Pt with improved mobility tolerating increased acivity with decreased assistance. See flowsheet for details. Cont PT per POC progressing to goals as pt tolerates.

## 2025-01-06 NOTE — PROGRESS NOTES
[facility] Progress Note  [place]. [place]        Patient Name: Travon Plummer  : 1945  MRN: [id number]   LOS: 2 days    Patient Care Team:  Chilango Erickson MD as PCP - General (Internal Medicine)  Andrea Sellers MD, JODY as Consulting Physician (Nephrology)  Deandra Do MD as Surgeon (General Surgery)  Leonard Ashford MD as Consulting Physician (Cardiology)  Georgina Pool MD as Consulting Physician (Gastroenterology)    Chief Complaint:    Chief Complaint   Patient presents with    Diarrhea     Primary Care Physician:  Chilango Erickson MD  Date of admission: 2025    Subjective     Interval History:   Follow-up ESRD.  Events noted from last 24 hours.    I reviewed the chart and other providers notes, labs and procedures done since my last note.  Patient is lying in the bed awake alert and interactive, he said his diarrhea is slightly better.  He still feels like that he wants to do peritoneal dialysis, I did explain to him that this is for 2 weeks so that he is more stable and can do it himself at home.  He agrees that he will do hemodialysis.    Review of Systems:   As noted above.    Objective     Vitals:   Temp:  [98 °F (36.7 °C)-98.6 °F (37 °C)] 98 °F (36.7 °C)  Heart Rate:  [58-65] 58  Resp:  [18] 18  BP: (141-159)/(20-80) 154/80    Intake/Output Summary (Last 24 hours) at 2025 1115  Last data filed at 2025 0700  Gross per 24 hour   Intake 520 ml   Output --   Net 520 ml       Physical Exam:    General Appearance: alert, oriented x 3, no acute distress   Skin: warm and dry  HEENT: oral mucosa normal, nonicteric sclera  Neck: supple, no JVD, tunneled dialysis catheter on the right chest.  Lungs: CTA  Heart: RRR, normal S1 and S2  Abdomen: obese, soft, nontender, non distended and positive bowel sounds.  Peritoneal dialysis catheter in place.  : no palpable bladder  Extremities: Trace edema, no cyanosis or  clubbing  Neuro: normal speech and mental status     Scheduled Meds:     Current Facility-Administered Medications   Medication Dose Route Frequency Provider Last Rate Last Admin    acetaminophen (TYLENOL) tablet 650 mg  650 mg Oral Q4H PRN Manuel Maya DO        Or    acetaminophen (TYLENOL) 160 MG/5ML oral solution 650 mg  650 mg Oral Q4H PRN Manuel Maya DO        Or    acetaminophen (TYLENOL) suppository 650 mg  650 mg Rectal Q4H PRN Manuel Maya DO        aspirin EC tablet 81 mg  81 mg Oral Daily Manuel Maya DO   81 mg at 01/06/25 0930    sennosides-docusate (PERICOLACE) 8.6-50 MG per tablet 2 tablet  2 tablet Oral BID PRN Manuel Maya DO        And    polyethylene glycol (MIRALAX) packet 17 g  17 g Oral Daily PRN Manuel Maya DO        And    bisacodyl (DULCOLAX) EC tablet 5 mg  5 mg Oral Daily PRN Manuel Maya DO        And    bisacodyl (DULCOLAX) suppository 10 mg  10 mg Rectal Daily PRN Manuel Maya DO        calcitriol (ROCALTROL) capsule 0.25 mcg  0.25 mcg Oral Daily Manuel Maya DO   0.25 mcg at 01/06/25 0930    Calcium Replacement - Follow Nurse / BPA Driven Protocol   Not Applicable PRN Manuel Maya DO        heparin (porcine) 5000 UNIT/ML injection 5,000 Units  5,000 Units Subcutaneous Q12H Manuel Maya DO   5,000 Units at 01/05/25 2136    levothyroxine (SYNTHROID, LEVOTHROID) tablet 25 mcg  25 mcg Oral Q AM Manuel Maya DO   25 mcg at 01/06/25 0642    Magnesium Low Dose Replacement - Follow Nurse / BPA Driven Protocol   Not Applicable PRN Manuel Maya DO        melatonin tablet 5 mg  5 mg Oral Nightly PRN Manuel Maya DO   5 mg at 01/04/25 2119    nitroglycerin (NITROSTAT) SL tablet 0.4 mg  0.4 mg Sublingual Q5 Min PRN Manuel Maya DO        ondansetron (ZOFRAN) injection 4 mg  4 mg Intravenous Q6H PRN Manuel Maya DO        pantoprazole (PROTONIX) EC tablet 40 mg  40 mg Oral Every Other Day Manuel Maya DO   40 mg at 01/05/25 0834     Pharmacy Consult   Not Applicable Continuous PRN Manuel Maya DO        Phosphorus Replacement - Follow Nurse / BPA Driven Protocol   Not Applicable PRN Manuel Maya DO        rosuvastatin (CRESTOR) tablet 40 mg  40 mg Oral Nightly Manuel Maya DO   40 mg at 01/05/25 2137    sevelamer (RENAGEL) tablet 800 mg  800 mg Oral BID AC Pritesh Borden MD   800 mg at 01/06/25 0930    sodium chloride 0.9 % flush 10 mL  10 mL Intravenous PRN Spenser Renae,         sodium chloride 0.9 % flush 10 mL  10 mL Intravenous Q12H Manuel Maya DO   10 mL at 01/06/25 0930    sodium chloride 0.9 % flush 10 mL  10 mL Intravenous PRN Manuel Maya DO        sodium chloride 0.9 % infusion 40 mL  40 mL Intravenous PRN Manuel Maya DO        vancomycin (VANCOCIN) capsule 125 mg  125 mg Oral Q6H Manuel Maya,    125 mg at 01/06/25 0642       aspirin, 81 mg, Oral, Daily  calcitriol, 0.25 mcg, Oral, Daily  heparin (porcine), 5,000 Units, Subcutaneous, Q12H  levothyroxine, 25 mcg, Oral, Q AM  pantoprazole, 40 mg, Oral, Every Other Day  rosuvastatin, 40 mg, Oral, Nightly  sevelamer, 800 mg, Oral, BID AC  sodium chloride, 10 mL, Intravenous, Q12H  vancomycin, 125 mg, Oral, Q6H        IV Meds:   Pharmacy Consult,         Results Reviewed:   I have personally reviewed the results from the time of this admission to 1/6/2025 11:15 EST     Results from last 7 days   Lab Units 01/06/25  0715 01/05/25  0549 01/04/25  1005   SODIUM mmol/L 144 144 142   POTASSIUM mmol/L 3.1* 3.0* 3.2*   CHLORIDE mmol/L 102 103 100   CO2 mmol/L 22.5 23.9 27.1   BUN mg/dL 57* 57* 53*   CREATININE mg/dL 12.97* 12.82* 11.87*   CALCIUM mg/dL 8.4* 8.0* 8.1*   BILIRUBIN mg/dL  --   --  0.4   ALK PHOS U/L  --   --  93   ALT (SGPT) U/L  --   --  <5   AST (SGOT) U/L  --   --  23   GLUCOSE mg/dL 89 89 100*       Estimated Creatinine Clearance: 6.1 mL/min (A) (by C-G formula based on SCr of 12.97 mg/dL (H)).    Results from last 7 days  "  Lab Units 01/06/25  0715   MAGNESIUM mg/dL 1.7   PHOSPHORUS mg/dL 8.3*             Results from last 7 days   Lab Units 01/06/25  0715 01/05/25  0549 01/04/25  1005   WBC 10*3/mm3 8.25 7.74 7.43   HEMOGLOBIN g/dL 9.8* 9.7* 10.1*   PLATELETS 10*3/mm3 149 151 165             Brief Urine Lab Results  (Last result in the past 365 days)        Color   Clarity   Blood   Leuk Est   Nitrite   Protein   CREAT   Urine HCG        07/17/24 1436 Yellow   Clear   Small   Negative   Negative   300 mg/dL                   No results found for: \"UTPCR\"    Imaging Results (Last 24 Hours)       ** No results found for the last 24 hours. **                Assessment / Plan     ASSESSMENT:    Intractable diarrhea    -End Stage Renal Disease ( ESRD ) on peritoneal dialysis .  Patient was switched to intermittent hemodialysis status post right tunneled hemodialysis catheter after recent hip replacement requiring rehab.unfortunately unclear if his  dialysis regular schedule .Continue to arrange hemodialysis treatment during patient  admission. Continue renal diet   - Hypokalemia on KCL replacement   -Chronic normocytic anemia. On Epogen protocol. 10.000 SC TIW  We will continue to follow H&H closely   -Hyperphosphatemia. Continue binders  / renvela with meals, Continue renal diet. We will follow phosphorus to make further adjustments  -Secondary hyperparathyrodism . On calcitriol protocol . will follow closely   - History of right femur fracture s/p ORIF , requiring rehab  ( placed PD to HD temporary for rehab placement )   - Colitis , ruling out C diff on vancomycin , as per primary team       PLAN:  Patient still complaining of diarrhea but seems to be much better.  I will go ahead and add cholestyramine to see if that will help any.  I will go ahead and start him on low-dose BuSpar to see if that will help any, appears to have some component of depression.  Low potassium noted we will go ahead and supplement with 80 mEq today.  " Patient has increased blood pressure, his losartan has been held will restart it at 50 mg today.  Details were discussed with the patient as well as family in the room.  Patients son was at the bedside, details discussed with him about his issues, he said he could tell that they both, his mother and father are deteriorating to a point where it is hard for them to take care of themselves.  Patient still wants to do peritoneal dialysis we will give him couple of weeks of hemodialysis and will give him another trial of peritoneal dialysis at home and see if he can do it.  Details were also discussed with the hospitalist service and or other providers as needed.   Continue with rest of the current treatment plan, and monitor with surveillance labs.  Further recommendations will depend on clinical course of the patient during the current hospitalization.   I have reviewed the copied text to this note, it was edited and the changes made as needed.  It is accurate to the point, when the note was signed today.     Thank you for involving us in the care of Travon Plummer.  Please feel free to call with any questions.    Andrea Sellers MD, FASN  01/06/25  11:15 [facility]    [facility]  [phone]      Part of this note may be an electronic transcription/translation of spoken language to printed text using the Dragon Dictation System.

## 2025-01-06 NOTE — PLAN OF CARE
Goal Outcome Evaluation:   No acute events this shift. VSS on RA. Several liquid/mucousy BM throughout the day. Plan for inpatient hemodialysis tomorrow. Will continue to monitor.

## 2025-01-06 NOTE — PLAN OF CARE
Goal Outcome Evaluation:         VSS on RA. Several loose bm this shift. Pt scheduled for hemodialysis today. Will continue plan of care.

## 2025-01-06 NOTE — THERAPY EVALUATION
Patient Name: Travon Plummer  : 1945    MRN: [id number]                              Today's Date: 2025       Admit Date: 2025    Visit Dx:     ICD-10-CM ICD-9-CM   1. Intractable diarrhea  R19.7 787.91   2. Dialysis patient  Z99.2 V45.11     Patient Active Problem List   Diagnosis    Coronary artery disease    Dyspnea    Cerebrovascular accident    Essential hypertension    Hypercholesterolemia    Tobacco abuse    Gout    Osteoarthritis    GERD (gastroesophageal reflux disease)    Obesity    Prostate cancer    Renal cell carcinoma    Nuclear sclerotic cataract of right eye    Symptomatic anemia    Iron deficiency anemia due to chronic blood loss    Melena    Chronic kidney disease, stage IV (severe)    Upper GI bleed    Absent kidney    Acquired hypothyroidism    Benign hypertensive kidney disease with chronic kidney disease    Dyslipidemia    Elevated prostate specific antigen (PSA)    Paroxysmal atrial fibrillation    Secondary hyperparathyroidism    Vitamin D deficiency    Urinary incontinence    Hematuria    Lower urinary tract symptoms (LUTS)    Chronic kidney disease, stage V    Closed right hip fracture    End stage renal failure on dialysis    Intractable diarrhea     Past Medical History:   Diagnosis Date    Benign hypertensive CKD, stage 5 chronic kidney disease or end stage renal disease     Broken arm     Carotid stenosis     bilateral    Cerebrovascular accident 10/31/2008    Coronary artery disease     followed by Dr. Ashford; LOV 24; denies chest pain, SOB 24    Dialysis patient     Current 24    Dyspnea     Elevated cholesterol     GERD (gastroesophageal reflux disease)     Gout     History of blood transfusion     Hypercholesterolemia     Hypertension 11/10/2015    History of hypertension; hypotensive at the time of office visit on 11/10/2015.    Impaired mobility     Obesity     Osteoarthritis     Paroxysmal atrial fibrillation     History of paroxysmal  atrial fibrillation, data deficit.    Prostate cancer 01/2015    Prostate cancer, diagnosed January of 2015, status post radiation therapy x39 treatments, 07/01/2015 at [facility].    Renal cell carcinoma 2015    Renal cell carcinoma, dx March of 2015, status post left nephrectomy.    Wears dentures     instructed no adhesive DOS     Past Surgical History:   Procedure Laterality Date    CAROTID STENT Left 10/31/2008    PTA/left carotid artery stent, 10/31/2008.     COLONOSCOPY N/A 12/23/2021    Procedure: COLONOSCOPY, polypectomy, clip placement x 1;  Surgeon: Deandra Do MD;  Location: [facility] ENDOSCOPY;  Service: Gastroenterology;  Laterality: N/A;    COLONOSCOPY W/ POLYPECTOMY      CORONARY ANGIOPLASTY WITH STENT PLACEMENT      A 3.5 x 13 mm. Cypher ESTIVEN to RCA expanded with 4 mm balloon.     DIALYSIS FISTULA CREATION N/A     abdominal    ENDOSCOPY N/A 12/22/2021    Procedure: ESOPHAGOGASTRODUODENOSCOPY with biopsy;  Surgeon: Deandra Do MD;  Location: [facility] ENDOSCOPY;  Service: Gastroenterology;  Laterality: N/A;    ENDOSCOPY N/A 02/17/2023    Procedure: ESOPHAGOGASTRODUODENOSCOPY with biopsy;  Surgeon: Georgina Pool MD;  Location: [facility] ENDOSCOPY;  Service: Gastroenterology;  Laterality: N/A;    HIP FRACTURE SURGERY      HIP INTERTROCHANTERIC NAILING Right 12/04/2024    Procedure: HIP INTERTROCHANTERIC NAILING;  Surgeon: Dean Hammonds MD;  Location: [facility] OR;  Service: Orthopedics;  Laterality: Right;    INGUINAL HERNIA REPAIR      INSERTION HEMODIALYSIS CATHETER N/A 1/3/2025    Procedure: HEMODIALYSIS CATHETER INSERTION;  Surgeon: Bijan Noel MD;  Location: [facility] OR;  Service: General;  Laterality: N/A;    NEPHRECTOMY Left 03/19/2015    diagnosed January 2015, status post left radical nephrectomy.     PROSTATE FIDUCIAL MARKER PLACEMENT  2016    received XRT for prostate CA in 2016      General Information       Row Name 01/06/25 1113 01/06/25 1110       OT Time and  Intention    Subjective Information no complaints  -SP --    Document Type evaluation  -SP --    Mode of Treatment occupational therapy  -SP occupational therapy  -SP    Symptoms Noted During/After Treatment fatigue;dizziness  -SP fatigue;dizziness  -SP      Row Name 01/06/25 1113 01/06/25 1110       General Information    Patient Profile Reviewed yes  -SP --    Prior Level of Function --  pt reports being minimally ambulatory since dc from [facility], per chart review he was ind with ADLs and HH mobilty as of 12/5 during his last OT evaluation here at [facility], pt reports family was providing mod to total assist for ADLs after dc from [facility]  -SP --  pt reports he was recently at [facility] and was non-ambulatory upon returning home due to weakness, states family was providing max to total assist for dressing and toileting, no family present at bedside to assist with PLOF  -SP    Existing Precautions/Restrictions fall  -SP --    Barriers to Rehab medically complex;previous functional deficit  -SP --      Row Name 01/06/25 1113          Living Environment    People in Home spouse  -SP       Row Name 01/06/25 1113          Home Main Entrance    Number of Stairs, Main Entrance one  -SP     Stair Railings, Main Entrance none  -SP       Row Name 01/06/25 1113          Stairs Within Home, Primary    Number of Stairs, Within Home, Primary none  -SP       Row Name 01/06/25 1113          Cognition    Orientation Status (Cognition) oriented x 4  -SP       Row Name 01/06/25 1113          Safety Issues/Impairments Affecting Functional Mobility    Safety Issues Affecting Function (Mobility) safety precaution awareness;safety precautions follow-through/compliance;awareness of need for assistance;insight into deficits/self-awareness;judgment;positioning of assistive device  -SP     Impairments Affecting Function (Mobility) cognition;endurance/activity tolerance;strength;balance  -SP     Cognitive Impairments, Mobility Safety/Performance awareness,  need for assistance;insight into deficits/self-awareness;judgment;problem-solving/reasoning;safety precaution awareness;safety precaution follow-through  -SP               User Key  (r) = Recorded By, (t) = Taken By, (c) = Cosigned By      Initials Name Provider Type    SP Osiris Mckeon OT Occupational Therapist                     Mobility/ADL's       Row Name 01/06/25 1115          Bed Mobility    Bed Mobility supine-sit  -SP     Supine-Sit [place] (Bed Mobility) minimum assist (75% patient effort);verbal cues  -SP     Assistive Device (Bed Mobility) bed rails;head of bed elevated  -SP       Row Name 01/06/25 1115          Transfers    Transfers sit-stand transfer;toilet transfer  -SP       Row Name 01/06/25 1115          Sit-Stand Transfer    Sit-Stand [place] (Transfers) minimum assist (75% patient effort);nonverbal cues (demo/gesture);verbal cues;2 person assist  -SP     Assistive Device (Sit-Stand Transfers) walker, front-wheeled  -SP     Comment, (Sit-Stand Transfer) x1 from eob  -SP       Row Name 01/06/25 1115          Toilet Transfer    Type (Toilet Transfer) sit-stand  -SP     [place] Level (Toilet Transfer) moderate assist (50% patient effort);nonverbal cues (demo/gesture);verbal cues;2 person assist  -SP     Assistive Device (Toilet Transfer) walker, front-wheeled  -SP       Row Name 01/06/25 1115          Functional Mobility    Functional Mobility- Ind. Level minimum assist (75% patient effort);2 person assist required;verbal cues required;nonverbal cues required (demo/gesture)  -SP     Functional Mobility- Device walker, front-wheeled  -SP     Functional Mobility-Distance (Feet) 16  -SP     Functional Mobility- Safety Issues balance decreased during turns;other (see comments)  poor safety awareness and insights into deficits with onset of dizziness  -SP     Patient was able to Ambulate yes  -SP       Row Name 01/06/25 1115          Activities of Daily Living    BADL  Assessment/Intervention bathing;upper body dressing;lower body dressing;grooming;feeding;toileting  -SP       Row Name 01/06/25 1115          Bathing Assessment/Intervention    [place] Level (Bathing) upper body;minimum assist (75% patient effort);lower body;maximum assist (25% patient effort)  -SP       Row Name 01/06/25 [facility]          Upper Body Dressing Assessment/Training    [place] Level (Upper Body Dressing) upper body dressing skills;minimum assist (75% patient effort)  -SP       Row Name 01/06/25 [facility]          Lower Body Dressing Assessment/Training    [place] Level (Lower Body Dressing) don;socks;dependent (less than 25% patient effort);pants/bottoms;maximum assist (25% patient effort);shoes/slippers;moderate assist (50% patient effort)  -SP       Row Name 01/06/25 [facility]          Grooming Assessment/Training    [place] Level (Grooming) grooming skills;minimum assist (75% patient effort);set up;verbal cues  -SP     Position (Grooming) sink side  -SP       Row Name 01/06/25 1115          Self-Feeding Assessment/Training    [place] Level (Feeding) feeding skills;set up  -SP       Row Name 01/06/25 [facility]          Toileting Assessment/Training    [place] Level (Toileting) adjust/manage clothing;change pad/brief;maximum assist (25% patient effort);perform perineal hygiene;dependent (less than 25% patient effort)  -SP               User Key  (r) = Recorded By, (t) = Taken By, (c) = Cosigned By      Initials Name Provider Type    Osiris Williamson OT Occupational Therapist                   Obj/Interventions       Row Name 01/06/25 1117          Range of Motion Comprehensive    General Range of Motion bilateral upper extremity ROM WFL  -SP       Row Name 01/06/25 1117          Strength Comprehensive (MMT)    General Manual Muscle Testing (MMT) Assessment upper extremity strength deficits identified  -SP       Row Name 01/06/25 1117          Upper Extremity (Manual Muscle Testing)     Comment, MMT: Upper Extremity B UE grossly 3+/5  -SP       Row Name 01/06/25 1117          Balance    Balance Assessment sitting static balance;sitting dynamic balance;standing static balance;standing dynamic balance  -SP     Static Sitting Balance contact guard  -SP     Dynamic Sitting Balance minimal assist  -SP     Position, Sitting Balance unsupported;sitting edge of bed  -SP     Static Standing Balance minimal assist  -SP     Dynamic Standing Balance moderate assist  -SP     Position/Device Used, Standing Balance supported;walker, front-wheeled  -SP               User Key  (r) = Recorded By, (t) = Taken By, (c) = Cosigned By      Initials Name Provider Type    SP Osiris Mckeon OT Occupational Therapist                   Goals/Plan       Row Name 01/06/25 1124          Transfer Goal 1 (OT)    Activity/Assistive Device (Transfer Goal 1, OT) toilet  -SP     [place] Level/Cues Needed (Transfer Goal 1, OT) minimum assist (75% or more patient effort)  -SP     Time Frame (Transfer Goal 1, OT) by discharge  -SP     Progress/Outcome (Transfer Goal 1, OT) goal ongoing  -SP       Row Name 01/06/25 1124          Bathing Goal 1 (OT)    Activity/Device (Bathing Goal 1, OT) bathing skills, all  -SP     [place] Level/Cues Needed (Bathing Goal 1, OT) minimum assist (75% or more patient effort)  -SP     Time Frame (Bathing Goal 1, OT) by discharge  -SP     Progress/Outcomes (Bathing Goal 1, OT) goal ongoing  -SP       Row Name 01/06/25 1124          Dressing Goal 1 (OT)    Activity/Device (Dressing Goal 1, OT) lower body dressing;dressing stick;sock-aid;reacher  -SP     [place]/Cues Needed (Dressing Goal 1, OT) minimum assist (75% or more patient effort)  -SP     Time Frame (Dressing Goal 1, OT) by discharge  -SP     Progress/Outcome (Dressing Goal 1, OT) goal ongoing  -SP       Row Name 01/06/25 1124          Toileting Goal 1 (OT)    [place] Level/Cues Needed (Toileting Goal 1, OT) maximum assist (25-49%  patient effort)  -SP     Time Frame (Toileting Goal 1, OT) short term goal (STG);5 days  -SP     Progress/Outcome (Toileting Goal 1, OT) goal ongoing  -SP       Row Name 01/06/25 1124          Strength Goal 1 (OT)    Strength Goal 1 (OT) Pt will engage in UE strengthening to maxmize his functional strength and endurance for ADLs and functional mobility participation.  -SP     Time Frame (Strength Goal 1, OT) short term goal (STG);5 days  -SP     Progress/Outcome (Strength Goal 1, OT) goal ongoing  -SP       Row Name 01/06/25 1124          Therapy Assessment/Plan (OT)    Planned Therapy Interventions (OT) activity tolerance training;adaptive equipment training;BADL retraining;functional balance retraining;IADL retraining;occupation/activity based interventions;patient/caregiver education/training;strengthening exercise;transfer/mobility retraining  -SP               User Key  (r) = Recorded By, (t) = Taken By, (c) = Cosigned By      Initials Name Provider Type    Osiris Williamson, AFTAB Occupational Therapist                   Clinical Impression       Row Name 01/06/25 1117          Pain Assessment    Pretreatment Pain Rating 0/10 - no pain  -SP     Posttreatment Pain Rating 0/10 - no pain  -SP       Row Name 01/06/25 1117          Plan of Care Review    Plan of Care Reviewed With patient  -SP     Progress no change  -SP     Outcome Evaluation Pt was received in the bed for the OT evaluation. He is A&O and denies pain. He reports that he lives with his spouse in as single story home with 1 NICHOLE. He states he went to [facility] and when he discharged he was minimally ambulatory primarily staying in the bed and he was requiring mod to total assist for ADLs, no family at bedside to confirm or provide further clarification. Per chart review on 12/5 he was ind for ADLs and HH mobility. On assessment he presents with strength, endurance, functional mobility, self-care, activity tolerance, and balance deficits. He required min A to  transfer to eob, mod A to don slip on shoes, mod A to don a clean gown and min A x2 to transfer to standing with the fww. He ambulated 16ft to the bathroom with min A x2 and decreased gait speed with B LE weakness evidenced by knee flexion while ambulating. He required mod A x2 to transfer to the commode, increased time on the commode with max A to don a clean brief and total assist for earnest care. He required mod A x2 to transfer to standing with cues for AD use and he stood at the sink x2 mins while completing hand hygeine with min A for balance. Pt attempted to ambulate back to the bedside and became less engaged required mod A for static standing and the chair was brought to him. He reports onset of dizziness once seated and his bp was taken once back to eob and was 141/104. He was reclined for comfort with all needs in reach and the chair alarm was activiated. Recommend continued OT services to address his deficits, promote functional return to independence, decrease risk of falls/remadission and decrease caregiver burden. Recommend he dc to subacute rehab when medically ready.  -SP       Row Name 01/06/25 1117          Therapy Assessment/Plan (OT)    Rehab Potential (OT) good  -SP     Criteria for Skilled Therapeutic Interventions Met (OT) yes;meets criteria  -SP     Therapy Frequency (OT) 5 times/wk  -SP       Row Name 01/06/25 1117          Therapy Plan Review/Discharge Plan (OT)    Anticipated Discharge Disposition (OT) sub acute care setting  -SP       Row Name 01/06/25 1117          Vital Signs    O2 Delivery Pre Treatment room air  -SP     O2 Delivery Intra Treatment room air  -SP     O2 Delivery Post Treatment room air  -SP     Pre Patient Position Supine  -SP     Intra Patient Position Standing  -SP     Post Patient Position Sitting  -SP       Row Name 01/06/25 1117          Positioning and Restraints    Pre-Treatment Position in bed  -SP     Post Treatment Position chair  -SP     In Chair reclined;call  light within reach;encouraged to call for assist;exit alarm on  -SP               User Key  (r) = Recorded By, (t) = Taken By, (c) = Cosigned By      Initials Name Provider Type    Osiris Williamson, AFTAB Occupational Therapist                   Outcome Measures       Row Name 01/06/25 1213          How much help from another is currently needed...    Putting on and taking off regular lower body clothing? 2  -SP     Bathing (including washing, rinsing, and drying) 2  -SP     Toileting (which includes using toilet bed pan or urinal) 1  -SP     Putting on and taking off regular upper body clothing 2  -SP     Taking care of personal grooming (such as brushing teeth) 3  -SP     Eating meals 3  -SP     AM-PAC 6 Clicks Score (OT) 13  -SP       Row Name 01/06/25 1121 01/06/25 1000       How much help from another person do you currently need...    Turning from your back to your side while in flat bed without using bedrails? 3  -RM 2  -KK    Moving from lying on back to sitting on the side of a flat bed without bedrails? 3  -RM 2  -KK    Moving to and from a bed to a chair (including a wheelchair)? 2  -RM 2  -KK    Standing up from a chair using your arms (e.g., wheelchair, bedside chair)? 2  -RM 2  -KK    Climbing 3-5 steps with a railing? 1  -RM 1  -KK    To walk in hospital room? 2  -RM 1  -KK    AM-PAC 6 Clicks Score (PT) 13  -RM 10  -KK    Highest Level of Mobility Goal 4 --> Transfer to chair/commode  -RM 4 --> Transfer to chair/commode  -KK      Row Name 01/06/25 0800          How much help from another person do you currently need...    Turning from your back to your side while in flat bed without using bedrails? 2  -KK     Moving from lying on back to sitting on the side of a flat bed without bedrails? 2  -KK     Moving to and from a bed to a chair (including a wheelchair)? 2  -KK     Standing up from a chair using your arms (e.g., wheelchair, bedside chair)? 2  -KK     Climbing 3-5 steps with a railing? 1  -KK     To  walk in hospital room? 1  -KK     AM-PAC 6 Clicks Score (PT) 10  -KK     Highest Level of Mobility Goal 4 --> Transfer to chair/commode  -KK       Row Name 01/06/25 1213 01/06/25 1121       Functional Assessment    Outcome Measure Options AM-PAC 6 Clicks Daily Activity (OT)  -SP AM-PAC 6 Clicks Basic Mobility (PT)  -RM              User Key  (r) = Recorded By, (t) = Taken By, (c) = Cosigned By      Initials Name Provider Type    RM Alexey Ann, PTA Physical Therapist Assistant    Osiris Williamson, OT Occupational Therapist    KK Rosa Evans, RN Registered Nurse                    Occupational Therapy Education       Title: PT OT SLP Therapies (In Progress)       Topic: Occupational Therapy (In Progress)       Point: ADL training (Done)       Description:   Instruct learner(s) on proper safety adaptation and remediation techniques during self care or transfers.   Instruct in proper use of assistive devices.                  Learning Progress Summary            Patient Acceptance, E, VU by SP at 1/6/2025 1214    Comment: Pt was educated on the purpose of the OT eval and POC.                      Point: Home exercise program (Not Started)       Description:   Instruct learner(s) on appropriate technique for monitoring, assisting and/or progressing therapeutic exercises/activities.                  Learner Progress:  Not documented in this visit.              Point: Precautions (Not Started)       Description:   Instruct learner(s) on prescribed precautions during self-care and functional transfers.                  Learner Progress:  Not documented in this visit.              Point: Body mechanics (Not Started)       Description:   Instruct learner(s) on proper positioning and spine alignment during self-care, functional mobility activities and/or exercises.                  Learner Progress:  Not documented in this visit.                              User Key       Initials Effective Dates Name Provider Type  Discipline    SP 09/08/22 -  Osiris Mckeon OT Occupational Therapist OT                  OT Recommendation and Plan  Planned Therapy Interventions (OT): activity tolerance training, adaptive equipment training, BADL retraining, functional balance retraining, IADL retraining, occupation/activity based interventions, patient/caregiver education/training, strengthening exercise, transfer/mobility retraining  Therapy Frequency (OT): 5 times/wk  Plan of Care Review  Plan of Care Reviewed With: patient  Progress: no change  Outcome Evaluation: Pt was received in the bed for the OT evaluation. He is A&O and denies pain. He reports that he lives with his spouse in as single story home with 1 [place]COLE. He states he went to [facility] and when he discharged he was minimally ambulatory primarily staying in the bed and he was requiring mod to total assist for ADLs, no family at bedside to confirm or provide further clarification. Per chart review on 12/5 he was ind for ADLs and HH mobility. On assessment he presents with strength, endurance, functional mobility, self-care, activity tolerance, and balance deficits. He required min A to transfer to eob, mod A to don slip on shoes, mod A to don a clean gown and min A x2 to transfer to standing with the fww. He ambulated 16ft to the bathroom with min A x2 and decreased gait speed with B LE weakness evidenced by knee flexion while ambulating. He required mod A x2 to transfer to the commode, increased time on the commode with max A to don a clean brief and total assist for earnest care. He required mod A x2 to transfer to standing with cues for AD use and he stood at the sink x2 mins while completing hand hygeine with min A for balance. Pt attempted to ambulate back to the bedside and became less engaged required mod A for static standing and the chair was brought to him. He reports onset of dizziness once seated and his bp was taken once back to eob and was 141/104. He was reclined for comfort  with all needs in reach and the chair alarm was activiated. Recommend continued OT services to address his deficits, promote functional return to independence, decrease risk of falls/remadission and decrease caregiver burden. Recommend he dc to subacute rehab when medically ready.     Time Calculation:   Evaluation Complexity (OT)  Review Occupational Profile/Medical/Therapy History Complexity: expanded/moderate complexity  Assessment, Occupational Performance/Identification of Deficit Complexity: 5 or more performance deficits  Clinical Decision Making Complexity (OT): detailed assessment/moderate complexity  Overall Complexity of Evaluation (OT): moderate complexity     Time Calculation- OT       Row Name 01/06/25 1218 01/06/25 1122          Time Calculation- OT    OT Start Time 1034  -SP --     OT Received On 01/06/25  -SP --     OT Goal Re-Cert Due Date 01/16/25  -SP --        Timed Charges    77594 - Gait Training Minutes  -- 15  -RM        Untimed Charges    OT Eval/Re-eval Minutes 58  -SP --        Total Minutes    Timed Charges Total Minutes -- 15  -RM     Untimed Charges Total Minutes 58  -SP --      Total Minutes 58  -SP 15  -RM               User Key  (r) = Recorded By, (t) = Taken By, (c) = Cosigned By      Initials Name Provider Type    Alexey White, PTA Physical Therapist Assistant    SP Osiris Mckeon OT Occupational Therapist                  Therapy Charges for Today       Code Description Service Date Service Provider Modifiers Qty    [id number] HC OT EVAL MOD COMPLEXITY 4 1/6/2025 Osiris Mckeon OT GO 1                 Osiris Mckeon OT  1/6/2025

## 2025-01-06 NOTE — CASE MANAGEMENT/SOCIAL WORK
Case Management/Social Work    Patient Name:  Travon Plummer  YOB: 1945  MRN: [id number]  Admit Date:  1/4/2025    Cristóbal spoke with Ingrid/JOSEPHINE Arguelles.  Confirmed pt's chair time has been set up. Pt is a T, TH, Sat run beginning at 9 AM.  Verbalized pt was supposed to have his first dialysis run on Saturday but did not show up. Cristóbal updated Ingrid that pt was currently admitted in the hospital.  Plan is to update [facility] when [place] is ready for discharge.  CM updated.       Electronically signed by:  JULIO Holt  01/06/25 12:31 EST

## 2025-01-06 NOTE — PROGRESS NOTES
[facility] HOSPITALIST    PROGRESS NOTE    Name:  Travon Plummer   Age:  80 y.o.  Sex:  male  :  1945  MRN:  [id number]   Visit Number:  [id number]  Admission Date:  2025  Date Of Service:  25  Primary Care Physician:  Chilango Erickson MD     LOS: 2 days :    Chief Complaint:      Weakness, diarrhea    Subjective:    2025: HD pending today.  Vital stable.  Labs reviewed.  Dr. Sellers will address potassium. D/w patient and family. D/w Dr. Sellers.      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  Patient reports he was even having diarrhea in his sleep.  He was recently discharged from [facility] short-term rehab post ORIF.  Patient reports that he is also having issues walking however family states that they believe this is a motivation issue as patient was able to use his walker and ambulate throughout the house the other day.  Patient denies abdominal pain, fever, chest pain, shortness of breath or cough.     ED summary: Patient afebrile, hemodynamically stable, nonhypoxic on room air.  Potassium 3.2, creatinine 11.87 and BUN 53.  Lactate within normal limits.  CBC unremarkable except for baseline anemia.  C. difficile toxin negative, EIA positive.  Remainder of GI PCR panel negative.  COVID and flu negative.  X-ray of the hip shows healing right intratrochanteric fracture status post ORIF.  No new fracture.  Family concerned about progressive weakness.  Inability to care for self at home.  Hospitalist asked to admit.    Patient is stable.  Pending for rehab.  Continuing HD.  Patient hopes at some point to transition back to PD.  Edited by: Justin Brown DO at 2025 [id number]     Review of Systems:     All  "systems were reviewed and negative except as mentioned in subjective, assessment and plan.    Vital Signs:    Temp:  [98 °F (36.7 °C)-98.6 °F (37 °C)] 98.1 °F (36.7 °C)  Heart Rate:  [58-71] 71  Resp:  [18] 18  BP: (141-159)/(20-85) 146/85    Intake and output:    I/O last 3 completed shifts:  In: 760 [P.O.:760]  Out: -   No intake/output data recorded.    Physical Examination:    Constitutional: No acute distress, awake, alert, ill-appearing  HENT: NCAT, mucous membranes moist  Respiratory: Clear to auscultation bilaterally, respiratory effort normal   Cardiovascular: RRR, no murmurs, rubs, or gallops  Gastrointestinal: Positive bowel sounds, soft, nontender, nondistended, PD catheter and tunneled catheter present  Musculoskeletal: No bilateral ankle edema  Psychiatric: Appropriate affect, cooperative  Neurologic: Oriented x 3, speech clear  Skin: No rashes  Exam stable 1/6/25  Edited by: Justin Brown,  at 1/6/2025 1239     Laboratory results:    Results from last 7 days   Lab Units 01/06/25  0715 01/05/25  0549 01/04/25  1005   SODIUM mmol/L 144 144 142   POTASSIUM mmol/L 3.1* 3.0* 3.2*   CHLORIDE mmol/L 102 103 100   CO2 mmol/L 22.5 23.9 27.1   BUN mg/dL 57* 57* 53*   CREATININE mg/dL 12.97* 12.82* 11.87*   CALCIUM mg/dL 8.4* 8.0* 8.1*   BILIRUBIN mg/dL  --   --  0.4   ALK PHOS U/L  --   --  93   ALT (SGPT) U/L  --   --  <5   AST (SGOT) U/L  --   --  23   GLUCOSE mg/dL 89 89 100*     Results from last 7 days   Lab Units 01/06/25  0715 01/05/25  0549 01/04/25  1005   WBC 10*3/mm3 8.25 7.74 7.43   HEMOGLOBIN g/dL 9.8* 9.7* 10.1*   HEMATOCRIT % 33.1* 32.8* 33.8*   PLATELETS 10*3/mm3 149 151 165                 No results for input(s): \"PHART\", \"XLC5CRA\", \"PO2ART\", \"SAT3AKQ\", \"BASEEXCESS\" in the last 8760 hours.   I have reviewed the patient's laboratory results.    Radiology results:    No radiology results from the last 24 hrs  I have reviewed the patient's radiology reports.    Medication Review:     I " have reviewed the patient's active and prn medications.     Problem List:      Intractable diarrhea      Assessment/Plan:    C. difficile infection POA  MARY on CKD/ESRD  Hypokalemia  Hypertension  Hyperlipidemia  CAD  Generalized weakness  Impaired Mobility and ADLs     Plan:     C. difficile infection   Given risk factor of multiple hospitalizations and recent short-term rehab stay, we will go ahead and treat as active C. difficile infection despite negative toxin.  Oral vancomycin  C. difficile precaution  Avoid antidiarrheal agents  MARY on CKD stage V/ESRD  Patient had been on peritoneal dialysis.  Patient had tunneled dialysis catheter placed by Dr. Noel 1/3/2025.  Nephrology consulted for inpatient hemodialysis  Hypokalemia  Secondary to diarrhea and GI losses  Replete per Dr. Sellers  Generalized weakness  Impaired Mobility and ADLs  Family interested in long-term care placement versus short-term rehab       DVT Prophylaxis: Heparin subq  Code Status: Full  Diet: Renal  Disposition: Placement [facility] for back up, with HD/PD  Edited by: Justin Brown DO at 1/6/2025 1239           Justin Brown DO  01/06/25  12:39 EST    Dictated utilizing Dragon dictation.

## 2025-01-06 NOTE — PLAN OF CARE
Goal Outcome Evaluation:      Tx ended w/o complications. 1L UF removed. Post dialysis report given to Rosa [place]. No bleeding or complications noted at access prior to transport        Problem: Hemodialysis  Goal: Safe, Effective Therapy Delivery  Outcome: Progressing  Goal: Effective Tissue Perfusion  Outcome: Progressing  Goal: Absence of Infection Signs and Symptoms  Outcome: Progressing

## 2025-01-06 NOTE — CASE MANAGEMENT/SOCIAL WORK
Case Management/Social Work    Patient Name:  Travon Plummer  YOB: 1945  MRN: [id number]  Admit Date:  1/4/2025        10:00 EST  Met with patient at bedside and called patient's spouse. Patient's spouse wants STR. We discussed the likely copay days starting and estimated amounts to which she is agreeable. She wants [facility] choice and [facility] facilities backup. CM will continue to follow.      Electronically signed by:  Sekou Jain RN  01/06/25 10:00 EST

## 2025-01-06 NOTE — CASE MANAGEMENT/SOCIAL WORK
Case Management/Social Work    Patient Name:  Travon Plummer  YOB: 1945  MRN: [id number]  Admit Date:  1/4/2025    STR referrals have been sent out at the request of pt and family. [facility] is preferred facility, with the [facility] as back up.  Sw to follow for placement.       Electronically signed by:  JULIO Holt  01/06/25 10:04 EST

## 2025-01-06 NOTE — HOME HEALTH
Transfer Summary:    - Patient transferred to [facility]  - Reason for transfer: weakness, patient was admitted for C diff, MARY, hypokalemia, hypertension, hyperlipidemia, CAD,weakness.   - Report called Courtney- patient went to the hospital with no call to home health.    - Summary of Care, treatment or services provided to the patient including disciplines seeing the patient: SN for medication education, fall prevention, post op care, safety teaching, and disease process education.     - Patient progress toward goals: minimal visits    - Communicable Disease? C-difficile possibility

## 2025-01-06 NOTE — PLAN OF CARE
Goal Outcome Evaluation:  Plan of Care Reviewed With: patient        Progress: no change  Outcome Evaluation: VS stable. Hemodialysis done today. No significant events, continuing to monitor.

## 2025-01-07 ENCOUNTER — HOME CARE VISIT (OUTPATIENT)
Dept: HOME HEALTH SERVICES | Facility: HOME HEALTHCARE | Age: 80
End: 2025-01-07
Payer: MEDICARE

## 2025-01-07 LAB
ALBUMIN SERPL-MCNC: 2.6 G/DL (ref 3.5–5.2)
ANION GAP SERPL CALCULATED.3IONS-SCNC: 13.1 MMOL/L (ref 5–15)
BUN SERPL-MCNC: 34 MG/DL (ref 8–23)
BUN/CREAT SERPL: 3.6 (ref 7–25)
CALCIUM SPEC-SCNC: 8.5 MG/DL (ref 8.6–10.5)
CHLORIDE SERPL-SCNC: 104 MMOL/L (ref 98–107)
CO2 SERPL-SCNC: 24.9 MMOL/L (ref 22–29)
CREAT SERPL-MCNC: 9.43 MG/DL (ref 0.76–1.27)
EGFRCR SERPLBLD CKD-EPI 2021: 5.2 ML/MIN/1.73
GLUCOSE SERPL-MCNC: 106 MG/DL (ref 65–99)
HAV IGM SERPL QL IA: NORMAL
HBV CORE IGM SERPL QL IA: NORMAL
HBV SURFACE AG SERPL QL IA: NORMAL
HCV AB SER QL: NORMAL
PHOSPHATE SERPL-MCNC: 5.3 MG/DL (ref 2.5–4.5)
POTASSIUM SERPL-SCNC: 3.9 MMOL/L (ref 3.5–5.2)
SODIUM SERPL-SCNC: 142 MMOL/L (ref 136–145)

## 2025-01-07 PROCEDURE — 97535 SELF CARE MNGMENT TRAINING: CPT

## 2025-01-07 PROCEDURE — 25010000002 HEPARIN (PORCINE) PER 1000 UNITS: Performed by: INTERNAL MEDICINE

## 2025-01-07 PROCEDURE — 97530 THERAPEUTIC ACTIVITIES: CPT

## 2025-01-07 PROCEDURE — 80069 RENAL FUNCTION PANEL: CPT | Performed by: INTERNAL MEDICINE

## 2025-01-07 PROCEDURE — 97116 GAIT TRAINING THERAPY: CPT

## 2025-01-07 PROCEDURE — 99232 SBSQ HOSP IP/OBS MODERATE 35: CPT | Performed by: INTERNAL MEDICINE

## 2025-01-07 PROCEDURE — 97110 THERAPEUTIC EXERCISES: CPT

## 2025-01-07 RX ORDER — AMLODIPINE BESYLATE 5 MG/1
2.5 TABLET ORAL
Status: DISCONTINUED | OUTPATIENT
Start: 2025-01-07 | End: 2025-01-09 | Stop reason: HOSPADM

## 2025-01-07 RX ORDER — METOPROLOL SUCCINATE 25 MG/1
12.5 TABLET, EXTENDED RELEASE ORAL
Status: DISCONTINUED | OUTPATIENT
Start: 2025-01-07 | End: 2025-01-09 | Stop reason: HOSPADM

## 2025-01-07 RX ORDER — SODIUM CHLORIDE 0.9 % (FLUSH) 0.9 %
10 SYRINGE (ML) INJECTION AS NEEDED
Status: DISCONTINUED | OUTPATIENT
Start: 2025-01-07 | End: 2025-01-09 | Stop reason: HOSPADM

## 2025-01-07 RX ORDER — SEVELAMER HYDROCHLORIDE 800 MG/1
1600 TABLET, FILM COATED ORAL
Status: DISCONTINUED | OUTPATIENT
Start: 2025-01-07 | End: 2025-01-09 | Stop reason: HOSPADM

## 2025-01-07 RX ORDER — CHOLESTYRAMINE LIGHT 4 G/5.7G
1 POWDER, FOR SUSPENSION ORAL EVERY 8 HOURS SCHEDULED
Status: DISCONTINUED | OUTPATIENT
Start: 2025-01-07 | End: 2025-01-09 | Stop reason: HOSPADM

## 2025-01-07 RX ORDER — LOSARTAN POTASSIUM 50 MG/1
100 TABLET ORAL NIGHTLY
Status: DISCONTINUED | OUTPATIENT
Start: 2025-01-07 | End: 2025-01-09 | Stop reason: HOSPADM

## 2025-01-07 RX ADMIN — CHOLESTYRAMINE 4 G: 4 POWDER, FOR SUSPENSION ORAL at 08:54

## 2025-01-07 RX ADMIN — PANTOPRAZOLE SODIUM 40 MG: 40 TABLET, DELAYED RELEASE ORAL at 08:55

## 2025-01-07 RX ADMIN — Medication 10 ML: at 21:59

## 2025-01-07 RX ADMIN — Medication 10 ML: at 09:02

## 2025-01-07 RX ADMIN — CHOLESTYRAMINE 4 G: 4 POWDER, FOR SUSPENSION ORAL at 15:02

## 2025-01-07 RX ADMIN — SEVELAMER HYDROCHLORIDE 1600 MG: 800 TABLET, FILM COATED ORAL at 08:54

## 2025-01-07 RX ADMIN — AMLODIPINE BESYLATE 2.5 MG: 5 TABLET ORAL at 11:10

## 2025-01-07 RX ADMIN — HEPARIN SODIUM 5000 UNITS: 5000 INJECTION INTRAVENOUS; SUBCUTANEOUS at 21:59

## 2025-01-07 RX ADMIN — VANCOMYCIN HYDROCHLORIDE 125 MG: 125 CAPSULE ORAL at 06:31

## 2025-01-07 RX ADMIN — ASPIRIN 81 MG: 81 TABLET, COATED ORAL at 08:54

## 2025-01-07 RX ADMIN — BUSPIRONE HYDROCHLORIDE 5 MG: 5 TABLET ORAL at 21:58

## 2025-01-07 RX ADMIN — SEVELAMER HYDROCHLORIDE 1600 MG: 800 TABLET, FILM COATED ORAL at 11:10

## 2025-01-07 RX ADMIN — CALCITRIOL CAPSULES 0.25 MCG 0.25 MCG: 0.25 CAPSULE ORAL at 08:55

## 2025-01-07 RX ADMIN — METOPROLOL SUCCINATE 12.5 MG: 25 TABLET, EXTENDED RELEASE ORAL at 11:10

## 2025-01-07 RX ADMIN — VANCOMYCIN HYDROCHLORIDE 125 MG: 125 CAPSULE ORAL at 18:34

## 2025-01-07 RX ADMIN — VANCOMYCIN HYDROCHLORIDE 125 MG: 125 CAPSULE ORAL at 11:10

## 2025-01-07 RX ADMIN — HEPARIN SODIUM 5000 UNITS: 5000 INJECTION INTRAVENOUS; SUBCUTANEOUS at 08:55

## 2025-01-07 RX ADMIN — LOSARTAN POTASSIUM 100 MG: 50 TABLET, FILM COATED ORAL at 21:59

## 2025-01-07 RX ADMIN — Medication 5 MG: at 21:59

## 2025-01-07 RX ADMIN — LEVOTHYROXINE SODIUM 25 MCG: 0.03 TABLET ORAL at 06:31

## 2025-01-07 RX ADMIN — BUSPIRONE HYDROCHLORIDE 5 MG: 5 TABLET ORAL at 08:55

## 2025-01-07 RX ADMIN — CHOLESTYRAMINE 4 G: 4 POWDER, FOR SUSPENSION ORAL at 21:59

## 2025-01-07 RX ADMIN — SEVELAMER HYDROCHLORIDE 1600 MG: 800 TABLET, FILM COATED ORAL at 18:34

## 2025-01-07 RX ADMIN — ROSUVASTATIN CALCIUM 40 MG: 20 TABLET, FILM COATED ORAL at 21:59

## 2025-01-07 NOTE — PLAN OF CARE
Goal Outcome Evaluation:            Pt rested during the night. No acute events to report. No c/o pain or nausea. Will continue plan of care.

## 2025-01-07 NOTE — PROGRESS NOTES
[facility] HOSPITALIST    PROGRESS NOTE    Name:  Travon Plummer   Age:  80 y.o.  Sex:  male  :  1945  MRN:  [id number]   Visit Number:  [id number]  Admission Date:  2025  Date Of Service:  25  Primary Care Physician:  Chilango Erickson MD     LOS: 3 days :    Chief Complaint:      Weakness, diarrhea    Subjective:    Patient resting comfortably.  Working with therapy.  No acute events overnight.    Hospital Course:    Patient is a chronically ill 80-year-old male with history significant for hypertension, CKD stage V on peritoneal dialysis, and recent right hip fracture status post ORIF who presents from home with family due to intractable diarrhea progressing over the last 5 days.  Patient reports he was even having diarrhea in his sleep.  He was recently discharged from [facility] short-term rehab post ORIF.  Patient reports that he is also having issues walking however family states that they believe this is a motivation issue as patient was able to use his walker and ambulate throughout the house the other day.  Patient denies abdominal pain, fever, chest pain, shortness of breath or cough.     ED summary: Patient afebrile, hemodynamically stable, nonhypoxic on room air.  Potassium 3.2, creatinine 11.87 and BUN 53.  Lactate within normal limits.  CBC unremarkable except for baseline anemia.  C. difficile toxin negative, EIA positive.  Remainder of GI PCR panel negative.  COVID and flu negative.  X-ray of the hip shows healing right intratrochanteric fracture status post ORIF.  No new fracture.  Family concerned about progressive weakness.  Inability to care for self at home.  Hospitalist asked to admit.    Review of Systems:     All systems were reviewed and negative except as mentioned in subjective, assessment and plan.    Vital Signs:    Temp:  [97.8 °F (36.6 °C)-98.6 °F (37 °C)] 98.6 °F (37 °C)  Heart Rate:  [61-67] 65  Resp:  [18-20] 20  BP:  "(835-176)/(75-99) 141/75    Intake and output:    I/O last 3 completed shifts:  In: 640 [P.O.:640]  Out: 1000   I/O this shift:  In: 360 [P.O.:360]  Out: -     Physical Examination:    General Appearance:  Alert and cooperative.    Head:  Atraumatic and normocephalic.   Eyes: Conjunctivae and sclerae normal, no icterus. No pallor.   Throat: No oral lesions, no thrush, oral mucosa moist.   Neck: Supple, trachea midline, no thyromegaly.   Lungs:   Breath sounds heard bilaterally equally.  No wheezing or crackles. No Pleural rub or bronchial breathing.   Heart:  Normal S1 and S2, no murmur, No JVD.   Abdomen:   Normal bowel sounds, Soft, nontender, nondistended, PD catheter and tunneled catheter present    Extremities: Supple, no edema, no cyanosis, no clubbing.   Skin: No bleeding or rash.   Neurologic: Alert and oriented x 3. No facial asymmetry. Moves all four limbs. No tremors.      Laboratory results:    Results from last 7 days   Lab Units 01/07/25  0954 01/06/25  0715 01/05/25  0549 01/04/25  1005   SODIUM mmol/L 142 144 144 142   POTASSIUM mmol/L 3.9 3.1* 3.0* 3.2*   CHLORIDE mmol/L 104 102 103 100   CO2 mmol/L 24.9 22.5 23.9 27.1   BUN mg/dL 34* 57* 57* 53*   CREATININE mg/dL 9.43* 12.97* 12.82* 11.87*   CALCIUM mg/dL 8.5* 8.4* 8.0* 8.1*   BILIRUBIN mg/dL  --   --   --  0.4   ALK PHOS U/L  --   --   --  93   ALT (SGPT) U/L  --   --   --  <5   AST (SGOT) U/L  --   --   --  23   GLUCOSE mg/dL 106* 89 89 100*     Results from last 7 days   Lab Units 01/06/25  0715 01/05/25  0549 01/04/25  1005   WBC 10*3/mm3 8.25 7.74 7.43   HEMOGLOBIN g/dL 9.8* 9.7* 10.1*   HEMATOCRIT % 33.1* 32.8* 33.8*   PLATELETS 10*3/mm3 149 151 165                 No results for input(s): \"PHART\", \"FQI7OTF\", \"PO2ART\", \"YVU7ZUR\", \"BASEEXCESS\" in the last 8760 hours.   I have reviewed the patient's laboratory results.    Radiology results:    No radiology results from the last 24 hrs  I have reviewed the patient's radiology " reports.    Medication Review:     I have reviewed the patient's active and prn medications.     Problem List:      Intractable diarrhea      Assessment:    C. difficile infection POA  MARY on CKD/ESRD  Hypokalemia  Hypertension  Hyperlipidemia  CAD  Generalized weakness  Impaired Mobility and ADLs    Plan:    C. difficile infection   Given risk factor of multiple hospitalizations and recent short-term rehab stay, we will go ahead and treat as active C. difficile infection despite negative toxin.  Oral vancomycin  C. difficile precautions  Avoid antidiarrheal agents  MARY on CKD stage V/ESRD  Patient had been on peritoneal dialysis.  Patient had tunneled dialysis catheter placed by Dr. Noel 1/3/2025.  Nephrology consulted for inpatient hemodialysis  Hypokalemia  Secondary to diarrhea and GI losses  Replete per Dr. Sellers  Generalized weakness  Impaired Mobility and ADLs  Family interested in long-term care placement versus short-term rehab        DVT Prophylaxis: Heparin subq  Code Status: Full  Diet: Renal  Disposition: Placement [facility] for back up, with HD/PD    Manuel Maya DO  01/07/25  17:04 EST    Dictated utilizing Dragon dictation.

## 2025-01-07 NOTE — CASE MANAGEMENT/SOCIAL WORK
Case Management/Social Work    Patient Name:  Travon Plummer  YOB: 1945  MRN: [id number]  Admit Date:  1/4/2025        [facility] facilities denied referral. NAI left message for Suha at [facility] to see if received referral. Pt is currently in isolation. [facility] is currently reviewing. Pt receives HD. NAI sent referrals to [facility] and [facility]. NAI following.       Electronically signed by:  JULIO Ashley  01/07/25 10:03 EST

## 2025-01-07 NOTE — THERAPY TREATMENT NOTE
Patient Name: Travon Plummer  : 1945    MRN: [id number]                              Today's Date: 2025       Admit Date: 2025    Visit Dx:     ICD-10-CM ICD-9-CM   1. Intractable diarrhea  R19.7 787.91   2. Dialysis patient  Z99.2 V45.11     Patient Active Problem List   Diagnosis    Coronary artery disease    Dyspnea    Cerebrovascular accident    Essential hypertension    Hypercholesterolemia    Tobacco abuse    Gout    Osteoarthritis    GERD (gastroesophageal reflux disease)    Obesity    Prostate cancer    Renal cell carcinoma    Nuclear sclerotic cataract of right eye    Symptomatic anemia    Iron deficiency anemia due to chronic blood loss    Melena    Chronic kidney disease, stage IV (severe)    Upper GI bleed    Absent kidney    Acquired hypothyroidism    Benign hypertensive kidney disease with chronic kidney disease    Dyslipidemia    Elevated prostate specific antigen (PSA)    Paroxysmal atrial fibrillation    Secondary hyperparathyroidism    Vitamin D deficiency    Urinary incontinence    Hematuria    Lower urinary tract symptoms (LUTS)    Chronic kidney disease, stage V    Closed right hip fracture    End stage renal failure on dialysis    Intractable diarrhea     Past Medical History:   Diagnosis Date    Benign hypertensive CKD, stage 5 chronic kidney disease or end stage renal disease     Broken arm     Carotid stenosis     bilateral    Cerebrovascular accident 10/31/2008    Coronary artery disease     followed by Dr. Ashford; LOV 24; denies chest pain, SOB 24    Dialysis patient     Current 24    Dyspnea     Elevated cholesterol     GERD (gastroesophageal reflux disease)     Gout     History of blood transfusion     Hypercholesterolemia     Hypertension 11/10/2015    History of hypertension; hypotensive at the time of office visit on 11/10/2015.    Impaired mobility     Obesity     Osteoarthritis     Paroxysmal atrial fibrillation     History of paroxysmal  atrial fibrillation, data deficit.    Prostate cancer 01/2015    Prostate cancer, diagnosed January of 2015, status post radiation therapy x39 treatments, 07/01/2015 at [facility].    Renal cell carcinoma 2015    Renal cell carcinoma, dx March of 2015, status post left nephrectomy.    Wears dentures     instructed no adhesive DOS     Past Surgical History:   Procedure Laterality Date    CAROTID STENT Left 10/31/2008    PTA/left carotid artery stent, 10/31/2008.     COLONOSCOPY N/A 12/23/2021    Procedure: COLONOSCOPY, polypectomy, clip placement x 1;  Surgeon: Deandra Do MD;  Location: [facility] ENDOSCOPY;  Service: Gastroenterology;  Laterality: N/A;    COLONOSCOPY W/ POLYPECTOMY      CORONARY ANGIOPLASTY WITH STENT PLACEMENT      A 3.5 x 13 mm. Cypher ESTIVEN to RCA expanded with 4 mm balloon.     DIALYSIS FISTULA CREATION N/A     abdominal    ENDOSCOPY N/A 12/22/2021    Procedure: ESOPHAGOGASTRODUODENOSCOPY with biopsy;  Surgeon: Deandra Do MD;  Location: [facility] ENDOSCOPY;  Service: Gastroenterology;  Laterality: N/A;    ENDOSCOPY N/A 02/17/2023    Procedure: ESOPHAGOGASTRODUODENOSCOPY with biopsy;  Surgeon: Georgina Pool MD;  Location: [facility] ENDOSCOPY;  Service: Gastroenterology;  Laterality: N/A;    HIP FRACTURE SURGERY      HIP INTERTROCHANTERIC NAILING Right 12/04/2024    Procedure: HIP INTERTROCHANTERIC NAILING;  Surgeon: Dean Hammonds MD;  Location: [facility] OR;  Service: Orthopedics;  Laterality: Right;    INGUINAL HERNIA REPAIR      INSERTION HEMODIALYSIS CATHETER N/A 1/3/2025    Procedure: HEMODIALYSIS CATHETER INSERTION;  Surgeon: Bijan Noel MD;  Location: [facility] OR;  Service: General;  Laterality: N/A;    NEPHRECTOMY Left 03/19/2015    diagnosed January 2015, status post left radical nephrectomy.     PROSTATE FIDUCIAL MARKER PLACEMENT  2016    received XRT for prostate CA in 2016      General Information       Row Name 01/07/25 1443          OT Time and Intention     Subjective Information complains of;weakness  -SD     Document Type therapy note (daily note)  -SD     Mode of Treatment occupational therapy  -SD     Patient Effort good  -SD     Symptoms Noted During/After Treatment fatigue  -SD       [facility] Name 01/07/25 1443          General Information    Patient Profile Reviewed yes  -SD               User Key  (r) = Recorded By, (t) = Taken By, (c) = Cosigned By      Initials Name Provider Type    SD Lynette Contreras OT Occupational Therapist                     Mobility/ADL's       Row Name 01/07/25 1443          Bed Mobility    Comment, (Bed Mobility) in chair  -SD       [facility] Name 01/07/25 1443          Transfers    Transfers sit-stand transfer  -[facility] Name 01/07/25 1443          Sit-Stand Transfer    Sit-Stand [place] (Transfers) minimum assist (75% patient effort)  -SD     Assistive Device (Sit-Stand Transfers) walker, front-wheeled  -[facility] Name 01/07/25 1443          Toilet Transfer    Type (Toilet Transfer) sit-stand;stand-sit  -SD     [place] Level (Toilet Transfer) minimum assist (75% patient effort)  -SD     Assistive Device (Toilet Transfer) commode, bedside without drop arms;walker, front-wheeled  -[facility] Name 01/07/25 1443          Functional Mobility    Functional Mobility- Ind. Level contact guard assist  -SD     Functional Mobility- Device walker, front-wheeled  -SD     Functional Mobility-Distance (Feet) 25  x2  -[facility] Name 01/07/25 1443          Lower Body Dressing Assessment/Training    [place] Level (Lower Body Dressing) don;pants/bottoms;maximum assist (25% patient effort)  -[facility] Name 01/07/25 1443          Grooming Assessment/Training    [place] Level (Grooming) wash face, hands;set up  -[facility] Name 01/07/25 1443          Toileting Assessment/Training    [place] Level (Toileting) adjust/manage clothing;change pad/brief;perform perineal hygiene;maximum assist (25% patient effort)  -SD      Assistive Devices (Toileting) commode, bedside without drop arms  -SD               User Key  (r) = Recorded By, (t) = Taken By, (c) = Cosigned By      Initials Name Provider Type    Lynette Pemberton OT Occupational Therapist                   Obj/Interventions       Row Name 01/07/25 1446          Shoulder (Therapeutic Exercise)    Shoulder (Therapeutic Exercise) strengthening exercise  -SD     Shoulder Strengthening (Therapeutic Exercise) bilateral;horizontal aBduction/aDduction;yellow;15 repititions;resistance band  -SD       Row Name 01/07/25 1446          Motor Skills    Therapeutic Exercise shoulder  -SD               User Key  (r) = Recorded By, (t) = Taken By, (c) = Cosigned By      Initials Name Provider Type    Lynette Pemberton OT Occupational Therapist                   Goals/Plan    No documentation.                  Clinical Impression       Row Name 01/07/25 1446          Pain Assessment    Pretreatment Pain Rating 0/10 - no pain  -SD     Posttreatment Pain Rating 0/10 - no pain  -SD       Row Name 01/07/25 1446          Plan of Care Review    Plan of Care Reviewed With patient  -SD     Progress improving  -SD     Outcome Evaluation OT tx completed. Patient is sitting EOB, PCT present. Patient performed tf to BSC min A. Required max A for clothing management and perineal hygiene. Patient performed tf back to chair min A. Sitting in chair performed grooming S/U. Patient tf from chair CGA, walked 25' x 2 using RW with CGA. Upon returning to chair performed UB resistance band exercises. Patient left sitting in chair with PT. Continue OT POC  -SD       Row Name 01/07/25 1446          Vital Signs    O2 Delivery Pre Treatment room air  -SD     O2 Delivery Intra Treatment room air  -SD     O2 Delivery Post Treatment room air  -SD       Row Name 01/07/25 1446          Positioning and Restraints    Pre-Treatment Position sitting in chair/recliner  -SD     Post Treatment Position chair  -SD     In Chair  sitting;call light within reach;encouraged to call for assist;exit alarm on;with PT  -SD               User Key  (r) = Recorded By, (t) = Taken By, (c) = Cosigned By      Initials Name Provider Type    Lynette Pemberton OT Occupational Therapist                   Outcome Measures       Row Name 01/07/25 1455          How much help from another is currently needed...    Putting on and taking off regular lower body clothing? 2  -SD     Bathing (including washing, rinsing, and drying) 2  -SD     Toileting (which includes using toilet bed pan or urinal) 2  -SD     Putting on and taking off regular upper body clothing 3  -SD     Taking care of personal grooming (such as brushing teeth) 3  -SD     Eating meals 3  -SD     AM-PAC 6 Clicks Score (OT) 15  -SD       Row Name 01/07/25 0800          How much help from another person do you currently need...    Turning from your back to your side while in flat bed without using bedrails? 3  -KK     Moving from lying on back to sitting on the side of a flat bed without bedrails? 3  -KK     Moving to and from a bed to a chair (including a wheelchair)? 2  -KK     Standing up from a chair using your arms (e.g., wheelchair, bedside chair)? 2  -KK     Climbing 3-5 steps with a railing? 1  -KK     To walk in hospital room? 2  -KK     AM-PAC 6 Clicks Score (PT) 13  -KK     Highest Level of Mobility Goal 4 --> Transfer to chair/commode  -KK       Row Name 01/07/25 1455          Functional Assessment    Outcome Measure Options AM-PAC 6 Clicks Daily Activity (OT)  -SD               User Key  (r) = Recorded By, (t) = Taken By, (c) = Cosigned By      Initials Name Provider Type    Lynette Pemberton OT Occupational Therapist    KK Rosa Evans, RN Registered Nurse                    Occupational Therapy Education       Title: PT OT SLP Therapies (In Progress)       Topic: Occupational Therapy (In Progress)       Point: ADL training (Done)       Description:   Instruct learner(s) on  proper safety adaptation and remediation techniques during self care or transfers.   Instruct in proper use of assistive devices.                  Learning Progress Summary            Patient Acceptance, E,TB, VU by SD at 1/7/2025 7388    Comment: safety during tf    Acceptance, E, VU by SP at 1/6/2025 1214    Comment: Pt was educated on the purpose of the OT eval and POC.                      Point: Home exercise program (Not Started)       Description:   Instruct learner(s) on appropriate technique for monitoring, assisting and/or progressing therapeutic exercises/activities.                  Learner Progress:  Not documented in this visit.              Point: Precautions (Not Started)       Description:   Instruct learner(s) on prescribed precautions during self-care and functional transfers.                  Learner Progress:  Not documented in this visit.              Point: Body mechanics (Not Started)       Description:   Instruct learner(s) on proper positioning and spine alignment during self-care, functional mobility activities and/or exercises.                  Learner Progress:  Not documented in this visit.                              User Key       Initials Effective Dates Name Provider Type Discipline    SD 06/16/21 -  Lynette Contreras OT Occupational Therapist OT    SP 09/08/22 -  Osiris Mckeon OT Occupational Therapist OT                  OT Recommendation and Plan     Plan of Care Review  Plan of Care Reviewed With: patient  Progress: improving  Outcome Evaluation: OT tx completed. Patient is sitting EOB, PCT present. Patient performed tf to BSC min A. Required max A for clothing management and perineal hygiene. Patient performed tf back to chair min A. Sitting in chair performed grooming S/U. Patient tf from chair CGA, walked 25' x 2 using RW with CGA. Upon returning to chair performed UB resistance band exercises. Patient left sitting in chair with PT. Continue OT POC     Time Calculation:          Time Calculation- OT       Row Name 01/07/25 1456             Time Calculation- OT    OT Start Time 1410  -SD      OT Stop Time 1435  -SD      OT Time Calculation (min) 25 min  -SD      OT Received On 01/07/25  -SD      OT Goal Re-Cert Due Date 01/16/25  -SD         Timed Charges    09480 - OT Therapeutic Activity Minutes 10  -SD      82762 - OT Self Care/Mgmt Minutes 15  -SD         Total Minutes    Timed Charges Total Minutes 25  -SD       Total Minutes 25  -SD                User Key  (r) = Recorded By, (t) = Taken By, (c) = Cosigned By      Initials Name Provider Type    SD Lynette Contreras, OT Occupational Therapist                  Therapy Charges for Today       Code Description Service Date Service Provider Modifiers Qty    16264773639 HC OT THERAPEUTIC ACT EA 15 MIN 1/7/2025 Lynette Contreras OT GO 1    46538350198 HC OT SELF CARE/MGMT/TRAIN EA 15 MIN 1/7/2025 Lynette Contreras OT GO 1                 Lynette Contreras OT  1/7/2025

## 2025-01-07 NOTE — PLAN OF CARE
Goal Outcome Evaluation:  Plan of Care Reviewed With: patient        Progress: improving  Outcome Evaluation: VS stable. No significant events. Patient planning to get dialysis tomorrow inpatient. No significant events, continuing to monitor.

## 2025-01-07 NOTE — PROGRESS NOTES
[facility] Progress Note  [facility]. [place]        Patient Name: Travon Plummer  : 1945  MRN: [id number]   LOS: 3 days    Patient Care Team:  Chilango Erickson MD as PCP - General (Internal Medicine)  Andrea Sellers MD, JODY as Consulting Physician (Nephrology)  Deandra Do MD as Surgeon (General Surgery)  Leonard Ashford MD as Consulting Physician (Cardiology)  Georgina Pool MD as Consulting Physician (Gastroenterology)    Chief Complaint:    Chief Complaint   Patient presents with    Diarrhea     Primary Care Physician:  Chilango Erickson MD  Date of admission: 2025    Subjective     Interval History:   Follow-up ESRD.  Events noted from last 24 hours.    I reviewed the chart and other providers notes, labs and procedures done since my last note.  Patient is lying in the bed awake alert and interactive, he said his diarrhea is slightly better.  Patient said he only had 1 time when the physical therapist came to help him.  He said he feels fair but is not stable on his feet.  He still feels like that he wants to do peritoneal dialysis, I did explain to him that this is for 2 weeks so that he is more stable and can do it himself at home.  He agrees that he will do hemodialysis.    Review of Systems:   As noted above.    Objective     Vitals:   Temp:  [97.7 °F (36.5 °C)-98.1 °F (36.7 °C)] 97.8 °F (36.6 °C)  Heart Rate:  [56-77] 65  Resp:  [16-18] 18  BP: (141-176)/() 176/86    Intake/Output Summary (Last 24 hours) at 2025 0935  Last data filed at 2025 0854  Gross per 24 hour   Intake 600 ml   Output 1000 ml   Net -400 ml       Physical Exam:    General Appearance: alert, oriented x 3, no acute distress   Skin: warm and dry  HEENT: oral mucosa normal, nonicteric sclera  Neck: supple, no JVD, tunneled dialysis catheter on the right chest.  Lungs: CTA  Heart: RRR, normal S1 and S2  Abdomen: obese, soft, nontender,  non distended and positive bowel sounds.  Peritoneal dialysis catheter in place.  : no palpable bladder  Extremities: Trace edema, no cyanosis or clubbing  Neuro: normal speech and mental status     Scheduled Meds:     Current Facility-Administered Medications   Medication Dose Route Frequency Provider Last Rate Last Admin    acetaminophen (TYLENOL) tablet 650 mg  650 mg Oral Q4H PRN Manuel Maya DO   650 mg at 01/06/25 1452    Or    acetaminophen (TYLENOL) 160 MG/5ML oral solution 650 mg  650 mg Oral Q4H PRN Manuel Maya DO        Or    acetaminophen (TYLENOL) suppository 650 mg  650 mg Rectal Q4H PRN Manuel Maya DO        aspirin EC tablet 81 mg  81 mg Oral Daily Manuel Maya DO   81 mg at 01/07/25 0854    sennosides-docusate (PERICOLACE) 8.6-50 MG per tablet 2 tablet  2 tablet Oral BID PRN Manuel Maya DO        And    polyethylene glycol (MIRALAX) packet 17 g  17 g Oral Daily PRN Manuel Maya DO        And    bisacodyl (DULCOLAX) EC tablet 5 mg  5 mg Oral Daily PRN Manuel Maya DO        And    bisacodyl (DULCOLAX) suppository 10 mg  10 mg Rectal Daily PRN Manuel Maya DO        busPIRone (BUSPAR) tablet 5 mg  5 mg Oral Q12H Andrea Sellers MD, FASN   5 mg at 01/07/25 0855    calcitriol (ROCALTROL) capsule 0.25 mcg  0.25 mcg Oral Daily Manuel Maya DO   0.25 mcg at 01/07/25 0855    Calcium Replacement - Follow Nurse / BPA Driven Protocol   Not Applicable PRN Manuel Maya DO        cholestyramine light packet 4 g  1 packet Oral Q8H Andrea Sellers MD, FASN   4 g at 01/07/25 0854    heparin (porcine) 5000 UNIT/ML injection 5,000 Units  5,000 Units Subcutaneous Q12H Manuel Maya DO   5,000 Units at 01/07/25 0855    heparin (porcine) injection 1,000 Units  1,000 Units Intracatheter PRN Andrea Sellers MD, FASN   1,000 Units at 01/06/25 1650    levothyroxine (SYNTHROID, LEVOTHROID) tablet 25 mcg  25 mcg Oral Q AM Manuel Maya DO   25 mcg at 01/07/25 0633    losartan  (COZAAR) tablet 50 mg  50 mg Oral Nightly Andrea Sellers MD, FASN   50 mg at 01/06/25 2304    Magnesium Low Dose Replacement - Follow Nurse / BPA Driven Protocol   Not Applicable PRN Manuel Maya DO        melatonin tablet 5 mg  5 mg Oral Nightly PRN Manuel Maya DO   5 mg at 01/04/25 2119    nitroglycerin (NITROSTAT) SL tablet 0.4 mg  0.4 mg Sublingual Q5 Min PRN Manuel Maya DO        ondansetron (ZOFRAN) injection 4 mg  4 mg Intravenous Q6H PRN Manuel Maya DO        pantoprazole (PROTONIX) EC tablet 40 mg  40 mg Oral Every Other Day Manuel Maya DO   40 mg at 01/07/25 0855    Pharmacy Consult   Not Applicable Continuous PRN Manuel Maya DO        Phosphorus Replacement - Follow Nurse / BPA Driven Protocol   Not Applicable PRN Manuel Maya DO        rosuvastatin (CRESTOR) tablet 40 mg  40 mg Oral Nightly Manuel Maya DO   40 mg at 01/06/25 2303    sevelamer (RENAGEL) tablet 1,600 mg  1,600 mg Oral TID With Meals Andrea Sellers MD, FASN   1,600 mg at 01/07/25 0854    sodium chloride 0.9 % flush 10 mL  10 mL Intravenous PRN Spenser Renae DO        sodium chloride 0.9 % flush 10 mL  10 mL Intravenous Q12H Manuel Maya DO   10 mL at 01/07/25 0902    sodium chloride 0.9 % flush 10 mL  10 mL Intravenous PRN Manuel Maya DO        sodium chloride 0.9 % infusion 40 mL  40 mL Intravenous PRN Manuel Maya DO        vancomycin (VANCOCIN) capsule 125 mg  125 mg Oral Q6H Manuel Maya DO   125 mg at 01/07/25 0631       aspirin, 81 mg, Oral, Daily  busPIRone, 5 mg, Oral, Q12H  calcitriol, 0.25 mcg, Oral, Daily  cholestyramine light, 1 packet, Oral, Q8H  heparin (porcine), 5,000 Units, Subcutaneous, Q12H  levothyroxine, 25 mcg, Oral, Q AM  losartan, 50 mg, Oral, Nightly  pantoprazole, 40 mg, Oral, Every Other Day  rosuvastatin, 40 mg, Oral, Nightly  sevelamer, 1,600 mg, Oral, TID With Meals  sodium chloride, 10 mL, Intravenous, Q12H  vancomycin, 125 mg, Oral,  "Q6H        IV Meds:   Pharmacy Consult,         Results Reviewed:   I have personally reviewed the results from the time of this admission to 1/7/2025 09:35 EST     Results from last 7 days   Lab Units 01/06/25  0715 01/05/25  0549 01/04/25  1005   SODIUM mmol/L 144 144 142   POTASSIUM mmol/L 3.1* 3.0* 3.2*   CHLORIDE mmol/L 102 103 100   CO2 mmol/L 22.5 23.9 27.1   BUN mg/dL 57* 57* 53*   CREATININE mg/dL 12.97* 12.82* 11.87*   CALCIUM mg/dL 8.4* 8.0* 8.1*   BILIRUBIN mg/dL  --   --  0.4   ALK PHOS U/L  --   --  93   ALT (SGPT) U/L  --   --  <5   AST (SGOT) U/L  --   --  23   GLUCOSE mg/dL 89 89 100*       Estimated Creatinine Clearance: 6.1 mL/min (A) (by C-G formula based on SCr of 12.97 mg/dL (H)).    Results from last 7 days   Lab Units 01/06/25  0715   MAGNESIUM mg/dL 1.7   PHOSPHORUS mg/dL 8.3*             Results from last 7 days   Lab Units 01/06/25  0715 01/05/25  0549 01/04/25  1005   WBC 10*3/mm3 8.25 7.74 7.43   HEMOGLOBIN g/dL 9.8* 9.7* 10.1*   PLATELETS 10*3/mm3 149 151 165             Brief Urine Lab Results  (Last result in the past 365 days)        Color   Clarity   Blood   Leuk Est   Nitrite   Protein   CREAT   Urine HCG        07/17/24 1436 Yellow   Clear   Small   Negative   Negative   300 mg/dL                   No results found for: \"UTPCR\"    Imaging Results (Last 24 Hours)       ** No results found for the last 24 hours. **                Assessment / Plan     ASSESSMENT:    Intractable diarrhea    -End Stage Renal Disease ( ESRD ) on peritoneal dialysis .  Patient was switched to intermittent hemodialysis status post right tunneled hemodialysis catheter after recent hip replacement requiring rehab.unfortunately unclear if his  dialysis regular schedule .Continue to arrange hemodialysis treatment during patient  admission. Continue renal diet   - Hypokalemia on KCL replacement   -Chronic normocytic anemia. On Epogen protocol. 10.000 SC TIW  We will continue to follow H&H closely "   -Hyperphosphatemia. Continue binders  / renvela with meals, Continue renal diet. We will follow phosphorus to make further adjustments  -Secondary hyperparathyrodism . On calcitriol protocol . will follow closely   - History of right femur fracture s/p ORIF , requiring rehab  ( placed PD to HD temporary for rehab placement )   - Colitis , ruling out C diff on vancomycin , as per primary team       PLAN:  Patient said diarrhea is significantly better still feels weak.  Physical therapy is ongoing.  Next dialysis due tomorrow will make arrangements.  Patient will not be able to drive himself to the dialysis clinic he will need transportation set up from home to dialysis clinic.  Increased blood pressure noted I will go ahead and increase losartan to 100 mg at bedtime, start him on Toprol XL 12.5 mg daily along with amlodipine 2.5 mg and he will be getting his losartan at bedtime.  Details were discussed with the patient no family in the room.    Details were also discussed with the hospitalist service and or other providers as needed.   Continue with rest of the current treatment plan, and monitor with surveillance labs.  Further recommendations will depend on clinical course of the patient during the current hospitalization.   I have reviewed the copied text to this note, it was edited and the changes made as needed.  It is accurate to the point, when the note was signed today.     Thank you for involving us in the care of Travon Plummer.  Please feel free to call with any questions.    Andrea Sellers MD, FASN  01/07/25  09:35 [facility]    [facility]  [phone]      Part of this note may be an electronic transcription/translation of spoken language to printed text using the Dragon Dictation System.

## 2025-01-07 NOTE — PLAN OF CARE
Goal Outcome Evaluation:  Plan of Care Reviewed With: patient        Progress: improving  Outcome Evaluation: Patient demonstrated improving strength, balance and gait quality.  He required minimal assistance to perform sit to stand transfers and to walk 25 feet x 2 with RW.  He is able to perform seated BLE exercises, concentrating on right leg using good muscle isolation with each phase.   He is expected to benefit from additional PT services per POC.

## 2025-01-07 NOTE — PLAN OF CARE
Goal Outcome Evaluation:  Plan of Care Reviewed With: patient        Progress: improving  Outcome Evaluation: OT tx completed. Patient is sitting EOB, PCT present. Patient performed tf to BSC min A. Required max A for clothing management and perineal hygiene. Patient performed tf back to chair min A. Sitting in chair performed grooming S/U. Patient tf from chair CGA, walked 25' x 2 using RW with CGA. Upon returning to chair performed UB resistance band exercises. Patient left sitting in chair with PT. Continue OT POC

## 2025-01-07 NOTE — CASE MANAGEMENT/SOCIAL WORK
Spoke to pt he is preferring to discharge home agreed  to call his wife Miley .Called Her pt will need to be more mobile prior to returning home  Preference is  [facility] then [facility]

## 2025-01-07 NOTE — THERAPY TREATMENT NOTE
Patient Name: Travon Plummer  : 1945    MRN: [id number]                              Today's Date: 2025       Admit Date: 2025    Visit Dx:     ICD-10-CM ICD-9-CM   1. Intractable diarrhea  R19.7 787.91   2. Dialysis patient  Z99.2 V45.11     Patient Active Problem List   Diagnosis    Coronary artery disease    Dyspnea    Cerebrovascular accident    Essential hypertension    Hypercholesterolemia    Tobacco abuse    Gout    Osteoarthritis    GERD (gastroesophageal reflux disease)    Obesity    Prostate cancer    Renal cell carcinoma    Nuclear sclerotic cataract of right eye    Symptomatic anemia    Iron deficiency anemia due to chronic blood loss    Melena    Chronic kidney disease, stage IV (severe)    Upper GI bleed    Absent kidney    Acquired hypothyroidism    Benign hypertensive kidney disease with chronic kidney disease    Dyslipidemia    Elevated prostate specific antigen (PSA)    Paroxysmal atrial fibrillation    Secondary hyperparathyroidism    Vitamin D deficiency    Urinary incontinence    Hematuria    Lower urinary tract symptoms (LUTS)    Chronic kidney disease, stage V    Closed right hip fracture    End stage renal failure on dialysis    Intractable diarrhea     Past Medical History:   Diagnosis Date    Benign hypertensive CKD, stage 5 chronic kidney disease or end stage renal disease     Broken arm     Carotid stenosis     bilateral    Cerebrovascular accident 10/31/2008    Coronary artery disease     followed by Dr. Ashford; LOV 24; denies chest pain, SOB 24    Dialysis patient     Current 24    Dyspnea     Elevated cholesterol     GERD (gastroesophageal reflux disease)     Gout     History of blood transfusion     Hypercholesterolemia     Hypertension 11/10/2015    History of hypertension; hypotensive at the time of office visit on 11/10/2015.    Impaired mobility     Obesity     Osteoarthritis     Paroxysmal atrial fibrillation     History of paroxysmal  atrial fibrillation, data deficit.    Prostate cancer 01/2015    Prostate cancer, diagnosed January of 2015, status post radiation therapy x39 treatments, 07/01/2015 at [facility].    Renal cell carcinoma 2015    Renal cell carcinoma, dx March of 2015, status post left nephrectomy.    Wears dentures     instructed no adhesive DOS     Past Surgical History:   Procedure Laterality Date    CAROTID STENT Left 10/31/2008    PTA/left carotid artery stent, 10/31/2008.     COLONOSCOPY N/A 12/23/2021    Procedure: COLONOSCOPY, polypectomy, clip placement x 1;  Surgeon: Deandra Do MD;  Location: [facility] ENDOSCOPY;  Service: Gastroenterology;  Laterality: N/A;    COLONOSCOPY W/ POLYPECTOMY      CORONARY ANGIOPLASTY WITH STENT PLACEMENT      A 3.5 x 13 mm. Cypher ESTIVEN to RCA expanded with 4 mm balloon.     DIALYSIS FISTULA CREATION N/A     abdominal    ENDOSCOPY N/A 12/22/2021    Procedure: ESOPHAGOGASTRODUODENOSCOPY with biopsy;  Surgeon: Deandra Do MD;  Location: [facility] ENDOSCOPY;  Service: Gastroenterology;  Laterality: N/A;    ENDOSCOPY N/A 02/17/2023    Procedure: ESOPHAGOGASTRODUODENOSCOPY with biopsy;  Surgeon: Georgina Pool MD;  Location: [facility] ENDOSCOPY;  Service: Gastroenterology;  Laterality: N/A;    HIP FRACTURE SURGERY      HIP INTERTROCHANTERIC NAILING Right 12/04/2024    Procedure: HIP INTERTROCHANTERIC NAILING;  Surgeon: Dean Hammonds MD;  Location: [facility] OR;  Service: Orthopedics;  Laterality: Right;    INGUINAL HERNIA REPAIR      INSERTION HEMODIALYSIS CATHETER N/A 1/3/2025    Procedure: HEMODIALYSIS CATHETER INSERTION;  Surgeon: Bijan Noel MD;  Location: [facility] OR;  Service: General;  Laterality: N/A;    NEPHRECTOMY Left 03/19/2015    diagnosed January 2015, status post left radical nephrectomy.     PROSTATE FIDUCIAL MARKER PLACEMENT  2016    received XRT for prostate CA in 2016      General Information       Row Name 01/07/25 3548          Physical Therapy Time and  Intention    Document Type therapy note (daily note)  -JR     Mode of Treatment physical therapy  -JR       Row Name 01/07/25 1425          General Information    Patient Profile Reviewed yes  -JR               User Key  (r) = Recorded By, (t) = Taken By, (c) = Cosigned By      Initials Name Provider Type    Aggie Khoury, MARILEE Physical Therapist                   Mobility       Row Name 01/07/25 1425          Bed Mobility    Comment, (Bed Mobility) Patient was up in the chair when PT arrived  -JR       Row Name 01/07/25 1425          Sit-Stand Transfer    Sit-Stand [place] (Transfers) minimum assist (75% patient effort)  -JR     Assistive Device (Sit-Stand Transfers) walker, front-wheeled  -JR       Row Name 01/07/25 1425          Gait/Stairs (Locomotion)    [place] Level (Gait) minimum assist (75% patient effort);2 person assist  -JR     Assistive Device (Gait) walker, front-wheeled  -JR     Patient was able to Ambulate yes  -JR     Distance in Feet (Gait) 25  25 x 2  -JR     Deviations/Abnormal Patterns (Gait) antalgic;base of support, narrow;festinating/shuffling  -JR     Bilateral Gait Deviations forward flexed posture;knee buckling bilaterally  -JR               User Key  (r) = Recorded By, (t) = Taken By, (c) = Cosigned By      Initials Name Provider Type    Aggie Khoury PT Physical Therapist                   Obj/Interventions       Row Name 01/07/25 1425          Balance    Balance Assessment sitting static balance;sitting dynamic balance;standing static balance;standing dynamic balance  -JR     Static Sitting Balance contact guard  -JR     Dynamic Sitting Balance minimal assist  -JR     Position, Sitting Balance unsupported;sitting edge of bed  -JR     Static Standing Balance minimal assist  -JR     Dynamic Standing Balance minimal assist;2-person assist  -JR     Position/Device Used, Standing Balance supported;walker, front-wheeled  -JR               User Key  (r) = Recorded By, (t) = Taken  By, (c) = Cosigned By      Initials Name Provider Type    Aggie Khoury, PT Physical Therapist                   Goals/Plan    No documentation.                  Clinical Impression       Row Name 01/07/25 1425          Pain    Pretreatment Pain Rating 0/10 - no pain  -     Posttreatment Pain Rating 0/10 - no pain  -JR       Row Name 01/07/25 1425          Plan of Care Review    Plan of Care Reviewed With patient  -JR     Progress improving  -     Outcome Evaluation Patient demonstrated improving strength, balance and gait quality.  He required minimal assistance to perform sit to stand transfers and to walk 25 feet x 2 with RW.  He is able to perform seated BLE exercises, concentrating on right leg using good muscle isolation with each phase.   He is expected to benefit from additional PT services per POC.  -       Row Name 01/07/25 1425          Positioning and Restraints    Pre-Treatment Position sitting in chair/recliner  -JR     Post Treatment Position chair  -JR     In Chair sitting;call light within reach;encouraged to call for assist;exit alarm on;notified nsg  -JR               User Key  (r) = Recorded By, (t) = Taken By, (c) = Cosigned By      Initials Name Provider Type    Aggie Khoury, PT Physical Therapist                   Outcome Measures       Row Name 01/07/25 1425 01/07/25 0800       How much help from another person do you currently need...    Turning from your back to your side while in flat bed without using bedrails? 3  -JR 3  -KK    Moving from lying on back to sitting on the side of a flat bed without bedrails? 3  -JR 3  -KK    Moving to and from a bed to a chair (including a wheelchair)? 3  -JR 2  -KK    Standing up from a chair using your arms (e.g., wheelchair, bedside chair)? 2  -JR 2  -KK    Climbing 3-5 steps with a railing? 2  -JR 1  -KK    To walk in hospital room? 3  -JR 2  -KK    AM-PAC 6 Clicks Score (PT) 16  -JR 13  -KK    Highest Level of Mobility Goal 5 --> Static  standing  - 4 --> Transfer to chair/commode  -      Row Name 01/07/25 1455 01/07/25 1425       Functional Assessment    Outcome Measure Options AM-PAC 6 Clicks Daily Activity (OT)  -SD AM-PAC 6 Clicks Basic Mobility (PT)  -              User Key  (r) = Recorded By, (t) = Taken By, (c) = Cosigned By      Initials Name Provider Type    JR Aggie Powell, PT Physical Therapist    Lynette Pemberton, OT Occupational Therapist    Rosa Johnston, RN Registered Nurse                                 Physical Therapy Education       Title: PT OT SLP Therapies (In Progress)       Topic: Physical Therapy (In Progress)       Point: Mobility training (Done)       Learning Progress Summary            Patient Acceptance, E,TB, VU by  at 1/6/2025 1121    Acceptance, E,D, DU,VU by  at 1/5/2025 1247                      Point: Home exercise program (Done)       Learning Progress Summary            Patient Acceptance, E,TB, VU by  at 1/7/2025 1708    Comment: seated therapeutic exercises                      Point: Body mechanics (Done)       Learning Progress Summary            Patient Acceptance, E,D, DU,VU by  at 1/5/2025 1247                      Point: Precautions (Not Started)       Learner Progress:  Not documented in this visit.                              User Key       Initials Effective Dates Name Provider Type Discipline     08/22/23 -  Aggie Powell, PT Physical Therapist PT     06/16/21 -  Alexey Ann, PTA Physical Therapist Assistant PT     06/13/23 -  Manuel Irwin, MARILEE Physical Therapist PT                  PT Recommendation and Plan     Progress: improving  Outcome Evaluation: Patient demonstrated improving strength, balance and gait quality.  He required minimal assistance to perform sit to stand transfers and to walk 25 feet x 2 with RW.  He is able to perform seated BLE exercises, concentrating on right leg using good muscle isolation with each phase.   He is expected to benefit from  additional PT services per POC.     Time Calculation:         PT Charges       Row Name 01/07/25 1425             Time Calculation    Start Time 1425  -JR      Stop Time 1504  -JR      Time Calculation (min) 39 min  -JR      PT Received On 01/07/25  -JR      PT Goal Re-Cert Due Date 01/15/25  -JR         Time Calculation- PT    Total Timed Code Minutes- PT 39 minute(s)  -JR         Timed Charges    33646 - PT Therapeutic Exercise Minutes 20  -JR      66400 - Gait Training Minutes  19  -JR         Total Minutes    Timed Charges Total Minutes 39  -JR       Total Minutes 39  -JR         PT/SLP KX Modifier    Exception criteria met to exceed therapy cap Apply KX Modifier  -JR                User Key  (r) = Recorded By, (t) = Taken By, (c) = Cosigned By      Initials Name Provider Type     Aggie Powell, PT Physical Therapist                  Therapy Charges for Today       Code Description Service Date Service Provider Modifiers Qty    37523435225 HC PT THER PROC EA 15 MIN 1/7/2025 Aggie Powell, PT GP, KX 2    09238041007 HC GAIT TRAINING EA 15 MIN 1/7/2025 Aggie Powell, PT GP, KX 1            PT G-Codes  Outcome Measure Options: AM-PAC 6 Clicks Daily Activity (OT)  AM-PAC 6 Clicks Score (PT): 16  AM-PAC 6 Clicks Score (OT): 15       Aggie Powell, PT  1/7/2025

## 2025-01-08 ENCOUNTER — APPOINTMENT (OUTPATIENT)
Dept: NEPHROLOGY | Facility: HOSPITAL | Age: 80
DRG: 371 | End: 2025-01-08
Payer: MEDICARE

## 2025-01-08 LAB
ALBUMIN SERPL-MCNC: 2.4 G/DL (ref 3.5–5.2)
ANION GAP SERPL CALCULATED.3IONS-SCNC: 13.9 MMOL/L (ref 5–15)
BUN SERPL-MCNC: 35 MG/DL (ref 8–23)
BUN/CREAT SERPL: 3.4 (ref 7–25)
CALCIUM SPEC-SCNC: 8.4 MG/DL (ref 8.6–10.5)
CHLORIDE SERPL-SCNC: 106 MMOL/L (ref 98–107)
CO2 SERPL-SCNC: 22.1 MMOL/L (ref 22–29)
CREAT SERPL-MCNC: 10.41 MG/DL (ref 0.76–1.27)
DEPRECATED RDW RBC AUTO: 62.2 FL (ref 37–54)
EGFRCR SERPLBLD CKD-EPI 2021: 4.6 ML/MIN/1.73
ERYTHROCYTE [DISTWIDTH] IN BLOOD BY AUTOMATED COUNT: 17.4 % (ref 12.3–15.4)
GLUCOSE SERPL-MCNC: 90 MG/DL (ref 65–99)
HCT VFR BLD AUTO: 34 % (ref 37.5–51)
HGB BLD-MCNC: 10 G/DL (ref 13–17.7)
MCH RBC QN AUTO: 28.6 PG (ref 26.6–33)
MCHC RBC AUTO-ENTMCNC: 29.4 G/DL (ref 31.5–35.7)
MCV RBC AUTO: 97.1 FL (ref 79–97)
PHOSPHATE SERPL-MCNC: 5.8 MG/DL (ref 2.5–4.5)
PLATELET # BLD AUTO: 151 10*3/MM3 (ref 140–450)
PMV BLD AUTO: 12 FL (ref 6–12)
POTASSIUM SERPL-SCNC: 3.6 MMOL/L (ref 3.5–5.2)
RBC # BLD AUTO: 3.5 10*6/MM3 (ref 4.14–5.8)
SODIUM SERPL-SCNC: 142 MMOL/L (ref 136–145)
WBC NRBC COR # BLD AUTO: 8.5 10*3/MM3 (ref 3.4–10.8)

## 2025-01-08 PROCEDURE — 97530 THERAPEUTIC ACTIVITIES: CPT

## 2025-01-08 PROCEDURE — 25010000002 HEPARIN (PORCINE) PER 1000 UNITS: Performed by: INTERNAL MEDICINE

## 2025-01-08 PROCEDURE — 97535 SELF CARE MNGMENT TRAINING: CPT

## 2025-01-08 PROCEDURE — 80069 RENAL FUNCTION PANEL: CPT | Performed by: INTERNAL MEDICINE

## 2025-01-08 PROCEDURE — 85027 COMPLETE CBC AUTOMATED: CPT | Performed by: INTERNAL MEDICINE

## 2025-01-08 PROCEDURE — 97110 THERAPEUTIC EXERCISES: CPT

## 2025-01-08 PROCEDURE — 99231 SBSQ HOSP IP/OBS SF/LOW 25: CPT | Performed by: INTERNAL MEDICINE

## 2025-01-08 RX ORDER — ALBUMIN (HUMAN) 12.5 G/50ML
12.5 SOLUTION INTRAVENOUS AS NEEDED
Status: DISCONTINUED | OUTPATIENT
Start: 2025-01-08 | End: 2025-01-09 | Stop reason: HOSPADM

## 2025-01-08 RX ORDER — HEPARIN SODIUM 1000 [USP'U]/ML
1000 INJECTION, SOLUTION INTRAVENOUS; SUBCUTANEOUS AS NEEDED
Status: DISCONTINUED | OUTPATIENT
Start: 2025-01-08 | End: 2025-01-09 | Stop reason: HOSPADM

## 2025-01-08 RX ADMIN — Medication 10 ML: at 13:14

## 2025-01-08 RX ADMIN — BUSPIRONE HYDROCHLORIDE 5 MG: 5 TABLET ORAL at 13:12

## 2025-01-08 RX ADMIN — Medication 5 MG: at 21:54

## 2025-01-08 RX ADMIN — VANCOMYCIN HYDROCHLORIDE 125 MG: 125 CAPSULE ORAL at 01:13

## 2025-01-08 RX ADMIN — Medication 10 ML: at 12:41

## 2025-01-08 RX ADMIN — SEVELAMER HYDROCHLORIDE 1600 MG: 800 TABLET, FILM COATED ORAL at 17:22

## 2025-01-08 RX ADMIN — Medication 10 ML: at 21:55

## 2025-01-08 RX ADMIN — LOSARTAN POTASSIUM 100 MG: 50 TABLET, FILM COATED ORAL at 21:54

## 2025-01-08 RX ADMIN — ROSUVASTATIN CALCIUM 40 MG: 20 TABLET, FILM COATED ORAL at 21:54

## 2025-01-08 RX ADMIN — HEPARIN SODIUM 5000 UNITS: 5000 INJECTION INTRAVENOUS; SUBCUTANEOUS at 13:12

## 2025-01-08 RX ADMIN — VANCOMYCIN HYDROCHLORIDE 125 MG: 125 CAPSULE ORAL at 13:12

## 2025-01-08 RX ADMIN — HEPARIN SODIUM 1000 UNITS: 1000 INJECTION INTRAVENOUS; SUBCUTANEOUS at 12:41

## 2025-01-08 RX ADMIN — HEPARIN SODIUM 5000 UNITS: 5000 INJECTION INTRAVENOUS; SUBCUTANEOUS at 21:55

## 2025-01-08 RX ADMIN — SEVELAMER HYDROCHLORIDE 1600 MG: 800 TABLET, FILM COATED ORAL at 13:12

## 2025-01-08 RX ADMIN — VANCOMYCIN HYDROCHLORIDE 125 MG: 125 CAPSULE ORAL at 06:20

## 2025-01-08 RX ADMIN — CHOLESTYRAMINE 4 G: 4 POWDER, FOR SUSPENSION ORAL at 08:17

## 2025-01-08 RX ADMIN — ASPIRIN 81 MG: 81 TABLET, COATED ORAL at 13:12

## 2025-01-08 RX ADMIN — LEVOTHYROXINE SODIUM 25 MCG: 0.03 TABLET ORAL at 06:20

## 2025-01-08 RX ADMIN — METOPROLOL SUCCINATE 12.5 MG: 25 TABLET, EXTENDED RELEASE ORAL at 13:12

## 2025-01-08 RX ADMIN — CALCITRIOL CAPSULES 0.25 MCG 0.25 MCG: 0.25 CAPSULE ORAL at 13:11

## 2025-01-08 RX ADMIN — VANCOMYCIN HYDROCHLORIDE 125 MG: 125 CAPSULE ORAL at 17:22

## 2025-01-08 RX ADMIN — AMLODIPINE BESYLATE 2.5 MG: 5 TABLET ORAL at 13:12

## 2025-01-08 RX ADMIN — CHOLESTYRAMINE 4 G: 4 POWDER, FOR SUSPENSION ORAL at 21:55

## 2025-01-08 RX ADMIN — CHOLESTYRAMINE 4 G: 4 POWDER, FOR SUSPENSION ORAL at 13:12

## 2025-01-08 RX ADMIN — BUSPIRONE HYDROCHLORIDE 5 MG: 5 TABLET ORAL at 21:54

## 2025-01-08 NOTE — PLAN OF CARE
Goal Outcome Evaluation:      Pt rested during the night. Pt had several episodes of incontinence of loose stool. Will continue plan of care.

## 2025-01-08 NOTE — PROGRESS NOTES
[facility] HOSPITAL[facility]    PROGRESS NOTE    Name:  Travon Plummer   Age:  80 y.o.  Sex:  male  :  1945  MRN:  [id number]   Visit Number:  [id number]  Admission Date:  2025  Date Of Service:  25  Primary Care Physician:  Chilango Erickson MD     LOS: 4 days :    Chief Complaint:      Weakness, diarrhea    Subjective:    Patient resting comfortably.  No acute events overnight.    Hospital Course:    Patient is a chronically ill 80-year-old male with history significant for hypertension, CKD stage V on peritoneal dialysis, and recent right hip fracture status post ORIF who presents from home with family due to intractable diarrhea progressing over the last 5 days.  Patient reports he was even having diarrhea in his sleep.  He was recently discharged from [facility] short-term rehab post ORIF.  Patient reports that he is also having issues walking however family states that they believe this is a motivation issue as patient was able to use his walker and ambulate throughout the house the other day.  Patient denies abdominal pain, fever, chest pain, shortness of breath or cough.     ED summary: Patient afebrile, hemodynamically stable, nonhypoxic on room air.  Potassium 3.2, creatinine 11.87 and BUN 53.  Lactate within normal limits.  CBC unremarkable except for baseline anemia.  C. difficile toxin negative, EIA positive.  Remainder of GI PCR panel negative.  COVID and flu negative.  X-ray of the hip shows healing right intratrochanteric fracture status post ORIF.  No new fracture.  Family concerned about progressive weakness.  Inability to care for self at home.  Hospitalist asked to admit.    Review of Systems:     All systems were reviewed and negative except as mentioned in subjective, assessment and plan.    Vital Signs:    Temp:  [97.5 °F (36.4 °C)-98.6 °F (37 °C)] 97.7 °F (36.5 °C)  Heart Rate:  [64-85] 75  Resp:  [16-24] 20  BP: (131-164)/(72-92) 131/77    Intake  "and output:    I/O last 3 completed shifts:  In: 600 [P.O.:600]  Out: -   I/O this shift:  In: 480 [P.O.:480]  Out: 1900     Physical Examination: Examined again 1/8/2025    General Appearance:  Alert and cooperative.    Head:  Atraumatic and normocephalic.   Eyes: Conjunctivae and sclerae normal, no icterus. No pallor.   Throat: No oral lesions, no thrush, oral mucosa moist.   Neck: Supple, trachea midline, no thyromegaly.   Lungs:   Breath sounds heard bilaterally equally.  No wheezing or crackles. No Pleural rub or bronchial breathing.   Heart:  Normal S1 and S2, no murmur, No JVD.   Abdomen:   Normal bowel sounds, Soft, nontender, nondistended, PD catheter and tunneled catheter present    Extremities: Supple, no edema, no cyanosis, no clubbing.   Skin: No bleeding or rash.   Neurologic: Alert and oriented x 3. No facial asymmetry. Moves all four limbs. No tremors.      Laboratory results:    Results from last 7 days   Lab Units 01/08/25  0603 01/07/25  0954 01/06/25  0715 01/05/25  0549 01/04/25  1005   SODIUM mmol/L 142 142 144   < > 142   POTASSIUM mmol/L 3.6 3.9 3.1*   < > 3.2*   CHLORIDE mmol/L 106 104 102   < > 100   CO2 mmol/L 22.1 24.9 22.5   < > 27.1   BUN mg/dL 35* 34* 57*   < > 53*   CREATININE mg/dL 10.41* 9.43* 12.97*   < > 11.87*   CALCIUM mg/dL 8.4* 8.5* 8.4*   < > 8.1*   BILIRUBIN mg/dL  --   --   --   --  0.4   ALK PHOS U/L  --   --   --   --  93   ALT (SGPT) U/L  --   --   --   --  <5   AST (SGOT) U/L  --   --   --   --  23   GLUCOSE mg/dL 90 106* 89   < > 100*    < > = values in this interval not displayed.     Results from last 7 days   Lab Units 01/08/25  0603 01/06/25  0715 01/05/25  0549   WBC 10*3/mm3 8.50 8.25 7.74   HEMOGLOBIN g/dL 10.0* 9.8* 9.7*   HEMATOCRIT % 34.0* 33.1* 32.8*   PLATELETS 10*3/mm3 151 149 151                 No results for input(s): \"PHART\", \"NGQ9LAD\", \"PO2ART\", \"PIH3OKK\", \"BASEEXCESS\" in the last 8760 hours.   I have reviewed the patient's laboratory " results.    Radiology results:    No radiology results from the last 24 hrs  I have reviewed the patient's radiology reports.    Medication Review:     I have reviewed the patient's active and prn medications.     Problem List:      Intractable diarrhea      Assessment:    C. difficile infection POA  MARY on CKD/ESRD  Hypokalemia  Hypertension  Hyperlipidemia  CAD  Generalized weakness  Impaired Mobility and ADLs    Plan:    C. difficile infection   Given risk factor of multiple hospitalizations and recent short-term rehab stay, we will go ahead and treat as active C. difficile infection despite negative toxin.  Oral vancomycin  C. difficile precautions  Avoid antidiarrheal agents  MARY on CKD stage V/ESRD  Patient had been on peritoneal dialysis.  Patient had tunneled dialysis catheter placed by Dr. Noel 1/3/2025.  Nephrology consulted for inpatient hemodialysis  Hypokalemia  Secondary to diarrhea and GI losses  Replete per Dr. Sellers  Generalized weakness  Impaired Mobility and ADLs  Family interested in long-term care placement versus short-term rehab     Plan of care reviewed, no changes 1/8/2025     DVT Prophylaxis: Heparin subq  Code Status: Full  Diet: Renal  Disposition: Placement [facility] [place] for back up, with HD/PD    Manuel Maya DO  01/08/25  15:19 EST    Dictated utilizing Dragon dictation.

## 2025-01-08 NOTE — PROGRESS NOTES
[facility] Progress Note  [facility]. [place]        Patient Name: Travon Plummer  : 1945  MRN: [id number]   LOS: 4 days    Patient Care Team:  Chilango Erickson MD as PCP - General (Internal Medicine)  Andrea Sellers MD, JODY as Consulting Physician (Nephrology)  Deandra Do MD as Surgeon (General Surgery)  Leonard Ashford MD as Consulting Physician (Cardiology)  Georgina Pool MD as Consulting Physician (Gastroenterology)    Chief Complaint:    Chief Complaint   Patient presents with    Diarrhea     Primary Care Physician:  Chilango Erickson MD  Date of admission: 2025    Subjective     Interval History:   Follow-up ESRD.  Events noted from last 24 hours.    I reviewed the chart and other providers notes, labs and procedures done since my last note.  Patient is lying in the bed awake alert and interactive, he said his diarrhea is better.    He still feels like that he wants to do peritoneal dialysis, I did explain to him that this is for 2 weeks so that he is more stable and can do it himself at home.  He agrees that he will do hemodialysis.    Review of Systems:   As noted above.    Objective     Vitals:   Temp:  [97.8 °F (36.6 °C)-98.6 °F (37 °C)] 98.3 °F (36.8 °C)  Heart Rate:  [65-70] 65  Resp:  [18-20] 20  BP: (136-176)/(75-86) 154/86    Intake/Output Summary (Last 24 hours) at 2025 0706  Last data filed at 2025 1738  Gross per 24 hour   Intake 600 ml   Output --   Net 600 ml       Physical Exam:    General Appearance: alert, oriented x 3, no acute distress   Skin: warm and dry  HEENT: oral mucosa normal, nonicteric sclera  Neck: supple, no JVD, tunneled dialysis catheter on the right chest.  Lungs: CTA  Heart: RRR, normal S1 and S2  Abdomen: obese, soft, nontender, non distended and positive bowel sounds.  Peritoneal dialysis catheter in place.  : no palpable bladder  Extremities: Trace edema, no cyanosis or  clubbing  Neuro: normal speech and mental status     Scheduled Meds:     Current Facility-Administered Medications   Medication Dose Route Frequency Provider Last Rate Last Admin    acetaminophen (TYLENOL) tablet 650 mg  650 mg Oral Q4H PRN Manuel Maya DO   650 mg at 01/06/25 1452    Or    acetaminophen (TYLENOL) 160 MG/5ML oral solution 650 mg  650 mg Oral Q4H PRN Manuel Maya DO        Or    acetaminophen (TYLENOL) suppository 650 mg  650 mg Rectal Q4H PRN Manuel Maya DO        amLODIPine (NORVASC) tablet 2.5 mg  2.5 mg Oral Q24H Andrea Sellers MD, FASN   2.5 mg at 01/07/25 1110    aspirin EC tablet 81 mg  81 mg Oral Daily Manuel Maya DO   81 mg at 01/07/25 0854    sennosides-docusate (PERICOLACE) 8.6-50 MG per tablet 2 tablet  2 tablet Oral BID PRN Manuel Maya DO        And    polyethylene glycol (MIRALAX) packet 17 g  17 g Oral Daily PRN Manuel Maya DO        And    bisacodyl (DULCOLAX) EC tablet 5 mg  5 mg Oral Daily PRN Manuel Maya DO        And    bisacodyl (DULCOLAX) suppository 10 mg  10 mg Rectal Daily PRN Manuel Maya DO        busPIRone (BUSPAR) tablet 5 mg  5 mg Oral Q12H Andrea Sellers MD, FASN   5 mg at 01/07/25 2158    calcitriol (ROCALTROL) capsule 0.25 mcg  0.25 mcg Oral Daily Manuel Maya DO   0.25 mcg at 01/07/25 0855    Calcium Replacement - Follow Nurse / BPA Driven Protocol   Not Applicable PRN Manuel Maya DO        cholestyramine light packet 4 g  1 packet Oral Q8H Andrea Sellers MD, FASN   4 g at 01/07/25 2159    heparin (porcine) 5000 UNIT/ML injection 5,000 Units  5,000 Units Subcutaneous Q12H Manuel Maya DO   5,000 Units at 01/07/25 2159    heparin (porcine) injection 1,000 Units  1,000 Units Intracatheter PRN Andrea Sellers MD, FASN   1,000 Units at 01/06/25 1650    levothyroxine (SYNTHROID, LEVOTHROID) tablet 25 mcg  25 mcg Oral Q AM Manuel Maya DO   25 mcg at 01/08/25 0620    losartan (COZAAR) tablet 100 mg  100 mg Oral  Nightly Andrea Sellers MD, FASN   100 mg at 01/07/25 2159    Magnesium Low Dose Replacement - Follow Nurse / BPA Driven Protocol   Not Applicable PRN Manuel Maya DO        melatonin tablet 5 mg  5 mg Oral Nightly PRN Manuel Maya DO   5 mg at 01/07/25 2159    metoprolol succinate XL (TOPROL-XL) 24 hr tablet 12.5 mg  12.5 mg Oral Q24H Andrea Sellers MD, FASN   12.5 mg at 01/07/25 1110    nitroglycerin (NITROSTAT) SL tablet 0.4 mg  0.4 mg Sublingual Q5 Min PRN Manuel Maya DO        ondansetron (ZOFRAN) injection 4 mg  4 mg Intravenous Q6H PRN Manuel Maya DO        pantoprazole (PROTONIX) EC tablet 40 mg  40 mg Oral Every Other Day Manuel Maya DO   40 mg at 01/07/25 0855    Pharmacy Consult   Not Applicable Continuous PRN Manuel Maya DO        Phosphorus Replacement - Follow Nurse / BPA Driven Protocol   Not Applicable PRN Manuel Maya DO        rosuvastatin (CRESTOR) tablet 40 mg  40 mg Oral Nightly Manuel Maya DO   40 mg at 01/07/25 2159    sevelamer (RENAGEL) tablet 1,600 mg  1,600 mg Oral TID With Meals Andrea Sellers MD, FASN   1,600 mg at 01/07/25 1834    sodium chloride 0.9 % flush 10 mL  10 mL Intravenous PRN Spenser Renae, DO        sodium chloride 0.9 % flush 10 mL  10 mL Intravenous Q12H Manuel Maya DO   10 mL at 01/07/25 2159    sodium chloride 0.9 % flush 10 mL  10 mL Intravenous PRN Manuel Maya DO        sodium chloride 0.9 % flush 10 mL  10 mL Intravenous PRN Andrea Sellers MD, JODY        sodium chloride 0.9 % infusion 40 mL  40 mL Intravenous PRN Manuel Maya DO        vancomycin (VANCOCIN) capsule 125 mg  125 mg Oral Q6H Manuel Maya DO   125 mg at 01/08/25 0620       amLODIPine, 2.5 mg, Oral, Q24H  aspirin, 81 mg, Oral, Daily  busPIRone, 5 mg, Oral, Q12H  calcitriol, 0.25 mcg, Oral, Daily  cholestyramine light, 1 packet, Oral, Q8H  heparin (porcine), 5,000 Units, Subcutaneous, Q12H  levothyroxine, 25 mcg, Oral, Q AM  losartan,  "100 mg, Oral, Nightly  metoprolol succinate XL, 12.5 mg, Oral, Q24H  pantoprazole, 40 mg, Oral, Every Other Day  rosuvastatin, 40 mg, Oral, Nightly  sevelamer, 1,600 mg, Oral, TID With Meals  sodium chloride, 10 mL, Intravenous, Q12H  vancomycin, 125 mg, Oral, Q6H        IV Meds:   Pharmacy Consult,         Results Reviewed:   I have personally reviewed the results from the time of this admission to 1/8/2025 07:06 EST     Results from last 7 days   Lab Units 01/07/25  0954 01/06/25  0715 01/05/25  0549 01/04/25  1005   SODIUM mmol/L 142 144 144 142   POTASSIUM mmol/L 3.9 3.1* 3.0* 3.2*   CHLORIDE mmol/L 104 102 103 100   CO2 mmol/L 24.9 22.5 23.9 27.1   BUN mg/dL 34* 57* 57* 53*   CREATININE mg/dL 9.43* 12.97* 12.82* 11.87*   CALCIUM mg/dL 8.5* 8.4* 8.0* 8.1*   BILIRUBIN mg/dL  --   --   --  0.4   ALK PHOS U/L  --   --   --  93   ALT (SGPT) U/L  --   --   --  <5   AST (SGOT) U/L  --   --   --  23   GLUCOSE mg/dL 106* 89 89 100*       Estimated Creatinine Clearance: 7.9 mL/min (A) (by C-G formula based on SCr of 9.43 mg/dL (H)).    Results from last 7 days   Lab Units 01/07/25  0954 01/06/25  0715   MAGNESIUM mg/dL  --  1.7   PHOSPHORUS mg/dL 5.3* 8.3*             Results from last 7 days   Lab Units 01/08/25  0603 01/06/25  0715 01/05/25  0549 01/04/25  1005   WBC 10*3/mm3 8.50 8.25 7.74 7.43   HEMOGLOBIN g/dL 10.0* 9.8* 9.7* 10.1*   PLATELETS 10*3/mm3 151 149 151 165             Brief Urine Lab Results  (Last result in the past 365 days)        Color   Clarity   Blood   Leuk Est   Nitrite   Protein   CREAT   Urine HCG        07/17/24 1436 Yellow   Clear   Small   Negative   Negative   300 mg/dL                   No results found for: \"UTPCR\"    Imaging Results (Last 24 Hours)       ** No results found for the last 24 hours. **                Assessment / Plan     ASSESSMENT:    Intractable diarrhea    -End Stage Renal Disease ( ESRD ) on peritoneal dialysis .  Patient was switched to intermittent hemodialysis " status post right tunneled hemodialysis catheter after recent hip replacement requiring rehab.unfortunately unclear if his  dialysis regular schedule .Continue to arrange hemodialysis treatment during patient  admission. Continue renal diet   - Hypokalemia on KCL replacement   -Chronic normocytic anemia. On Epogen protocol. 10.000 SC TIW  We will continue to follow H&H closely   -Hyperphosphatemia. Continue binders  / renvela with meals, Continue renal diet. We will follow phosphorus to make further adjustments  -Secondary hyperparathyrodism . On calcitriol protocol . will follow closely   - History of right femur fracture s/p ORIF , requiring rehab  ( placed PD to HD temporary for rehab placement )   - Colitis , ruling out C diff on vancomycin , as per primary team       PLAN:  Patient said diarrhea is significantly better still feels weak.  Physical therapy is ongoing.  It appears patient does not have any help at home and will not be able to do peritoneal dialysis until he can do it himself.  Family has decided for him to go to long-term care in a nursing home.  We will continue with Monday Wednesday Friday dialysis while in the hospital.  Blood pressure is significantly better we will continue to watch closely may need further adjustment of his medications.  Details were discussed with the patient no family in the room.    Details were also discussed with the hospitalist service and or other providers as needed.   Continue with rest of the current treatment plan, and monitor with surveillance labs.  Further recommendations will depend on clinical course of the patient during the current hospitalization.   I have reviewed the copied text to this note, it was edited and the changes made as needed.  It is accurate to the point, when the note was signed today.     Thank you for involving us in the care of Travon Plummer.  Please feel free to call with any questions.    Andrea Sellers MD,  FASN  01/08/25  07:06 [facility]    [facility]  [phone]      Part of this note may be an electronic transcription/translation of spoken language to printed text using the Dragon Dictation System.

## 2025-01-08 NOTE — CASE MANAGEMENT/SOCIAL WORK
Case Management/Social Work    Patient Name:  Travon Plummer  YOB: 1945  MRN: [id number]  Admit Date:  1/4/2025        Family open to [facility]. NAI sent referral to [facility]. Facility able to offer a bed. [facility] will need to [place] [facility] [place] is ready for d/c. Pt is a TTS schedule for HD but did not start first day due to being in hospital. NAI updated CM. SW following. Pt already completed his 3 night IP stay.     13:15 EST Per Suha/Alisia, they do not have any LTC beds at this time. [facility] offered bed. NAI updated CM.     15:15 EST spouse/pt is agreeable to [facility] [place]. NAI updated team.       Electronically signed by:  JULIO Ashley  01/08/25 09:50 EST

## 2025-01-08 NOTE — THERAPY TREATMENT NOTE
Patient Name: Travon Plummer  : 1945    MRN: [id number]                              Today's Date: 2025       Admit Date: 2025    Visit Dx:     ICD-10-CM ICD-9-CM   1. Intractable diarrhea  R19.7 787.91   2. Dialysis patient  Z99.2 V45.11     Patient Active Problem List   Diagnosis    Coronary artery disease    Dyspnea    Cerebrovascular accident    Essential hypertension    Hypercholesterolemia    Tobacco abuse    Gout    Osteoarthritis    GERD (gastroesophageal reflux disease)    Obesity    Prostate cancer    Renal cell carcinoma    Nuclear sclerotic cataract of right eye    Symptomatic anemia    Iron deficiency anemia due to chronic blood loss    Melena    Chronic kidney disease, stage IV (severe)    Upper GI bleed    Absent kidney    Acquired hypothyroidism    Benign hypertensive kidney disease with chronic kidney disease    Dyslipidemia    Elevated prostate specific antigen (PSA)    Paroxysmal atrial fibrillation    Secondary hyperparathyroidism    Vitamin D deficiency    Urinary incontinence    Hematuria    Lower urinary tract symptoms (LUTS)    Chronic kidney disease, stage V    Closed right hip fracture    End stage renal failure on dialysis    Intractable diarrhea     Past Medical History:   Diagnosis Date    Benign hypertensive CKD, stage 5 chronic kidney disease or end stage renal disease     Broken arm     Carotid stenosis     bilateral    Cerebrovascular accident 10/31/2008    Coronary artery disease     followed by Dr. Ashford; LOV 24; denies chest pain, SOB 24    Dialysis patient     Current 24    Dyspnea     Elevated cholesterol     GERD (gastroesophageal reflux disease)     Gout     History of blood transfusion     Hypercholesterolemia     Hypertension 11/10/2015    History of hypertension; hypotensive at the time of office visit on 11/10/2015.    Impaired mobility     Obesity     Osteoarthritis     Paroxysmal atrial fibrillation     History of paroxysmal  atrial fibrillation, data deficit.    Prostate cancer 01/2015    Prostate cancer, diagnosed January of 2015, status post radiation therapy x39 treatments, 07/01/2015 at [facility].    Renal cell carcinoma 2015    Renal cell carcinoma, dx March of 2015, status post left nephrectomy.    Wears dentures     instructed no adhesive DOS     Past Surgical History:   Procedure Laterality Date    CAROTID STENT Left 10/31/2008    PTA/left carotid artery stent, 10/31/2008.     COLONOSCOPY N/A 12/23/2021    Procedure: COLONOSCOPY, polypectomy, clip placement x 1;  Surgeon: Deandra Do MD;  Location: [facility] ENDOSCOPY;  Service: Gastroenterology;  Laterality: N/A;    COLONOSCOPY W/ POLYPECTOMY      CORONARY ANGIOPLASTY WITH STENT PLACEMENT      A 3.5 x 13 mm. Cypher ESTIVEN to RCA expanded with 4 mm balloon.     DIALYSIS FISTULA CREATION N/A     abdominal    ENDOSCOPY N/A 12/22/2021    Procedure: ESOPHAGOGASTRODUODENOSCOPY with biopsy;  Surgeon: Deandra Do MD;  Location: [facility] ENDOSCOPY;  Service: Gastroenterology;  Laterality: N/A;    ENDOSCOPY N/A 02/17/2023    Procedure: ESOPHAGOGASTRODUODENOSCOPY with biopsy;  Surgeon: Georgina Pool MD;  Location: [facility] ENDOSCOPY;  Service: Gastroenterology;  Laterality: N/A;    HIP FRACTURE SURGERY      HIP INTERTROCHANTERIC NAILING Right 12/04/2024    Procedure: HIP INTERTROCHANTERIC NAILING;  Surgeon: Dean Hammonds MD;  Location: [facility] OR;  Service: Orthopedics;  Laterality: Right;    INGUINAL HERNIA REPAIR      INSERTION HEMODIALYSIS CATHETER N/A 1/3/2025    Procedure: HEMODIALYSIS CATHETER INSERTION;  Surgeon: Bijan Noel MD;  Location: [facility] OR;  Service: General;  Laterality: N/A;    NEPHRECTOMY Left 03/19/2015    diagnosed January 2015, status post left radical nephrectomy.     PROSTATE FIDUCIAL MARKER PLACEMENT  2016    received XRT for prostate CA in 2016      General Information       Row Name 01/08/25 1538          OT Time and Intention     Subjective Information complains of;weakness  -SD     Document Type therapy note (daily note)  -SD     Mode of Treatment occupational therapy  -SD     Patient Effort adequate  -SD     Symptoms Noted During/After Treatment fatigue  -SD       Row Name 01/08/25 1531          General Information    Patient Profile Reviewed yes  -SD               User Key  (r) = Recorded By, (t) = Taken By, (c) = Cosigned By      Initials Name Provider Type    SD Lynette Contreras OT Occupational Therapist                     Mobility/ADL's       Row Name 01/08/25 1531          Bed Mobility    Bed Mobility supine-sit  -SD     Supine-Sit Jones (Bed Mobility) minimum assist (75% patient effort)  -SD     Assistive Device (Bed Mobility) bed rails;head of bed elevated  -SD       Row Name 01/08/25 1531          Transfers    Transfers sit-stand transfer;toilet transfer  -SD       Row Name 01/08/25 1531          Sit-Stand Transfer    Sit-Stand Jones (Transfers) minimum assist (75% patient effort)  -SD     Assistive Device (Sit-Stand Transfers) walker, front-wheeled  -SD       Row Name 01/08/25 1531          Toilet Transfer    Type (Toilet Transfer) sit-stand;stand-sit  -SD     Jones Level (Toilet Transfer) minimum assist (75% patient effort)  -SD     Assistive Device (Toilet Transfer) commode, bedside without drop arms;walker, front-wheeled  -SD       Row Name 01/08/25 1531          Functional Mobility    Functional Mobility- Ind. Level contact guard assist;minimum assist (75% patient effort)  -SD     Functional Mobility- Device walker, front-wheeled  -SD     Functional Mobility-Distance (Feet) 12  -SD       Row Name 01/08/25 1531          Lower Body Dressing Assessment/Training    Jones Level (Lower Body Dressing) don;pants/bottoms;maximum assist (25% patient effort)  -SD       Row Name 01/08/25 1531          Grooming Assessment/Training    Jones Level (Grooming) wash face, hands;oral care regimen;standby  assist  -SD     Comment, (Grooming) dentures placed in cup to soak  -SD       Row Name 01/08/25 1531          Toileting Assessment/Training    [place] Level (Toileting) adjust/manage clothing;perform perineal hygiene;maximum assist (25% patient effort)  -SD     Assistive Devices (Toileting) commode, bedside without drop arms  -SD               User Key  (r) = Recorded By, (t) = Taken By, (c) = Cosigned By      Initials Name Provider Type    Lynette Pemberton OT Occupational Therapist                   Obj/Interventions       Row Name 01/08/25 1533          Shoulder (Therapeutic Exercise)    Shoulder Strengthening (Therapeutic Exercise) bilateral;flexion;extension;aBduction;aDduction;horizontal aBduction/aDduction;yellow;resistance band;15 repititions  -SD       Row Name 01/08/25 1533          Motor Skills    Therapeutic Exercise shoulder;elbow/forearm  -SD               User Key  (r) = Recorded By, (t) = Taken By, (c) = Cosigned By      Initials Name Provider Type    Lynette Pemberton OT Occupational Therapist                   Goals/Plan    No documentation.                  Clinical Impression       Row Name 01/08/25 1534          Pain Assessment    Pretreatment Pain Rating 8/10  -SD     Posttreatment Pain Rating 6/10  -SD     Pain Location extremity  -SD     Pain Side/Orientation right;lower  -SD     Pain Management Interventions exercise or physical activity utilized  -SD     Response to Pain Interventions activity participation with decreased pain  -SD       Row Name 01/08/25 1534          Plan of Care Review    Plan of Care Reviewed With patient  -SD     Progress improving  -SD     Outcome Evaluation OT tx completed. Patient is supine in bed, c/o 8/10 RLE but agreeable to participate. Patient performed supine to sit with min A. Performed sit to stand and tf to BSC with min A using RW. Patient required max A for perineal hygiene, and clothing management. Patient tf off commode and walked 12' using RW  CGA-Min A. Sitting in chair performed UB resistance exercises and grooming tasks. Patient left sitting in chair, call bell within reach, chair alarm on. Continue OT POC  -SD       Row Name 01/08/25 1534          Vital Signs    O2 Delivery Pre Treatment room air  -SD     O2 Delivery Intra Treatment room air  -SD     O2 Delivery Post Treatment room air  -SD       Row Name 01/08/25 1534          Positioning and Restraints    Pre-Treatment Position in bed  -SD     Post Treatment Position chair  -SD     In Chair sitting;call light within reach;encouraged to call for assist;exit alarm on  -SD               User Key  (r) = Recorded By, (t) = Taken By, (c) = Cosigned By      Initials Name Provider Type    Lynette Pemberton OT Occupational Therapist                   Outcome Measures       Row Name 01/08/25 1536          How much help from another is currently needed...    Putting on and taking off regular lower body clothing? 2  -SD     Bathing (including washing, rinsing, and drying) 2  -SD     Toileting (which includes using toilet bed pan or urinal) 2  -SD     Putting on and taking off regular upper body clothing 3  -SD     Taking care of personal grooming (such as brushing teeth) 3  -SD     Eating meals 3  -SD     AM-PAC 6 Clicks Score (OT) 15  -SD       Row Name 01/08/25 0800          How much help from another person do you currently need...    Turning from your back to your side while in flat bed without using bedrails? 3  -LA     Moving from lying on back to sitting on the side of a flat bed without bedrails? 3  -LA     Moving to and from a bed to a chair (including a wheelchair)? 4  -LA     Standing up from a chair using your arms (e.g., wheelchair, bedside chair)? 2  -LA     Climbing 3-5 steps with a railing? 2  -LA     To walk in hospital room? 2  -LA     AM-PAC 6 Clicks Score (PT) 16  -LA     Highest Level of Mobility Goal 5 --> Static standing  -LA       Row Name 01/08/25 1536          Functional Assessment     Outcome Measure Options AM-PAC 6 Clicks Daily Activity (OT)  -SD               User Key  (r) = Recorded By, (t) = Taken By, (c) = Cosigned By      Initials Name Provider Type    SD Lynette Contreras OT Occupational Therapist    Rody Wild, RN Registered Nurse                    Occupational Therapy Education       Title: PT OT SLP Therapies (In Progress)       Topic: Occupational Therapy (In Progress)       Point: ADL training (Done)       Description:   Instruct learner(s) on proper safety adaptation and remediation techniques during self care or transfers.   Instruct in proper use of assistive devices.                  Learning Progress Summary            Patient Acceptance, E,TB, VU by SD at 1/8/2025 1536    Comment: Benefit of OOB    Acceptance, E,TB, VU by SD at 1/7/2025 1456    Comment: safety during tf    Acceptance, E, VU by SP at 1/6/2025 1214    Comment: Pt was educated on the purpose of the OT eval and POC.                      Point: Home exercise program (Not Started)       Description:   Instruct learner(s) on appropriate technique for monitoring, assisting and/or progressing therapeutic exercises/activities.                  Learner Progress:  Not documented in this visit.              Point: Precautions (Not Started)       Description:   Instruct learner(s) on prescribed precautions during self-care and functional transfers.                  Learner Progress:  Not documented in this visit.              Point: Body mechanics (Not Started)       Description:   Instruct learner(s) on proper positioning and spine alignment during self-care, functional mobility activities and/or exercises.                  Learner Progress:  Not documented in this visit.                              User Key       Initials Effective Dates Name Provider Type Discipline    SD 06/16/21 -  Lynette Contreras OT Occupational Therapist OT    SP 09/08/22 -  Osiris Mckeon OT Occupational Therapist OT                  OT  Recommendation and Plan     Plan of Care Review  Plan of Care Reviewed With: patient  Progress: improving  Outcome Evaluation: OT tx completed. Patient is supine in bed, c/o 8/10 RLE but agreeable to participate. Patient performed supine to sit with min A. Performed sit to stand and tf to BSC with min A using RW. Patient required max A for perineal hygiene, and clothing management. Patient tf off commode and walked 12' using RW CGA-Min A. Sitting in chair performed UB resistance exercises and grooming tasks. Patient left sitting in chair, call bell within reach, chair alarm on. Continue OT POC     Time Calculation:         Time Calculation- OT       Row Name 01/08/25 1537             Time Calculation- OT    OT Start Time 1353  -SD      OT Stop Time 1440  -SD      OT Time Calculation (min) 47 min  -SD      OT Received On 01/08/25  -SD      OT Goal Re-Cert Due Date 01/16/25  -SD         Timed Charges    04998 - OT Therapeutic Exercise Minutes 15  -SD      36203 - OT Therapeutic Activity Minutes 16  -SD      95980 - OT Self Care/Mgmt Minutes 16  -SD         Total Minutes    Timed Charges Total Minutes 47  -SD       Total Minutes 47  -SD                User Key  (r) = Recorded By, (t) = Taken By, (c) = Cosigned By      Initials Name Provider Type    SD Lynette Contreras, OT Occupational Therapist                  Therapy Charges for Today       Code Description Service Date Service Provider Modifiers Qty    53127051074 HC OT THERAPEUTIC ACT EA 15 MIN 1/7/2025 BlakesleeLynette pruett, OT GO 1    01949269815 HC OT SELF CARE/MGMT/TRAIN EA 15 MIN 1/7/2025 Lynette Contreras OT GO 1    74479671648 HC OT THER PROC EA 15 MIN 1/8/2025 Lynette Contreras, OT GO, KX 1    37074267133 HC OT THERAPEUTIC ACT EA 15 MIN 1/8/2025 BlakesleeLynette, OT GO, KX 1    47136201529 HC OT SELF CARE/MGMT/TRAIN EA 15 MIN 1/8/2025 Lynette Contreras OT GO, KX 1                 Lynette Contreras, OT  1/8/2025

## 2025-01-08 NOTE — CASE MANAGEMENT/SOCIAL WORK
"1400:CM spoke with pt at bedside. Awake and answered questions coherently. Updated on bed offer from [facility]. Pt requested CM to call wife and do \"whatever she wants me to\" Voice Message left for Pat. CM to follow.    1500: CM spoke with wife per phone. Updated on bed offer from [facility]. Wife agreed to bed offer. SW updated.  "

## 2025-01-08 NOTE — PLAN OF CARE
Goal Outcome Evaluation:  Plan of Care Reviewed With: patient        Progress: improving  Outcome Evaluation: OT tx completed. Patient is supine in bed, c/o 8/10 RLE but agreeable to participate. Patient performed supine to sit with min A. Performed sit to stand and tf to BSC with min A using RW. Patient required max A for perineal hygiene, and clothing management. Patient tf off commode and walked 12' using RW CGA-Min A. Sitting in chair performed UB resistance exercises and grooming tasks. Patient left sitting in chair, call bell within reach, chair alarm on. Continue OT POC

## 2025-01-08 NOTE — PLAN OF CARE
Problem: Adult Inpatient Plan of Care  Goal: Absence of Hospital-Acquired Illness or Injury  Intervention: Identify and Manage Fall Risk  Recent Flowsheet Documentation  Taken 1/8/2025 1600 by Rody Mason RN  Safety Promotion/Fall Prevention:   safety round/check completed   room organization consistent   clutter free environment maintained   assistive device/personal items within reach  Taken 1/8/2025 1400 by Rody Mason RN  Safety Promotion/Fall Prevention:   safety round/check completed   room organization consistent   clutter free environment maintained   assistive device/personal items within reach  Taken 1/8/2025 1316 by Rody Mason RN  Safety Promotion/Fall Prevention:   safety round/check completed   room organization consistent   clutter free environment maintained   assistive device/personal items within reach  Taken 1/8/2025 1200 by Rody Mason RN  Safety Promotion/Fall Prevention: patient off unit  Taken 1/8/2025 1000 by Rody Mason RN  Safety Promotion/Fall Prevention: patient off unit  Taken 1/8/2025 0800 by Rody Mason RN  Safety Promotion/Fall Prevention:   safety round/check completed   room organization consistent   clutter free environment maintained   assistive device/personal items within reach  Intervention: Prevent Skin Injury  Recent Flowsheet Documentation  Taken 1/8/2025 1600 by Rody Mason RN  Body Position:   sitting up in bed   neutral head position   neutral body alignment  Taken 1/8/2025 1400 by Rody Mason RN  Body Position:   sitting up in bed   tilted   left  Taken 1/8/2025 1316 by Rody Mason RN  Body Position:   sitting up in bed   tilted   right  Taken 1/8/2025 0800 by Rody Mason RN  Body Position:   sitting up in bed   neutral head position   neutral body alignment  Intervention: Prevent Infection  Recent Flowsheet Documentation  Taken 1/8/2025 1600 by Rody Mason RN  Infection Prevention:   environmental surveillance performed   single patient room  provided  Taken 1/8/2025 1400 by Rody Mason RN  Infection Prevention:   environmental surveillance performed   single patient room provided  Taken 1/8/2025 1316 by Rody Mason RN  Infection Prevention:   environmental surveillance performed   single patient room provided  Taken 1/8/2025 0800 by Rody Mason RN  Infection Prevention:   environmental surveillance performed   single patient room provided  Goal: Optimal Comfort and Wellbeing  Intervention: Provide Person-Centered Care  Recent Flowsheet Documentation  Taken 1/8/2025 0800 by Rody Mason RN  Trust Relationship/Rapport:   care explained   thoughts/feelings acknowledged   reassurance provided   questions encouraged   questions answered     Problem: Skin Injury Risk Increased  Goal: Skin Health and Integrity  Intervention: Optimize Skin Protection  Recent Flowsheet Documentation  Taken 1/8/2025 1600 by Rody Mason RN  Activity Management: activity encouraged  Head of Bed (HOB) Positioning: HOB at 60-90 degrees  Taken 1/8/2025 1400 by Rody Mason RN  Activity Management: activity encouraged  Head of Bed (HOB) Positioning: HOB at 30-45 degrees  Taken 1/8/2025 1316 by Rody Mason RN  Activity Management: activity encouraged  Head of Bed (HOB) Positioning: HOB at 30-45 degrees  Taken 1/8/2025 0800 by Rody Mason RN  Activity Management: activity encouraged  Pressure Reduction Techniques:   frequent weight shift encouraged   heels elevated off bed   pressure points protected   weight shift assistance provided  Head of Bed (HOB) Positioning: HOB at 60-90 degrees  Pressure Reduction Devices:   positioning supports utilized   heel offloading device utilized     Problem: Fall Injury Risk  Goal: Absence of Fall and Fall-Related Injury  Intervention: Identify and Manage Contributors  Recent Flowsheet Documentation  Taken 1/8/2025 0800 by Rody Mason RN  Medication Review/Management: medications reviewed  Self-Care Promotion:   independence encouraged    BADL personal objects within reach  Intervention: Promote Injury-Free Environment  Recent Flowsheet Documentation  Taken 1/8/2025 1600 by Rody Mason RN  Safety Promotion/Fall Prevention:   safety round/check completed   room organization consistent   clutter free environment maintained   assistive device/personal items within reach  Taken 1/8/2025 1400 by Rody Mason RN  Safety Promotion/Fall Prevention:   safety round/check completed   room organization consistent   clutter free environment maintained   assistive device/personal items within reach  Taken 1/8/2025 1316 by Rody Mason RN  Safety Promotion/Fall Prevention:   safety round/check completed   room organization consistent   clutter free environment maintained   assistive device/personal items within reach  Taken 1/8/2025 1200 by Rody Mason RN  Safety Promotion/Fall Prevention: patient off unit  Taken 1/8/2025 1000 by Rody Mason RN  Safety Promotion/Fall Prevention: patient off unit  Taken 1/8/2025 0800 by Rody Mason RN  Safety Promotion/Fall Prevention:   safety round/check completed   room organization consistent   clutter free environment maintained   assistive device/personal items within reach     Problem: Hemodialysis  Goal: Safe, Effective Therapy Delivery  Intervention: Optimize Device Care and Function  Recent Flowsheet Documentation  Taken 1/8/2025 0800 by Rody Mason RN  Medication Review/Management: medications reviewed  Goal: Absence of Infection Signs and Symptoms  Intervention: Prevent or Manage Infection  Recent Flowsheet Documentation  Taken 1/8/2025 1600 by Rody Mason RN  Infection Prevention:   environmental surveillance performed   single patient room provided  Taken 1/8/2025 1400 by Rody Mason RN  Infection Prevention:   environmental surveillance performed   single patient room provided  Taken 1/8/2025 1316 by Rody Mason RN  Infection Prevention:   environmental surveillance performed   single patient room  provided  Taken 1/8/2025 0800 by Rody Mason, RN  Infection Prevention:   environmental surveillance performed   single patient room provided   Goal Outcome Evaluation:              Outcome Evaluation: Patient participated in therapy during shift. Had dialysis this morning. Anticipating possible discharge to [facility] tomorrow.

## 2025-01-09 VITALS
WEIGHT: 200.18 LBS | RESPIRATION RATE: 18 BRPM | DIASTOLIC BLOOD PRESSURE: 80 MMHG | HEART RATE: 73 BPM | SYSTOLIC BLOOD PRESSURE: 134 MMHG | HEIGHT: 75 IN | BODY MASS INDEX: 24.89 KG/M2 | TEMPERATURE: 97.5 F | OXYGEN SATURATION: 94 %

## 2025-01-09 LAB
ALBUMIN SERPL-MCNC: 2.6 G/DL (ref 3.5–5.2)
ANION GAP SERPL CALCULATED.3IONS-SCNC: 10.5 MMOL/L (ref 5–15)
BUN SERPL-MCNC: 21 MG/DL (ref 8–23)
BUN/CREAT SERPL: 3.1 (ref 7–25)
CALCIUM SPEC-SCNC: 8.5 MG/DL (ref 8.6–10.5)
CHLORIDE SERPL-SCNC: 103 MMOL/L (ref 98–107)
CO2 SERPL-SCNC: 23.5 MMOL/L (ref 22–29)
CREAT SERPL-MCNC: 6.82 MG/DL (ref 0.76–1.27)
EGFRCR SERPLBLD CKD-EPI 2021: 7.6 ML/MIN/1.73
GLUCOSE SERPL-MCNC: 94 MG/DL (ref 65–99)
PHOSPHATE SERPL-MCNC: 5 MG/DL (ref 2.5–4.5)
POTASSIUM SERPL-SCNC: 4 MMOL/L (ref 3.5–5.2)
SODIUM SERPL-SCNC: 137 MMOL/L (ref 136–145)

## 2025-01-09 PROCEDURE — 25010000002 HEPARIN (PORCINE) PER 1000 UNITS: Performed by: INTERNAL MEDICINE

## 2025-01-09 PROCEDURE — 99239 HOSP IP/OBS DSCHRG MGMT >30: CPT | Performed by: INTERNAL MEDICINE

## 2025-01-09 PROCEDURE — 80069 RENAL FUNCTION PANEL: CPT | Performed by: INTERNAL MEDICINE

## 2025-01-09 RX ORDER — AMLODIPINE BESYLATE 2.5 MG/1
2.5 TABLET ORAL
Qty: 30 TABLET | Refills: 0 | Status: SHIPPED | OUTPATIENT
Start: 2025-01-09 | End: 2025-02-08

## 2025-01-09 RX ORDER — METOPROLOL SUCCINATE 25 MG/1
12.5 TABLET, EXTENDED RELEASE ORAL
Qty: 15 TABLET | Refills: 0 | Status: SHIPPED | OUTPATIENT
Start: 2025-01-09 | End: 2025-02-08

## 2025-01-09 RX ORDER — VANCOMYCIN HYDROCHLORIDE 125 MG/1
125 CAPSULE ORAL EVERY 6 HOURS SCHEDULED
Qty: 21 CAPSULE | Refills: 0 | Status: SHIPPED | OUTPATIENT
Start: 2025-01-09 | End: 2025-01-15

## 2025-01-09 RX ORDER — SEVELAMER CARBONATE 800 MG/1
800 TABLET, FILM COATED ORAL
Qty: 90 TABLET | Refills: 0 | Status: SHIPPED | OUTPATIENT
Start: 2025-01-09 | End: 2025-02-08

## 2025-01-09 RX ORDER — BUSPIRONE HYDROCHLORIDE 5 MG/1
5 TABLET ORAL EVERY 12 HOURS SCHEDULED
Qty: 60 TABLET | Refills: 0 | Status: SHIPPED | OUTPATIENT
Start: 2025-01-09 | End: 2025-02-08

## 2025-01-09 RX ADMIN — VANCOMYCIN HYDROCHLORIDE 125 MG: 125 CAPSULE ORAL at 11:17

## 2025-01-09 RX ADMIN — SEVELAMER HYDROCHLORIDE 1600 MG: 800 TABLET, FILM COATED ORAL at 11:17

## 2025-01-09 RX ADMIN — AMLODIPINE BESYLATE 2.5 MG: 5 TABLET ORAL at 08:50

## 2025-01-09 RX ADMIN — VANCOMYCIN HYDROCHLORIDE 125 MG: 125 CAPSULE ORAL at 00:03

## 2025-01-09 RX ADMIN — CALCITRIOL CAPSULES 0.25 MCG 0.25 MCG: 0.25 CAPSULE ORAL at 08:50

## 2025-01-09 RX ADMIN — CHOLESTYRAMINE 4 G: 4 POWDER, FOR SUSPENSION ORAL at 06:09

## 2025-01-09 RX ADMIN — VANCOMYCIN HYDROCHLORIDE 125 MG: 125 CAPSULE ORAL at 06:09

## 2025-01-09 RX ADMIN — HEPARIN SODIUM 5000 UNITS: 5000 INJECTION INTRAVENOUS; SUBCUTANEOUS at 08:50

## 2025-01-09 RX ADMIN — SEVELAMER HYDROCHLORIDE 1600 MG: 800 TABLET, FILM COATED ORAL at 08:50

## 2025-01-09 RX ADMIN — ASPIRIN 81 MG: 81 TABLET, COATED ORAL at 08:50

## 2025-01-09 RX ADMIN — PANTOPRAZOLE SODIUM 40 MG: 40 TABLET, DELAYED RELEASE ORAL at 08:50

## 2025-01-09 RX ADMIN — BUSPIRONE HYDROCHLORIDE 5 MG: 5 TABLET ORAL at 08:50

## 2025-01-09 RX ADMIN — METOPROLOL SUCCINATE 12.5 MG: 25 TABLET, EXTENDED RELEASE ORAL at 08:50

## 2025-01-09 RX ADMIN — Medication 10 ML: at 08:49

## 2025-01-09 RX ADMIN — ACETAMINOPHEN 650 MG: 325 TABLET, FILM COATED ORAL at 03:10

## 2025-01-09 RX ADMIN — LEVOTHYROXINE SODIUM 25 MCG: 0.03 TABLET ORAL at 06:09

## 2025-01-09 NOTE — CASE MANAGEMENT/SOCIAL WORK
Case Management/Social Work    Patient Name:  Travon Plummer  YOB: 1945  MRN: [id number]  Admit Date:  1/4/2025        Pt able to admit to [place] today. Pt received HD yesterday. Pt has TTS schedule at [facility]. Pts next HD in clinic will be Saturday. [facility] arranged for transportation.       Electronically signed by:  JULIO Ashley  01/09/25 09:25 EST

## 2025-01-09 NOTE — DISCHARGE SUMMARY
[facility]   DISCHARGE SUMMARY      Name:  Travon Plummer   Age:  80 y.o.  Sex:  male  :  1945  MRN:  [id number]   Visit Number:  [id number]    Admission Date:  2025  Date of Discharge:  2025  Primary Care Physician:  Chilango Erickson MD      Discharge Diagnoses:     C. difficile infection POA  MARY on CKD/ESRD  Hypokalemia  Hypertension  Hyperlipidemia  CAD  Generalized weakness  Impaired Mobility and ADLs    Problem List:     Active Hospital Problems    Diagnosis  POA    **Intractable diarrhea [R19.7]  Yes      Resolved Hospital Problems   No resolved problems to display.     Presenting Problem:    Chief Complaint   Patient presents with    Diarrhea      Consults:     Consulting Physician(s)         Provider   Role Specialty     Andrea Sellers MD, JODY      Consulting Physician Nephrology          Procedures Performed:        History of presenting illness/Hospital Course:    Patient is a chronically ill 80-year-old male with history significant for hypertension, CKD stage V on peritoneal dialysis, and recent right hip fracture status post ORIF who presents from home with family due to intractable diarrhea progressing over the last 5 days.  Patient reports he was even having diarrhea in his sleep.  He was recently discharged from [facility] short-term rehab post ORIF.  Patient reports that he is also having issues walking however family states that they believe this is a motivation issue as patient was able to use his walker and ambulate throughout the house the other day.  Patient denies abdominal pain, fever, chest pain, shortness of breath or cough.     ED summary: Patient afebrile, hemodynamically stable, nonhypoxic on room air.  Potassium 3.2, creatinine 11.87 and BUN 53.  Lactate within normal limits.  CBC unremarkable except for baseline anemia.  C. difficile toxin negative, EIA positive.  Remainder of GI PCR panel negative.  COVID and flu  negative.  X-ray of the hip shows healing right intratrochanteric fracture status post ORIF.  No new fracture.  Family concerned about progressive weakness.  Inability to care for self at home.  Hospitalist asked to admit.     C. difficile infection   Given risk factor of multiple hospitalizations and recent short-term rehab stay, we will go ahead and treat as active C. difficile infection despite negative toxin.  Oral vancomycin  C. difficile precautions  Avoid antidiarrheal agents  MARY on CKD stage V/ESRD  Patient had been on peritoneal dialysis.  Patient had tunneled dialysis catheter placed by Dr. Noel 1/3/2025.  Nephrology consulted for inpatient hemodialysis  Hypokalemia  Secondary to diarrhea and GI losses    Follow up with primary physician. Continue hemodialysis. Follow up with nephrology.     Vital Signs:    Temp:  [97.5 °F (36.4 °C)-97.9 °F (36.6 °C)] 97.5 °F (36.4 °C)  Heart Rate:  [60-85] 73  Resp:  [16-24] 18  BP: (122-155)/(70-92) 134/80    Physical Exam:    General Appearance:  Alert and cooperative.    Head:  Atraumatic and normocephalic.   Eyes: Conjunctivae and sclerae normal, no icterus. No pallor.   Throat: No oral lesions, no thrush, oral mucosa moist.   Neck: Supple, trachea midline, no thyromegaly.   Lungs:   Breath sounds heard bilaterally equally.  No wheezing or crackles. No Pleural rub or bronchial breathing.   Heart:  Normal S1 and S2, no murmur, No JVD.   Abdomen:   Normal bowel sounds, Soft, nontender, nondistended, PD catheter and tunneled catheter present    Extremities: Supple, no edema, no cyanosis, no clubbing.   Skin: No bleeding or rash.   Neurologic: Alert and oriented x 3. No facial asymmetry. Moves all four limbs. No tremors.      Pertinent Lab Results:     Results from last 7 days   Lab Units 01/08/25  0603 01/07/25  0954 01/06/25  0715 01/05/25  0549 01/04/25  1005   SODIUM mmol/L 142 142 144   < > 142   POTASSIUM mmol/L 3.6 3.9 3.1*   < > 3.2*   CHLORIDE mmol/L 106 104  102   < > 100   CO2 mmol/L 22.1 24.9 22.5   < > 27.1   BUN mg/dL 35* 34* 57*   < > 53*   CREATININE mg/dL 10.41* 9.43* 12.97*   < > 11.87*   CALCIUM mg/dL 8.4* 8.5* 8.4*   < > 8.1*   BILIRUBIN mg/dL  --   --   --   --  0.4   ALK PHOS U/L  --   --   --   --  93   ALT (SGPT) U/L  --   --   --   --  <5   AST (SGOT) U/L  --   --   --   --  23   GLUCOSE mg/dL 90 106* 89   < > 100*    < > = values in this interval not displayed.     Results from last 7 days   Lab Units 01/08/25  0603 01/06/25  0715 01/05/25  0549   WBC 10*3/mm3 8.50 8.25 7.74   HEMOGLOBIN g/dL 10.0* 9.8* 9.7*   HEMATOCRIT % 34.0* 33.1* 32.8*   PLATELETS 10*3/mm3 151 149 151                     Results from last 7 days   Lab Units 01/04/25  1005   LIPASE U/L 36               Pertinent Radiology Results:    Imaging Results (All)       Procedure Component Value Units Date/Time    XR Hip With or Without Pelvis 2 - 3 View Right [[id number]] Collected: 01/04/25 1251     Updated: 01/04/25 1256    Narrative:      PROCEDURE: XR HIP W OR WO PELVIS 2-3 VIEW RIGHT-     HISTORY: eval for fx, pain     COMPARISON: Compared CT pelvis 12/3/2024.     FINDINGS:  A two view exam demonstrates mildly displaced right  intertrochanteric fracture as seen on prior CT. Patient is undergone  ORIF since the prior exam with stable alignment of the fracture  fragments. Fracture line is still visualized superiorly and laterally  and not well seen inferiorly and medially consistent with interval  healing. Diffuse osteopenia identified.. Dislocation. The joint spaces  appear unremarkable. No soft tissue abnormality is seen. 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 Peritoneal dialysis catheter is noted coiled in the left side of  the pelvis.       Impression:      Healing right intertrochanteric fracture status post ORIF;  no new fracture seen.                 This report was  signed and finalized on 1/4/2025 12:54 PM by Alice Castillo MD.               Echo:    Results for orders placed during the hospital encounter of 02/28/22    Adult Transthoracic Echo Complete W/ Cont if Necessary Per Protocol    Interpretation Summary  · Calculated left ventricular EF = 62%  · Sigmoid-shaped ventricular septum is present.  · Left ventricular diastolic function is consistent with (grade I) impaired relaxation.  · Mild mitral annular calcification is present. Mild to moderate mitral valve regurgitation is present  · The aortic valve exhibits sclerosis  · Mild tricuspid valve regurgitation is present    Condition on Discharge:      Stable.    Code status during the hospital stay:    Code Status and Medical Interventions: CPR (Attempt to Resuscitate); Full Support   Ordered at: 01/04/25 1402     Code Status (Patient has no pulse and is not breathing):    CPR (Attempt to Resuscitate)     Medical Interventions (Patient has pulse or is breathing):    Full Support     Discharge Disposition:    Long Term Care (DC - External)    Discharge Medications:       Discharge Medications        New Medications        Instructions Start Date   amLODIPine 2.5 MG tablet  Commonly known as: NORVASC   2.5 mg, Oral, Every 24 Hours Scheduled      busPIRone 5 MG tablet  Commonly known as: BUSPAR   5 mg, Oral, Every 12 Hours Scheduled      metoprolol succinate XL 25 MG 24 hr tablet  Commonly known as: TOPROL-XL   12.5 mg, Oral, Every 24 Hours Scheduled      sevelamer 800 MG tablet  Commonly known as: RENVELA   800 mg, Oral, 3 Times Daily With Meals      vancomycin 125 MG capsule  Commonly known as: VANCOCIN   125 mg, Oral, Every 6 Hours Scheduled             Continue These Medications        Instructions Start Date   aspirin 81 MG EC tablet   81 mg, Oral, Daily      calcitriol 0.25 MCG capsule  Commonly known as: ROCALTROL   1 capsule, Daily      ferrous sulfate 325 (65 FE) MG tablet   1 tablet, Weekly      levothyroxine 25 MCG  tablet  Commonly known as: SYNTHROID, LEVOTHROID   1 tablet, Daily      losartan 100 MG tablet  Commonly known as: COZAAR   1 tablet, Daily      pantoprazole 20 MG EC tablet  Commonly known as: PROTONIX   20 mg, Oral, Daily      polyethylene glycol 17 g packet  Commonly known as: MIRALAX   17 g, Oral, Daily PRN      rosuvastatin 40 MG tablet  Commonly known as: CRESTOR   40 mg, Oral, Nightly      sennosides-docusate 8.6-50 MG per tablet  Commonly known as: PERICOLACE   2 tablets, Oral, 2 Times Daily PRN      VITAMIN B-12 PO   1,000 mcg, Daily      Vitamin D 50 MCG (2000 UT) capsule   1 capsule, Daily             Stop These Medications      bumetanide 1 MG tablet  Commonly known as: BUMEX     carvedilol 6.25 MG tablet  Commonly known as: COREG            Discharge Diet:     Diet Instructions       Diet: Renal Diets; Low Sodium (2-3g), Low Potassium, Low Phosphorus; Regular (IDDSI 7); Thin (IDDSI 0)      Discharge Diet: Renal Diets    Renal Diet:  Low Sodium (2-3g)  Low Potassium  Low Phosphorus       Texture: Regular (IDDSI 7)    Fluid Consistency: Thin (IDDSI 0)          Activity at Discharge:     Activity Instructions       Activity as Tolerated            Follow-up Appointments:    Additional Instructions for the Follow-ups that You Need to Schedule       Discharge Follow-up with PCP   As directed       Currently Documented PCP:    Chilango Erickson MD    PCP Phone Number:    [phone]     Follow Up Details: 1 week               Contact information for follow-up providers       Chilango Erickson MD .    Specialty: Internal Medicine  Why: 1 week  Contact information:  [address]  [facility] [address] [phone]                       Contact information for after-discharge care       Destination       [facility] .    Service: Skilled Nursing  Contact information:  [address] [address] [phone]                                  Future Appointments   Date Time Provider Department Center   9/16/2025  1:30 PM Leonard Ashford MD [facility]   11/13/2025  3:30 PM Georgina Pool MD [facility]     Test Results Pending at Discharge:    Pending Results       Procedure [Order ID] Specimen - Date/Time    Renal Function Panel [[id number]]     Specimen: Blood     Urinalysis With Microscopic If Indicated (No Culture) - Urine, Clean Catch [[id number]]     Specimen: Urine, Clean Catch                  Manuel Maya DO  01/09/25  08:18 EST    Time: I spent >30 minutes on this discharge activity which included: face-to-face encounter with the patient, reviewing the data in the system, coordination of the care with the nursing staff as well as consultants, documentation, and entering orders.     Dictated utilizing Dragon dictation.

## 2025-01-09 NOTE — CASE MANAGEMENT/SOCIAL WORK
Case Management Discharge Note      Final Note: DC via EMS to [facility] for STR.         Selected Continued Care - Discharged on 1/9/2025 Admission date: 1/4/2025 - Discharge disposition: Long Term Care (DC - External)      Destination Coordination complete.      Service Provider Services Address Phone Fax Patient Preferred    [facility] [address] [id number] [phone] --              Durable Medical Equipment    No services have been selected for the patient.                Dialysis/Infusion    No services have been selected for the patient.                Home Medical Care    No services have been selected for the patient.                Therapy    No services have been selected for the patient.                Community Resources    No services have been selected for the patient.                Community & DME    No services have been selected for the patient.                    Selected Continued Care - Prior Encounters Includes continued care and service providers with selected services from prior encounters from 10/6/2024 to 1/9/2025      Discharged on 12/11/2024 Admission date: 12/3/2024 - Discharge disposition: Skilled Nursing Facility (DC - External)      Destination       Service Provider Services Address Phone Fax Patient Preferred    [facility] [address] [address] [phone] --              Home Medical Care       Service Provider Services Address Phone Fax Patient Preferred    [facility] [address] [address] [phone] --                          Transportation Services  Ambulance: [facility]    Final Discharge Disposition Code: 03 - skilled nursing facility (SNF)

## 2025-01-09 NOTE — PROGRESS NOTES
[facility] Progress Note  [facility]. [place]        Patient Name: Travon Plummer  : 1945  MRN: [id number]   LOS: 5 days    Patient Care Team:  Chilango Erickson MD as PCP - General (Internal Medicine)  Andrea Sellers MD, JODY as Consulting Physician (Nephrology)  Deandra Do MD as Surgeon (General Surgery)  Leonard Ashford MD as Consulting Physician (Cardiology)  Georgina Pool MD as Consulting Physician (Gastroenterology)    Chief Complaint:    Chief Complaint   Patient presents with    Diarrhea     Primary Care Physician:  Chilango Erickson MD  Date of admission: 2025    Subjective     Interval History:   Follow-up ESRD.  Events noted from last 24 hours.    I reviewed the chart and other providers notes, labs and procedures done since my last note.  Patient is lying in the bed awake alert and interactive, he still said he is weak in his legs.  Denies any chest pain or shortness of breath.  Most likely will be transferred to short-term rehab.  He still feels like that he wants to do peritoneal dialysis, I did explain to him that this is for 2 weeks so that he is more stable and can do it himself at home.  He agrees that he will do hemodialysis.    Review of Systems:   As noted above.    Objective     Vitals:   Temp:  [97.5 °F (36.4 °C)-97.9 °F (36.6 °C)] 97.5 °F (36.4 °C)  Heart Rate:  [60-85] 73  Resp:  [16-24] 18  BP: (122-155)/(70-92) 134/80    Intake/Output Summary (Last 24 hours) at 2025 0736  Last data filed at 2025 0700  Gross per 24 hour   Intake 720 ml   Output 1900 ml   Net -1180 ml       Physical Exam:    General Appearance: alert, oriented x 3, no acute distress   Skin: warm and dry  HEENT: oral mucosa normal, nonicteric sclera  Neck: supple, no JVD, tunneled dialysis catheter on the right chest.  Lungs: CTA  Heart: RRR, normal S1 and S2  Abdomen: obese, soft, nontender, non distended and positive bowel  sounds.  Peritoneal dialysis catheter in place.  : no palpable bladder  Extremities: Trace edema, no cyanosis or clubbing  Neuro: normal speech and mental status     Scheduled Meds:     Current Facility-Administered Medications   Medication Dose Route Frequency Provider Last Rate Last Admin    acetaminophen (TYLENOL) tablet 650 mg  650 mg Oral Q4H PRN Manuel Maya DO   650 mg at 01/09/25 0310    Or    acetaminophen (TYLENOL) 160 MG/5ML oral solution 650 mg  650 mg Oral Q4H PRN Manuel Maya DO        Or    acetaminophen (TYLENOL) suppository 650 mg  650 mg Rectal Q4H PRN Manuel Maya DO        albumin human 25 % IV SOLN 12.5 g  12.5 g Intravenous PRN Andrea Sellers MD, FASN        amLODIPine (NORVASC) tablet 2.5 mg  2.5 mg Oral Q24H Andrea Sellers MD, JODY   2.5 mg at 01/08/25 1312    aspirin EC tablet 81 mg  81 mg Oral Daily Manuel Maya DO   81 mg at 01/08/25 1312    sennosides-docusate (PERICOLACE) 8.6-50 MG per tablet 2 tablet  2 tablet Oral BID PRN Manuel Maya DO        And    polyethylene glycol (MIRALAX) packet 17 g  17 g Oral Daily PRN Manuel Maya DO        And    bisacodyl (DULCOLAX) EC tablet 5 mg  5 mg Oral Daily PRN Manuel Maya DO        And    bisacodyl (DULCOLAX) suppository 10 mg  10 mg Rectal Daily PRN Manuel Maya DO        busPIRone (BUSPAR) tablet 5 mg  5 mg Oral Q12H Andrea Sellers MD FASN   5 mg at 01/08/25 2154    calcitriol (ROCALTROL) capsule 0.25 mcg  0.25 mcg Oral Daily Manuel Maya DO   0.25 mcg at 01/08/25 1311    Calcium Replacement - Follow Nurse / BPA Driven Protocol   Not Applicable PRN Manuel Maya DO        cholestyramine light packet 4 g  1 packet Oral Q8H Andrea Sellers MD, FASN   4 g at 01/09/25 0609    heparin (porcine) 5000 UNIT/ML injection 5,000 Units  5,000 Units Subcutaneous Q12H Manuel Maya DO   5,000 Units at 01/08/25 2155    heparin (porcine) injection 1,000 Units  1,000 Units Intracatheter Andrea Bailey MD,  NICOLEN   1,000 Units at 01/06/25 1650    heparin (porcine) injection 1,000 Units  1,000 Units Intracatheter PRN Andrea Sellers MD, JODY   1,000 Units at 01/08/25 1241    levothyroxine (SYNTHROID, LEVOTHROID) tablet 25 mcg  25 mcg Oral Q AM Manuel Maya DO   25 mcg at 01/09/25 0609    losartan (COZAAR) tablet 100 mg  100 mg Oral Nightly Andrea Sellers MD, NICOLEN   100 mg at 01/08/25 2154    Magnesium Low Dose Replacement - Follow Nurse / BPA Driven Protocol   Not Applicable PRN Manuel Maya DO        melatonin tablet 5 mg  5 mg Oral Nightly PRN Manuel Maya DO   5 mg at 01/08/25 2154    metoprolol succinate XL (TOPROL-XL) 24 hr tablet 12.5 mg  12.5 mg Oral Q24H Andrea Sellers MD, JODY   12.5 mg at 01/08/25 1312    nitroglycerin (NITROSTAT) SL tablet 0.4 mg  0.4 mg Sublingual Q5 Min PRN Manuel Maya DO        ondansetron (ZOFRAN) injection 4 mg  4 mg Intravenous Q6H PRN Manuel Maya DO        pantoprazole (PROTONIX) EC tablet 40 mg  40 mg Oral Every Other Day Manuel aMya DO   40 mg at 01/07/25 0855    Pharmacy Consult   Not Applicable Continuous PRN Manuel Maya DO        Phosphorus Replacement - Follow Nurse / BPA Driven Protocol   Not Applicable PRN Manuel Maya DO        rosuvastatin (CRESTOR) tablet 40 mg  40 mg Oral Nightly Manuel Maya DO   40 mg at 01/08/25 2154    sevelamer (RENAGEL) tablet 1,600 mg  1,600 mg Oral TID With Meals Andrea Sellers MD, FASN   1,600 mg at 01/08/25 1722    sodium chloride 0.9 % flush 10 mL  10 mL Intravenous PRN Spenser Renae, DO        sodium chloride 0.9 % flush 10 mL  10 mL Intravenous Q12H Manuel Maya DO   10 mL at 01/08/25 2155    sodium chloride 0.9 % flush 10 mL  10 mL Intravenous PRN Manuel Maya,    10 mL at 01/08/25 1241    sodium chloride 0.9 % flush 10 mL  10 mL Intravenous PRN Andrea Sellers MD, FASN   10 mL at 01/08/25 1241    sodium chloride 0.9 % infusion 40 mL  40 mL Intravenous PRN Manuel Maya, DO         vancomycin (VANCOCIN) capsule 125 mg  125 mg Oral Q6H Manuel Maya DO   125 mg at 01/09/25 0609       amLODIPine, 2.5 mg, Oral, Q24H  aspirin, 81 mg, Oral, Daily  busPIRone, 5 mg, Oral, Q12H  calcitriol, 0.25 mcg, Oral, Daily  cholestyramine light, 1 packet, Oral, Q8H  heparin (porcine), 5,000 Units, Subcutaneous, Q12H  levothyroxine, 25 mcg, Oral, Q AM  losartan, 100 mg, Oral, Nightly  metoprolol succinate XL, 12.5 mg, Oral, Q24H  pantoprazole, 40 mg, Oral, Every Other Day  rosuvastatin, 40 mg, Oral, Nightly  sevelamer, 1,600 mg, Oral, TID With Meals  sodium chloride, 10 mL, Intravenous, Q12H  vancomycin, 125 mg, Oral, Q6H        IV Meds:   Pharmacy Consult,         Results Reviewed:   I have personally reviewed the results from the time of this admission to 1/9/2025 07:36 EST     Results from last 7 days   Lab Units 01/08/25  0603 01/07/25  0954 01/06/25  0715 01/05/25  0549 01/04/25  1005   SODIUM mmol/L 142 142 144   < > 142   POTASSIUM mmol/L 3.6 3.9 3.1*   < > 3.2*   CHLORIDE mmol/L 106 104 102   < > 100   CO2 mmol/L 22.1 24.9 22.5   < > 27.1   BUN mg/dL 35* 34* 57*   < > 53*   CREATININE mg/dL 10.41* 9.43* 12.97*   < > 11.87*   CALCIUM mg/dL 8.4* 8.5* 8.4*   < > 8.1*   BILIRUBIN mg/dL  --   --   --   --  0.4   ALK PHOS U/L  --   --   --   --  93   ALT (SGPT) U/L  --   --   --   --  <5   AST (SGOT) U/L  --   --   --   --  23   GLUCOSE mg/dL 90 106* 89   < > 100*    < > = values in this interval not displayed.       Estimated Creatinine Clearance: 7.3 mL/min (A) (by C-G formula based on SCr of 10.41 mg/dL (H)).    Results from last 7 days   Lab Units 01/08/25  0603 01/07/25  0954 01/06/25  0715   MAGNESIUM mg/dL  --   --  1.7   PHOSPHORUS mg/dL 5.8* 5.3* 8.3*             Results from last 7 days   Lab Units 01/08/25  0603 01/06/25  0715 01/05/25  0549 01/04/25  1005   WBC 10*3/mm3 8.50 8.25 7.74 7.43   HEMOGLOBIN g/dL 10.0* 9.8* 9.7* 10.1*   PLATELETS 10*3/mm3 151 149 151 165             Brief Urine Lab  "Results  (Last result in the past 365 days)        Color   Clarity   Blood   Leuk Est   Nitrite   Protein   CREAT   Urine HCG        07/17/24 1436 Yellow   Clear   Small   Negative   Negative   300 mg/dL                   No results found for: \"UTPCR\"    Imaging Results (Last 24 Hours)       ** No results found for the last 24 hours. **                Assessment / Plan     ASSESSMENT:    Intractable diarrhea    -End Stage Renal Disease ( ESRD ) on peritoneal dialysis .  Patient was switched to intermittent hemodialysis status post right tunneled hemodialysis catheter after recent hip replacement requiring rehab.unfortunately unclear if his  dialysis regular schedule .Continue to arrange hemodialysis treatment during patient  admission. Continue renal diet   - Hypokalemia on KCL replacement   -Chronic normocytic anemia. On Epogen protocol. 10.000 SC TIW  We will continue to follow H&H closely   -Hyperphosphatemia. Continue binders  / renvela with meals, Continue renal diet. We will follow phosphorus to make further adjustments  -Secondary hyperparathyrodism . On calcitriol protocol . will follow closely   - History of right femur fracture s/p ORIF , requiring rehab  ( placed PD to HD temporary for rehab placement )   - Colitis , ruling out C diff on vancomycin , as per primary team       PLAN:  Clinically doing much better, continue with the current treatment plan.  Patient is getting dialysis Monday Wednesday Friday in the hospital his outpatient schedule will be Tuesday Thursday and Saturday he can be discharged to the rehab and can resume his outpatient dialysis on Saturday.  I will see him at the dialysis clinic.  Outpatient dialysis orders have been set up.  He will still do backup hemodialysis for about a month to see if he gets good enough to go back to peritoneal dialysis at home otherwise will be switched to in center hemodialysis for good.  Blood pressure is significantly better we will continue to watch " closely may need further adjustment of his medications.  Details were discussed with the patient no family in the room.    Details were also discussed with the hospitalist service and or other providers as needed.   Continue with rest of the current treatment plan, and monitor with surveillance labs.  Further recommendations will depend on clinical course of the patient during the current hospitalization.   I have reviewed the copied text to this note, it was edited and the changes made as needed.  It is accurate to the point, when the note was signed today.     Thank you for involving us in the care of Travon Plummer.  Please feel free to call with any questions.    Andrea Sellers MD, FASN  01/09/25  07:36 [facility]    [facility]  [phone]      Part of this note may be an electronic transcription/translation of spoken language to printed text using the Dragon Dictation System.

## 2025-01-09 NOTE — PLAN OF CARE
Goal Outcome Evaluation: Patient being discharged to [place] via EMS today

## 2025-01-10 ENCOUNTER — NURSING HOME (OUTPATIENT)
Dept: INTERNAL MEDICINE | Facility: CLINIC | Age: 80
End: 2025-01-10
Payer: MEDICARE

## 2025-01-10 VITALS
SYSTOLIC BLOOD PRESSURE: 137 MMHG | BODY MASS INDEX: 23.92 KG/M2 | HEART RATE: 74 BPM | WEIGHT: 192.4 LBS | OXYGEN SATURATION: 95 % | TEMPERATURE: 97.2 F | RESPIRATION RATE: 18 BRPM | HEIGHT: 75 IN | DIASTOLIC BLOOD PRESSURE: 89 MMHG

## 2025-01-10 DIAGNOSIS — I25.10 CORONARY ARTERY DISEASE INVOLVING NATIVE HEART WITHOUT ANGINA PECTORIS, UNSPECIFIED VESSEL OR LESION TYPE: ICD-10-CM

## 2025-01-10 DIAGNOSIS — E87.6 HYPOKALEMIA: ICD-10-CM

## 2025-01-10 DIAGNOSIS — F41.9 ANXIETY: ICD-10-CM

## 2025-01-10 DIAGNOSIS — N18.6 ESRD (END STAGE RENAL DISEASE): ICD-10-CM

## 2025-01-10 DIAGNOSIS — A04.72 C. DIFFICILE DIARRHEA: Primary | ICD-10-CM

## 2025-01-10 DIAGNOSIS — E03.9 ACQUIRED HYPOTHYROIDISM: ICD-10-CM

## 2025-01-10 DIAGNOSIS — K59.01 SLOW TRANSIT CONSTIPATION: ICD-10-CM

## 2025-01-10 DIAGNOSIS — N17.9 AKI (ACUTE KIDNEY INJURY): ICD-10-CM

## 2025-01-10 DIAGNOSIS — I10 ESSENTIAL HYPERTENSION: ICD-10-CM

## 2025-01-10 PROCEDURE — 99306 1ST NF CARE HIGH MDM 50: CPT | Performed by: INTERNAL MEDICINE

## 2025-01-10 NOTE — LETTER
Nursing Home History and Physical       Virgil Rubi DO  [address] [place] [address] Phone: [phone]  Fax: [phone]     PATIENT NAME: Travon Plummer                                                                          YOB: 1945           DATE OF SERVICE: 01/10/2025  FACILITY: [facility]    CHIEF COMPLAINT:  Nursing facility admission    History of Present Illness  The patient is an 80-year-old male with a history of hypertension, CKD stage 5 on peritoneal dialysis, and a recent right hip fracture status post ORIF. He was recently hospitalized at [facility] for concerns of 5 days of diarrhea. After his hospital stay, he was transferred to [facility] for short-term rehab following his ORIF. Unfortunately, shortly after, he developed symptoms of diarrhea. Upon admission to the emergency room, he was found to have significant dehydration with a creatinine of 11.87, a potassium of 3.2, and a BUN of 53. He was tested for C. difficile toxin, which was negative. GI PCR panel was negative. COVID-19 and influenza were also negative upon admission. However, due to his high risk of C. difficile infection, he was empirically started on treatment with oral vancomycin. Due to significant dehydration as well as debility and weakness, he was then transferred to this facility for further strengthening and rehab.    He reports experiencing diarrhea since his hospitalization, with no control over his bowel movements. He has had approximately 6 bowel movements this morning.    He was transferred to this facility yesterday and has been receiving therapy. His peritoneal dialysis has been discontinued, and he is currently undergoing hemodialysis.    MEDICATIONS  Current: Rosuvastatin, aspirin 81 mg, metoprolol 12.5 mg, amlodipine, losartan 100 mg, buspirone 5 mg, levothyroxine 25 mcg, oral vancomycin.       PAST MEDICAL & SURGICAL HISTORY:   Past Medical History:    Diagnosis Date   • Benign hypertensive CKD, stage 5 chronic kidney disease or end stage renal disease    • Broken arm    • Carotid stenosis     bilateral   • Cerebrovascular accident 10/31/2008   • Coronary artery disease     followed by Dr. Ashford; LOV 7/9/24; denies chest pain, SOB 8/5/24   • Dialysis patient 2024    Current 11/13/24   • Dyspnea    • Elevated cholesterol    • GERD (gastroesophageal reflux disease)    • Gout    • History of blood transfusion    • Hypercholesterolemia    • Hypertension 11/10/2015    History of hypertension; hypotensive at the time of office visit on 11/10/2015.   • Impaired mobility    • Obesity    • Osteoarthritis    • Paroxysmal atrial fibrillation     History of paroxysmal atrial fibrillation, data deficit.   • Prostate cancer 01/2015    Prostate cancer, diagnosed January of 2015, status post radiation therapy x39 treatments, 07/01/2015 at [facility].   • Renal cell carcinoma 2015    Renal cell carcinoma, dx March of 2015, status post left nephrectomy.   • Wears dentures     instructed no adhesive DOS      Past Surgical History:   Procedure Laterality Date   • CAROTID STENT Left 10/31/2008    PTA/left carotid artery stent, 10/31/2008.    • COLONOSCOPY N/A 12/23/2021    Procedure: COLONOSCOPY, polypectomy, clip placement x 1;  Surgeon: Deandra Do MD;  Location: [facility] ENDOSCOPY;  Service: Gastroenterology;  Laterality: N/A;   • COLONOSCOPY W/ POLYPECTOMY     • CORONARY ANGIOPLASTY WITH STENT PLACEMENT      A 3.5 x 13 mm. Cypher ESTIVEN to RCA expanded with 4 mm balloon.    • DIALYSIS FISTULA CREATION N/A     abdominal   • ENDOSCOPY N/A 12/22/2021    Procedure: ESOPHAGOGASTRODUODENOSCOPY with biopsy;  Surgeon: Deandra Do MD;  Location: [facility] ENDOSCOPY;  Service: Gastroenterology;  Laterality: N/A;   • ENDOSCOPY N/A 02/17/2023    Procedure: ESOPHAGOGASTRODUODENOSCOPY with biopsy;  Surgeon: Georgina Pool MD;  Location: [facility] ENDOSCOPY;  Service:  Gastroenterology;  Laterality: N/A;   • HIP FRACTURE SURGERY     • HIP INTERTROCHANTERIC NAILING Right 2024    Procedure: HIP INTERTROCHANTERIC NAILING;  Surgeon: Dean Hammonds MD;  Location: [facility];  Service: Orthopedics;  Laterality: Right;   • INGUINAL HERNIA REPAIR     • INSERTION HEMODIALYSIS CATHETER N/A 1/3/2025    Procedure: HEMODIALYSIS CATHETER INSERTION;  Surgeon: Bijan Noel MD;  Location: [facility];  Service: General;  Laterality: N/A;   • NEPHRECTOMY Left 2015    diagnosed 2015, status post left radical nephrectomy.    • PROSTATE FIDUCIAL MARKER PLACEMENT  2016    received XRT for prostate CA in          MEDICATIONS:  I have reviewed and reconciled the patients medication list in the patients chart at the [facility] on 01/10/2025.      ALLERGIES:  Allergies   Allergen Reactions   • Allopurinol Urinary Retention   • Lipitor [Atorvastatin] Myalgia         SOCIAL HISTORY:  Social History     Socioeconomic History   • Marital status:    Tobacco Use   • Smoking status: Former     Current packs/day: 0.00     Average packs/day: 1 pack/day for 51.0 years (51.0 ttl pk-yrs)     Types: Cigarettes     Start date:      Quit date:      Years since quittin.0     Passive exposure: Past   • Smokeless tobacco: Former     Types: Chew   Vaping Use   • Vaping status: Never Used   Substance and Sexual Activity   • Alcohol use: Not Currently     Comment: rare   • Drug use: No   • Sexual activity: Defer       FAMILY HISTORY:  Family History   Problem Relation Age of Onset   • No Known Problems Mother    • No Known Problems Father    • Colon cancer Neg Hx         REVIEW OF SYSTEMS:  Review of Systems   Constitutional:  Negative for chills, fatigue and fever.   HENT:  Negative for congestion, ear pain, rhinorrhea, sinus pressure and sore throat.    Eyes:  Negative for visual disturbance.   Respiratory:  Negative for cough, chest tightness, shortness of  "breath and wheezing.    Cardiovascular:  Negative for chest pain, palpitations and leg swelling.   Gastrointestinal:  Positive for diarrhea. Negative for abdominal pain, blood in stool, constipation, nausea and vomiting.   Endocrine: Negative for polydipsia and polyuria.   Genitourinary:  Negative for dysuria and hematuria.   Musculoskeletal:  Negative for arthralgias and back pain.   Skin:  Negative for rash.   Neurological:  Negative for dizziness, light-headedness, numbness and headaches.   Psychiatric/Behavioral:  Negative for dysphoric mood and sleep disturbance. The patient is not nervous/anxious.        PHYSICAL EXAMINATION:   VITAL SIGNS: /89   Pulse 74   Temp 97.2 °F (36.2 °C)   Resp 18   Ht 190.5 cm (75\")   Wt 87.3 kg (192 lb 6.4 oz)   SpO2 95%   BMI 24.05 kg/m²     Physical Exam  Vitals and nursing note reviewed.   Constitutional:       Appearance: Normal appearance. He is well-developed.      Comments: Frail elderly male, NAD   HENT:      Head: Normocephalic and atraumatic.      Nose: Nose normal.      Mouth/Throat:      Mouth: Mucous membranes are moist.      Pharynx: No oropharyngeal exudate.   Eyes:      General: No scleral icterus.     Conjunctiva/sclera: Conjunctivae normal.      Pupils: Pupils are equal, round, and reactive to light.   Neck:      Thyroid: No thyromegaly.   Cardiovascular:      Rate and Rhythm: Normal rate and regular rhythm.      Heart sounds: Murmur heard.      No friction rub. No gallop.      Comments: HD catheter in place, right upper chest  Pulmonary:      Effort: Pulmonary effort is normal. No respiratory distress.      Breath sounds: Normal breath sounds. No wheezing.   Abdominal:      General: Bowel sounds are normal. There is no distension.      Palpations: Abdomen is soft.      Tenderness: There is no abdominal tenderness.   Musculoskeletal:         General: No deformity or signs of injury.      Cervical back: Normal range of motion and neck supple. "   Lymphadenopathy:      Cervical: No cervical adenopathy.   Skin:     General: Skin is warm and dry.      Findings: No rash.   Neurological:      Mental Status: He is alert and oriented to person, place, and time.   Psychiatric:         Mood and Affect: Mood normal.         Behavior: Behavior normal.         RECORDS REVIEW:   Discharge Summary R 1/9/2025  Results  Laboratory Studies  Creatinine of 11.87. Potassium of 3.2. BUN of 53.    Testing  C. difficile toxin test was negative. GI PCR panel was negative. Covid and flu tests were also negative.    ASSESSMENT   Diagnoses and all orders for this visit:    1. C. difficile diarrhea (Primary)    2. MARY (acute kidney injury)    3. ESRD (end stage renal disease)    4. Hypokalemia    5. Coronary artery disease involving native heart without angina pectoris, unspecified vessel or lesion type    6. Essential hypertension    7. Anxiety    8. Acquired hypothyroidism    9. Slow transit constipation        Assessment & Plan  1. C. difficile infection.  He has completed the prescribed course of oral vancomycin from the hospital. Continue to monitor for changes in bowel pattern and avoid antidiarrheal agents. Due to concerns of diarrhea, Docusate/Senna will be discontinued.  Labs have been ordered including CBC and CMP to follow up on blood counts and electrolytes.     2. Acute kidney injury on end-stage renal disease.  He has transitioned to HD from PD.. A tunneled dialysis catheter was placed by Dr. Noel on 01/03/2025. Nephrology has been consulted and will continue monitoring care for further management.    3. Hypokalemia.  Noted during hospitalization secondary to GI loss from diarrhea. Labs will be monitored in the nursing facility to ensure stability.    4. Coronary artery disease.  Stable on current cardiac medications. Continue rosuvastatin, aspirin 81 mg, and metoprolol 12.5 mg daily.    5. Hypertension.  Controlled on current medications. Continue amlodipine,  metoprolol, and losartan 100 mg daily.    6. Anxiety.  Continue buspirone 5 mg twice daily.    7. Hypothyroidism.  Continue levothyroxine 25 mcg daily.    8. Constipation.  He has been on stool softeners in the past these were stopped today.            [x]  Discussed Patient in detail with nursing/staff, addressed all needs today.     [x]  Plan of Care Reviewed   [x]  PT/OT Reviewed   [x]  Order Changes  []  Discharge Plans Reviewed  [x]  Advance Directive on file with Nursing Home.   [x]  POA on file with Nursing Home.    [x]  Code Status listed and reviewed.     Virgil Rubi DO.  1/13/2025      **Part of this note may be an electronic transcription/translation of spoken language to printed text using the Dragon Dictation System.**    Patient or patient representative verbalized consent for the use of Ambient Listening during the visit with  Virgil Rubi DO for chart documentation. 1/13/2025  09:48 EST

## 2025-01-10 NOTE — PROGRESS NOTES
Nursing Home History and Physical       Virgil Rubi DO  [address] [place] [address] Phone: [phone]  Fax: [phone]     PATIENT NAME: Travon Plummer                                                                          YOB: 1945           DATE OF SERVICE: 01/10/2025  FACILITY: [facility]    CHIEF COMPLAINT:  Nursing facility admission    History of Present Illness  The patient is an 80-year-old male with a history of hypertension, CKD stage 5 on peritoneal dialysis, and a recent right hip fracture status post ORIF. He was recently hospitalized at [facility] for concerns of 5 days of diarrhea. After his hospital stay, he was transferred to [facility] for short-term rehab following his ORIF. Unfortunately, shortly after, he developed symptoms of diarrhea. Upon admission to the emergency room, he was found to have significant dehydration with a creatinine of 11.87, a potassium of 3.2, and a BUN of 53. He was tested for C. difficile toxin, which was negative. GI PCR panel was negative. COVID-19 and influenza were also negative upon admission. However, due to his high risk of C. difficile infection, he was empirically started on treatment with oral vancomycin. Due to significant dehydration as well as debility and weakness, he was then transferred to this facility for further strengthening and rehab.    He reports experiencing diarrhea since his hospitalization, with no control over his bowel movements. He has had approximately 6 bowel movements this morning.    He was transferred to this facility yesterday and has been receiving therapy. His peritoneal dialysis has been discontinued, and he is currently undergoing hemodialysis.    MEDICATIONS  Current: Rosuvastatin, aspirin 81 mg, metoprolol 12.5 mg, amlodipine, losartan 100 mg, buspirone 5 mg, levothyroxine 25 mcg, oral vancomycin.       PAST MEDICAL & SURGICAL HISTORY:   Past Medical History:    Diagnosis Date    Benign hypertensive CKD, stage 5 chronic kidney disease or end stage renal disease     Broken arm     Carotid stenosis     bilateral    Cerebrovascular accident 10/31/2008    Coronary artery disease     followed by Dr. Ashford; LOV 7/9/24; denies chest pain, SOB 8/5/24    Dialysis patient 2024    Current 11/13/24    Dyspnea     Elevated cholesterol     GERD (gastroesophageal reflux disease)     Gout     History of blood transfusion     Hypercholesterolemia     Hypertension 11/10/2015    History of hypertension; hypotensive at the time of office visit on 11/10/2015.    Impaired mobility     Obesity     Osteoarthritis     Paroxysmal atrial fibrillation     History of paroxysmal atrial fibrillation, data deficit.    Prostate cancer 01/2015    Prostate cancer, diagnosed January of 2015, status post radiation therapy x39 treatments, 07/01/2015 at [facility].    Renal cell carcinoma 2015    Renal cell carcinoma, dx March of 2015, status post left nephrectomy.    Wears dentures     instructed no adhesive DOS      Past Surgical History:   Procedure Laterality Date    CAROTID STENT Left 10/31/2008    PTA/left carotid artery stent, 10/31/2008.     COLONOSCOPY N/A 12/23/2021    Procedure: COLONOSCOPY, polypectomy, clip placement x 1;  Surgeon: Deandra Do MD;  Location: [facility] ENDOSCOPY;  Service: Gastroenterology;  Laterality: N/A;    COLONOSCOPY W/ POLYPECTOMY      CORONARY ANGIOPLASTY WITH STENT PLACEMENT      A 3.5 x 13 mm. Cypher ESTIVEN to RCA expanded with 4 mm balloon.     DIALYSIS FISTULA CREATION N/A     abdominal    ENDOSCOPY N/A 12/22/2021    Procedure: ESOPHAGOGASTRODUODENOSCOPY with biopsy;  Surgeon: Deandra Do MD;  Location: [facility] ENDOSCOPY;  Service: Gastroenterology;  Laterality: N/A;    ENDOSCOPY N/A 02/17/2023    Procedure: ESOPHAGOGASTRODUODENOSCOPY with biopsy;  Surgeon: Georgina Pool MD;  Location: [facility] ENDOSCOPY;  Service: Gastroenterology;  Laterality:  N/A;    HIP FRACTURE SURGERY      HIP INTERTROCHANTERIC NAILING Right 2024    Procedure: HIP INTERTROCHANTERIC NAILING;  Surgeon: Dean Hammonds MD;  Location: [facility] OR;  Service: Orthopedics;  Laterality: Right;    INGUINAL HERNIA REPAIR      INSERTION HEMODIALYSIS CATHETER N/A 1/3/2025    Procedure: HEMODIALYSIS CATHETER INSERTION;  Surgeon: Bijan Noel MD;  Location: [facility] OR;  Service: General;  Laterality: N/A;    NEPHRECTOMY Left 2015    diagnosed 2015, status post left radical nephrectomy.     PROSTATE FIDUCIAL MARKER PLACEMENT  2016    received XRT for prostate CA in 2016         MEDICATIONS:  I have reviewed and reconciled the patients medication list in the patients chart at the [facility] nursing [facility] on 01/10/2025.      ALLERGIES:  Allergies   Allergen Reactions    Allopurinol Urinary Retention    Lipitor [Atorvastatin] Myalgia         SOCIAL HISTORY:  Social History     Socioeconomic History    Marital status:    Tobacco Use    Smoking status: Former     Current packs/day: 0.00     Average packs/day: 1 pack/day for 51.0 years (51.0 ttl pk-yrs)     Types: Cigarettes     Start date:      Quit date:      Years since quittin.0     Passive exposure: Past    Smokeless tobacco: Former     Types: Chew   Vaping Use    Vaping status: Never Used   Substance and Sexual Activity    Alcohol use: Not Currently     Comment: rare    Drug use: No    Sexual activity: Defer       FAMILY HISTORY:  Family History   Problem Relation Age of Onset    No Known Problems Mother     No Known Problems Father     Colon cancer Neg Hx         REVIEW OF SYSTEMS:  Review of Systems   Constitutional:  Negative for chills, fatigue and fever.   HENT:  Negative for congestion, ear pain, rhinorrhea, sinus pressure and sore throat.    Eyes:  Negative for visual disturbance.   Respiratory:  Negative for cough, chest tightness, shortness of breath and wheezing.    Cardiovascular:  Negative for  "chest pain, palpitations and leg swelling.   Gastrointestinal:  Positive for diarrhea. Negative for abdominal pain, blood in stool, constipation, nausea and vomiting.   Endocrine: Negative for polydipsia and polyuria.   Genitourinary:  Negative for dysuria and hematuria.   Musculoskeletal:  Negative for arthralgias and back pain.   Skin:  Negative for rash.   Neurological:  Negative for dizziness, light-headedness, numbness and headaches.   Psychiatric/Behavioral:  Negative for dysphoric mood and sleep disturbance. The patient is not nervous/anxious.        PHYSICAL EXAMINATION:   VITAL SIGNS: /89   Pulse 74   Temp 97.2 °F (36.2 °C)   Resp 18   Ht 190.5 cm (75\")   Wt 87.3 kg (192 lb 6.4 oz)   SpO2 95%   BMI 24.05 kg/m²     Physical Exam  Vitals and nursing note reviewed.   Constitutional:       Appearance: Normal appearance. He is well-developed.      Comments: Frail elderly male, NAD   HENT:      Head: Normocephalic and atraumatic.      Nose: Nose normal.      Mouth/Throat:      Mouth: Mucous membranes are moist.      Pharynx: No oropharyngeal exudate.   Eyes:      General: No scleral icterus.     Conjunctiva/sclera: Conjunctivae normal.      Pupils: Pupils are equal, round, and reactive to light.   Neck:      Thyroid: No thyromegaly.   Cardiovascular:      Rate and Rhythm: Normal rate and regular rhythm.      Heart sounds: Murmur heard.      No friction rub. No gallop.      Comments: HD catheter in place, right upper chest  Pulmonary:      Effort: Pulmonary effort is normal. No respiratory distress.      Breath sounds: Normal breath sounds. No wheezing.   Abdominal:      General: Bowel sounds are normal. There is no distension.      Palpations: Abdomen is soft.      Tenderness: There is no abdominal tenderness.   Musculoskeletal:         General: No deformity or signs of injury.      Cervical back: Normal range of motion and neck supple.   Lymphadenopathy:      Cervical: No cervical adenopathy. "   Skin:     General: Skin is warm and dry.      Findings: No rash.   Neurological:      Mental Status: He is alert and oriented to person, place, and time.   Psychiatric:         Mood and Affect: Mood normal.         Behavior: Behavior normal.         RECORDS REVIEW:   Discharge Summary R 1/9/2025  Results  Laboratory Studies  Creatinine of 11.87. Potassium of 3.2. BUN of 53.    Testing  C. difficile toxin test was negative. GI PCR panel was negative. Covid and flu tests were also negative.    ASSESSMENT   Diagnoses and all orders for this visit:    1. C. difficile diarrhea (Primary)    2. MARY (acute kidney injury)    3. ESRD (end stage renal disease)    4. Hypokalemia    5. Coronary artery disease involving native heart without angina pectoris, unspecified vessel or lesion type    6. Essential hypertension    7. Anxiety    8. Acquired hypothyroidism    9. Slow transit constipation        Assessment & Plan  1. C. difficile infection.  He has completed the prescribed course of oral vancomycin from the hospital. Continue to monitor for changes in bowel pattern and avoid antidiarrheal agents. Due to concerns of diarrhea, Docusate/Senna will be discontinued.  Labs have been ordered including CBC and CMP to follow up on blood counts and electrolytes.     2. Acute kidney injury on end-stage renal disease.  He has transitioned to HD from PD.. A tunneled dialysis catheter was placed by Dr. Noel on 01/03/2025. Nephrology has been consulted and will continue monitoring care for further management.    3. Hypokalemia.  Noted during hospitalization secondary to GI loss from diarrhea. Labs will be monitored in the nursing facility to ensure stability.    4. Coronary artery disease.  Stable on current cardiac medications. Continue rosuvastatin, aspirin 81 mg, and metoprolol 12.5 mg daily.    5. Hypertension.  Controlled on current medications. Continue amlodipine, metoprolol, and losartan 100 mg daily.    6. Anxiety.  Continue  buspirone 5 mg twice daily.    7. Hypothyroidism.  Continue levothyroxine 25 mcg daily.    8. Constipation.  He has been on stool softeners in the past these were stopped today.            [x]  Discussed Patient in detail with nursing/staff, addressed all needs today.     [x]  Plan of Care Reviewed   [x]  PT/OT Reviewed   [x]  Order Changes  []  Discharge Plans Reviewed  [x]  Advance Directive on file with Nursing Home.   [x]  POA on file with Nursing Home.    [x]  Code Status listed and reviewed.     Virgil Rubi DO.  1/13/2025      **Part of this note may be an electronic transcription/translation of spoken language to printed text using the Dragon Dictation System.**    Patient or patient representative verbalized consent for the use of Ambient Listening during the visit with  Virgil Rubi DO for chart documentation. 1/13/2025  09:48 EST

## 2025-01-15 ENCOUNTER — NURSING HOME (OUTPATIENT)
Dept: INTERNAL MEDICINE | Facility: CLINIC | Age: 80
End: 2025-01-15
Payer: MEDICARE

## 2025-01-15 VITALS
BODY MASS INDEX: 23.65 KG/M2 | HEIGHT: 75 IN | RESPIRATION RATE: 18 BRPM | SYSTOLIC BLOOD PRESSURE: 151 MMHG | OXYGEN SATURATION: 96 % | HEART RATE: 77 BPM | WEIGHT: 190.2 LBS | DIASTOLIC BLOOD PRESSURE: 82 MMHG | TEMPERATURE: 97.5 F

## 2025-01-15 DIAGNOSIS — N18.6 ESRD (END STAGE RENAL DISEASE): ICD-10-CM

## 2025-01-15 DIAGNOSIS — I25.10 CORONARY ARTERY DISEASE INVOLVING NATIVE HEART WITHOUT ANGINA PECTORIS, UNSPECIFIED VESSEL OR LESION TYPE: ICD-10-CM

## 2025-01-15 DIAGNOSIS — A04.72 C. DIFFICILE DIARRHEA: ICD-10-CM

## 2025-01-15 DIAGNOSIS — E03.9 ACQUIRED HYPOTHYROIDISM: ICD-10-CM

## 2025-01-15 DIAGNOSIS — R19.7 DIARRHEA, UNSPECIFIED TYPE: Primary | ICD-10-CM

## 2025-01-15 DIAGNOSIS — I10 ESSENTIAL HYPERTENSION: ICD-10-CM

## 2025-01-15 PROCEDURE — 99309 SBSQ NF CARE MODERATE MDM 30: CPT | Performed by: INTERNAL MEDICINE

## 2025-01-15 NOTE — LETTER
Nursing Home Progress Note        Virgil Elicia,    [address] [place] [address] Phone: [phone]  Fax: [phone]     PATIENT NAME: Travon Plummer                                                                          YOB: 1945           DATE OF SERVICE: 01/15/2025  FACILITY:  [facility]      CHIEF COMPLAINT:  Chronic Medical Management    History of Present Illness    Patient was seen today via telemedicine for acute concerns of persistent diarrhea.  Patient was now completing his oral vancomycin treatment as of today.  He stated to the nurses that he was continuing to have loose stools and wished to discuss with me.  Upon our discussion today, patient stated that he was actually having some improved symptoms but continued to have intermittent periods of time with loose stools.  For example, yesterday he had 3 loose stools in a 2-hour span.  After that, patient did not have any bowel movements.         PAST MEDICAL & SURGICAL HISTORY:      Past Surgical History:   Procedure Laterality Date   • CAROTID STENT Left 10/31/2008    PTA/left carotid artery stent, 10/31/2008.    • COLONOSCOPY N/A 12/23/2021    Procedure: COLONOSCOPY, polypectomy, clip placement x 1;  Surgeon: Deandra Do MD;  Location: [facility] ENDOSCOPY;  Service: Gastroenterology;  Laterality: N/A;   • COLONOSCOPY W/ POLYPECTOMY     • CORONARY ANGIOPLASTY WITH STENT PLACEMENT      A 3.5 x 13 mm. Cypher ESTIVEN to RCA expanded with 4 mm balloon.    • DIALYSIS FISTULA CREATION N/A     abdominal   • ENDOSCOPY N/A 12/22/2021    Procedure: ESOPHAGOGASTRODUODENOSCOPY with biopsy;  Surgeon: Deandra Do MD;  Location: [facility] ENDOSCOPY;  Service: Gastroenterology;  Laterality: N/A;   • ENDOSCOPY N/A 02/17/2023    Procedure: ESOPHAGOGASTRODUODENOSCOPY with biopsy;  Surgeon: Georgina Pool MD;  Location: [facility] ENDOSCOPY;  Service: Gastroenterology;  Laterality: N/A;   • HIP FRACTURE SURGERY     • HIP  INTERTROCHANTERIC NAILING Right 2024    Procedure: HIP INTERTROCHANTERIC NAILING;  Surgeon: Dean Hammonds MD;  Location: [facility];  Service: Orthopedics;  Laterality: Right;   • INGUINAL HERNIA REPAIR     • INSERTION HEMODIALYSIS CATHETER N/A 1/3/2025    Procedure: HEMODIALYSIS CATHETER INSERTION;  Surgeon: Bijan Noel MD;  Location: [facility];  Service: General;  Laterality: N/A;   • NEPHRECTOMY Left 2015    diagnosed 2015, status post left radical nephrectomy.    • PROSTATE FIDUCIAL MARKER PLACEMENT  2016    received XRT for prostate CA in 2016         MEDICATIONS:  I have reviewed and reconciled the patients medication list in the patients chart at the [facility] nursing facility today, 01/15/2025.      ALLERGIES:  Allergies   Allergen Reactions   • Allopurinol Urinary Retention   • Lipitor [Atorvastatin] Myalgia         SOCIAL HISTORY:  Social History     Socioeconomic History   • Marital status:    Tobacco Use   • Smoking status: Former     Current packs/day: 0.00     Average packs/day: 1 pack/day for 51.0 years (51.0 ttl pk-yrs)     Types: Cigarettes     Start date:      Quit date:      Years since quittin.0     Passive exposure: Past   • Smokeless tobacco: Former     Types: Chew   Vaping Use   • Vaping status: Never Used   Substance and Sexual Activity   • Alcohol use: Not Currently     Comment: rare   • Drug use: No   • Sexual activity: Defer       FAMILY HISTORY:  Family History   Problem Relation Age of Onset   • No Known Problems Mother    • No Known Problems Father    • Colon cancer Neg Hx        REVIEW OF SYSTEMS:  Review of Systems   Constitutional:  Negative for chills, fatigue and fever.   HENT:  Negative for congestion, ear pain, rhinorrhea, sinus pressure and sore throat.    Eyes:  Negative for visual disturbance.   Respiratory:  Negative for cough, chest tightness, shortness of breath and wheezing.    Cardiovascular:  Negative for chest pain,  "palpitations and leg swelling.   Gastrointestinal:  Positive for diarrhea. Negative for abdominal pain, blood in stool, constipation, nausea and vomiting.   Endocrine: Negative for polydipsia and polyuria.   Genitourinary:  Negative for dysuria and hematuria.   Musculoskeletal:  Negative for arthralgias and back pain.   Skin:  Negative for rash.   Neurological:  Negative for dizziness, light-headedness, numbness and headaches.   Psychiatric/Behavioral:  Negative for dysphoric mood and sleep disturbance. The patient is not nervous/anxious.         PHYSICAL EXAMINATION:     VITAL SIGNS:  /82   Pulse 77   Temp 97.5 °F (36.4 °C)   Resp 18   Ht 190.5 cm (75\")   Wt 86.3 kg (190 lb 3.2 oz)   SpO2 96%   BMI 23.77 kg/m²     Physical Exam  Vitals and nursing note reviewed.   Constitutional:       Appearance: Normal appearance. He is well-developed.      Comments: Frail elderly male, NAD   HENT:      Head: Normocephalic and atraumatic.      Nose: Nose normal.      Mouth/Throat:      Mouth: Mucous membranes are moist.      Pharynx: No oropharyngeal exudate.   Eyes:      General: No scleral icterus.     Conjunctiva/sclera: Conjunctivae normal.      Pupils: Pupils are equal, round, and reactive to light.   Neck:      Thyroid: No thyromegaly.   Cardiovascular:      Rate and Rhythm: Normal rate and regular rhythm.      Heart sounds: Murmur heard.      No friction rub. No gallop.      Comments: HD catheter in place, right upper chest  Pulmonary:      Effort: Pulmonary effort is normal. No respiratory distress.      Breath sounds: Normal breath sounds. No wheezing.   Abdominal:      General: Bowel sounds are normal. There is no distension.      Palpations: Abdomen is soft.      Tenderness: There is no abdominal tenderness.   Musculoskeletal:         General: No deformity or signs of injury.      Cervical back: Normal range of motion and neck supple.   Lymphadenopathy:      Cervical: No cervical adenopathy.   Skin:     " General: Skin is warm and dry.      Findings: No rash.   Neurological:      Mental Status: He is alert and oriented to person, place, and time.   Psychiatric:         Mood and Affect: Mood normal.         Behavior: Behavior normal.       RECORDS REVIEW:   Results        ASSESSMENT     Diagnoses and all orders for this visit:    1. Diarrhea, unspecified type (Primary)    2. ESRD (end stage renal disease)    3. C. difficile diarrhea    4. Essential hypertension    5. Coronary artery disease involving native heart without angina pectoris, unspecified vessel or lesion type    6. Acquired hypothyroidism        Assessment & Plan      Chronic diarrhea  -Although patient has completed treatment for presumed C. difficile infection, he continues to have loose stools.  -Medication cause is a consideration.  I contacted the patient's nephrologist, Dr. Sellers, who shared that he does not believe any of his current renal medications are contributing to the loose stools.  Sevelamer can in fact cause constipation.  -It is okay for the patient to have 1 dose of Imodium today if diarrhea persists.  I would like for him to start daily fiber supplements to help keep regular. Order placed for fiber.   -MiraLAX and docusate/senna have been discontinued (although the patient has not been taking this medication).    C. difficile infection.  -Patient completes oral vancomycin today.  Other than loose stools, patient did not have any other symptoms associated with C. difficile infection.     Acute kidney injury on end-stage renal disease.  He has transitioned to HD from PD.. A tunneled dialysis catheter was placed by Dr. Noel on 01/03/2025. Nephrology has been consulted and will continue monitoring care for further management.  - PD catheter remains in place and may be removed per nephrology instructions when appropriate.      Hypokalemia.  Noted during hospitalization secondary to GI loss from diarrhea. Labs will be monitored in the  nursing facility to ensure stability.     Coronary artery disease.  Stable on current cardiac medications. Continue rosuvastatin, aspirin 81 mg, and metoprolol 12.5 mg daily.     Hypertension.  Controlled on current medications. Continue amlodipine, metoprolol, and losartan 100 mg daily.      Anxiety.  Continue buspirone 5 mg twice daily.      Hypothyroidism.  Continue levothyroxine 25 mcg daily.     Constipation.  He has been on stool softeners in the past, these have been stopped due to issues with loose stools.        32 min spent with direct patient care, review of medical chart, discussion with nursing staff, and medical decision making.     The use of a video visit has been reviewed with the patient and verbal informed consent has been obtained.       [x]  Discussed Patient in detail with nursing/staff, addressed all needs today.     [x]  Plan of Care Reviewed   []  PT/OT Reviewed   [x]  Order Changes  []  Discharge Plans Reviewed  [x]  Advance Directive on file with Nursing Home.   [x]  POA on file with Nursing Home.    [x]  Code Status listed and reviewed.     I confirm accuracy of unchanged data/findings including physical exam and plan which have been carried forward from previous visit, as well as I have updated appropriately those that have changed        Virgil Rubi DO.  1/15/2025      Patient or patient representative verbalized consent for the use of Ambient Listening during the visit with  Virgil Rubi DO for chart documentation. 1/15/2025  10:41 EST

## 2025-01-15 NOTE — PROGRESS NOTES
Nursing Home Progress Note        Virgiljenae Rubi    [address] [place] [address] Phone: [phone]  Fax: [phone]     PATIENT NAME: Travon Plummer                                                                          YOB: 1945           DATE OF SERVICE: 01/15/2025  FACILITY:  [facility]      CHIEF COMPLAINT:  Chronic Medical Management    History of Present Illness    Patient was seen today via telemedicine for acute concerns of persistent diarrhea.  Patient was now completing his oral vancomycin treatment as of today.  He stated to the nurses that he was continuing to have loose stools and wished to discuss with me.  Upon our discussion today, patient stated that he was actually having some improved symptoms but continued to have intermittent periods of time with loose stools.  For example, yesterday he had 3 loose stools in a 2-hour span.  After that, patient did not have any bowel movements.         PAST MEDICAL & SURGICAL HISTORY:      Past Surgical History:   Procedure Laterality Date    CAROTID STENT Left 10/31/2008    PTA/left carotid artery stent, 10/31/2008.     COLONOSCOPY N/A 12/23/2021    Procedure: COLONOSCOPY, polypectomy, clip placement x 1;  Surgeon: Deandra Do MD;  Location: [facility] ENDOSCOPY;  Service: Gastroenterology;  Laterality: N/A;    COLONOSCOPY W/ POLYPECTOMY      CORONARY ANGIOPLASTY WITH STENT PLACEMENT      A 3.5 x 13 mm. Cypher ESTIVEN to RCA expanded with 4 mm balloon.     DIALYSIS FISTULA CREATION N/A     abdominal    ENDOSCOPY N/A 12/22/2021    Procedure: ESOPHAGOGASTRODUODENOSCOPY with biopsy;  Surgeon: Deandra Do MD;  Location: [facility] ENDOSCOPY;  Service: Gastroenterology;  Laterality: N/A;    ENDOSCOPY N/A 02/17/2023    Procedure: ESOPHAGOGASTRODUODENOSCOPY with biopsy;  Surgeon: Georgina Pool MD;  Location: [facility] ENDOSCOPY;  Service: Gastroenterology;  Laterality: N/A;    HIP FRACTURE SURGERY      HIP  INTERTROCHANTERIC NAILING Right 2024    Procedure: HIP INTERTROCHANTERIC NAILING;  Surgeon: Dean Hammonds MD;  Location: [facility];  Service: Orthopedics;  Laterality: Right;    INGUINAL HERNIA REPAIR      INSERTION HEMODIALYSIS CATHETER N/A 1/3/2025    Procedure: HEMODIALYSIS CATHETER INSERTION;  Surgeon: Bijan Noel MD;  Location: [facility];  Service: General;  Laterality: N/A;    NEPHRECTOMY Left 2015    diagnosed 2015, status post left radical nephrectomy.     PROSTATE FIDUCIAL MARKER PLACEMENT  2016    received XRT for prostate CA in 2016         MEDICATIONS:  I have reviewed and reconciled the patients medication list in the patients chart at the skilled nursing facility today, 01/15/2025.      ALLERGIES:  Allergies   Allergen Reactions    Allopurinol Urinary Retention    Lipitor [Atorvastatin] Myalgia         SOCIAL HISTORY:  Social History     Socioeconomic History    Marital status:    Tobacco Use    Smoking status: Former     Current packs/day: 0.00     Average packs/day: 1 pack/day for 51.0 years (51.0 ttl pk-yrs)     Types: Cigarettes     Start date:      Quit date:      Years since quittin.0     Passive exposure: Past    Smokeless tobacco: Former     Types: Chew   Vaping Use    Vaping status: Never Used   Substance and Sexual Activity    Alcohol use: Not Currently     Comment: rare    Drug use: No    Sexual activity: Defer       FAMILY HISTORY:  Family History   Problem Relation Age of Onset    No Known Problems Mother     No Known Problems Father     Colon cancer Neg Hx        REVIEW OF SYSTEMS:  Review of Systems   Constitutional:  Negative for chills, fatigue and fever.   HENT:  Negative for congestion, ear pain, rhinorrhea, sinus pressure and sore throat.    Eyes:  Negative for visual disturbance.   Respiratory:  Negative for cough, chest tightness, shortness of breath and wheezing.    Cardiovascular:  Negative for chest pain, palpitations and leg  "swelling.   Gastrointestinal:  Positive for diarrhea. Negative for abdominal pain, blood in stool, constipation, nausea and vomiting.   Endocrine: Negative for polydipsia and polyuria.   Genitourinary:  Negative for dysuria and hematuria.   Musculoskeletal:  Negative for arthralgias and back pain.   Skin:  Negative for rash.   Neurological:  Negative for dizziness, light-headedness, numbness and headaches.   Psychiatric/Behavioral:  Negative for dysphoric mood and sleep disturbance. The patient is not nervous/anxious.         PHYSICAL EXAMINATION:     VITAL SIGNS:  /82   Pulse 77   Temp 97.5 °F (36.4 °C)   Resp 18   Ht 190.5 cm (75\")   Wt 86.3 kg (190 lb 3.2 oz)   SpO2 96%   BMI 23.77 kg/m²     Physical Exam  Vitals and nursing note reviewed.   Constitutional:       Appearance: Normal appearance. He is well-developed.      Comments: Frail elderly male, NAD   HENT:      Head: Normocephalic and atraumatic.      Nose: Nose normal.      Mouth/Throat:      Mouth: Mucous membranes are moist.      Pharynx: No oropharyngeal exudate.   Eyes:      General: No scleral icterus.     Conjunctiva/sclera: Conjunctivae normal.      Pupils: Pupils are equal, round, and reactive to light.   Neck:      Thyroid: No thyromegaly.   Cardiovascular:      Rate and Rhythm: Normal rate and regular rhythm.      Heart sounds: Murmur heard.      No friction rub. No gallop.      Comments: HD catheter in place, right upper chest  Pulmonary:      Effort: Pulmonary effort is normal. No respiratory distress.      Breath sounds: Normal breath sounds. No wheezing.   Abdominal:      General: Bowel sounds are normal. There is no distension.      Palpations: Abdomen is soft.      Tenderness: There is no abdominal tenderness.   Musculoskeletal:         General: No deformity or signs of injury.      Cervical back: Normal range of motion and neck supple.   Lymphadenopathy:      Cervical: No cervical adenopathy.   Skin:     General: Skin is warm " and dry.      Findings: No rash.   Neurological:      Mental Status: He is alert and oriented to person, place, and time.   Psychiatric:         Mood and Affect: Mood normal.         Behavior: Behavior normal.       RECORDS REVIEW:   Results        ASSESSMENT     Diagnoses and all orders for this visit:    1. Diarrhea, unspecified type (Primary)    2. ESRD (end stage renal disease)    3. C. difficile diarrhea    4. Essential hypertension    5. Coronary artery disease involving native heart without angina pectoris, unspecified vessel or lesion type    6. Acquired hypothyroidism        Assessment & Plan      Chronic diarrhea  -Although patient has completed treatment for presumed C. difficile infection, he continues to have loose stools.  -Medication cause is a consideration.  I contacted the patient's nephrologist, Dr. Sellers, who shared that he does not believe any of his current renal medications are contributing to the loose stools.  Sevelamer can in fact cause constipation.  -It is okay for the patient to have 1 dose of Imodium today if diarrhea persists.  I would like for him to start daily fiber supplements to help keep regular. Order placed for fiber.   -MiraLAX and docusate/senna have been discontinued (although the patient has not been taking this medication).    C. difficile infection.  -Patient completes oral vancomycin today.  Other than loose stools, patient did not have any other symptoms associated with C. difficile infection.     Acute kidney injury on end-stage renal disease.  He has transitioned to HD from PD.. A tunneled dialysis catheter was placed by Dr. Noel on 01/03/2025. Nephrology has been consulted and will continue monitoring care for further management.  - PD catheter remains in place and may be removed per nephrology instructions when appropriate.      Hypokalemia.  Noted during hospitalization secondary to GI loss from diarrhea. Labs will be monitored in the nursing facility to ensure  stability.     Coronary artery disease.  Stable on current cardiac medications. Continue rosuvastatin, aspirin 81 mg, and metoprolol 12.5 mg daily.     Hypertension.  Controlled on current medications. Continue amlodipine, metoprolol, and losartan 100 mg daily.      Anxiety.  Continue buspirone 5 mg twice daily.      Hypothyroidism.  Continue levothyroxine 25 mcg daily.     Constipation.  He has been on stool softeners in the past, these have been stopped due to issues with loose stools.        32 min spent with direct patient care, review of medical chart, discussion with nursing staff, and medical decision making.     The use of a video visit has been reviewed with the patient and verbal informed consent has been obtained.       [x]  Discussed Patient in detail with nursing/staff, addressed all needs today.     [x]  Plan of Care Reviewed   []  PT/OT Reviewed   [x]  Order Changes  []  Discharge Plans Reviewed  [x]  Advance Directive on file with Nursing Home.   [x]  POA on file with Nursing Home.    [x]  Code Status listed and reviewed.     I confirm accuracy of unchanged data/findings including physical exam and plan which have been carried forward from previous visit, as well as I have updated appropriately those that have changed        Virgil Rubi DO.  1/15/2025      Patient or patient representative verbalized consent for the use of Ambient Listening during the visit with  Virgil Rubi DO for chart documentation. 1/15/2025  10:41 EST

## 2025-01-22 ENCOUNTER — HOME CARE VISIT (OUTPATIENT)
Dept: HOME HEALTH SERVICES | Facility: HOME HEALTHCARE | Age: 80
End: 2025-01-22
Payer: MEDICARE

## 2025-03-07 NOTE — H&P (VIEW-ONLY)
Outpatient Physical Therapy Discharge Note     Patient: Dong Joseph  : 1957    Beginning/End Dates of Reporting Period:  5/15/2018 to 2018  Dong has been seen for a total of 27 visits  overall.    Referring Provider: Dr. 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     Client Self Report: Dong reports that he is happy with his progress. He feels that he is functioning at 75% of his normal and understands that it will take time and his compliance with his home program. He reports occasional right hip/thigh tightness and soreness. At today's visit he reports right upper thigh soreness 3/10.     Objective Measurements:  Objective Measure: LEFS  Details:     Objective Measure: Strength  Details: 5/5 manual muscle testing extension, abduction, ER. Functional strength: unable to stand up from a right leg in front lunge.     Objective Measure: Active range of motion  Details: R hip extension: 10 degrees, L hip extension: 15 degrees    Objective Measure: Gait analysis  Details: Improving lean to the right but it is still present.     Objective Measure: SLS  Details: > 60 seconds bilaterally     Goals:  Goal Identifier Gait   Goal Description Dong will ambulate safely without the use of an assistive device.   Target Date 18   Date Met  18   Progress:     Goal Identifier Strength   Goal Description Dong will demonstrate 5/5 strength about the right lower extremity allowing him to return to more intense activities.   Target Date 18   Date Met  18   Progress:     Goal Identifier Function   Goal Description Dong will improve his LEFS by 30% indicating improved overall function   Target Date 18   Date Met  18   Progress:     Progress Toward Goals:   Progress this reporting period: All goals have been met.    Plan:  Discharge from therapy.    Discharge: Yes    Reason for  Patient: Travon Plummer    YOB: 1945    Date: 03/07/2025    Primary Care Provider: Chilango Erickson MD    Chief Complaint   Patient presents with    Follow-up     PD cath Removal       SUBJECTIVE:    History of present illness: Patient here to discuss peritoneal dialysis catheter removal.  Apparently has had recent infection and is not having peritoneal dialysis any longer.  He is undergoing hemodialysis via a tunneled dialysis catheter.  The following portions of the patient's history were reviewed and updated as appropriate: allergies, current medications, past family history, past medical history, past social history, past surgical history and problem list.      Review of Systems   Constitutional:  Negative for chills, fever and unexpected weight change.   HENT:  Negative for trouble swallowing and voice change.    Eyes:  Negative for visual disturbance.   Respiratory:  Negative for apnea, cough, chest tightness, shortness of breath and wheezing.    Cardiovascular:  Negative for chest pain, palpitations and leg swelling.   Gastrointestinal:  Negative for abdominal distention, abdominal pain, anal bleeding, blood in stool, constipation, diarrhea, nausea, rectal pain and vomiting.   Endocrine: Negative for cold intolerance and heat intolerance.   Genitourinary:  Negative for difficulty urinating, dysuria, flank pain, scrotal swelling and testicular pain.   Musculoskeletal:  Negative for back pain, gait problem and joint swelling.   Skin:  Negative for color change, rash and wound.   Neurological:  Negative for dizziness, syncope, speech difficulty, weakness, numbness and headaches.   Hematological:  Negative for adenopathy. Does not bruise/bleed easily.   Psychiatric/Behavioral:  Negative for confusion. The patient is not nervous/anxious.          Allergies:  Allergies   Allergen Reactions    Allopurinol Urinary Retention    Lipitor [Atorvastatin] Myalgia       Medications:    Current  Discharge: Patient has met all goals.    Equipment Issued: Theraband    Discharge Plan: Patient to continue home program.    Thank you for this referral.    Radha Urena P.T.     Outpatient Medications:     aspirin 81 MG EC tablet, Take 1 tablet by mouth Daily., Disp: 30 tablet, Rfl: 0    calcitriol (ROCALTROL) 0.25 MCG capsule, Take 1 capsule by mouth Daily. Indications: unknown, Disp: , Rfl:     Cholecalciferol (Vitamin D) 50 MCG (2000 UT) capsule, Take 1 capsule by mouth Daily. Indications: Vitamin D Deficiency, Disp: , Rfl:     Cyanocobalamin (VITAMIN B-12 PO), Take 1,000 mcg by mouth Daily. Indications: Supplement, Disp: , Rfl:     ferrous sulfate 325 (65 FE) MG tablet, Take 1 tablet by mouth 1 (One) Time Per Week. Sunday   Indications: Anemia From Inadequate Iron in the Body, Disp: , Rfl:     levothyroxine (SYNTHROID, LEVOTHROID) 25 MCG tablet, Take 1 tablet by mouth Daily. LEVOXYL  Indications: Underactive Thyroid, Disp: , Rfl: 0    losartan (COZAAR) 100 MG tablet, Take 1 tablet by mouth Daily. Indications: High Blood Pressure, Disp: , Rfl:     metoprolol succinate XL (TOPROL-XL) 25 MG 24 hr tablet, Take  by mouth Daily., Disp: , Rfl:     pantoprazole (PROTONIX) 20 MG EC tablet, Take 1 tablet by mouth Daily., Disp: , Rfl:     polyethylene glycol (MIRALAX) 17 g packet, Take 17 g by mouth Daily As Needed (Use if senna-docusate is ineffective)., Disp: , Rfl:     rosuvastatin (CRESTOR) 40 MG tablet, Take 1 tablet by mouth Every Night., Disp: 90 tablet, Rfl: 3    sennosides-docusate (PERICOLACE) 8.6-50 MG per tablet, Take 1 tablet by mouth., Disp: , Rfl:     busPIRone (BUSPAR) 5 MG tablet, Take 1 tablet by mouth Every 12 (Twelve) Hours for 30 days. Indications: Anxiety Disorder, Disp: 60 tablet, Rfl: 0    History:  Past Medical History:   Diagnosis Date    Benign hypertensive CKD, stage 5 chronic kidney disease or end stage renal disease     Broken arm     Carotid stenosis     bilateral    Cerebrovascular accident 10/31/2008    Coronary artery disease     followed by Dr. Ashford; LOV 7/9/24; denies chest pain, SOB 8/5/24    Dialysis patient 2024    Current 11/13/24    Dyspnea     Elevated  cholesterol     GERD (gastroesophageal reflux disease)     Gout     History of blood transfusion     Hypercholesterolemia     Hypertension 11/10/2015    History of hypertension; hypotensive at the time of office visit on 11/10/2015.    Impaired mobility     Obesity     Osteoarthritis     Paroxysmal atrial fibrillation     History of paroxysmal atrial fibrillation, data deficit.    Prostate cancer 01/2015    Prostate cancer, diagnosed January of 2015, status post radiation therapy x39 treatments, 07/01/2015 at [facility].    Renal cell carcinoma 2015    Renal cell carcinoma, dx March of 2015, status post left nephrectomy.    Wears dentures     instructed no adhesive DOS       Past Surgical History:   Procedure Laterality Date    CAROTID STENT Left 10/31/2008    PTA/left carotid artery stent, 10/31/2008.     COLONOSCOPY N/A 12/23/2021    Procedure: COLONOSCOPY, polypectomy, clip placement x 1;  Surgeon: Deandra Do MD;  Location: [facility] ENDOSCOPY;  Service: Gastroenterology;  Laterality: N/A;    COLONOSCOPY W/ POLYPECTOMY      CORONARY ANGIOPLASTY WITH STENT PLACEMENT      A 3.5 x 13 mm. Cypher ESTIVEN to RCA expanded with 4 mm balloon.     DIALYSIS FISTULA CREATION N/A     abdominal    ENDOSCOPY N/A 12/22/2021    Procedure: ESOPHAGOGASTRODUODENOSCOPY with biopsy;  Surgeon: Deandra Do MD;  Location: [facility] ENDOSCOPY;  Service: Gastroenterology;  Laterality: N/A;    ENDOSCOPY N/A 02/17/2023    Procedure: ESOPHAGOGASTRODUODENOSCOPY with biopsy;  Surgeon: Georgina Pool MD;  Location: [facility] ENDOSCOPY;  Service: Gastroenterology;  Laterality: N/A;    HIP FRACTURE SURGERY      HIP INTERTROCHANTERIC NAILING Right 12/04/2024    Procedure: HIP INTERTROCHANTERIC NAILING;  Surgeon: Dean Hammonds MD;  Location: [facility] OR;  Service: Orthopedics;  Laterality: Right;    INGUINAL HERNIA REPAIR      INSERTION HEMODIALYSIS CATHETER N/A 1/3/2025    Procedure: HEMODIALYSIS CATHETER INSERTION;  Surgeon:  "Bijan Noel MD;  Location: [facility];  Service: General;  Laterality: N/A;    NEPHRECTOMY Left 2015    diagnosed 2015, status post left radical nephrectomy.     PROSTATE FIDUCIAL MARKER PLACEMENT  2016    received XRT for prostate CA in 2016       Family History   Problem Relation Age of Onset    No Known Problems Mother     No Known Problems Father     Colon cancer Neg Hx        Social History     Tobacco Use    Smoking status: Former     Current packs/day: 0.00     Average packs/day: 1 pack/day for 51.0 years (51.0 ttl pk-yrs)     Types: Cigarettes     Start date:      Quit date:      Years since quittin.2     Passive exposure: Past    Smokeless tobacco: Former     Types: Chew   Vaping Use    Vaping status: Never Used   Substance Use Topics    Alcohol use: Not Currently     Comment: rare    Drug use: No        OBJECTIVE:    Vital Signs:   Vitals:    25 1429   BP: 128/78   Pulse: 82   Resp: 12   Temp: 98.4 °F (36.9 °C)   TempSrc: Temporal   SpO2: 100%   Weight: 88 kg (194 lb)   Height: 190.5 cm (75\")       Physical Exam:       General Appearance:    Alert, cooperative, in no acute distress   Head:    Normocephalic, without obvious abnormality, atraumatic   Eyes:            Normal.  No scleral icterus.  PERRLA    Lungs:     Clear to auscultation,respirations regular, even and                  unlabored    Heart:    Regular rhythm and normal rate,    Abdomen:   Soft and nontender   Extremities:   Moves all extremities well, no edema, no cyanosis, no             redness   Skin:   No bleeding, bruising or rash   Neurologic:   Normal without gross deficits.   Psychiatric: No evidence of depression or anxiety          Results Review:   None    Review of Systems was reviewed and confirmed as accurate as documented by the MA.    ASSESSMENT/PLAN:    1. Peritoneal dialysis catheter in place        Patient with a peritoneal dialysis catheter that he states has had issues with infection " and is no longer undergoing peritoneal dialysis.  I offered to remove this.  He understands procedure as well as the risk of bleeding and infection and he wishes to proceed.  He is not interested in a fistula at this time.          Electronically signed by Bijan Noel MD  03/13/25

## 2025-03-07 NOTE — PROGRESS NOTES
Patient: Travon Plummer    YOB: 1945    Date: 03/07/2025    Primary Care Provider: Chilango Erickson MD    Chief Complaint   Patient presents with    Follow-up     PD cath Removal       SUBJECTIVE:    History of present illness: Patient here to discuss peritoneal dialysis catheter removal.  Apparently has had recent infection and is not having peritoneal dialysis any longer.  He is undergoing hemodialysis via a tunneled dialysis catheter.  The following portions of the patient's history were reviewed and updated as appropriate: allergies, current medications, past family history, past medical history, past social history, past surgical history and problem list.      Review of Systems   Constitutional:  Negative for chills, fever and unexpected weight change.   HENT:  Negative for trouble swallowing and voice change.    Eyes:  Negative for visual disturbance.   Respiratory:  Negative for apnea, cough, chest tightness, shortness of breath and wheezing.    Cardiovascular:  Negative for chest pain, palpitations and leg swelling.   Gastrointestinal:  Negative for abdominal distention, abdominal pain, anal bleeding, blood in stool, constipation, diarrhea, nausea, rectal pain and vomiting.   Endocrine: Negative for cold intolerance and heat intolerance.   Genitourinary:  Negative for difficulty urinating, dysuria, flank pain, scrotal swelling and testicular pain.   Musculoskeletal:  Negative for back pain, gait problem and joint swelling.   Skin:  Negative for color change, rash and wound.   Neurological:  Negative for dizziness, syncope, speech difficulty, weakness, numbness and headaches.   Hematological:  Negative for adenopathy. Does not bruise/bleed easily.   Psychiatric/Behavioral:  Negative for confusion. The patient is not nervous/anxious.          Allergies:  Allergies   Allergen Reactions    Allopurinol Urinary Retention    Lipitor [Atorvastatin] Myalgia       Medications:    Current  Outpatient Medications:     aspirin 81 MG EC tablet, Take 1 tablet by mouth Daily., Disp: 30 tablet, Rfl: 0    calcitriol (ROCALTROL) 0.25 MCG capsule, Take 1 capsule by mouth Daily. Indications: unknown, Disp: , Rfl:     Cholecalciferol (Vitamin D) 50 MCG (2000 UT) capsule, Take 1 capsule by mouth Daily. Indications: Vitamin D Deficiency, Disp: , Rfl:     Cyanocobalamin (VITAMIN B-12 PO), Take 1,000 mcg by mouth Daily. Indications: Supplement, Disp: , Rfl:     ferrous sulfate 325 (65 FE) MG tablet, Take 1 tablet by mouth 1 (One) Time Per Week. Sunday   Indications: Anemia From Inadequate Iron in the Body, Disp: , Rfl:     levothyroxine (SYNTHROID, LEVOTHROID) 25 MCG tablet, Take 1 tablet by mouth Daily. LEVOXYL  Indications: Underactive Thyroid, Disp: , Rfl: 0    losartan (COZAAR) 100 MG tablet, Take 1 tablet by mouth Daily. Indications: High Blood Pressure, Disp: , Rfl:     metoprolol succinate XL (TOPROL-XL) 25 MG 24 hr tablet, Take  by mouth Daily., Disp: , Rfl:     pantoprazole (PROTONIX) 20 MG EC tablet, Take 1 tablet by mouth Daily., Disp: , Rfl:     polyethylene glycol (MIRALAX) 17 g packet, Take 17 g by mouth Daily As Needed (Use if senna-docusate is ineffective)., Disp: , Rfl:     rosuvastatin (CRESTOR) 40 MG tablet, Take 1 tablet by mouth Every Night., Disp: 90 tablet, Rfl: 3    sennosides-docusate (PERICOLACE) 8.6-50 MG per tablet, Take 1 tablet by mouth., Disp: , Rfl:     busPIRone (BUSPAR) 5 MG tablet, Take 1 tablet by mouth Every 12 (Twelve) Hours for 30 days. Indications: Anxiety Disorder, Disp: 60 tablet, Rfl: 0    History:  Past Medical History:   Diagnosis Date    Benign hypertensive CKD, stage 5 chronic kidney disease or end stage renal disease     Broken arm     Carotid stenosis     bilateral    Cerebrovascular accident 10/31/2008    Coronary artery disease     followed by Dr. Ashford; LOV 7/9/24; denies chest pain, SOB 8/5/24    Dialysis patient 2024    Current 11/13/24    Dyspnea     Elevated  cholesterol     GERD (gastroesophageal reflux disease)     Gout     History of blood transfusion     Hypercholesterolemia     Hypertension 11/10/2015    History of hypertension; hypotensive at the time of office visit on 11/10/2015.    Impaired mobility     Obesity     Osteoarthritis     Paroxysmal atrial fibrillation     History of paroxysmal atrial fibrillation, data deficit.    Prostate cancer 01/2015    Prostate cancer, diagnosed January of 2015, status post radiation therapy x39 treatments, 07/01/2015 at [facility].    Renal cell carcinoma 2015    Renal cell carcinoma, dx March of 2015, status post left nephrectomy.    Wears dentures     instructed no adhesive DOS       Past Surgical History:   Procedure Laterality Date    CAROTID STENT Left 10/31/2008    PTA/left carotid artery stent, 10/31/2008.     COLONOSCOPY N/A 12/23/2021    Procedure: COLONOSCOPY, polypectomy, clip placement x 1;  Surgeon: Deandra Do MD;  Location: [facility] ENDOSCOPY;  Service: Gastroenterology;  Laterality: N/A;    COLONOSCOPY W/ POLYPECTOMY      CORONARY ANGIOPLASTY WITH STENT PLACEMENT      A 3.5 x 13 mm. Cypher ESTIVEN to RCA expanded with 4 mm balloon.     DIALYSIS FISTULA CREATION N/A     abdominal    ENDOSCOPY N/A 12/22/2021    Procedure: ESOPHAGOGASTRODUODENOSCOPY with biopsy;  Surgeon: Deandra Do MD;  Location: [facility] ENDOSCOPY;  Service: Gastroenterology;  Laterality: N/A;    ENDOSCOPY N/A 02/17/2023    Procedure: ESOPHAGOGASTRODUODENOSCOPY with biopsy;  Surgeon: Georgina Pool MD;  Location: [facility] ENDOSCOPY;  Service: Gastroenterology;  Laterality: N/A;    HIP FRACTURE SURGERY      HIP INTERTROCHANTERIC NAILING Right 12/04/2024    Procedure: HIP INTERTROCHANTERIC NAILING;  Surgeon: Dean Hammonds MD;  Location: [facility] OR;  Service: Orthopedics;  Laterality: Right;    INGUINAL HERNIA REPAIR      INSERTION HEMODIALYSIS CATHETER N/A 1/3/2025    Procedure: HEMODIALYSIS CATHETER INSERTION;  Surgeon:  "Bijan Noel MD;  Location: [facility];  Service: General;  Laterality: N/A;    NEPHRECTOMY Left 2015    diagnosed 2015, status post left radical nephrectomy.     PROSTATE FIDUCIAL MARKER PLACEMENT  2016    received XRT for prostate CA in 2016       Family History   Problem Relation Age of Onset    No Known Problems Mother     No Known Problems Father     Colon cancer Neg Hx        Social History     Tobacco Use    Smoking status: Former     Current packs/day: 0.00     Average packs/day: 1 pack/day for 51.0 years (51.0 ttl pk-yrs)     Types: Cigarettes     Start date:      Quit date:      Years since quittin.2     Passive exposure: Past    Smokeless tobacco: Former     Types: Chew   Vaping Use    Vaping status: Never Used   Substance Use Topics    Alcohol use: Not Currently     Comment: rare    Drug use: No        OBJECTIVE:    Vital Signs:   Vitals:    25 1429   BP: 128/78   Pulse: 82   Resp: 12   Temp: 98.4 °F (36.9 °C)   TempSrc: Temporal   SpO2: 100%   Weight: 88 kg (194 lb)   Height: 190.5 cm (75\")       Physical Exam:       General Appearance:    Alert, cooperative, in no acute distress   Head:    Normocephalic, without obvious abnormality, atraumatic   Eyes:            Normal.  No scleral icterus.  PERRLA    Lungs:     Clear to auscultation,respirations regular, even and                  unlabored    Heart:    Regular rhythm and normal rate,    Abdomen:   Soft and nontender   Extremities:   Moves all extremities well, no edema, no cyanosis, no             redness   Skin:   No bleeding, bruising or rash   Neurologic:   Normal without gross deficits.   Psychiatric: No evidence of depression or anxiety          Results Review:   None    Review of Systems was reviewed and confirmed as accurate as documented by the MA.    ASSESSMENT/PLAN:    1. Peritoneal dialysis catheter in place        Patient with a peritoneal dialysis catheter that he states has had issues with infection " and is no longer undergoing peritoneal dialysis.  I offered to remove this.  He understands procedure as well as the risk of bleeding and infection and he wishes to proceed.  He is not interested in a fistula at this time.          Electronically signed by Bijan Noel MD  03/13/25

## 2025-03-13 ENCOUNTER — OFFICE VISIT (OUTPATIENT)
Dept: SURGERY | Facility: CLINIC | Age: 80
End: 2025-03-13
Payer: MEDICARE

## 2025-03-13 VITALS
HEIGHT: 75 IN | OXYGEN SATURATION: 100 % | HEART RATE: 82 BPM | RESPIRATION RATE: 12 BRPM | WEIGHT: 194 LBS | SYSTOLIC BLOOD PRESSURE: 128 MMHG | BODY MASS INDEX: 24.12 KG/M2 | DIASTOLIC BLOOD PRESSURE: 78 MMHG | TEMPERATURE: 98.4 F

## 2025-03-13 DIAGNOSIS — Z99.2 PERITONEAL DIALYSIS CATHETER IN PLACE: Primary | ICD-10-CM

## 2025-03-13 RX ORDER — PANTOPRAZOLE SODIUM 20 MG/1
1 TABLET, DELAYED RELEASE ORAL DAILY
COMMUNITY
Start: 2025-02-13

## 2025-03-13 RX ORDER — AMOXICILLIN 250 MG
1 CAPSULE ORAL
COMMUNITY
Start: 2024-12-31

## 2025-03-13 RX ORDER — METOPROLOL SUCCINATE 25 MG/1
TABLET, EXTENDED RELEASE ORAL DAILY
COMMUNITY
Start: 2025-01-09

## 2025-03-14 ENCOUNTER — PREP FOR SURGERY (OUTPATIENT)
Dept: OTHER | Facility: HOSPITAL | Age: 80
End: 2025-03-14
Payer: MEDICARE

## 2025-03-14 DIAGNOSIS — Z99.2 PERITONEAL DIALYSIS CATHETER IN PLACE: Primary | ICD-10-CM

## 2025-03-26 ENCOUNTER — ANESTHESIA (OUTPATIENT)
Dept: PERIOP | Facility: HOSPITAL | Age: 80
End: 2025-03-26
Payer: MEDICARE

## 2025-03-26 ENCOUNTER — HOSPITAL ENCOUNTER (OUTPATIENT)
Facility: HOSPITAL | Age: 80
Setting detail: HOSPITAL OUTPATIENT SURGERY
Discharge: HOME | End: 2025-03-26
Attending: SURGERY | Admitting: SURGERY
Payer: MEDICARE

## 2025-03-26 ENCOUNTER — ANESTHESIA EVENT (OUTPATIENT)
Dept: PERIOP | Facility: HOSPITAL | Age: 80
End: 2025-03-26
Payer: MEDICARE

## 2025-03-26 VITALS
WEIGHT: 194 LBS | BODY MASS INDEX: 24.12 KG/M2 | RESPIRATION RATE: 15 BRPM | HEART RATE: 62 BPM | HEIGHT: 75 IN | TEMPERATURE: 97.7 F | DIASTOLIC BLOOD PRESSURE: 84 MMHG | OXYGEN SATURATION: 95 % | SYSTOLIC BLOOD PRESSURE: 150 MMHG

## 2025-03-26 DIAGNOSIS — Z99.2 PERITONEAL DIALYSIS CATHETER IN PLACE: ICD-10-CM

## 2025-03-26 PROCEDURE — 25010000002 ONDANSETRON PER 1 MG: Performed by: NURSE ANESTHETIST, CERTIFIED REGISTERED

## 2025-03-26 PROCEDURE — 25010000002 BUPIVACAINE (PF) 0.25 % SOLUTION 10 ML VIAL: Performed by: SURGERY

## 2025-03-26 PROCEDURE — 25010000002 FENTANYL CITRATE (PF) 50 MCG/ML SOLUTION PREFILLED SYRINGE: Performed by: NURSE ANESTHETIST, CERTIFIED REGISTERED

## 2025-03-26 PROCEDURE — 25010000002 CEFAZOLIN PER 500 MG: Performed by: SURGERY

## 2025-03-26 PROCEDURE — 49422 REMOVE TUNNELED IP CATH: CPT | Performed by: SURGERY

## 2025-03-26 PROCEDURE — 25010000002 LIDOCAINE (CARDIAC): Performed by: NURSE ANESTHETIST, CERTIFIED REGISTERED

## 2025-03-26 PROCEDURE — 25010000002 LIDOCAINE 1 % SOLUTION 20 ML VIAL: Performed by: SURGERY

## 2025-03-26 PROCEDURE — 25010000002 PROPOFOL 10 MG/ML EMULSION: Performed by: NURSE ANESTHETIST, CERTIFIED REGISTERED

## 2025-03-26 PROCEDURE — 25810000003 SODIUM CHLORIDE 0.9 % SOLUTION: Performed by: SURGERY

## 2025-03-26 RX ORDER — LIDOCAINE HYDROCHLORIDE 10 MG/ML
INJECTION, SOLUTION INFILTRATION; PERINEURAL AS NEEDED
Status: DISCONTINUED | OUTPATIENT
Start: 2025-03-26 | End: 2025-03-26 | Stop reason: HOSPADM

## 2025-03-26 RX ORDER — SODIUM CHLORIDE 9 MG/ML
50 INJECTION, SOLUTION INTRAVENOUS CONTINUOUS
Status: DISCONTINUED | OUTPATIENT
Start: 2025-03-26 | End: 2025-03-26 | Stop reason: HOSPADM

## 2025-03-26 RX ORDER — FENTANYL CITRATE 50 UG/ML
INJECTION, SOLUTION INTRAMUSCULAR; INTRAVENOUS AS NEEDED
Status: DISCONTINUED | OUTPATIENT
Start: 2025-03-26 | End: 2025-03-26 | Stop reason: SURG

## 2025-03-26 RX ORDER — PROPOFOL 10 MG/ML
VIAL (ML) INTRAVENOUS CONTINUOUS PRN
Status: DISCONTINUED | OUTPATIENT
Start: 2025-03-26 | End: 2025-03-26 | Stop reason: SURG

## 2025-03-26 RX ORDER — BUPIVACAINE HYDROCHLORIDE 5 MG/ML
INJECTION, SOLUTION EPIDURAL; INTRACAUDAL; PERINEURAL AS NEEDED
Status: DISCONTINUED | OUTPATIENT
Start: 2025-03-26 | End: 2025-03-26 | Stop reason: HOSPADM

## 2025-03-26 RX ORDER — ONDANSETRON 2 MG/ML
INJECTION INTRAMUSCULAR; INTRAVENOUS AS NEEDED
Status: DISCONTINUED | OUTPATIENT
Start: 2025-03-26 | End: 2025-03-26 | Stop reason: SURG

## 2025-03-26 RX ADMIN — LIDOCAINE HYDROCHLORIDE 60 MG: 20 INJECTION, SOLUTION INTRAVENOUS at 11:34

## 2025-03-26 RX ADMIN — ONDANSETRON 4 MG: 2 INJECTION INTRAMUSCULAR; INTRAVENOUS at 11:34

## 2025-03-26 RX ADMIN — PROPOFOL 100 MCG/KG/MIN: 10 INJECTION, EMULSION INTRAVENOUS at 11:34

## 2025-03-26 RX ADMIN — FENTANYL CITRATE 25 MCG: 50 INJECTION, SOLUTION INTRAMUSCULAR; INTRAVENOUS at 11:45

## 2025-03-26 RX ADMIN — FENTANYL CITRATE 25 MCG: 50 INJECTION, SOLUTION INTRAMUSCULAR; INTRAVENOUS at 11:34

## 2025-03-26 RX ADMIN — SODIUM CHLORIDE 50 ML/HR: 9 INJECTION, SOLUTION INTRAVENOUS at 09:26

## 2025-03-26 RX ADMIN — SODIUM CHLORIDE 2000 MG: 9 INJECTION, SOLUTION INTRAVENOUS at 11:29

## 2025-03-26 NOTE — ANESTHESIA PREPROCEDURE EVALUATION
Anesthesia Evaluation     Patient summary reviewed and Nursing notes reviewed   no history of anesthetic complications:   NPO Solid Status: > 8 hours  NPO Liquid Status: > 2 hours           Airway   Mallampati: II  TM distance: >3 FB  Neck ROM: full  Possible difficult intubation and Difficult intubation highly probable  Dental      Pulmonary    (+) a smoker Former, COPD,shortness of breath, sleep apnea, decreased breath sounds  Cardiovascular     PT is on anticoagulation therapy  Patient on routine beta blocker    (+) hypertension, CAD, dysrhythmias Atrial Fib, MANZO, PVD, hyperlipidemia,  carotid artery disease carotid bilateral      Neuro/Psych  (+) CVA  GI/Hepatic/Renal/Endo    (+) obesity, morbid obesity, GERD, GI bleeding , renal disease- CRI, thyroid problem hypothyroidism    Musculoskeletal     Abdominal   (+) obese   Substance History      OB/GYN          Other   arthritis,   history of cancer    ROS/Med Hx Other: Labs reviewed  8/26 bun 47 crt 7.9               Anesthesia Plan    ASA 4     MAC     (Risks and benefits discussed including risk of aspiration, recall and dental damage. All patient questions answered.    Will continue with plan of care.)  intravenous induction     Anesthetic plan, risks, benefits, and alternatives have been provided, discussed and informed consent has been obtained with: patient.  Pre-procedure education provided  Plan discussed with CRNA.      CODE STATUS:

## 2025-03-26 NOTE — ANESTHESIA POSTPROCEDURE EVALUATION
Patient: Travon Plummer    Procedure Summary       Date: 03/26/25 Room / Location: [facility] OR  /  [facility] OR    Anesthesia Start: 1129 Anesthesia Stop: 1211    Procedure: REMOVAL PERITONEAL DIALYSIS CATHETER (Abdomen) Diagnosis:       Peritoneal dialysis catheter in place      (Peritoneal dialysis catheter in place [Z99.2])    Surgeons: Bijan Noel MD Provider: Juan Antonio Harvey CRNA    Anesthesia Type: MAC ASA Status: 4            Anesthesia Type: MAC    Vitals  No vitals data found for the desired time range.          Post Anesthesia Care and Evaluation    Patient location during evaluation: PHASE II  Patient participation: complete - patient participated  Level of consciousness: awake and alert  Pain score: 1  Pain management: satisfactory to patient    Airway patency: patent  Anesthetic complications: No anesthetic complications  PONV Status: none  Cardiovascular status: acceptable and stable  Respiratory status: acceptable and spontaneous ventilation  Hydration status: acceptable    Comments: Vitals signs as noted in nursing documentation as per protocol.

## 2025-03-26 NOTE — OP NOTE
PATIENT:    Travon Plummer    DATE OF SURGERY:  3/26/2025    PHYSICIAN:    Bijan Noel MD    REFERRING PHYSICIAN:  Bijan Noel MD    YOB: 1945    PREOPERATIVE DIAGNOSIS: Peritoneal dialysis catheter in place    POSTOPERATIVE DIAGNOSIS: Same    PROCEDURE: Excision/removal tunneled peritoneal dialysis catheter         Specimen: None    EBL: 10 mL    Complications: None      ANESTHESIA: MAC    OPERATIVE PROCEDURE:  The patient was taken to the operating room, placed in the supine position, and given MAC.  They were prepped and draped in the normal sterile fashion.  They did receive preoperative IV antibiotics.  The nursing staff did perform intraoperative timeout prior to the incision.    An incision was made overlying the more distal cuff.  Dissection was continued deeply until the cuff was identified and it was dissected free and catheter removed.  Through this incision the more proximal cuff was also identified and dissected free until the entire catheter was removed.  Fascia was reapproximated using a figure-of-eight Vicryl suture.  Wound was further closed in layers using interrupted deep dermal Vicryl closure and skin glue for the skin.  Hemostasis had been controlled with cautery and there was minimal blood loss throughout the procedure.  There were no complications and the patient tolerated the procedure well.    The patient was stable at this point in time and subsequently transferred back to the recovery room in stable condition.       Bijan Noel MD  3/26/2025  12:14 EDT

## 2025-04-01 DIAGNOSIS — I65.29 STENOSIS OF CAROTID ARTERY, UNSPECIFIED LATERALITY: ICD-10-CM

## 2025-04-01 DIAGNOSIS — I65.21 CAROTID STENOSIS, ASYMPTOMATIC, RIGHT: ICD-10-CM

## 2025-04-01 DIAGNOSIS — I10 ESSENTIAL HYPERTENSION: ICD-10-CM

## 2025-04-01 DIAGNOSIS — I25.10 CORONARY ARTERY DISEASE INVOLVING NATIVE CORONARY ARTERY OF NATIVE HEART WITHOUT ANGINA PECTORIS: Primary | ICD-10-CM

## 2025-05-15 ENCOUNTER — ANESTHESIA EVENT (OUTPATIENT)
Dept: PERIOP | Facility: HOSPITAL | Age: 80
End: 2025-05-15
Payer: MEDICARE

## 2025-05-15 RX ORDER — FAMOTIDINE 10 MG/ML
20 INJECTION, SOLUTION INTRAVENOUS ONCE
Status: CANCELLED | OUTPATIENT
Start: 2025-05-15 | End: 2025-05-15

## 2025-05-16 ENCOUNTER — ANESTHESIA (OUTPATIENT)
Dept: PERIOP | Facility: HOSPITAL | Age: 80
End: 2025-05-16
Payer: MEDICARE

## 2025-05-16 ENCOUNTER — APPOINTMENT (OUTPATIENT)
Dept: GENERAL RADIOLOGY | Facility: HOSPITAL | Age: 80
End: 2025-05-16
Payer: MEDICARE

## 2025-05-16 ENCOUNTER — ANESTHESIA EVENT CONVERTED (OUTPATIENT)
Dept: ANESTHESIOLOGY | Facility: HOSPITAL | Age: 80
End: 2025-05-16
Payer: MEDICARE

## 2025-05-16 ENCOUNTER — HOSPITAL ENCOUNTER (OUTPATIENT)
Facility: HOSPITAL | Age: 80
Setting detail: HOSPITAL OUTPATIENT SURGERY
Discharge: HOME | End: 2025-05-16
Attending: SURGERY | Admitting: SURGERY
Payer: MEDICARE

## 2025-05-16 VITALS
RESPIRATION RATE: 16 BRPM | OXYGEN SATURATION: 96 % | WEIGHT: 188 LBS | SYSTOLIC BLOOD PRESSURE: 163 MMHG | DIASTOLIC BLOOD PRESSURE: 78 MMHG | HEIGHT: 75 IN | BODY MASS INDEX: 23.38 KG/M2 | TEMPERATURE: 98.4 F | HEART RATE: 72 BPM

## 2025-05-16 DIAGNOSIS — N18.6 END STAGE RENAL FAILURE ON DIALYSIS: Primary | ICD-10-CM

## 2025-05-16 DIAGNOSIS — Z99.2 END STAGE RENAL FAILURE ON DIALYSIS: Primary | ICD-10-CM

## 2025-05-16 LAB
ANION GAP SERPL CALCULATED.3IONS-SCNC: 11 MMOL/L (ref 5–15)
BUN SERPL-MCNC: 18 MG/DL (ref 8–23)
BUN/CREAT SERPL: 4.7 (ref 7–25)
CALCIUM SPEC-SCNC: 9.6 MG/DL (ref 8.6–10.5)
CHLORIDE SERPL-SCNC: 100 MMOL/L (ref 98–107)
CO2 SERPL-SCNC: 27 MMOL/L (ref 22–29)
CREAT SERPL-MCNC: 3.81 MG/DL (ref 0.76–1.27)
DEPRECATED RDW RBC AUTO: 53.1 FL (ref 37–54)
EGFRCR SERPLBLD CKD-EPI 2021: 15.3 ML/MIN/1.73
ERYTHROCYTE [DISTWIDTH] IN BLOOD BY AUTOMATED COUNT: 14.6 % (ref 12.3–15.4)
GLUCOSE SERPL-MCNC: 88 MG/DL (ref 65–99)
HCT VFR BLD AUTO: 36.6 % (ref 37.5–51)
HGB BLD-MCNC: 11.3 G/DL (ref 13–17.7)
INR PPP: 0.95 (ref 0.89–1.12)
MCH RBC QN AUTO: 30.4 PG (ref 26.6–33)
MCHC RBC AUTO-ENTMCNC: 30.9 G/DL (ref 31.5–35.7)
MCV RBC AUTO: 98.4 FL (ref 79–97)
PLATELET # BLD AUTO: 168 10*3/MM3 (ref 140–450)
PMV BLD AUTO: 11.6 FL (ref 6–12)
POTASSIUM SERPL-SCNC: 4.5 MMOL/L (ref 3.5–5.2)
PROTHROMBIN TIME: 13.2 SECONDS (ref 12.2–15.3)
QT INTERVAL: 438 MS
QTC INTERVAL: 459 MS
RBC # BLD AUTO: 3.72 10*6/MM3 (ref 4.14–5.8)
SODIUM SERPL-SCNC: 138 MMOL/L (ref 136–145)
WBC NRBC COR # BLD AUTO: 7.25 10*3/MM3 (ref 3.4–10.8)

## 2025-05-16 PROCEDURE — 25810000003 SODIUM CHLORIDE 0.9 % SOLUTION: Performed by: NURSE ANESTHETIST, CERTIFIED REGISTERED

## 2025-05-16 PROCEDURE — 25810000003 SODIUM CHLORIDE 0.9 % SOLUTION: Performed by: ANESTHESIOLOGY

## 2025-05-16 PROCEDURE — 25010000002 BUPIVACAINE (PF) 0.5 % SOLUTION: Performed by: NURSE ANESTHETIST, CERTIFIED REGISTERED

## 2025-05-16 PROCEDURE — 25010000002 LIDOCAINE PF 1% 1 % SOLUTION: Performed by: NURSE ANESTHETIST, CERTIFIED REGISTERED

## 2025-05-16 PROCEDURE — 80048 BASIC METABOLIC PNL TOTAL CA: CPT | Performed by: SURGERY

## 2025-05-16 PROCEDURE — 25010000002 PROPOFOL 1000 MG/ML EMULSION: Performed by: NURSE ANESTHETIST, CERTIFIED REGISTERED

## 2025-05-16 PROCEDURE — 85610 PROTHROMBIN TIME: CPT | Performed by: SURGERY

## 2025-05-16 PROCEDURE — 25810000003 SODIUM CHLORIDE PER 500 ML: Performed by: SURGERY

## 2025-05-16 PROCEDURE — 25010000002 HEPARIN (PORCINE) PER 1000 UNITS: Performed by: SURGERY

## 2025-05-16 PROCEDURE — 25010000002 HEPARIN (PORCINE) PER 1000 UNITS: Performed by: NURSE ANESTHETIST, CERTIFIED REGISTERED

## 2025-05-16 PROCEDURE — 85027 COMPLETE CBC AUTOMATED: CPT | Performed by: SURGERY

## 2025-05-16 PROCEDURE — 93005 ELECTROCARDIOGRAM TRACING: CPT | Performed by: ANESTHESIOLOGY

## 2025-05-16 PROCEDURE — 25010000002 CEFAZOLIN PER 500 MG: Performed by: SURGERY

## 2025-05-16 DEVICE — HEMOST ABS SURGICEL SNOW 1X2IN: Type: IMPLANTABLE DEVICE | Site: ARM | Status: FUNCTIONAL

## 2025-05-16 RX ORDER — HEPARIN SODIUM 1000 [USP'U]/ML
INJECTION, SOLUTION INTRAVENOUS; SUBCUTANEOUS AS NEEDED
Status: DISCONTINUED | OUTPATIENT
Start: 2025-05-16 | End: 2025-05-16 | Stop reason: SURG

## 2025-05-16 RX ORDER — FENTANYL CITRATE 50 UG/ML
50 INJECTION, SOLUTION INTRAMUSCULAR; INTRAVENOUS
Status: DISCONTINUED | OUTPATIENT
Start: 2025-05-16 | End: 2025-05-16 | Stop reason: HOSPADM

## 2025-05-16 RX ORDER — FAMOTIDINE 20 MG/1
20 TABLET, FILM COATED ORAL ONCE
Status: COMPLETED | OUTPATIENT
Start: 2025-05-16 | End: 2025-05-16

## 2025-05-16 RX ORDER — HYDROCODONE BITARTRATE AND ACETAMINOPHEN 5; 325 MG/1; MG/1
1 TABLET ORAL EVERY 6 HOURS PRN
Qty: 7 TABLET | Refills: 0 | Status: SHIPPED | OUTPATIENT
Start: 2025-05-16 | End: 2025-05-19

## 2025-05-16 RX ORDER — SODIUM CHLORIDE, SODIUM LACTATE, POTASSIUM CHLORIDE, CALCIUM CHLORIDE 600; 310; 30; 20 MG/100ML; MG/100ML; MG/100ML; MG/100ML
9 INJECTION, SOLUTION INTRAVENOUS CONTINUOUS
Status: DISCONTINUED | OUTPATIENT
Start: 2025-05-17 | End: 2025-05-16 | Stop reason: HOSPADM

## 2025-05-16 RX ORDER — SODIUM CHLORIDE 9 MG/ML
INJECTION, SOLUTION INTRAVENOUS CONTINUOUS PRN
Status: DISCONTINUED | OUTPATIENT
Start: 2025-05-16 | End: 2025-05-16 | Stop reason: SURG

## 2025-05-16 RX ORDER — SODIUM CHLORIDE 9 MG/ML
9 INJECTION, SOLUTION INTRAVENOUS ONCE
Status: COMPLETED | OUTPATIENT
Start: 2025-05-16 | End: 2025-05-16

## 2025-05-16 RX ORDER — ONDANSETRON 2 MG/ML
4 INJECTION INTRAMUSCULAR; INTRAVENOUS ONCE AS NEEDED
Status: DISCONTINUED | OUTPATIENT
Start: 2025-05-16 | End: 2025-05-16 | Stop reason: HOSPADM

## 2025-05-16 RX ORDER — MIDAZOLAM HYDROCHLORIDE 1 MG/ML
0.5 INJECTION, SOLUTION INTRAMUSCULAR; INTRAVENOUS
Status: DISCONTINUED | OUTPATIENT
Start: 2025-05-16 | End: 2025-05-16 | Stop reason: HOSPADM

## 2025-05-16 RX ORDER — LIDOCAINE HYDROCHLORIDE 10 MG/ML
0.5 INJECTION, SOLUTION EPIDURAL; INFILTRATION; INTRACAUDAL; PERINEURAL ONCE AS NEEDED
Status: DISCONTINUED | OUTPATIENT
Start: 2025-05-16 | End: 2025-05-16 | Stop reason: HOSPADM

## 2025-05-16 RX ORDER — HYDROMORPHONE HYDROCHLORIDE 1 MG/ML
0.5 INJECTION, SOLUTION INTRAMUSCULAR; INTRAVENOUS; SUBCUTANEOUS
Status: DISCONTINUED | OUTPATIENT
Start: 2025-05-16 | End: 2025-05-16 | Stop reason: HOSPADM

## 2025-05-16 RX ORDER — SODIUM CHLORIDE 0.9 % (FLUSH) 0.9 %
10 SYRINGE (ML) INJECTION AS NEEDED
Status: DISCONTINUED | OUTPATIENT
Start: 2025-05-16 | End: 2025-05-16 | Stop reason: HOSPADM

## 2025-05-16 RX ORDER — BUPIVACAINE HYDROCHLORIDE 5 MG/ML
INJECTION, SOLUTION EPIDURAL; INTRACAUDAL; PERINEURAL
Status: COMPLETED | OUTPATIENT
Start: 2025-05-16 | End: 2025-05-16

## 2025-05-16 RX ORDER — LIDOCAINE HYDROCHLORIDE 10 MG/ML
INJECTION, SOLUTION EPIDURAL; INFILTRATION; INTRACAUDAL; PERINEURAL AS NEEDED
Status: DISCONTINUED | OUTPATIENT
Start: 2025-05-16 | End: 2025-05-16 | Stop reason: SURG

## 2025-05-16 RX ORDER — SODIUM CHLORIDE 9 MG/ML
INJECTION, SOLUTION INTRAVENOUS AS NEEDED
Status: DISCONTINUED | OUTPATIENT
Start: 2025-05-16 | End: 2025-05-16 | Stop reason: HOSPADM

## 2025-05-16 RX ORDER — AMLODIPINE BESYLATE 2.5 MG/1
2.5 TABLET ORAL DAILY
COMMUNITY

## 2025-05-16 RX ORDER — SODIUM CHLORIDE 0.9 % (FLUSH) 0.9 %
10 SYRINGE (ML) INJECTION EVERY 12 HOURS SCHEDULED
Status: DISCONTINUED | OUTPATIENT
Start: 2025-05-16 | End: 2025-05-16 | Stop reason: HOSPADM

## 2025-05-16 RX ADMIN — BUPIVACAINE HYDROCHLORIDE 30 ML: 5 INJECTION, SOLUTION EPIDURAL; INTRACAUDAL; PERINEURAL at 06:45

## 2025-05-16 RX ADMIN — SODIUM CHLORIDE 9 ML/HR: 9 INJECTION, SOLUTION INTRAVENOUS at 07:25

## 2025-05-16 RX ADMIN — FAMOTIDINE 20 MG: 20 TABLET, FILM COATED ORAL at 07:29

## 2025-05-16 RX ADMIN — HEPARIN SODIUM 8000 UNITS: 1000 INJECTION INTRAVENOUS; SUBCUTANEOUS at 08:24

## 2025-05-16 RX ADMIN — Medication 20 MG: at 08:06

## 2025-05-16 RX ADMIN — PROPOFOL 25 MCG/KG/MIN: 10 INJECTION, EMULSION INTRAVENOUS at 07:59

## 2025-05-16 RX ADMIN — SODIUM CHLORIDE: 9 INJECTION, SOLUTION INTRAVENOUS at 07:59

## 2025-05-16 RX ADMIN — SODIUM CHLORIDE 2000 MG: 900 INJECTION INTRAVENOUS at 08:02

## 2025-05-16 RX ADMIN — LIDOCAINE HYDROCHLORIDE 50 MG: 10 INJECTION, SOLUTION EPIDURAL; INFILTRATION; INTRACAUDAL; PERINEURAL at 07:59

## 2025-05-16 NOTE — ANESTHESIA PREPROCEDURE EVALUATION
Anesthesia Evaluation     Patient summary reviewed and Nursing notes reviewed   NPO Solid Status: > 8 hours  NPO Liquid Status: > 2 hours           Airway   Mallampati: II  TM distance: >3 FB  Neck ROM: full  No difficulty expected  Dental    (+) upper dentures and edentulous    Pulmonary    (+) a smoker Former, cigarettes, COPD (mild  no MDI) mild,shortness of breath  (-) asthma, recent URI, sleep apnea, no home oxygen  Cardiovascular     ECG reviewed  Patient on routine beta blocker    (+) hypertension, CAD, cardiac stents (2008) more than 12 months ago , dysrhythmias (No Blood thinners) Paroxysmal Atrial Fib, hyperlipidemia,  carotid artery disease (sp L ICA stent >70% R ICA) carotid bilateral  (-) angina    ROS comment: Coronary artery disease.  Dyspnea/abnormal MPS, 2008:  Inferior wall ischemia, EF 70%:  CATH 2008:  Normal LV. Subtotal occlusion of well collateralized right JEREMIAS.  ESTIVEN to RCA expanded with 4 mm balloon.   Echo 2022: EF 62%, grade 1 diastolic dysfunction, aortic sclerosis.    CVA with expressive aphasia,2008:L carotid  stent, 10/2008.   No obstructive disease by duplex, October 2010.  Carotid duplex, 2014, ([place] ) patent L carotid stent -no evidence stenosis  R  ICA.> >70% R ICA   stenosis, medical management [facility]      Neuro/Psych  (+) CVA residual symptoms  (-) seizuresNeuromuscular disease: dysphasia 2008.  GI/Hepatic/Renal/Endo    (+) obesity, morbid obesity, GERD, GI bleeding , renal disease- ESRD and dialysis, thyroid problem hypothyroidism  (-) diabetes    Musculoskeletal     Abdominal    Substance History      OB/GYN          Other   arthritis,   history of cancer    ROS/Med Hx Other: Old labs Elevated creat >6    K normal    HCT 34  Labs ECG pending                 Anesthesia Plan    ASA 4     MAC and regional     (Regional block (supraclav SS) c MAC  Labs ECG pending )  intravenous induction     Anesthetic plan, risks, benefits, and alternatives have been provided, discussed and  informed consent has been obtained with: patient.    Plan discussed with CRNA.      CODE STATUS:

## 2025-05-16 NOTE — INTERVAL H&P NOTE
H&P reviewed. The patient was examined and there are no changes to the H&P.        Immunization History:   Influenza:  UTD per pt  Pneumococcal:  UTD per pt  Tetanus:  UTD per pt    Vital Signs:  There were no vitals taken for this visit.    Physical Exam:    CV:  S1S2 regular rate and rhythm, no murmur               Resp:  Clear to auscultation; respirations regular, even and unlabored    Results Review:     Lab Results   Component Value Date    WBC 8.50 01/08/2025    HGB 11.6 (L) 05/06/2025    HGB 34.8 (L) 05/06/2025    HCT 34.0 (L) 01/08/2025    MCV 97.1 (H) 01/08/2025     01/08/2025    NEUTROABS 4.92 01/05/2025    GLUCOSE 94 01/09/2025    BUN 41 (H) 05/06/2025    CREATININE 6.82 (H) 01/09/2025    EGFRIFNONA 19 (L) 02/21/2022    EGFRIFAFRI 23 (L) 02/21/2022     01/09/2025    K 4.0 01/09/2025     01/09/2025    CO2 23.5 01/09/2025    MG 1.7 01/06/2025    PHOS 5.0 (H) 01/09/2025    CALCIUM 8.5 (L) 01/09/2025    ALBUMIN 2.6 (L) 01/09/2025    AST 23 01/04/2025    ALT <5 01/04/2025    BILITOT 0.4 01/04/2025    PTT 29.7 02/17/2023    INR 1.07 02/17/2023        I reviewed the patient's new clinical results.

## 2025-05-16 NOTE — ANESTHESIA PROCEDURE NOTES
"Peripheral Block      Patient reassessed immediately prior to procedure    Reason for block: at surgeon's request and post-op pain management  Performed by  CRNA/CAA: Moustapha Sanchez CRNA  Preanesthetic Checklist  Completed: patient identified, IV checked, site marked, risks and benefits discussed, surgical consent, monitors and equipment checked, pre-op evaluation and timeout performed  Prep:  Pt Position: supine  Sterile barriers:mask, cap, washed/disinfected hands and gloves  Patient monitoring: EKG, continuous pulse oximetry and blood pressure monitoring  Procedure  Performed under: local infiltration  Guidance:ultrasound guided  Images:still images obtained, printed/placed on chart    Laterality:left  Block Type:superficial cervical plexus  Injection Technique:single-shot  Needle Type:echogenic and short-bevel  Needle Gauge:20 G  Resistance on Injection: none    Medications Used: bupivacaine (PF) (MARCAINE) 0.5 % injection - Injection   30 mL - 5/16/2025 6:45:00 AM      Post Assessment  Injection Assessment: negative aspiration for heme, no paresthesia on injection and incremental injection  Patient Tolerance:comfortable throughout block  Complications:no  Additional Notes  A high-frequency linear transducer, with sterile cover, was placed on the lateral neck, overlying the Sternocleidomastoid Muscle (SCM). The structures that were identified included the SCM, Middle Scalene Muscle, and C5-C7 nerve roots. The insertion site was prepped in sterile fashion and then localized with 2-5 ml of 1% Lidocaine. Using ultrasound-guidance, a 20-gauge B-Marquez 4\" Ultraplex 360 non-stimulating echogenic needle was advanced in plane, from lateral to medial, until the needle tip resided in the fascial layer underneath the SCM. 1-3ml of preservative free normal saline was used to hydro-dissect the fascial planes. After the fascial plane was verified, the local anesthetic (LA) was injected. Care was taken to prevent LA from " spreading over the top of C5 to minimize phrenic nerve involvement. Aspiration every 5 ml to prevent intravascular injection. Injection was completed with negative aspiration of blood and negative intravascular injection. Injection pressures were normal with minimal resistance.  Performed by: Moustapha Sanchez CRNA

## 2025-05-16 NOTE — OP NOTE
ARTERIOVENOUS FISTULA FORMATION  Procedure Report    Patient Name:  Travon Plummer  YOB: 1945    Date of Surgery:  5/16/2025     Indications: End-stage renal disease in need of long-term arteriovenous access    Pre-op Diagnosis:   End-stage renal disease on hemodialysis       Post-Op Diagnosis Codes:  Same    Procedure/CPT® Codes:  No CPT Code Applied in Case Entry    Procedure(s):  BRACHIOBASILIC ARTERIOVENOUS FISTULA CREATION    Staff:  Surgeon(s):  Junior Nelson MD    Circulator: Dayday Martin RN  Scrub Person: Thierno Tamayo  Nursing Assistant: Sandra Chan           Anesthesia: Monitored Anesthesia Care    Estimated Blood Loss: minimal    Implants:    Implant Name Type Inv. Item Serial No.  Lot No. LRB No. Used Action   HEMOST ABS SURGICEL SNOW 1X2IN - [id number] Implant HEMOST ABS SURGICEL SNOW 1X2IN  [employer] J [id number] Left 1 Implanted       Specimen:          None        Findings: Left brachiobasilic fistula creation    Complications: None    Description of Procedure: After informed consent was obtained risk and benefits discussed with the patient patient was brought back to the operating table laid in supine positions patient's left upper extremity was prepped and draped in usual sterile fashion preoperative antibiotics given a timeout performed.  Curvilinear incision was made at the antecubital fossa basilic vein was isolated out this appeared to be around 2 to 2.5 mm small in size.  Once this was dissected out the brachial artery was also dissected out.  A bolus of heparin was given.  The brachial artery was clamped proximally and distally basilic vein was transected and anastomosed to the brachial artery in end-to-side fashion.  Fistula had a good thrill.  Full transposition was not performed secondary to the size of the fistula we will allow this to mature prior to transposing it.  Surgical bed was made hemostatic was closed in multiple layers  standard dressings applied.  Patient tolerated procedure well was aroused from sedation taken recovery in stable condition later be discharged home.      Junior Leslie MD     Date: 5/16/2025  Time: 08:58 EDT

## 2025-05-16 NOTE — ANESTHESIA POSTPROCEDURE EVALUATION
Patient: Travon Plummer    Procedure Summary       Date: 05/16/25 Room / Location:  [facility] /  [facility]    Anesthesia Start: 0752 Anesthesia Stop: 0904    Procedure: BRACHIOBASILIC ARTERIOVENOUS FISTULA CREATION (Left: Arm Upper) Diagnosis:     Surgeons: Junior Nelson MD Provider: Nicholas Barron MD    Anesthesia Type: MAC, regional ASA Status: 4            Anesthesia Type: MAC, regional    Vitals  Vitals Value Taken Time   /79    Temp 98.5    Pulse 67 05/16/25 09:03   Resp 13    SpO2 98 % 05/16/25 09:03   Vitals shown include unfiled device data.        Post Anesthesia Care and Evaluation    Patient location during evaluation: PACU  Patient participation: complete - patient participated  Level of consciousness: awake and alert  Pain management: adequate    Airway patency: patent  Anesthetic complications: No anesthetic complications  PONV Status: none  Cardiovascular status: hemodynamically stable and acceptable  Respiratory status: nonlabored ventilation, acceptable and nasal cannula  Hydration status: acceptable         no

## 2025-06-06 ENCOUNTER — TELEPHONE (OUTPATIENT)
Dept: CARDIOLOGY | Facility: CLINIC | Age: 80
End: 2025-06-06
Payer: MEDICARE

## 2025-06-06 NOTE — TELEPHONE ENCOUNTER
Received v/m from patient's spouse stating the patient has dialysis on Tuesdays and they are wanting to reschedule his duplex and follow up w/ [facility] on 9/16/25. I returned her call and let her know Dr. Ashford is only at the [facility] on Tuesdays. She said that was fine and to keep the appts, as she would try to reschedule his dialysis for that week.

## (undated) DEVICE — DECANTER BAG 9": Brand: MEDLINE INDUSTRIES, INC.

## (undated) DEVICE — SUT MNCRYL PLS ANTIB UD 4/0 PC3 18IN

## (undated) DEVICE — Device

## (undated) DEVICE — RICH MAJOR PROCEDURE: Brand: MEDLINE INDUSTRIES, INC.

## (undated) DEVICE — HYBRID TUBING/CAP SET FOR OLYMPUS® SCOPES: Brand: ERBE

## (undated) DEVICE — DRSNG WND GZ PAD BORDERED LF 4X5IN STRL

## (undated) DEVICE — LUBE JELLY PK/2.75GM STRL BX/144

## (undated) DEVICE — UNDYED MONOFILAMENT (POLYDIOXANONE), ABSORBABLE SYNTHETIC SURGICAL SUTURE: Brand: PDS

## (undated) DEVICE — ADHS SKIN PREMIERPRO EXOFIN TOPICAL HI/VISC .5ML

## (undated) DEVICE — K-WIRE
Type: IMPLANTABLE DEVICE | Site: HIP | Status: NON-FUNCTIONAL
Removed: 2024-12-04

## (undated) DEVICE — SOL IRR H2O BTL 1000ML STRL

## (undated) DEVICE — SUT VIC 4/0 SH 27IN J415H

## (undated) DEVICE — GLV SURG SENSICARE W/ALOE PF LF 7.5 STRL

## (undated) DEVICE — 6619 2 PTNT ISO SYS INCISE AREA&LT;(&GT;&&LT;)&GT;P: Brand: STERI-DRAPE™ IOBAN™ 2

## (undated) DEVICE — CONMED SCOPE SAVER BITE BLOCK, 20X27 MM: Brand: SCOPE SAVER

## (undated) DEVICE — GLV SURG SENSICARE W/ALOE PF LF SZ6 STRL

## (undated) DEVICE — DRSNG SURESITE123 6X8IN

## (undated) DEVICE — GLV SURG SENSICARE W/ALOE PF LF 6.5 STRL

## (undated) DEVICE — SUT PROLN 6/0 BV1 D/A 30IN 8709H

## (undated) DEVICE — VLV SXN AIR/H2O ORCAPOD3 1P/U STRL

## (undated) DEVICE — MEDI-VAC NON-CONDUCTIVE SUCTION TUBING: Brand: CARDINAL HEALTH

## (undated) DEVICE — STRIP,CLOSURE,WOUND,MEDI-STRIP,1/2X4: Brand: MEDLINE

## (undated) DEVICE — SUT ETHLN 3/0 FS1 663G

## (undated) DEVICE — SPNG GZ WOVN 4X4IN 12PLY 10/BX STRL

## (undated) DEVICE — INTENDED TO BE USED TO OCCLUDE, RETRACT AND IDENTIFY ARTERIES, VEINS, TENDONS AND NERVES IN SURGICAL PROCEDURES: Brand: STERION®  VESSEL LOOP

## (undated) DEVICE — PK VASC 10

## (undated) DEVICE — SUT VIC 2/0 CT1 27IN J259H

## (undated) DEVICE — SYR LUERLOK 30CC

## (undated) DEVICE — PATIENT RETURN ELECTRODE, SINGLE-USE, CONTACT QUALITY MONITORING, ADULT, WITH 9FT CORD, FOR PATIENTS WEIGING OVER 33LBS. (15KG): Brand: MEGADYNE

## (undated) DEVICE — MICRO HVTSA, 0.5G AND HVTSA SOURCEMARK PRODUCT CODE M1206 AND M1206-01: Brand: EXOFIN MICRO HVTSA, 0.5G

## (undated) DEVICE — DRAPE,HAND,STERILE: Brand: MEDLINE

## (undated) DEVICE — CVR PROB ULTRASND CIVFLEX GEN/PURP TELESCP/FOLD 5.5X58IN LF

## (undated) DEVICE — KT CATH HEMO NEXTSTEP 15F 23CM RED

## (undated) DEVICE — K-WIRE, STERILE
Type: IMPLANTABLE DEVICE | Site: HIP | Status: NON-FUNCTIONAL
Removed: 2024-12-04

## (undated) DEVICE — DRSNG WND BORDR/ADHS NONADHR/GZ LF 4X4IN STRL

## (undated) DEVICE — DRSNG SURESITE WNDW 4X4.5

## (undated) DEVICE — ENDOSCOPY PORT CONNECTOR FOR OLYMPUS® SCOPES: Brand: ERBE

## (undated) DEVICE — SOL NACL 0.9PCT 100ML SGL

## (undated) DEVICE — SUT VIC 2/0 SH 27IN

## (undated) DEVICE — SYR LUERLOK 20CC BX/50

## (undated) DEVICE — SHEET,DRAPE,53X77,STERILE: Brand: MEDLINE

## (undated) DEVICE — DRESSING,GAUZE,XEROFORM,CURAD,1"X8",ST: Brand: CURAD

## (undated) DEVICE — SYR LUER SLPTP 50ML

## (undated) DEVICE — CVR POST PERINATAL PAD

## (undated) DEVICE — QUICK CATCH IN-LINE SUCTION POLYP TRAP IS USED FOR SUCTION RETRIEVAL OF ENDOSCOPICALLY REMOVED POLYPS.

## (undated) DEVICE — COVER,C-ARM,41X74: Brand: MEDLINE

## (undated) DEVICE — RICH GENERAL LAPAROSCOPY: Brand: MEDLINE INDUSTRIES, INC.

## (undated) DEVICE — ENDOPATH XCEL UNIVERSAL TROCAR STABLILITY SLEEVES: Brand: ENDOPATH XCEL

## (undated) DEVICE — PAD,NON-ADHERENT,3X8,STERILE,LF,1/PK: Brand: MEDLINE

## (undated) DEVICE — SYR LL TP 10ML STRL

## (undated) DEVICE — NDL HYPO ECLPS SFTY 25G 1 1/2IN

## (undated) DEVICE — SOL IRR H2O BO 1000ML STRL

## (undated) DEVICE — LIMB HLDR QUILTED QR

## (undated) DEVICE — SNAP KOVER: Brand: UNBRANDED

## (undated) DEVICE — FRCP BX RADJAW4 NDL 2.8 240 STD OG

## (undated) DEVICE — ANTIBACTERIAL UNDYED BRAIDED (POLYGLACTIN 910), SYNTHETIC ABSORBABLE SUTURE: Brand: COATED VICRYL

## (undated) DEVICE — SUCTION CANISTER, 1500CC, RIGID: Brand: DEROYAL

## (undated) DEVICE — SUT SILK 2/0 SH 30IN K833H

## (undated) DEVICE — ST TBG PNEUMOCLEAR EVAC SMOKE HIFLO

## (undated) DEVICE — YANKAUER,BULB TIP, NO VENT: Brand: ARGYLE

## (undated) DEVICE — 3M™ STERI-DRAPE™ ISOLATION BAG, 10 PER CARTON / 4 CARTONS PER CASE, 1003: Brand: 3M™ STERI-DRAPE™

## (undated) DEVICE — ENDOPATH XCEL BLADELESS TROCARS WITH STABILITY SLEEVES: Brand: ENDOPATH XCEL

## (undated) DEVICE — ADHS LIQ MASTISOL 2/3ML

## (undated) DEVICE — SNAR POLYP SENSATION STDOVL 27 240 BX40

## (undated) DEVICE — UNDERGLV SURG BIOGEL INDICAT PI SZ8 BLU

## (undated) DEVICE — SUT VIC 3/0 SH 27IN J416H

## (undated) DEVICE — HYPODERMIC SAFETY NEEDLE: Brand: MONOJECT

## (undated) DEVICE — Device: Brand: PROTECTORS, LEG SPAR BALL JOINT, 12/PR

## (undated) DEVICE — SYR LUERLOK 5CC

## (undated) DEVICE — CVR HNDL LIGHT RIGID

## (undated) DEVICE — GLV SURG BIOGEL LTX PF 7 1/2

## (undated) DEVICE — SYR CONTRL LUERLOK 10CC

## (undated) DEVICE — APPL CHLORAPREP TINTED 26ML TEAL

## (undated) DEVICE — GLV SURG SENSICARE PI LF PF 8 GRN STRL

## (undated) DEVICE — DRILL, AO SMALL: Brand: GAMMA

## (undated) DEVICE — KT INTRO PERC PEELPRO TEARAWAY PFTE STD 0.038IN 16F 18G 14CM